# Patient Record
Sex: MALE | Race: WHITE | NOT HISPANIC OR LATINO
[De-identification: names, ages, dates, MRNs, and addresses within clinical notes are randomized per-mention and may not be internally consistent; named-entity substitution may affect disease eponyms.]

---

## 2016-08-18 RX ORDER — AMLODIPINE BESYLATE 2.5 MG/1
2 TABLET ORAL
Qty: 0 | Refills: 0 | DISCHARGE
Start: 2016-08-18 | End: 2016-09-17

## 2017-03-09 ENCOUNTER — NON-APPOINTMENT (OUTPATIENT)
Age: 77
End: 2017-03-09

## 2017-03-09 ENCOUNTER — LABORATORY RESULT (OUTPATIENT)
Age: 77
End: 2017-03-09

## 2017-03-09 ENCOUNTER — APPOINTMENT (OUTPATIENT)
Dept: CARDIOLOGY | Facility: CLINIC | Age: 77
End: 2017-03-09

## 2017-03-09 VITALS
SYSTOLIC BLOOD PRESSURE: 161 MMHG | DIASTOLIC BLOOD PRESSURE: 79 MMHG | WEIGHT: 150 LBS | BODY MASS INDEX: 25.61 KG/M2 | OXYGEN SATURATION: 96 % | HEART RATE: 70 BPM | HEIGHT: 64 IN

## 2017-03-14 LAB
ALBUMIN SERPL ELPH-MCNC: 4.8 G/DL
ALP BLD-CCNC: 68 U/L
ALT SERPL-CCNC: 23 U/L
ANION GAP SERPL CALC-SCNC: 12 MMOL/L
AST SERPL-CCNC: 36 U/L
BASOPHILS # BLD AUTO: 0.01 K/UL
BASOPHILS NFR BLD AUTO: 0.1 %
BILIRUB SERPL-MCNC: 0.4 MG/DL
BUN SERPL-MCNC: 26 MG/DL
CALCIUM SERPL-MCNC: 10 MG/DL
CHLORIDE SERPL-SCNC: 102 MMOL/L
CHOLEST SERPL-MCNC: 148 MG/DL
CHOLEST/HDLC SERPL: 2.1 RATIO
CO2 SERPL-SCNC: 25 MMOL/L
CREAT SERPL-MCNC: 1.05 MG/DL
EOSINOPHIL # BLD AUTO: 0.18 K/UL
EOSINOPHIL NFR BLD AUTO: 2.2 %
GLUCOSE SERPL-MCNC: 107 MG/DL
HBA1C MFR BLD HPLC: 5.9 %
HCT VFR BLD CALC: 40.9 %
HDLC SERPL-MCNC: 70 MG/DL
HGB BLD-MCNC: 13.7 G/DL
IMM GRANULOCYTES NFR BLD AUTO: 0.1 %
INR PPP: 2.77 RATIO
LDLC SERPL CALC-MCNC: 66 MG/DL
LYMPHOCYTES # BLD AUTO: 1.49 K/UL
LYMPHOCYTES NFR BLD AUTO: 18.4 %
MAN DIFF?: NORMAL
MCHC RBC-ENTMCNC: 33.5 GM/DL
MCHC RBC-ENTMCNC: 35.4 PG
MCV RBC AUTO: 105.7 FL
MONOCYTES # BLD AUTO: 0.48 K/UL
MONOCYTES NFR BLD AUTO: 5.9 %
NEUTROPHILS # BLD AUTO: 5.91 K/UL
NEUTROPHILS NFR BLD AUTO: 73.3 %
PLATELET # BLD AUTO: 277 K/UL
POTASSIUM SERPL-SCNC: 5.8 MMOL/L
PROT SERPL-MCNC: 8.2 G/DL
PSA FREE FLD-MCNC: 15.4 %
PSA FREE SERPL-MCNC: 0.05 NG/ML
PSA SERPL-MCNC: 0.34 NG/ML
PT BLD: 31.6 SEC
RBC # BLD: 3.87 M/UL
RBC # FLD: 14.2 %
SODIUM SERPL-SCNC: 139 MMOL/L
TRIGL SERPL-MCNC: 58 MG/DL
TSH SERPL-ACNC: 2.24 UIU/ML
WBC # FLD AUTO: 8.08 K/UL

## 2017-06-22 ENCOUNTER — APPOINTMENT (OUTPATIENT)
Dept: CARDIOLOGY | Facility: CLINIC | Age: 77
End: 2017-06-22

## 2017-06-22 ENCOUNTER — NON-APPOINTMENT (OUTPATIENT)
Age: 77
End: 2017-06-22

## 2017-06-22 VITALS — DIASTOLIC BLOOD PRESSURE: 74 MMHG | SYSTOLIC BLOOD PRESSURE: 157 MMHG

## 2017-06-22 VITALS
OXYGEN SATURATION: 96 % | HEART RATE: 64 BPM | BODY MASS INDEX: 26.12 KG/M2 | DIASTOLIC BLOOD PRESSURE: 79 MMHG | SYSTOLIC BLOOD PRESSURE: 166 MMHG | HEIGHT: 64 IN | WEIGHT: 153 LBS

## 2017-06-23 LAB
INR PPP: 1.79 RATIO
PSA FREE FLD-MCNC: 17.1
PSA FREE SERPL-MCNC: 0.07 NG/ML
PSA SERPL-MCNC: 0.41 NG/ML
PT BLD: 20.5 SEC

## 2017-09-25 ENCOUNTER — TRANSCRIPTION ENCOUNTER (OUTPATIENT)
Age: 77
End: 2017-09-25

## 2017-10-10 ENCOUNTER — APPOINTMENT (OUTPATIENT)
Dept: ULTRASOUND IMAGING | Facility: HOSPITAL | Age: 77
End: 2017-10-10
Payer: MEDICARE

## 2017-10-10 ENCOUNTER — OUTPATIENT (OUTPATIENT)
Dept: OUTPATIENT SERVICES | Facility: HOSPITAL | Age: 77
LOS: 1 days | End: 2017-10-10
Payer: MEDICARE

## 2017-10-10 ENCOUNTER — APPOINTMENT (OUTPATIENT)
Dept: CV DIAGNOSITCS | Facility: HOSPITAL | Age: 77
End: 2017-10-10
Payer: MEDICARE

## 2017-10-10 DIAGNOSIS — Z98.89 OTHER SPECIFIED POSTPROCEDURAL STATES: Chronic | ICD-10-CM

## 2017-10-10 DIAGNOSIS — Z00.00 ENCOUNTER FOR GENERAL ADULT MEDICAL EXAMINATION WITHOUT ABNORMAL FINDINGS: ICD-10-CM

## 2017-10-10 DIAGNOSIS — Z90.79 ACQUIRED ABSENCE OF OTHER GENITAL ORGAN(S): Chronic | ICD-10-CM

## 2017-10-10 DIAGNOSIS — I35.0 NONRHEUMATIC AORTIC (VALVE) STENOSIS: ICD-10-CM

## 2017-10-10 DIAGNOSIS — Z95.1 PRESENCE OF AORTOCORONARY BYPASS GRAFT: Chronic | ICD-10-CM

## 2017-10-10 DIAGNOSIS — Z90.89 ACQUIRED ABSENCE OF OTHER ORGANS: Chronic | ICD-10-CM

## 2017-10-10 PROCEDURE — 93306 TTE W/DOPPLER COMPLETE: CPT | Mod: 26

## 2017-10-10 PROCEDURE — 93923 UPR/LXTR ART STDY 3+ LVLS: CPT | Mod: 26

## 2017-10-10 PROCEDURE — 93306 TTE W/DOPPLER COMPLETE: CPT

## 2017-10-10 PROCEDURE — 93923 UPR/LXTR ART STDY 3+ LVLS: CPT

## 2017-10-19 ENCOUNTER — APPOINTMENT (OUTPATIENT)
Dept: CARDIOLOGY | Facility: CLINIC | Age: 77
End: 2017-10-19
Payer: MEDICARE

## 2017-10-19 ENCOUNTER — NON-APPOINTMENT (OUTPATIENT)
Age: 77
End: 2017-10-19

## 2017-10-19 ENCOUNTER — LABORATORY RESULT (OUTPATIENT)
Age: 77
End: 2017-10-19

## 2017-10-19 VITALS
OXYGEN SATURATION: 97 % | HEIGHT: 64 IN | HEART RATE: 63 BPM | DIASTOLIC BLOOD PRESSURE: 73 MMHG | SYSTOLIC BLOOD PRESSURE: 163 MMHG

## 2017-10-19 PROCEDURE — 93000 ELECTROCARDIOGRAM COMPLETE: CPT

## 2017-10-19 PROCEDURE — 99215 OFFICE O/P EST HI 40 MIN: CPT

## 2017-10-20 LAB
25(OH)D3 SERPL-MCNC: 55.3 NG/ML
ALBUMIN SERPL ELPH-MCNC: 4.7 G/DL
ALP BLD-CCNC: 67 U/L
ALT SERPL-CCNC: 28 U/L
ANION GAP SERPL CALC-SCNC: 14 MMOL/L
AST SERPL-CCNC: 31 U/L
BASOPHILS # BLD AUTO: 0 K/UL
BASOPHILS NFR BLD AUTO: 0 %
BILIRUB SERPL-MCNC: 0.4 MG/DL
BUN SERPL-MCNC: 23 MG/DL
CALCIUM SERPL-MCNC: 10.2 MG/DL
CHLORIDE SERPL-SCNC: 103 MMOL/L
CHOLEST SERPL-MCNC: 148 MG/DL
CHOLEST/HDLC SERPL: 2.1 RATIO
CO2 SERPL-SCNC: 25 MMOL/L
CREAT SERPL-MCNC: 1.08 MG/DL
EOSINOPHIL # BLD AUTO: 0.28 K/UL
EOSINOPHIL NFR BLD AUTO: 4.4 %
GLUCOSE SERPL-MCNC: 107 MG/DL
HBA1C MFR BLD HPLC: 5.7 %
HCT VFR BLD CALC: 42 %
HDLC SERPL-MCNC: 69 MG/DL
HGB BLD-MCNC: 14.4 G/DL
INR PPP: 2.98 RATIO
LDLC SERPL CALC-MCNC: 69 MG/DL
LYMPHOCYTES # BLD AUTO: 0.72 K/UL
LYMPHOCYTES NFR BLD AUTO: 11.4 %
MAN DIFF?: NORMAL
MCHC RBC-ENTMCNC: 34.3 GM/DL
MCHC RBC-ENTMCNC: 36.5 PG
MCV RBC AUTO: 106.3 FL
MONOCYTES # BLD AUTO: 0.56 K/UL
MONOCYTES NFR BLD AUTO: 8.8 %
NEUTROPHILS # BLD AUTO: 4.77 K/UL
NEUTROPHILS NFR BLD AUTO: 75.4 %
PLATELET # BLD AUTO: 314 K/UL
POTASSIUM SERPL-SCNC: 5.3 MMOL/L
PROT SERPL-MCNC: 8.1 G/DL
PT BLD: 34.4 SEC
RBC # BLD: 3.95 M/UL
RBC # FLD: 14 %
SODIUM SERPL-SCNC: 142 MMOL/L
TRIGL SERPL-MCNC: 51 MG/DL
TSH SERPL-ACNC: 2.02 UIU/ML
WBC # FLD AUTO: 6.33 K/UL

## 2017-10-24 ENCOUNTER — APPOINTMENT (OUTPATIENT)
Dept: ULTRASOUND IMAGING | Facility: HOSPITAL | Age: 77
End: 2017-10-24

## 2017-10-24 ENCOUNTER — OUTPATIENT (OUTPATIENT)
Dept: OUTPATIENT SERVICES | Facility: HOSPITAL | Age: 77
LOS: 1 days | End: 2017-10-24
Payer: MEDICARE

## 2017-10-24 DIAGNOSIS — Z90.89 ACQUIRED ABSENCE OF OTHER ORGANS: Chronic | ICD-10-CM

## 2017-10-24 DIAGNOSIS — Z95.1 PRESENCE OF AORTOCORONARY BYPASS GRAFT: Chronic | ICD-10-CM

## 2017-10-24 DIAGNOSIS — I25.10 ATHEROSCLEROTIC HEART DISEASE OF NATIVE CORONARY ARTERY WITHOUT ANGINA PECTORIS: ICD-10-CM

## 2017-10-24 DIAGNOSIS — Z90.79 ACQUIRED ABSENCE OF OTHER GENITAL ORGAN(S): Chronic | ICD-10-CM

## 2017-10-24 DIAGNOSIS — Z98.89 OTHER SPECIFIED POSTPROCEDURAL STATES: Chronic | ICD-10-CM

## 2017-10-24 PROCEDURE — 93880 EXTRACRANIAL BILAT STUDY: CPT | Mod: 26

## 2017-10-24 PROCEDURE — 93880 EXTRACRANIAL BILAT STUDY: CPT

## 2018-02-08 ENCOUNTER — APPOINTMENT (OUTPATIENT)
Dept: PULMONOLOGY | Facility: CLINIC | Age: 78
End: 2018-02-08
Payer: MEDICARE

## 2018-02-08 VITALS
TEMPERATURE: 207.86 F | HEIGHT: 64 IN | OXYGEN SATURATION: 92 % | BODY MASS INDEX: 25.61 KG/M2 | DIASTOLIC BLOOD PRESSURE: 70 MMHG | SYSTOLIC BLOOD PRESSURE: 138 MMHG | WEIGHT: 150 LBS | RESPIRATION RATE: 15 BRPM | HEART RATE: 61 BPM

## 2018-02-08 PROCEDURE — 99214 OFFICE O/P EST MOD 30 MIN: CPT

## 2018-02-08 RX ORDER — PNEUMOCOCCAL 23-VAL P-SAC VAC 25MCG/0.5
25 VIAL (ML) INJECTION
Qty: 1 | Refills: 0 | Status: COMPLETED | COMMUNITY
Start: 2017-12-11

## 2018-02-08 RX ORDER — FLUTICASONE PROPIONATE AND SALMETEROL 50; 250 UG/1; UG/1
250-50 POWDER RESPIRATORY (INHALATION)
Qty: 60 | Refills: 0 | Status: COMPLETED | COMMUNITY
Start: 2016-09-21

## 2018-03-01 ENCOUNTER — NON-APPOINTMENT (OUTPATIENT)
Age: 78
End: 2018-03-01

## 2018-03-01 ENCOUNTER — APPOINTMENT (OUTPATIENT)
Dept: CARDIOLOGY | Facility: CLINIC | Age: 78
End: 2018-03-01
Payer: MEDICARE

## 2018-03-01 ENCOUNTER — LABORATORY RESULT (OUTPATIENT)
Age: 78
End: 2018-03-01

## 2018-03-01 VITALS — SYSTOLIC BLOOD PRESSURE: 150 MMHG | DIASTOLIC BLOOD PRESSURE: 68 MMHG

## 2018-03-01 VITALS
SYSTOLIC BLOOD PRESSURE: 160 MMHG | HEART RATE: 89 BPM | DIASTOLIC BLOOD PRESSURE: 67 MMHG | BODY MASS INDEX: 25.61 KG/M2 | HEIGHT: 64 IN | OXYGEN SATURATION: 95 % | WEIGHT: 150 LBS

## 2018-03-01 PROCEDURE — 93000 ELECTROCARDIOGRAM COMPLETE: CPT

## 2018-03-01 PROCEDURE — 99214 OFFICE O/P EST MOD 30 MIN: CPT

## 2018-03-01 RX ORDER — WARFARIN 4 MG/1
4 TABLET ORAL
Qty: 90 | Refills: 0 | Status: DISCONTINUED | COMMUNITY
Start: 2016-11-17 | End: 2018-03-01

## 2018-03-06 LAB
25(OH)D3 SERPL-MCNC: 47.8 NG/ML
ALBUMIN SERPL ELPH-MCNC: 4.8 G/DL
ALP BLD-CCNC: 64 U/L
ALT SERPL-CCNC: 29 U/L
ANION GAP SERPL CALC-SCNC: 20 MMOL/L
AST SERPL-CCNC: 45 U/L
BASOPHILS # BLD AUTO: 0 K/UL
BASOPHILS NFR BLD AUTO: 0 %
BILIRUB SERPL-MCNC: 0.2 MG/DL
BUN SERPL-MCNC: 24 MG/DL
CALCIUM SERPL-MCNC: 10.2 MG/DL
CHLORIDE SERPL-SCNC: 103 MMOL/L
CHOLEST SERPL-MCNC: 150 MG/DL
CHOLEST/HDLC SERPL: 2 RATIO
CO2 SERPL-SCNC: 18 MMOL/L
CREAT SERPL-MCNC: 1.25 MG/DL
EOSINOPHIL # BLD AUTO: 0.18 K/UL
EOSINOPHIL NFR BLD AUTO: 2.5
GLUCOSE SERPL-MCNC: 94 MG/DL
HBA1C MFR BLD HPLC: 5.5 %
HCT VFR BLD CALC: 41.1 %
HDLC SERPL-MCNC: 74 MG/DL
HGB BLD-MCNC: 13.2 G/DL
INR PPP: 3.58 RATIO
LDLC SERPL CALC-MCNC: 66 MG/DL
LYMPHOCYTES # BLD AUTO: 0.67 K/UL
LYMPHOCYTES NFR BLD AUTO: 9.3 %
MAN DIFF?: NORMAL
MCHC RBC-ENTMCNC: 32.1 GM/DL
MCHC RBC-ENTMCNC: 34.7 PG
MCV RBC AUTO: 108.2 FL
MONOCYTES # BLD AUTO: 0.85 K/UL
MONOCYTES NFR BLD AUTO: 11.9 %
NEUTROPHILS # BLD AUTO: 5.46 K/UL
NEUTROPHILS NFR BLD AUTO: 76.3 %
PLATELET # BLD AUTO: 299 K/UL
POTASSIUM SERPL-SCNC: 6.3 MMOL/L
PROT SERPL-MCNC: 7.9 G/DL
PSA FREE FLD-MCNC: 20.3
PSA FREE SERPL-MCNC: 0.13 NG/ML
PSA SERPL-MCNC: 0.64 NG/ML
PT BLD: 41.5 SEC
RBC # BLD: 3.8 M/UL
RBC # FLD: 14 %
SODIUM SERPL-SCNC: 141 MMOL/L
TRIGL SERPL-MCNC: 50 MG/DL
TSH SERPL-ACNC: 2.5 UIU/ML
WBC # FLD AUTO: 7.16 K/UL

## 2018-03-08 LAB
ALBUMIN SERPL ELPH-MCNC: 4.4 G/DL
ALP BLD-CCNC: 59 U/L
ALT SERPL-CCNC: 27 U/L
ANION GAP SERPL CALC-SCNC: 14 MMOL/L
AST SERPL-CCNC: 34 U/L
BILIRUB SERPL-MCNC: 0.4 MG/DL
BUN SERPL-MCNC: 23 MG/DL
CALCIUM SERPL-MCNC: 9.9 MG/DL
CHLORIDE SERPL-SCNC: 106 MMOL/L
CHOLEST SERPL-MCNC: 151 MG/DL
CHOLEST/HDLC SERPL: 2.2 RATIO
CO2 SERPL-SCNC: 20 MMOL/L
CREAT SERPL-MCNC: 1.1 MG/DL
GLUCOSE SERPL-MCNC: 102 MG/DL
HDLC SERPL-MCNC: 69 MG/DL
LDLC SERPL CALC-MCNC: 74 MG/DL
POTASSIUM SERPL-SCNC: 5.2 MMOL/L
PROT SERPL-MCNC: 8 G/DL
SODIUM SERPL-SCNC: 140 MMOL/L
TRIGL SERPL-MCNC: 40 MG/DL

## 2018-03-12 ENCOUNTER — APPOINTMENT (OUTPATIENT)
Dept: PULMONOLOGY | Facility: CLINIC | Age: 78
End: 2018-03-12
Payer: MEDICARE

## 2018-03-12 VITALS
DIASTOLIC BLOOD PRESSURE: 90 MMHG | WEIGHT: 158 LBS | OXYGEN SATURATION: 96 % | TEMPERATURE: 96.3 F | BODY MASS INDEX: 26.98 KG/M2 | HEART RATE: 64 BPM | HEIGHT: 64 IN | SYSTOLIC BLOOD PRESSURE: 140 MMHG | RESPIRATION RATE: 16 BRPM

## 2018-03-12 PROCEDURE — 94726 PLETHYSMOGRAPHY LUNG VOLUMES: CPT

## 2018-03-12 PROCEDURE — 99214 OFFICE O/P EST MOD 30 MIN: CPT | Mod: 25

## 2018-03-12 PROCEDURE — 94060 EVALUATION OF WHEEZING: CPT

## 2018-03-12 PROCEDURE — ZZZZZ: CPT

## 2018-03-12 PROCEDURE — 94729 DIFFUSING CAPACITY: CPT

## 2018-03-13 ENCOUNTER — APPOINTMENT (OUTPATIENT)
Dept: ULTRASOUND IMAGING | Facility: HOSPITAL | Age: 78
End: 2018-03-13
Payer: MEDICARE

## 2018-03-15 ENCOUNTER — APPOINTMENT (OUTPATIENT)
Dept: ULTRASOUND IMAGING | Facility: HOSPITAL | Age: 78
End: 2018-03-15
Payer: MEDICARE

## 2018-03-15 ENCOUNTER — OUTPATIENT (OUTPATIENT)
Dept: OUTPATIENT SERVICES | Facility: HOSPITAL | Age: 78
LOS: 1 days | End: 2018-03-15
Payer: MEDICARE

## 2018-03-15 DIAGNOSIS — Z98.89 OTHER SPECIFIED POSTPROCEDURAL STATES: Chronic | ICD-10-CM

## 2018-03-15 DIAGNOSIS — I10 ESSENTIAL (PRIMARY) HYPERTENSION: ICD-10-CM

## 2018-03-15 DIAGNOSIS — R06.09 OTHER FORMS OF DYSPNEA: ICD-10-CM

## 2018-03-15 DIAGNOSIS — Z00.00 ENCOUNTER FOR GENERAL ADULT MEDICAL EXAMINATION WITHOUT ABNORMAL FINDINGS: ICD-10-CM

## 2018-03-15 DIAGNOSIS — I25.10 ATHEROSCLEROTIC HEART DISEASE OF NATIVE CORONARY ARTERY WITHOUT ANGINA PECTORIS: ICD-10-CM

## 2018-03-15 DIAGNOSIS — Z90.89 ACQUIRED ABSENCE OF OTHER ORGANS: Chronic | ICD-10-CM

## 2018-03-15 DIAGNOSIS — Z95.1 PRESENCE OF AORTOCORONARY BYPASS GRAFT: Chronic | ICD-10-CM

## 2018-03-15 DIAGNOSIS — Z90.79 ACQUIRED ABSENCE OF OTHER GENITAL ORGAN(S): Chronic | ICD-10-CM

## 2018-03-15 PROCEDURE — 76770 US EXAM ABDO BACK WALL COMP: CPT | Mod: 26

## 2018-03-15 PROCEDURE — 76775 US EXAM ABDO BACK WALL LIM: CPT

## 2018-06-07 ENCOUNTER — APPOINTMENT (OUTPATIENT)
Dept: CARDIOLOGY | Facility: CLINIC | Age: 78
End: 2018-06-07
Payer: MEDICARE

## 2018-06-07 ENCOUNTER — NON-APPOINTMENT (OUTPATIENT)
Age: 78
End: 2018-06-07

## 2018-06-07 VITALS
BODY MASS INDEX: 25.61 KG/M2 | HEART RATE: 62 BPM | SYSTOLIC BLOOD PRESSURE: 161 MMHG | HEIGHT: 64 IN | WEIGHT: 150 LBS | OXYGEN SATURATION: 95 % | DIASTOLIC BLOOD PRESSURE: 74 MMHG

## 2018-06-07 PROCEDURE — 99214 OFFICE O/P EST MOD 30 MIN: CPT

## 2018-06-07 PROCEDURE — 93000 ELECTROCARDIOGRAM COMPLETE: CPT

## 2018-06-08 LAB
INR PPP: 3.7 RATIO
PSA FREE FLD-MCNC: 20.3
PSA FREE SERPL-MCNC: 0.16 NG/ML
PSA SERPL-MCNC: 0.79 NG/ML
PT BLD: 42.9 SEC

## 2018-09-27 ENCOUNTER — LABORATORY RESULT (OUTPATIENT)
Age: 78
End: 2018-09-27

## 2018-09-27 ENCOUNTER — APPOINTMENT (OUTPATIENT)
Dept: CARDIOLOGY | Facility: CLINIC | Age: 78
End: 2018-09-27
Payer: MEDICARE

## 2018-09-27 ENCOUNTER — NON-APPOINTMENT (OUTPATIENT)
Age: 78
End: 2018-09-27

## 2018-09-27 VITALS — OXYGEN SATURATION: 96 % | HEART RATE: 78 BPM | SYSTOLIC BLOOD PRESSURE: 146 MMHG | DIASTOLIC BLOOD PRESSURE: 79 MMHG

## 2018-09-27 PROCEDURE — 99214 OFFICE O/P EST MOD 30 MIN: CPT

## 2018-09-27 PROCEDURE — 93000 ELECTROCARDIOGRAM COMPLETE: CPT

## 2018-10-02 LAB
ALBUMIN SERPL ELPH-MCNC: 4.5 G/DL
ALP BLD-CCNC: 75 U/L
ALT SERPL-CCNC: 16 U/L
ANION GAP SERPL CALC-SCNC: 15 MMOL/L
APO LP(A) SERPL-MCNC: 17 NMOL/L
AST SERPL-CCNC: 31 U/L
BASOPHILS # BLD AUTO: 0 K/UL
BASOPHILS NFR BLD AUTO: 0 %
BILIRUB SERPL-MCNC: 0.3 MG/DL
BUN SERPL-MCNC: 22 MG/DL
CALCIUM SERPL-MCNC: 9.7 MG/DL
CHLORIDE SERPL-SCNC: 105 MMOL/L
CHOLEST SERPL-MCNC: 133 MG/DL
CHOLEST/HDLC SERPL: 2.2 RATIO
CO2 SERPL-SCNC: 22 MMOL/L
CREAT SERPL-MCNC: 1.06 MG/DL
CRP SERPL HS-MCNC: 6.3 MG/L
EOSINOPHIL # BLD AUTO: 0.17 K/UL
EOSINOPHIL NFR BLD AUTO: 3 %
GLUCOSE SERPL-MCNC: 105 MG/DL
HBA1C MFR BLD HPLC: 5.4 %
HCT VFR BLD CALC: 34 %
HDLC SERPL-MCNC: 60 MG/DL
HGB BLD-MCNC: 11.1 G/DL
INR PPP: 3.6 RATIO
LDLC SERPL CALC-MCNC: 63 MG/DL
LYMPHOCYTES # BLD AUTO: 0.62 K/UL
LYMPHOCYTES NFR BLD AUTO: 11 %
MAN DIFF?: NORMAL
MCHC RBC-ENTMCNC: 32.6 GM/DL
MCHC RBC-ENTMCNC: 35.7 PG
MCV RBC AUTO: 109.3 FL
MONOCYTES # BLD AUTO: 0.28 K/UL
MONOCYTES NFR BLD AUTO: 5 %
NEUTROPHILS # BLD AUTO: 4.59 K/UL
NEUTROPHILS NFR BLD AUTO: 81 %
PLATELET # BLD AUTO: 354 K/UL
POTASSIUM SERPL-SCNC: 5.4 MMOL/L
PROT SERPL-MCNC: 8 G/DL
PSA SERPL-MCNC: 1.11 NG/ML
PT BLD: 41.8 SEC
RBC # BLD: 3.11 M/UL
RBC # FLD: 16.7 %
SODIUM SERPL-SCNC: 142 MMOL/L
TRIGL SERPL-MCNC: 51 MG/DL
WBC # FLD AUTO: 5.67 K/UL

## 2018-10-15 ENCOUNTER — APPOINTMENT (OUTPATIENT)
Dept: ULTRASOUND IMAGING | Facility: HOSPITAL | Age: 78
End: 2018-10-15

## 2018-10-15 ENCOUNTER — APPOINTMENT (OUTPATIENT)
Dept: CV DIAGNOSITCS | Facility: HOSPITAL | Age: 78
End: 2018-10-15

## 2018-10-15 ENCOUNTER — OUTPATIENT (OUTPATIENT)
Dept: OUTPATIENT SERVICES | Facility: HOSPITAL | Age: 78
LOS: 1 days | End: 2018-10-15
Payer: MEDICARE

## 2018-10-15 DIAGNOSIS — Z98.89 OTHER SPECIFIED POSTPROCEDURAL STATES: Chronic | ICD-10-CM

## 2018-10-15 DIAGNOSIS — Z95.1 PRESENCE OF AORTOCORONARY BYPASS GRAFT: Chronic | ICD-10-CM

## 2018-10-15 DIAGNOSIS — J44.9 CHRONIC OBSTRUCTIVE PULMONARY DISEASE, UNSPECIFIED: ICD-10-CM

## 2018-10-15 DIAGNOSIS — R06.09 OTHER FORMS OF DYSPNEA: ICD-10-CM

## 2018-10-15 DIAGNOSIS — Z00.00 ENCOUNTER FOR GENERAL ADULT MEDICAL EXAMINATION WITHOUT ABNORMAL FINDINGS: ICD-10-CM

## 2018-10-15 DIAGNOSIS — Z90.89 ACQUIRED ABSENCE OF OTHER ORGANS: Chronic | ICD-10-CM

## 2018-10-15 DIAGNOSIS — Z90.79 ACQUIRED ABSENCE OF OTHER GENITAL ORGAN(S): Chronic | ICD-10-CM

## 2018-10-15 PROCEDURE — 93923 UPR/LXTR ART STDY 3+ LVLS: CPT

## 2018-10-15 PROCEDURE — 93306 TTE W/DOPPLER COMPLETE: CPT

## 2018-10-15 PROCEDURE — 93923 UPR/LXTR ART STDY 3+ LVLS: CPT | Mod: 26

## 2018-10-15 PROCEDURE — 93306 TTE W/DOPPLER COMPLETE: CPT | Mod: 26

## 2018-11-06 ENCOUNTER — APPOINTMENT (OUTPATIENT)
Dept: UROLOGY | Facility: CLINIC | Age: 78
End: 2018-11-06
Payer: MEDICARE

## 2018-11-06 PROCEDURE — 99214 OFFICE O/P EST MOD 30 MIN: CPT

## 2018-12-20 ENCOUNTER — APPOINTMENT (OUTPATIENT)
Dept: CARDIOLOGY | Facility: CLINIC | Age: 78
End: 2018-12-20
Payer: MEDICARE

## 2018-12-20 ENCOUNTER — NON-APPOINTMENT (OUTPATIENT)
Age: 78
End: 2018-12-20

## 2018-12-20 VITALS
HEART RATE: 64 BPM | DIASTOLIC BLOOD PRESSURE: 81 MMHG | BODY MASS INDEX: 25.61 KG/M2 | OXYGEN SATURATION: 97 % | SYSTOLIC BLOOD PRESSURE: 181 MMHG | HEIGHT: 64 IN | WEIGHT: 150 LBS

## 2018-12-20 LAB
25(OH)D3 SERPL-MCNC: 34.6 NG/ML
ALBUMIN SERPL ELPH-MCNC: 4.6 G/DL
ALP BLD-CCNC: 88 U/L
ALT SERPL-CCNC: 58 U/L
ANION GAP SERPL CALC-SCNC: 11 MMOL/L
AST SERPL-CCNC: 62 U/L
BILIRUB SERPL-MCNC: 0.3 MG/DL
BUN SERPL-MCNC: 24 MG/DL
CALCIUM SERPL-MCNC: 9.5 MG/DL
CHLORIDE SERPL-SCNC: 105 MMOL/L
CHOLEST SERPL-MCNC: 142 MG/DL
CHOLEST/HDLC SERPL: 2.1 RATIO
CO2 SERPL-SCNC: 24 MMOL/L
CREAT SERPL-MCNC: 1.12 MG/DL
GLUCOSE SERPL-MCNC: 114 MG/DL
HBA1C MFR BLD HPLC: 5.8 %
HDLC SERPL-MCNC: 67 MG/DL
LDLC SERPL CALC-MCNC: 67 MG/DL
POTASSIUM SERPL-SCNC: 6.1 MMOL/L
PROT SERPL-MCNC: 8 G/DL
PSA SERPL-MCNC: 1.39 NG/ML
SODIUM SERPL-SCNC: 140 MMOL/L
TRIGL SERPL-MCNC: 38 MG/DL

## 2018-12-20 PROCEDURE — 93000 ELECTROCARDIOGRAM COMPLETE: CPT

## 2018-12-20 PROCEDURE — 99214 OFFICE O/P EST MOD 30 MIN: CPT

## 2018-12-20 NOTE — REASON FOR VISIT
[Follow-Up - Clinic] : a clinic follow-up of [Aortic Stenosis] : aortic stenosis [Atrial Fibrillation] : atrial fibrillation [Coronary Artery Disease] : coronary artery disease [Hypertension] : hypertension [Medication Management] : Medication management

## 2018-12-21 ENCOUNTER — MEDICATION RENEWAL (OUTPATIENT)
Age: 78
End: 2018-12-21

## 2018-12-23 NOTE — REVIEW OF SYSTEMS
[Feeling Fatigued] : feeling fatigued [Dyspnea on exertion] : dyspnea during exertion [Anxiety] : anxiety [Negative] : Heme/Lymph [Shortness Of Breath] : no shortness of breath

## 2018-12-23 NOTE — DISCUSSION/SUMMARY
[FreeTextEntry1] : Encouraged more exercise and walking\par Compliance to meds reviewed\par Smoking cessation discussed\par \par Labs ordered\par Recheck carotid US\par \par

## 2018-12-23 NOTE — ASSESSMENT
[FreeTextEntry1] : 74 y/o with HTN, Hchol, PAF on coumadin, Prostate Ca, CAD s/p CABG with GAN\par \par REcommended a r/l cath to further elucidate cause (CAD vs AS)

## 2018-12-23 NOTE — PHYSICAL EXAM
[General Appearance - Well Developed] : well developed [Normal Appearance] : normal appearance [Well Groomed] : well groomed [General Appearance - Well Nourished] : well nourished [No Deformities] : no deformities [General Appearance - In No Acute Distress] : no acute distress [Normal Conjunctiva] : the conjunctiva exhibited no abnormalities [Eyelids - No Xanthelasma] : the eyelids demonstrated no xanthelasmas [Normal Oral Mucosa] : normal oral mucosa [No Oral Pallor] : no oral pallor [No Oral Cyanosis] : no oral cyanosis [Normal Jugular Venous A Waves Present] : normal jugular venous A waves present [Normal Jugular Venous V Waves Present] : normal jugular venous V waves present [No Jugular Venous Snow A Waves] : no jugular venous snow A waves [Respiration, Rhythm And Depth] : normal respiratory rhythm and effort [Exaggerated Use Of Accessory Muscles For Inspiration] : no accessory muscle use [Diffuse Wheezing] : diffuse wheezing [Heart Rate And Rhythm] : heart rate and rhythm were normal [Heart Sounds] : normal S1 and S2 [Systolic grade ___/6] : A grade [unfilled]/6 systolic murmur was heard. [Abdomen Soft] : soft [Abdomen Tenderness] : non-tender [Abdomen Mass (___ Cm)] : no abdominal mass palpated [Abnormal Walk] : normal gait [Gait - Sufficient For Exercise Testing] : the gait was sufficient for exercise testing [Nail Clubbing] : no clubbing of the fingernails [Cyanosis, Localized] : no localized cyanosis [Petechial Hemorrhages (___cm)] : no petechial hemorrhages [Skin Color & Pigmentation] : normal skin color and pigmentation [] : no rash [No Venous Stasis] : no venous stasis [Skin Lesions] : no skin lesions [No Skin Ulcers] : no skin ulcer [No Xanthoma] : no  xanthoma was observed [Oriented To Time, Place, And Person] : oriented to person, place, and time [Affect] : the affect was normal [Mood] : the mood was normal [No Anxiety] : not feeling anxious [Edema] : no peripheral edema present

## 2018-12-23 NOTE — HISTORY OF PRESENT ILLNESS
[FreeTextEntry1] : Returning for follow-up\par Mild GAN, unchanged - still smoking \par \par No syncope\par No palpitations\par Compliant with meds\par No LE edema\par No chest pains \par

## 2019-01-01 ENCOUNTER — OUTPATIENT (OUTPATIENT)
Dept: OUTPATIENT SERVICES | Facility: HOSPITAL | Age: 79
LOS: 1 days | End: 2019-01-01
Payer: MEDICARE

## 2019-01-01 ENCOUNTER — RESULT REVIEW (OUTPATIENT)
Age: 79
End: 2019-01-01

## 2019-01-01 ENCOUNTER — OTHER (OUTPATIENT)
Age: 79
End: 2019-01-01

## 2019-01-01 ENCOUNTER — APPOINTMENT (OUTPATIENT)
Dept: CARDIOLOGY | Facility: CLINIC | Age: 79
End: 2019-01-01
Payer: MEDICARE

## 2019-01-01 ENCOUNTER — NON-APPOINTMENT (OUTPATIENT)
Age: 79
End: 2019-01-01

## 2019-01-01 ENCOUNTER — FORM ENCOUNTER (OUTPATIENT)
Age: 79
End: 2019-01-01

## 2019-01-01 ENCOUNTER — TRANSCRIPTION ENCOUNTER (OUTPATIENT)
Age: 79
End: 2019-01-01

## 2019-01-01 ENCOUNTER — INBOUND DOCUMENT (OUTPATIENT)
Age: 79
End: 2019-01-01

## 2019-01-01 ENCOUNTER — APPOINTMENT (OUTPATIENT)
Dept: PULMONOLOGY | Facility: CLINIC | Age: 79
End: 2019-01-01
Payer: MEDICARE

## 2019-01-01 ENCOUNTER — LABORATORY RESULT (OUTPATIENT)
Age: 79
End: 2019-01-01

## 2019-01-01 ENCOUNTER — APPOINTMENT (OUTPATIENT)
Dept: HEMATOLOGY ONCOLOGY | Facility: CLINIC | Age: 79
End: 2019-01-01
Payer: MEDICARE

## 2019-01-01 ENCOUNTER — OUTPATIENT (OUTPATIENT)
Dept: OUTPATIENT SERVICES | Facility: HOSPITAL | Age: 79
LOS: 1 days | Discharge: HOME | End: 2019-01-01

## 2019-01-01 ENCOUNTER — APPOINTMENT (OUTPATIENT)
Dept: NEUROLOGY | Facility: CLINIC | Age: 79
End: 2019-01-01
Payer: MEDICARE

## 2019-01-01 ENCOUNTER — INPATIENT (INPATIENT)
Facility: HOSPITAL | Age: 79
LOS: 12 days | Discharge: HOME | DRG: 981 | End: 2020-01-01
Attending: INTERNAL MEDICINE | Admitting: HOSPITALIST
Payer: MEDICARE

## 2019-01-01 ENCOUNTER — INPATIENT (INPATIENT)
Facility: HOSPITAL | Age: 79
LOS: 1 days | Discharge: HOME | DRG: 286 | End: 2019-12-05
Attending: STUDENT IN AN ORGANIZED HEALTH CARE EDUCATION/TRAINING PROGRAM | Admitting: INTERNAL MEDICINE
Payer: MEDICARE

## 2019-01-01 ENCOUNTER — APPOINTMENT (OUTPATIENT)
Dept: CARDIOLOGY | Facility: CLINIC | Age: 79
End: 2019-01-01

## 2019-01-01 VITALS — HEART RATE: 82 BPM | OXYGEN SATURATION: 95 % | DIASTOLIC BLOOD PRESSURE: 74 MMHG | SYSTOLIC BLOOD PRESSURE: 153 MMHG

## 2019-01-01 VITALS
OXYGEN SATURATION: 95 % | SYSTOLIC BLOOD PRESSURE: 139 MMHG | DIASTOLIC BLOOD PRESSURE: 89 MMHG | HEIGHT: 64 IN | HEART RATE: 90 BPM

## 2019-01-01 VITALS
HEART RATE: 75 BPM | WEIGHT: 150 LBS | TEMPERATURE: 97.9 F | OXYGEN SATURATION: 95 % | DIASTOLIC BLOOD PRESSURE: 79 MMHG | SYSTOLIC BLOOD PRESSURE: 119 MMHG | RESPIRATION RATE: 18 BRPM | HEIGHT: 64 IN | BODY MASS INDEX: 25.61 KG/M2

## 2019-01-01 VITALS
DIASTOLIC BLOOD PRESSURE: 65 MMHG | WEIGHT: 154.98 LBS | HEIGHT: 64 IN | HEART RATE: 65 BPM | SYSTOLIC BLOOD PRESSURE: 99 MMHG | TEMPERATURE: 97 F | RESPIRATION RATE: 20 BRPM | OXYGEN SATURATION: 99 %

## 2019-01-01 VITALS
DIASTOLIC BLOOD PRESSURE: 73 MMHG | HEIGHT: 63 IN | OXYGEN SATURATION: 97 % | RESPIRATION RATE: 20 BRPM | HEART RATE: 87 BPM | WEIGHT: 150.13 LBS | SYSTOLIC BLOOD PRESSURE: 154 MMHG | TEMPERATURE: 98 F

## 2019-01-01 VITALS
TEMPERATURE: 97.8 F | OXYGEN SATURATION: 99 % | WEIGHT: 138.89 LBS | SYSTOLIC BLOOD PRESSURE: 140 MMHG | HEART RATE: 76 BPM | DIASTOLIC BLOOD PRESSURE: 80 MMHG | BODY MASS INDEX: 24.01 KG/M2 | HEIGHT: 63.78 IN | RESPIRATION RATE: 17 BRPM

## 2019-01-01 VITALS
HEART RATE: 76 BPM | RESPIRATION RATE: 17 BRPM | WEIGHT: 148 LBS | BODY MASS INDEX: 25.27 KG/M2 | OXYGEN SATURATION: 95 % | SYSTOLIC BLOOD PRESSURE: 129 MMHG | TEMPERATURE: 97.2 F | DIASTOLIC BLOOD PRESSURE: 78 MMHG | HEIGHT: 64 IN

## 2019-01-01 VITALS
DIASTOLIC BLOOD PRESSURE: 87 MMHG | TEMPERATURE: 98 F | RESPIRATION RATE: 16 BRPM | HEART RATE: 92 BPM | SYSTOLIC BLOOD PRESSURE: 139 MMHG | OXYGEN SATURATION: 96 % | WEIGHT: 139.99 LBS | HEIGHT: 64 IN

## 2019-01-01 VITALS
SYSTOLIC BLOOD PRESSURE: 119 MMHG | OXYGEN SATURATION: 95 % | TEMPERATURE: 98 F | DIASTOLIC BLOOD PRESSURE: 74 MMHG | HEART RATE: 83 BPM | RESPIRATION RATE: 20 BRPM

## 2019-01-01 VITALS — SYSTOLIC BLOOD PRESSURE: 140 MMHG | HEART RATE: 82 BPM | OXYGEN SATURATION: 94 % | DIASTOLIC BLOOD PRESSURE: 80 MMHG

## 2019-01-01 VITALS — SYSTOLIC BLOOD PRESSURE: 129 MMHG | DIASTOLIC BLOOD PRESSURE: 80 MMHG | OXYGEN SATURATION: 93 %

## 2019-01-01 VITALS — DIASTOLIC BLOOD PRESSURE: 76 MMHG | SYSTOLIC BLOOD PRESSURE: 144 MMHG | HEART RATE: 90 BPM

## 2019-01-01 DIAGNOSIS — R06.02 SHORTNESS OF BREATH: ICD-10-CM

## 2019-01-01 DIAGNOSIS — Z95.2 PRESENCE OF PROSTHETIC HEART VALVE: Chronic | ICD-10-CM

## 2019-01-01 DIAGNOSIS — E78.00 PURE HYPERCHOLESTEROLEMIA, UNSPECIFIED: ICD-10-CM

## 2019-01-01 DIAGNOSIS — D64.9 ANEMIA, UNSPECIFIED: ICD-10-CM

## 2019-01-01 DIAGNOSIS — Z95.1 PRESENCE OF AORTOCORONARY BYPASS GRAFT: Chronic | ICD-10-CM

## 2019-01-01 DIAGNOSIS — I25.10 ATHEROSCLEROTIC HEART DISEASE OF NATIVE CORONARY ARTERY WITHOUT ANGINA PECTORIS: ICD-10-CM

## 2019-01-01 DIAGNOSIS — R06.09 OTHER FORMS OF DYSPNEA: ICD-10-CM

## 2019-01-01 DIAGNOSIS — Z90.79 ACQUIRED ABSENCE OF OTHER GENITAL ORGAN(S): Chronic | ICD-10-CM

## 2019-01-01 DIAGNOSIS — J44.9 CHRONIC OBSTRUCTIVE PULMONARY DISEASE, UNSPECIFIED: ICD-10-CM

## 2019-01-01 DIAGNOSIS — Z98.89 OTHER SPECIFIED POSTPROCEDURAL STATES: Chronic | ICD-10-CM

## 2019-01-01 DIAGNOSIS — D46.9 MYELODYSPLASTIC SYNDROME, UNSPECIFIED: ICD-10-CM

## 2019-01-01 DIAGNOSIS — D61.818 OTHER PANCYTOPENIA: ICD-10-CM

## 2019-01-01 DIAGNOSIS — I48.91 UNSPECIFIED ATRIAL FIBRILLATION: ICD-10-CM

## 2019-01-01 DIAGNOSIS — I49.3 VENTRICULAR PREMATURE DEPOLARIZATION: ICD-10-CM

## 2019-01-01 DIAGNOSIS — N17.9 ACUTE KIDNEY FAILURE, UNSPECIFIED: ICD-10-CM

## 2019-01-01 DIAGNOSIS — Z02.9 ENCOUNTER FOR ADMINISTRATIVE EXAMINATIONS, UNSPECIFIED: ICD-10-CM

## 2019-01-01 DIAGNOSIS — D75.89 OTHER SPECIFIED DISEASES OF BLOOD AND BLOOD-FORMING ORGANS: ICD-10-CM

## 2019-01-01 DIAGNOSIS — I65.23 OCCLUSION AND STENOSIS OF BILATERAL CAROTID ARTERIES: ICD-10-CM

## 2019-01-01 DIAGNOSIS — I48.20 CHRONIC ATRIAL FIBRILLATION, UNSP: ICD-10-CM

## 2019-01-01 DIAGNOSIS — Z90.89 ACQUIRED ABSENCE OF OTHER ORGANS: Chronic | ICD-10-CM

## 2019-01-01 DIAGNOSIS — I44.7 LEFT BUNDLE-BRANCH BLOCK, UNSPECIFIED: ICD-10-CM

## 2019-01-01 DIAGNOSIS — I10 ESSENTIAL (PRIMARY) HYPERTENSION: ICD-10-CM

## 2019-01-01 DIAGNOSIS — E87.5 HYPERKALEMIA: ICD-10-CM

## 2019-01-01 DIAGNOSIS — I50.22 CHRONIC SYSTOLIC (CONGESTIVE) HEART FAILURE: ICD-10-CM

## 2019-01-01 DIAGNOSIS — I65.29 OCCLUSION AND STENOSIS OF UNSPECIFIED CAROTID ARTERY: ICD-10-CM

## 2019-01-01 DIAGNOSIS — Z29.9 ENCOUNTER FOR PROPHYLACTIC MEASURES, UNSPECIFIED: ICD-10-CM

## 2019-01-01 DIAGNOSIS — I50.21 ACUTE SYSTOLIC (CONGESTIVE) HEART FAILURE: ICD-10-CM

## 2019-01-01 DIAGNOSIS — Z01.818 ENCOUNTER FOR OTHER PREPROCEDURAL EXAMINATION: ICD-10-CM

## 2019-01-01 DIAGNOSIS — Z87.891 PERSONAL HISTORY OF NICOTINE DEPENDENCE: ICD-10-CM

## 2019-01-01 DIAGNOSIS — R21 RASH AND OTHER NONSPECIFIC SKIN ERUPTION: ICD-10-CM

## 2019-01-01 DIAGNOSIS — R79.1 ABNORMAL COAGULATION PROFILE: ICD-10-CM

## 2019-01-01 DIAGNOSIS — I50.23 ACUTE ON CHRONIC SYSTOLIC (CONGESTIVE) HEART FAILURE: ICD-10-CM

## 2019-01-01 LAB
% ALBUMIN: 55.7 % — SIGNIFICANT CHANGE UP
% ALPHA 1: 5.4 % — SIGNIFICANT CHANGE UP
% ALPHA 2: 10.4 % — SIGNIFICANT CHANGE UP
% BETA: 8.3 % — SIGNIFICANT CHANGE UP
% GAMMA: 20.2 % — SIGNIFICANT CHANGE UP
% M SPIKE: 13.3 % — SIGNIFICANT CHANGE UP
ALBUMIN SERPL ELPH-MCNC: 3.3 G/DL — SIGNIFICANT CHANGE UP (ref 3.3–5)
ALBUMIN SERPL ELPH-MCNC: 3.5 G/DL — SIGNIFICANT CHANGE UP (ref 3.3–5)
ALBUMIN SERPL ELPH-MCNC: 3.5 G/DL — SIGNIFICANT CHANGE UP (ref 3.3–5)
ALBUMIN SERPL ELPH-MCNC: 3.6 G/DL — SIGNIFICANT CHANGE UP (ref 3.3–5)
ALBUMIN SERPL ELPH-MCNC: 3.6 G/DL — SIGNIFICANT CHANGE UP (ref 3.6–5.5)
ALBUMIN SERPL ELPH-MCNC: 3.8 G/DL
ALBUMIN SERPL ELPH-MCNC: 4.2 G/DL
ALBUMIN SERPL ELPH-MCNC: 4.2 G/DL — SIGNIFICANT CHANGE UP (ref 3.3–5)
ALBUMIN SERPL ELPH-MCNC: 4.2 G/DL — SIGNIFICANT CHANGE UP (ref 3.3–5)
ALBUMIN SERPL ELPH-MCNC: 4.4 G/DL
ALBUMIN SERPL ELPH-MCNC: 4.4 G/DL
ALBUMIN/GLOB SERPL ELPH: 1.3 RATIO — SIGNIFICANT CHANGE UP
ALP BLD-CCNC: 69 U/L
ALP BLD-CCNC: 75 U/L
ALP BLD-CCNC: 80 U/L
ALP BLD-CCNC: 86 U/L
ALP SERPL-CCNC: 100 U/L — SIGNIFICANT CHANGE UP (ref 40–120)
ALP SERPL-CCNC: 101 U/L — SIGNIFICANT CHANGE UP (ref 40–120)
ALP SERPL-CCNC: 124 U/L — HIGH (ref 40–120)
ALP SERPL-CCNC: 130 U/L — HIGH (ref 40–120)
ALP SERPL-CCNC: 73 U/L — SIGNIFICANT CHANGE UP (ref 40–120)
ALP SERPL-CCNC: 73 U/L — SIGNIFICANT CHANGE UP (ref 40–120)
ALPHA1 GLOB SERPL ELPH-MCNC: 0.3 G/DL — SIGNIFICANT CHANGE UP (ref 0.1–0.4)
ALPHA2 GLOB SERPL ELPH-MCNC: 0.7 G/DL — SIGNIFICANT CHANGE UP (ref 0.5–1)
ALT FLD-CCNC: 24 U/L — SIGNIFICANT CHANGE UP (ref 10–45)
ALT FLD-CCNC: 28 U/L — SIGNIFICANT CHANGE UP (ref 10–45)
ALT FLD-CCNC: 32 U/L — SIGNIFICANT CHANGE UP (ref 10–45)
ALT FLD-CCNC: 36 U/L — SIGNIFICANT CHANGE UP (ref 10–45)
ALT SERPL-CCNC: 20 U/L
ALT SERPL-CCNC: 21 U/L
ALT SERPL-CCNC: 21 U/L
ALT SERPL-CCNC: 22 U/L
AMMONIA BLD-MCNC: 43 UMOL/L — SIGNIFICANT CHANGE UP (ref 11–55)
ANA TITR SER: NEGATIVE — SIGNIFICANT CHANGE UP
ANION GAP SERPL CALC-SCNC: 11 MMOL/L
ANION GAP SERPL CALC-SCNC: 11 MMOL/L
ANION GAP SERPL CALC-SCNC: 11 MMOL/L — SIGNIFICANT CHANGE UP (ref 5–17)
ANION GAP SERPL CALC-SCNC: 12 MMOL/L — SIGNIFICANT CHANGE UP (ref 5–17)
ANION GAP SERPL CALC-SCNC: 13 MMOL/L
ANION GAP SERPL CALC-SCNC: 13 MMOL/L — SIGNIFICANT CHANGE UP (ref 5–17)
ANION GAP SERPL CALC-SCNC: 14 MMOL/L
ANION GAP SERPL CALC-SCNC: 14 MMOL/L — SIGNIFICANT CHANGE UP (ref 5–17)
ANION GAP SERPL CALC-SCNC: 15 MMOL/L — SIGNIFICANT CHANGE UP (ref 5–17)
ANION GAP SERPL CALC-SCNC: 16 MMOL/L — SIGNIFICANT CHANGE UP (ref 5–17)
ANION GAP SERPL CALC-SCNC: 17 MMOL/L — SIGNIFICANT CHANGE UP (ref 5–17)
ANION GAP SERPL CALC-SCNC: 17 MMOL/L — SIGNIFICANT CHANGE UP (ref 5–17)
ANION GAP SERPL CALC-SCNC: 18 MMOL/L — HIGH (ref 5–17)
ANION GAP SERPL CALC-SCNC: 19 MMOL/L — HIGH (ref 5–17)
ANISOCYTOSIS BLD QL: SLIGHT — SIGNIFICANT CHANGE UP
ANISOCYTOSIS BLD QL: SLIGHT — SIGNIFICANT CHANGE UP
APTT BLD: 32.7 SEC — SIGNIFICANT CHANGE UP (ref 27.5–36.3)
APTT BLD: 34 SEC — SIGNIFICANT CHANGE UP (ref 27.5–36.3)
APTT BLD: 49.2 SEC — HIGH (ref 27.5–36.3)
APTT BLD: 60.8 SEC — HIGH (ref 27.5–36.3)
AST SERPL-CCNC: 27 U/L — SIGNIFICANT CHANGE UP (ref 10–40)
AST SERPL-CCNC: 28 U/L
AST SERPL-CCNC: 28 U/L
AST SERPL-CCNC: 29 U/L
AST SERPL-CCNC: 29 U/L — SIGNIFICANT CHANGE UP (ref 10–40)
AST SERPL-CCNC: 32 U/L
AST SERPL-CCNC: 34 U/L — SIGNIFICANT CHANGE UP (ref 10–40)
AST SERPL-CCNC: 34 U/L — SIGNIFICANT CHANGE UP (ref 10–40)
AST SERPL-CCNC: 40 U/L — SIGNIFICANT CHANGE UP (ref 10–40)
AST SERPL-CCNC: 42 U/L — HIGH (ref 10–40)
B-GLOBULIN SERPL ELPH-MCNC: 0.5 G/DL — SIGNIFICANT CHANGE UP (ref 0.5–1)
BASOPHILS # BLD AUTO: 0 K/UL — SIGNIFICANT CHANGE UP (ref 0–0.2)
BASOPHILS # BLD AUTO: 0.01 K/UL
BASOPHILS # BLD AUTO: 0.01 K/UL
BASOPHILS # BLD AUTO: 0.01 K/UL — SIGNIFICANT CHANGE UP (ref 0–0.2)
BASOPHILS # BLD AUTO: 0.01 K/UL — SIGNIFICANT CHANGE UP (ref 0–0.2)
BASOPHILS NFR BLD AUTO: 0 % — SIGNIFICANT CHANGE UP (ref 0–2)
BASOPHILS NFR BLD AUTO: 0.2 % — SIGNIFICANT CHANGE UP (ref 0–2)
BASOPHILS NFR BLD AUTO: 0.2 % — SIGNIFICANT CHANGE UP (ref 0–2)
BASOPHILS NFR BLD AUTO: 0.3 %
BASOPHILS NFR BLD AUTO: 0.3 % — SIGNIFICANT CHANGE UP (ref 0–2)
BASOPHILS NFR BLD AUTO: 0.4 %
BASOPHILS NFR BLD AUTO: 0.7 % — SIGNIFICANT CHANGE UP (ref 0–2)
BILIRUB DIRECT SERPL-MCNC: 0.3 MG/DL — HIGH (ref 0–0.2)
BILIRUB INDIRECT FLD-MCNC: 0.5 MG/DL — SIGNIFICANT CHANGE UP (ref 0.2–1)
BILIRUB SERPL-MCNC: 0.4 MG/DL
BILIRUB SERPL-MCNC: 0.4 MG/DL — SIGNIFICANT CHANGE UP (ref 0.2–1.2)
BILIRUB SERPL-MCNC: 0.4 MG/DL — SIGNIFICANT CHANGE UP (ref 0.2–1.2)
BILIRUB SERPL-MCNC: 0.5 MG/DL
BILIRUB SERPL-MCNC: 0.5 MG/DL
BILIRUB SERPL-MCNC: 0.5 MG/DL — SIGNIFICANT CHANGE UP (ref 0.2–1.2)
BILIRUB SERPL-MCNC: 0.6 MG/DL
BILIRUB SERPL-MCNC: 0.6 MG/DL — SIGNIFICANT CHANGE UP (ref 0.2–1.2)
BILIRUB SERPL-MCNC: 0.8 MG/DL — SIGNIFICANT CHANGE UP (ref 0.2–1.2)
BILIRUB SERPL-MCNC: 0.8 MG/DL — SIGNIFICANT CHANGE UP (ref 0.2–1.2)
BLD GP AB SCN SERPL QL: NEGATIVE — SIGNIFICANT CHANGE UP
BLD GP AB SCN SERPL QL: NEGATIVE — SIGNIFICANT CHANGE UP
BUN SERPL-MCNC: 100 MG/DL — HIGH (ref 7–23)
BUN SERPL-MCNC: 100 MG/DL — HIGH (ref 7–23)
BUN SERPL-MCNC: 108 MG/DL — HIGH (ref 7–23)
BUN SERPL-MCNC: 109 MG/DL — HIGH (ref 7–23)
BUN SERPL-MCNC: 110 MG/DL — HIGH (ref 7–23)
BUN SERPL-MCNC: 113 MG/DL
BUN SERPL-MCNC: 26 MG/DL — HIGH (ref 7–23)
BUN SERPL-MCNC: 28 MG/DL — HIGH (ref 7–23)
BUN SERPL-MCNC: 33 MG/DL
BUN SERPL-MCNC: 34 MG/DL — HIGH (ref 7–23)
BUN SERPL-MCNC: 36 MG/DL — HIGH (ref 7–23)
BUN SERPL-MCNC: 37 MG/DL
BUN SERPL-MCNC: 39 MG/DL
BUN SERPL-MCNC: 39 MG/DL — HIGH (ref 7–23)
BUN SERPL-MCNC: 69 MG/DL — HIGH (ref 7–23)
BUN SERPL-MCNC: 75 MG/DL — HIGH (ref 7–23)
BUN SERPL-MCNC: 75 MG/DL — HIGH (ref 7–23)
BUN SERPL-MCNC: 76 MG/DL — HIGH (ref 7–23)
BUN SERPL-MCNC: 78 MG/DL — HIGH (ref 7–23)
BUN SERPL-MCNC: 79 MG/DL — HIGH (ref 7–23)
BUN SERPL-MCNC: 80 MG/DL — HIGH (ref 7–23)
BUN SERPL-MCNC: 89 MG/DL — HIGH (ref 7–23)
BUN SERPL-MCNC: 90 MG/DL — HIGH (ref 7–23)
BUN SERPL-MCNC: 94 MG/DL — HIGH (ref 7–23)
CALCIUM SERPL-MCNC: 8.7 MG/DL — SIGNIFICANT CHANGE UP (ref 8.4–10.5)
CALCIUM SERPL-MCNC: 8.8 MG/DL
CALCIUM SERPL-MCNC: 8.8 MG/DL — SIGNIFICANT CHANGE UP (ref 8.4–10.5)
CALCIUM SERPL-MCNC: 8.9 MG/DL — SIGNIFICANT CHANGE UP (ref 8.4–10.5)
CALCIUM SERPL-MCNC: 9 MG/DL
CALCIUM SERPL-MCNC: 9 MG/DL
CALCIUM SERPL-MCNC: 9 MG/DL — SIGNIFICANT CHANGE UP (ref 8.4–10.5)
CALCIUM SERPL-MCNC: 9 MG/DL — SIGNIFICANT CHANGE UP (ref 8.4–10.5)
CALCIUM SERPL-MCNC: 9.1 MG/DL — SIGNIFICANT CHANGE UP (ref 8.4–10.5)
CALCIUM SERPL-MCNC: 9.2 MG/DL — SIGNIFICANT CHANGE UP (ref 8.4–10.5)
CALCIUM SERPL-MCNC: 9.3 MG/DL
CALCIUM SERPL-MCNC: 9.3 MG/DL — SIGNIFICANT CHANGE UP (ref 8.4–10.5)
CALCIUM SERPL-MCNC: 9.4 MG/DL — SIGNIFICANT CHANGE UP (ref 8.4–10.5)
CALCIUM SERPL-MCNC: 9.4 MG/DL — SIGNIFICANT CHANGE UP (ref 8.4–10.5)
CALCIUM SERPL-MCNC: 9.6 MG/DL — SIGNIFICANT CHANGE UP (ref 8.4–10.5)
CHLORIDE SERPL-SCNC: 100 MMOL/L — SIGNIFICANT CHANGE UP (ref 96–108)
CHLORIDE SERPL-SCNC: 101 MMOL/L — SIGNIFICANT CHANGE UP (ref 96–108)
CHLORIDE SERPL-SCNC: 101 MMOL/L — SIGNIFICANT CHANGE UP (ref 96–108)
CHLORIDE SERPL-SCNC: 102 MMOL/L
CHLORIDE SERPL-SCNC: 102 MMOL/L — SIGNIFICANT CHANGE UP (ref 96–108)
CHLORIDE SERPL-SCNC: 102 MMOL/L — SIGNIFICANT CHANGE UP (ref 96–108)
CHLORIDE SERPL-SCNC: 103 MMOL/L — SIGNIFICANT CHANGE UP (ref 96–108)
CHLORIDE SERPL-SCNC: 104 MMOL/L — SIGNIFICANT CHANGE UP (ref 96–108)
CHLORIDE SERPL-SCNC: 105 MMOL/L — SIGNIFICANT CHANGE UP (ref 96–108)
CHLORIDE SERPL-SCNC: 105 MMOL/L — SIGNIFICANT CHANGE UP (ref 96–108)
CHLORIDE SERPL-SCNC: 106 MMOL/L — SIGNIFICANT CHANGE UP (ref 96–108)
CHLORIDE SERPL-SCNC: 107 MMOL/L
CHLORIDE SERPL-SCNC: 107 MMOL/L
CHLORIDE SERPL-SCNC: 109 MMOL/L
CHLORIDE SERPL-SCNC: 92 MMOL/L — LOW (ref 96–108)
CHLORIDE SERPL-SCNC: 93 MMOL/L — LOW (ref 96–108)
CHLORIDE SERPL-SCNC: 94 MMOL/L — LOW (ref 96–108)
CHLORIDE SERPL-SCNC: 97 MMOL/L — SIGNIFICANT CHANGE UP (ref 96–108)
CHLORIDE SERPL-SCNC: 97 MMOL/L — SIGNIFICANT CHANGE UP (ref 96–108)
CHLORIDE SERPL-SCNC: 99 MMOL/L — SIGNIFICANT CHANGE UP (ref 96–108)
CHOLEST SERPL-MCNC: 101 MG/DL
CHOLEST/HDLC SERPL: 1.9 RATIO
CHROM ANALY INTERPHASE BLD FISH-IMP: SIGNIFICANT CHANGE UP
CHROM ANALY OVERALL INTERP SPEC-IMP: SIGNIFICANT CHANGE UP
CO2 SERPL-SCNC: 18 MMOL/L
CO2 SERPL-SCNC: 20 MMOL/L — LOW (ref 22–31)
CO2 SERPL-SCNC: 21 MMOL/L — LOW (ref 22–31)
CO2 SERPL-SCNC: 22 MMOL/L
CO2 SERPL-SCNC: 22 MMOL/L — SIGNIFICANT CHANGE UP (ref 22–31)
CO2 SERPL-SCNC: 23 MMOL/L — SIGNIFICANT CHANGE UP (ref 22–31)
CO2 SERPL-SCNC: 24 MMOL/L
CO2 SERPL-SCNC: 24 MMOL/L — SIGNIFICANT CHANGE UP (ref 22–31)
CO2 SERPL-SCNC: 25 MMOL/L
CO2 SERPL-SCNC: 25 MMOL/L — SIGNIFICANT CHANGE UP (ref 22–31)
CO2 SERPL-SCNC: 26 MMOL/L — SIGNIFICANT CHANGE UP (ref 22–31)
CREAT ?TM UR-MCNC: 30 MG/DL — SIGNIFICANT CHANGE UP
CREAT ?TM UR-MCNC: 56 MG/DL — SIGNIFICANT CHANGE UP
CREAT SERPL-MCNC: 1.21 MG/DL — SIGNIFICANT CHANGE UP (ref 0.5–1.3)
CREAT SERPL-MCNC: 1.24 MG/DL — SIGNIFICANT CHANGE UP (ref 0.5–1.3)
CREAT SERPL-MCNC: 1.5 MG/DL
CREAT SERPL-MCNC: 1.52 MG/DL
CREAT SERPL-MCNC: 1.57 MG/DL
CREAT SERPL-MCNC: 1.63 MG/DL — HIGH (ref 0.5–1.3)
CREAT SERPL-MCNC: 1.68 MG/DL — HIGH (ref 0.5–1.3)
CREAT SERPL-MCNC: 1.73 MG/DL — HIGH (ref 0.5–1.3)
CREAT SERPL-MCNC: 2.08 MG/DL — HIGH (ref 0.5–1.3)
CREAT SERPL-MCNC: 2.14 MG/DL — HIGH (ref 0.5–1.3)
CREAT SERPL-MCNC: 2.21 MG/DL — HIGH (ref 0.5–1.3)
CREAT SERPL-MCNC: 2.22 MG/DL — HIGH (ref 0.5–1.3)
CREAT SERPL-MCNC: 2.33 MG/DL — HIGH (ref 0.5–1.3)
CREAT SERPL-MCNC: 2.37 MG/DL — HIGH (ref 0.5–1.3)
CREAT SERPL-MCNC: 2.39 MG/DL — HIGH (ref 0.5–1.3)
CREAT SERPL-MCNC: 2.41 MG/DL — HIGH (ref 0.5–1.3)
CREAT SERPL-MCNC: 2.87 MG/DL — HIGH (ref 0.5–1.3)
CREAT SERPL-MCNC: 3.01 MG/DL — HIGH (ref 0.5–1.3)
CREAT SERPL-MCNC: 3.09 MG/DL — HIGH (ref 0.5–1.3)
CREAT SERPL-MCNC: 3.12 MG/DL — HIGH (ref 0.5–1.3)
CREAT SERPL-MCNC: 3.25 MG/DL — HIGH (ref 0.5–1.3)
CREAT SERPL-MCNC: 3.4 MG/DL
CREAT SERPL-MCNC: 3.4 MG/DL — HIGH (ref 0.5–1.3)
CREAT SERPL-MCNC: 3.42 MG/DL — HIGH (ref 0.5–1.3)
EOSINOPHIL # BLD AUTO: 0 K/UL — SIGNIFICANT CHANGE UP (ref 0–0.5)
EOSINOPHIL # BLD AUTO: 0 K/UL — SIGNIFICANT CHANGE UP (ref 0–0.5)
EOSINOPHIL # BLD AUTO: 0.01 K/UL — SIGNIFICANT CHANGE UP (ref 0–0.5)
EOSINOPHIL # BLD AUTO: 0.02 K/UL
EOSINOPHIL # BLD AUTO: 0.02 K/UL — SIGNIFICANT CHANGE UP (ref 0–0.5)
EOSINOPHIL # BLD AUTO: 0.03 K/UL
EOSINOPHIL # BLD AUTO: 0.04 K/UL — SIGNIFICANT CHANGE UP (ref 0–0.5)
EOSINOPHIL # BLD AUTO: 0.1 K/UL — SIGNIFICANT CHANGE UP (ref 0–0.5)
EOSINOPHIL # BLD AUTO: 0.1 K/UL — SIGNIFICANT CHANGE UP (ref 0–0.5)
EOSINOPHIL NFR BLD AUTO: 0 % — SIGNIFICANT CHANGE UP (ref 0–6)
EOSINOPHIL NFR BLD AUTO: 0 % — SIGNIFICANT CHANGE UP (ref 0–6)
EOSINOPHIL NFR BLD AUTO: 0.2 % — SIGNIFICANT CHANGE UP (ref 0–6)
EOSINOPHIL NFR BLD AUTO: 0.4 % — SIGNIFICANT CHANGE UP (ref 0–6)
EOSINOPHIL NFR BLD AUTO: 0.7 %
EOSINOPHIL NFR BLD AUTO: 0.8 % — SIGNIFICANT CHANGE UP (ref 0–6)
EOSINOPHIL NFR BLD AUTO: 1 %
EOSINOPHIL NFR BLD AUTO: 1.3 % — SIGNIFICANT CHANGE UP (ref 0–6)
EOSINOPHIL NFR BLD AUTO: 1.8 % — SIGNIFICANT CHANGE UP (ref 0–6)
ESTIMATED AVERAGE GLUCOSE: 120 MG/DL
FERRITIN SERPL-MCNC: 69 NG/ML — SIGNIFICANT CHANGE UP (ref 30–400)
FOLATE SERPL-MCNC: >20 NG/ML — SIGNIFICANT CHANGE UP
GAMMA GLOBULIN: 1.3 G/DL — SIGNIFICANT CHANGE UP (ref 0.6–1.6)
GAS PNL BLDV: SIGNIFICANT CHANGE UP
GAS PNL BLDV: SIGNIFICANT CHANGE UP
GLUCOSE SERPL-MCNC: 100 MG/DL
GLUCOSE SERPL-MCNC: 101 MG/DL — HIGH (ref 70–99)
GLUCOSE SERPL-MCNC: 102 MG/DL — HIGH (ref 70–99)
GLUCOSE SERPL-MCNC: 103 MG/DL — HIGH (ref 70–99)
GLUCOSE SERPL-MCNC: 104 MG/DL — HIGH (ref 70–99)
GLUCOSE SERPL-MCNC: 104 MG/DL — HIGH (ref 70–99)
GLUCOSE SERPL-MCNC: 105 MG/DL — HIGH (ref 70–99)
GLUCOSE SERPL-MCNC: 107 MG/DL — HIGH (ref 70–99)
GLUCOSE SERPL-MCNC: 108 MG/DL — HIGH (ref 70–99)
GLUCOSE SERPL-MCNC: 108 MG/DL — HIGH (ref 70–99)
GLUCOSE SERPL-MCNC: 111 MG/DL
GLUCOSE SERPL-MCNC: 112 MG/DL — HIGH (ref 70–99)
GLUCOSE SERPL-MCNC: 119 MG/DL — HIGH (ref 70–99)
GLUCOSE SERPL-MCNC: 121 MG/DL
GLUCOSE SERPL-MCNC: 125 MG/DL
GLUCOSE SERPL-MCNC: 129 MG/DL — HIGH (ref 70–99)
GLUCOSE SERPL-MCNC: 132 MG/DL — HIGH (ref 70–99)
GLUCOSE SERPL-MCNC: 136 MG/DL — HIGH (ref 70–99)
GLUCOSE SERPL-MCNC: 150 MG/DL — HIGH (ref 70–99)
GLUCOSE SERPL-MCNC: 91 MG/DL — SIGNIFICANT CHANGE UP (ref 70–99)
GLUCOSE SERPL-MCNC: 95 MG/DL — SIGNIFICANT CHANGE UP (ref 70–99)
GLUCOSE SERPL-MCNC: 96 MG/DL — SIGNIFICANT CHANGE UP (ref 70–99)
GLUCOSE SERPL-MCNC: 99 MG/DL — SIGNIFICANT CHANGE UP (ref 70–99)
GLUCOSE SERPL-MCNC: 99 MG/DL — SIGNIFICANT CHANGE UP (ref 70–99)
HAPTOGLOB SERPL-MCNC: 23 MG/DL — LOW (ref 34–200)
HAPTOGLOB SERPL-MCNC: 25 MG/DL — LOW (ref 34–200)
HBA1C MFR BLD HPLC: 5.8 %
HCT VFR BLD CALC: 29.4 % — LOW (ref 39–50)
HCT VFR BLD CALC: 30.9 % — LOW (ref 39–50)
HCT VFR BLD CALC: 31.9 % — LOW (ref 39–50)
HCT VFR BLD CALC: 32.8 % — LOW (ref 39–50)
HCT VFR BLD CALC: 32.8 % — LOW (ref 39–50)
HCT VFR BLD CALC: 32.9 % — LOW (ref 39–50)
HCT VFR BLD CALC: 33.1 % — LOW (ref 39–50)
HCT VFR BLD CALC: 33.1 % — LOW (ref 39–50)
HCT VFR BLD CALC: 33.3 % — LOW (ref 39–50)
HCT VFR BLD CALC: 35.3 % — LOW (ref 39–50)
HCT VFR BLD CALC: 35.7 %
HCT VFR BLD CALC: 35.7 %
HCT VFR BLD CALC: 35.8 % — LOW (ref 39–50)
HCT VFR BLD CALC: 35.9 % — LOW (ref 39–50)
HCT VFR BLD CALC: 36.5 % — LOW (ref 39–50)
HCT VFR BLD CALC: 36.5 % — LOW (ref 39–50)
HCT VFR BLD CALC: 36.6 % — LOW (ref 39–50)
HCT VFR BLD CALC: 36.7 % — LOW (ref 39–50)
HDLC SERPL-MCNC: 54 MG/DL
HEMATOPATHOLOGY REPORT: SIGNIFICANT CHANGE UP
HGB BLD-MCNC: 10.2 G/DL — LOW (ref 13–17)
HGB BLD-MCNC: 10.5 G/DL — LOW (ref 13–17)
HGB BLD-MCNC: 10.6 G/DL — LOW (ref 13–17)
HGB BLD-MCNC: 10.7 G/DL — LOW (ref 13–17)
HGB BLD-MCNC: 10.9 G/DL — LOW (ref 13–17)
HGB BLD-MCNC: 11.1 G/DL — LOW (ref 13–17)
HGB BLD-MCNC: 11.3 G/DL
HGB BLD-MCNC: 11.5 G/DL
HGB BLD-MCNC: 11.5 G/DL — LOW (ref 13–17)
HGB BLD-MCNC: 11.6 G/DL — LOW (ref 13–17)
HGB BLD-MCNC: 11.7 G/DL — LOW (ref 13–17)
HGB BLD-MCNC: 11.8 G/DL — LOW (ref 13–17)
HGB BLD-MCNC: 11.8 G/DL — LOW (ref 13–17)
HGB BLD-MCNC: 11.9 G/DL — LOW (ref 13–17)
HGB BLD-MCNC: 12.5 G/DL — LOW (ref 13–17)
HGB BLD-MCNC: 9.6 G/DL — LOW (ref 13–17)
HIV 1+2 AB+HIV1 P24 AG SERPL QL IA: SIGNIFICANT CHANGE UP
IMM GRANULOCYTES NFR BLD AUTO: 0.2 % — SIGNIFICANT CHANGE UP (ref 0–1.5)
IMM GRANULOCYTES NFR BLD AUTO: 0.2 % — SIGNIFICANT CHANGE UP (ref 0–1.5)
IMM GRANULOCYTES NFR BLD AUTO: 0.3 %
IMM GRANULOCYTES NFR BLD AUTO: 0.4 %
IMM GRANULOCYTES NFR BLD AUTO: 0.5 % — SIGNIFICANT CHANGE UP (ref 0–1.5)
INR BLD: 1.36 RATIO — HIGH (ref 0.88–1.16)
INR BLD: 1.41 RATIO — HIGH (ref 0.88–1.16)
INR BLD: 1.83 RATIO — HIGH (ref 0.88–1.16)
INR BLD: 1.87 RATIO — HIGH (ref 0.88–1.16)
INR BLD: 1.98 RATIO — HIGH (ref 0.88–1.16)
INR BLD: 2.24 RATIO — HIGH (ref 0.88–1.16)
INR BLD: 2.42 RATIO — HIGH (ref 0.88–1.16)
INR BLD: 2.51 RATIO — HIGH (ref 0.88–1.16)
INR BLD: 2.52 RATIO — HIGH (ref 0.88–1.16)
INR BLD: 2.62 RATIO — HIGH (ref 0.88–1.16)
INR BLD: 2.62 RATIO — HIGH (ref 0.88–1.16)
INR BLD: 2.96 RATIO — HIGH (ref 0.88–1.16)
INR BLD: 3.48 RATIO — HIGH (ref 0.88–1.16)
INR BLD: 3.54 RATIO — HIGH (ref 0.88–1.16)
INR BLD: 5.29 RATIO — CRITICAL HIGH (ref 0.88–1.16)
INR BLD: 6.22 RATIO — CRITICAL HIGH (ref 0.88–1.16)
INR PPP: 3.45 RATIO
INR PPP: 5.91 RATIO
INTERPRETATION SERPL IFE-IMP: SIGNIFICANT CHANGE UP
IRON SATN MFR SERPL: 19 % — SIGNIFICANT CHANGE UP (ref 16–55)
IRON SATN MFR SERPL: 51 UG/DL — SIGNIFICANT CHANGE UP (ref 45–165)
LACTATE SERPL-SCNC: 1.3 MMOL/L — SIGNIFICANT CHANGE UP (ref 0.7–2)
LDH SERPL L TO P-CCNC: 153 U/L — SIGNIFICANT CHANGE UP (ref 50–242)
LDH SERPL L TO P-CCNC: 298 U/L — HIGH (ref 50–242)
LDLC SERPL CALC-MCNC: 40 MG/DL
LYMPHOCYTES # BLD AUTO: 0.15 K/UL — LOW (ref 1–3.3)
LYMPHOCYTES # BLD AUTO: 0.2 K/UL — LOW (ref 1–3.3)
LYMPHOCYTES # BLD AUTO: 0.2 K/UL — LOW (ref 1–3.3)
LYMPHOCYTES # BLD AUTO: 0.21 K/UL — LOW (ref 1–3.3)
LYMPHOCYTES # BLD AUTO: 0.24 K/UL — LOW (ref 1–3.3)
LYMPHOCYTES # BLD AUTO: 0.27 K/UL — LOW (ref 1–3.3)
LYMPHOCYTES # BLD AUTO: 0.39 K/UL
LYMPHOCYTES # BLD AUTO: 0.39 K/UL
LYMPHOCYTES # BLD AUTO: 0.6 K/UL — LOW (ref 1–3.3)
LYMPHOCYTES # BLD AUTO: 16.4 % — SIGNIFICANT CHANGE UP (ref 13–44)
LYMPHOCYTES # BLD AUTO: 2.5 % — LOW (ref 13–44)
LYMPHOCYTES # BLD AUTO: 3.5 % — LOW (ref 13–44)
LYMPHOCYTES # BLD AUTO: 3.9 % — LOW (ref 13–44)
LYMPHOCYTES # BLD AUTO: 4 % — LOW (ref 13–44)
LYMPHOCYTES # BLD AUTO: 5 % — LOW (ref 13–44)
LYMPHOCYTES # BLD AUTO: 5.6 % — LOW (ref 13–44)
LYMPHOCYTES NFR BLD AUTO: 13.3 %
LYMPHOCYTES NFR BLD AUTO: 14.2 %
M-SPIKE: 0.9 G/DL — HIGH (ref 0–0)
MACROCYTES BLD QL: SLIGHT — SIGNIFICANT CHANGE UP
MACROCYTES BLD QL: SLIGHT — SIGNIFICANT CHANGE UP
MAGNESIUM SERPL-MCNC: 2 MG/DL — SIGNIFICANT CHANGE UP (ref 1.6–2.6)
MAGNESIUM SERPL-MCNC: 2.1 MG/DL — SIGNIFICANT CHANGE UP (ref 1.6–2.6)
MAGNESIUM SERPL-MCNC: 2.1 MG/DL — SIGNIFICANT CHANGE UP (ref 1.6–2.6)
MAGNESIUM SERPL-MCNC: 2.2 MG/DL — SIGNIFICANT CHANGE UP (ref 1.6–2.6)
MAGNESIUM SERPL-MCNC: 2.2 MG/DL — SIGNIFICANT CHANGE UP (ref 1.6–2.6)
MAGNESIUM SERPL-MCNC: 2.3 MG/DL — SIGNIFICANT CHANGE UP (ref 1.6–2.6)
MAGNESIUM SERPL-MCNC: 2.3 MG/DL — SIGNIFICANT CHANGE UP (ref 1.6–2.6)
MAGNESIUM SERPL-MCNC: 2.7 MG/DL — HIGH (ref 1.6–2.6)
MAN DIFF?: NORMAL
MAN DIFF?: NORMAL
MANUAL SMEAR VERIFICATION: SIGNIFICANT CHANGE UP
MANUAL SMEAR VERIFICATION: SIGNIFICANT CHANGE UP
MCHC RBC-ENTMCNC: 31.7 GM/DL
MCHC RBC-ENTMCNC: 31.8 GM/DL — LOW (ref 32–36)
MCHC RBC-ENTMCNC: 32.2 GM/DL
MCHC RBC-ENTMCNC: 32.3 GM/DL — SIGNIFICANT CHANGE UP (ref 32–36)
MCHC RBC-ENTMCNC: 32.4 GM/DL — SIGNIFICANT CHANGE UP (ref 32–36)
MCHC RBC-ENTMCNC: 32.6 GM/DL — SIGNIFICANT CHANGE UP (ref 32–36)
MCHC RBC-ENTMCNC: 32.7 GM/DL — SIGNIFICANT CHANGE UP (ref 32–36)
MCHC RBC-ENTMCNC: 32.9 GM/DL — SIGNIFICANT CHANGE UP (ref 32–36)
MCHC RBC-ENTMCNC: 33 GM/DL — SIGNIFICANT CHANGE UP (ref 32–36)
MCHC RBC-ENTMCNC: 33 GM/DL — SIGNIFICANT CHANGE UP (ref 32–36)
MCHC RBC-ENTMCNC: 33.1 GM/DL — SIGNIFICANT CHANGE UP (ref 32–36)
MCHC RBC-ENTMCNC: 33.2 GM/DL — SIGNIFICANT CHANGE UP (ref 32–36)
MCHC RBC-ENTMCNC: 33.5 GM/DL — SIGNIFICANT CHANGE UP (ref 32–36)
MCHC RBC-ENTMCNC: 34.2 G/DL — SIGNIFICANT CHANGE UP (ref 32–36)
MCHC RBC-ENTMCNC: 36.2 PG — HIGH (ref 27–34)
MCHC RBC-ENTMCNC: 36.5 PG
MCHC RBC-ENTMCNC: 36.5 PG — HIGH (ref 27–34)
MCHC RBC-ENTMCNC: 36.6 PG — HIGH (ref 27–34)
MCHC RBC-ENTMCNC: 37 PG — HIGH (ref 27–34)
MCHC RBC-ENTMCNC: 37.1 PG — HIGH (ref 27–34)
MCHC RBC-ENTMCNC: 37.2 PG — HIGH (ref 27–34)
MCHC RBC-ENTMCNC: 37.4 PG — HIGH (ref 27–34)
MCHC RBC-ENTMCNC: 37.4 PG — HIGH (ref 27–34)
MCHC RBC-ENTMCNC: 37.5 PG — HIGH (ref 27–34)
MCHC RBC-ENTMCNC: 37.5 PG — HIGH (ref 27–34)
MCHC RBC-ENTMCNC: 37.6 PG — HIGH (ref 27–34)
MCHC RBC-ENTMCNC: 37.6 PG — HIGH (ref 27–34)
MCHC RBC-ENTMCNC: 37.8 PG
MCHC RBC-ENTMCNC: 37.9 PG — HIGH (ref 27–34)
MCHC RBC-ENTMCNC: 38 PG — HIGH (ref 27–34)
MCHC RBC-ENTMCNC: 38.4 PG — HIGH (ref 27–34)
MCHC RBC-ENTMCNC: 39.5 PG — HIGH (ref 27–34)
MCV RBC AUTO: 111 FL — HIGH (ref 80–100)
MCV RBC AUTO: 111.6 FL — HIGH (ref 80–100)
MCV RBC AUTO: 112.2 FL — HIGH (ref 80–100)
MCV RBC AUTO: 113.1 FL — HIGH (ref 80–100)
MCV RBC AUTO: 113.1 FL — HIGH (ref 80–100)
MCV RBC AUTO: 113.2 FL — HIGH (ref 80–100)
MCV RBC AUTO: 114.3 FL — HIGH (ref 80–100)
MCV RBC AUTO: 114.4 FL — HIGH (ref 80–100)
MCV RBC AUTO: 114.4 FL — HIGH (ref 80–100)
MCV RBC AUTO: 114.5 FL — HIGH (ref 80–100)
MCV RBC AUTO: 115.2 FL
MCV RBC AUTO: 115.4 FL — HIGH (ref 80–100)
MCV RBC AUTO: 115.8 FL — HIGH (ref 80–100)
MCV RBC AUTO: 115.9 FL — HIGH (ref 80–100)
MCV RBC AUTO: 115.9 FL — HIGH (ref 80–100)
MCV RBC AUTO: 116 FL — HIGH (ref 80–100)
MCV RBC AUTO: 116.6 FL — HIGH (ref 80–100)
MCV RBC AUTO: 117.4 FL
MONOCYTES # BLD AUTO: 0 K/UL — SIGNIFICANT CHANGE UP (ref 0–0.9)
MONOCYTES # BLD AUTO: 0 K/UL — SIGNIFICANT CHANGE UP (ref 0–0.9)
MONOCYTES # BLD AUTO: 0.04 K/UL — SIGNIFICANT CHANGE UP (ref 0–0.9)
MONOCYTES # BLD AUTO: 0.05 K/UL
MONOCYTES # BLD AUTO: 0.05 K/UL — SIGNIFICANT CHANGE UP (ref 0–0.9)
MONOCYTES # BLD AUTO: 0.05 K/UL — SIGNIFICANT CHANGE UP (ref 0–0.9)
MONOCYTES # BLD AUTO: 0.07 K/UL
MONOCYTES # BLD AUTO: 0.1 K/UL — SIGNIFICANT CHANGE UP (ref 0–0.9)
MONOCYTES # BLD AUTO: 0.1 K/UL — SIGNIFICANT CHANGE UP (ref 0–0.9)
MONOCYTES NFR BLD AUTO: 0 % — LOW (ref 2–14)
MONOCYTES NFR BLD AUTO: 0 % — LOW (ref 2–14)
MONOCYTES NFR BLD AUTO: 0.7 % — LOW (ref 2–14)
MONOCYTES NFR BLD AUTO: 0.9 % — LOW (ref 2–14)
MONOCYTES NFR BLD AUTO: 1 % — LOW (ref 2–14)
MONOCYTES NFR BLD AUTO: 1 % — LOW (ref 2–14)
MONOCYTES NFR BLD AUTO: 1.7 %
MONOCYTES NFR BLD AUTO: 2.2 % — SIGNIFICANT CHANGE UP (ref 2–14)
MONOCYTES NFR BLD AUTO: 2.5 %
NEUTROPHILS # BLD AUTO: 2.25 K/UL
NEUTROPHILS # BLD AUTO: 2.45 K/UL
NEUTROPHILS # BLD AUTO: 2.7 K/UL — SIGNIFICANT CHANGE UP (ref 1.8–7.4)
NEUTROPHILS # BLD AUTO: 4.44 K/UL — SIGNIFICANT CHANGE UP (ref 1.8–7.4)
NEUTROPHILS # BLD AUTO: 4.47 K/UL — SIGNIFICANT CHANGE UP (ref 1.8–7.4)
NEUTROPHILS # BLD AUTO: 5.18 K/UL — SIGNIFICANT CHANGE UP (ref 1.8–7.4)
NEUTROPHILS # BLD AUTO: 5.45 K/UL — SIGNIFICANT CHANGE UP (ref 1.8–7.4)
NEUTROPHILS # BLD AUTO: 5.6 K/UL — SIGNIFICANT CHANGE UP (ref 1.8–7.4)
NEUTROPHILS # BLD AUTO: 5.72 K/UL — SIGNIFICANT CHANGE UP (ref 1.8–7.4)
NEUTROPHILS NFR BLD AUTO: 78.9 % — HIGH (ref 43–77)
NEUTROPHILS NFR BLD AUTO: 81.8 %
NEUTROPHILS NFR BLD AUTO: 83.4 %
NEUTROPHILS NFR BLD AUTO: 92.2 % — HIGH (ref 43–77)
NEUTROPHILS NFR BLD AUTO: 93.2 % — HIGH (ref 43–77)
NEUTROPHILS NFR BLD AUTO: 93.5 % — HIGH (ref 43–77)
NEUTROPHILS NFR BLD AUTO: 94.7 % — HIGH (ref 43–77)
NEUTROPHILS NFR BLD AUTO: 95.1 % — HIGH (ref 43–77)
NEUTROPHILS NFR BLD AUTO: 96.1 % — HIGH (ref 43–77)
NRBC # BLD: 0 /100 WBCS — SIGNIFICANT CHANGE UP (ref 0–0)
NRBC # BLD: 1 /100 WBCS — HIGH (ref 0–0)
NT-PROBNP SERPL-SCNC: HIGH PG/ML (ref 0–300)
OVALOCYTES BLD QL SMEAR: SIGNIFICANT CHANGE UP
PHOSPHATE SERPL-MCNC: 4.6 MG/DL — HIGH (ref 2.5–4.5)
PHOSPHATE SERPL-MCNC: 4.9 MG/DL — HIGH (ref 2.5–4.5)
PHOSPHATE SERPL-MCNC: 5.6 MG/DL — HIGH (ref 2.5–4.5)
PLAT MORPH BLD: NORMAL — SIGNIFICANT CHANGE UP
PLAT MORPH BLD: NORMAL — SIGNIFICANT CHANGE UP
PLATELET # BLD AUTO: 108 K/UL — LOW (ref 150–400)
PLATELET # BLD AUTO: 120 K/UL — LOW (ref 150–400)
PLATELET # BLD AUTO: 122 K/UL — LOW (ref 150–400)
PLATELET # BLD AUTO: 124 K/UL
PLATELET # BLD AUTO: 126 K/UL — LOW (ref 150–400)
PLATELET # BLD AUTO: 130 K/UL — LOW (ref 150–400)
PLATELET # BLD AUTO: 154 K/UL — SIGNIFICANT CHANGE UP (ref 150–400)
PLATELET # BLD AUTO: 162 K/UL — SIGNIFICANT CHANGE UP (ref 150–400)
PLATELET # BLD AUTO: 174 K/UL — SIGNIFICANT CHANGE UP (ref 150–400)
PLATELET # BLD AUTO: 176 K/UL — SIGNIFICANT CHANGE UP (ref 150–400)
PLATELET # BLD AUTO: 184 K/UL — SIGNIFICANT CHANGE UP (ref 150–400)
PLATELET # BLD AUTO: 215 K/UL — SIGNIFICANT CHANGE UP (ref 150–400)
PLATELET # BLD AUTO: 246 K/UL — SIGNIFICANT CHANGE UP (ref 150–400)
PLATELET # BLD AUTO: 247 K/UL — SIGNIFICANT CHANGE UP (ref 150–400)
PLATELET # BLD AUTO: 75 K/UL — LOW (ref 150–400)
PLATELET # BLD AUTO: 76 K/UL — LOW (ref 150–400)
PLATELET # BLD AUTO: 81 K/UL — LOW (ref 150–400)
PLATELET # BLD AUTO: 95 K/UL
POIKILOCYTOSIS BLD QL AUTO: SIGNIFICANT CHANGE UP
POLYCHROMASIA BLD QL SMEAR: SLIGHT — SIGNIFICANT CHANGE UP
POLYCHROMASIA BLD QL SMEAR: SLIGHT — SIGNIFICANT CHANGE UP
POTASSIUM SERPL-MCNC: 3.8 MMOL/L — SIGNIFICANT CHANGE UP (ref 3.5–5.3)
POTASSIUM SERPL-MCNC: 3.8 MMOL/L — SIGNIFICANT CHANGE UP (ref 3.5–5.3)
POTASSIUM SERPL-MCNC: 3.9 MMOL/L — SIGNIFICANT CHANGE UP (ref 3.5–5.3)
POTASSIUM SERPL-MCNC: 4.1 MMOL/L — SIGNIFICANT CHANGE UP (ref 3.5–5.3)
POTASSIUM SERPL-MCNC: 4.1 MMOL/L — SIGNIFICANT CHANGE UP (ref 3.5–5.3)
POTASSIUM SERPL-MCNC: 4.2 MMOL/L — SIGNIFICANT CHANGE UP (ref 3.5–5.3)
POTASSIUM SERPL-MCNC: 4.3 MMOL/L — SIGNIFICANT CHANGE UP (ref 3.5–5.3)
POTASSIUM SERPL-MCNC: 4.4 MMOL/L — SIGNIFICANT CHANGE UP (ref 3.5–5.3)
POTASSIUM SERPL-MCNC: 4.4 MMOL/L — SIGNIFICANT CHANGE UP (ref 3.5–5.3)
POTASSIUM SERPL-MCNC: 4.5 MMOL/L — SIGNIFICANT CHANGE UP (ref 3.5–5.3)
POTASSIUM SERPL-MCNC: 4.6 MMOL/L — SIGNIFICANT CHANGE UP (ref 3.5–5.3)
POTASSIUM SERPL-MCNC: 4.7 MMOL/L — SIGNIFICANT CHANGE UP (ref 3.5–5.3)
POTASSIUM SERPL-MCNC: 4.8 MMOL/L — SIGNIFICANT CHANGE UP (ref 3.5–5.3)
POTASSIUM SERPL-MCNC: 5 MMOL/L — SIGNIFICANT CHANGE UP (ref 3.5–5.3)
POTASSIUM SERPL-MCNC: 5.1 MMOL/L — SIGNIFICANT CHANGE UP (ref 3.5–5.3)
POTASSIUM SERPL-MCNC: 5.1 MMOL/L — SIGNIFICANT CHANGE UP (ref 3.5–5.3)
POTASSIUM SERPL-MCNC: 5.4 MMOL/L — HIGH (ref 3.5–5.3)
POTASSIUM SERPL-MCNC: 6.7 MMOL/L — CRITICAL HIGH (ref 3.5–5.3)
POTASSIUM SERPL-SCNC: 3.8 MMOL/L — SIGNIFICANT CHANGE UP (ref 3.5–5.3)
POTASSIUM SERPL-SCNC: 3.8 MMOL/L — SIGNIFICANT CHANGE UP (ref 3.5–5.3)
POTASSIUM SERPL-SCNC: 3.9 MMOL/L — SIGNIFICANT CHANGE UP (ref 3.5–5.3)
POTASSIUM SERPL-SCNC: 4.1 MMOL/L — SIGNIFICANT CHANGE UP (ref 3.5–5.3)
POTASSIUM SERPL-SCNC: 4.1 MMOL/L — SIGNIFICANT CHANGE UP (ref 3.5–5.3)
POTASSIUM SERPL-SCNC: 4.2 MMOL/L — SIGNIFICANT CHANGE UP (ref 3.5–5.3)
POTASSIUM SERPL-SCNC: 4.3 MMOL/L — SIGNIFICANT CHANGE UP (ref 3.5–5.3)
POTASSIUM SERPL-SCNC: 4.4 MMOL/L — SIGNIFICANT CHANGE UP (ref 3.5–5.3)
POTASSIUM SERPL-SCNC: 4.4 MMOL/L — SIGNIFICANT CHANGE UP (ref 3.5–5.3)
POTASSIUM SERPL-SCNC: 4.5 MMOL/L — SIGNIFICANT CHANGE UP (ref 3.5–5.3)
POTASSIUM SERPL-SCNC: 4.6 MMOL/L — SIGNIFICANT CHANGE UP (ref 3.5–5.3)
POTASSIUM SERPL-SCNC: 4.7 MMOL/L
POTASSIUM SERPL-SCNC: 4.7 MMOL/L — SIGNIFICANT CHANGE UP (ref 3.5–5.3)
POTASSIUM SERPL-SCNC: 4.8 MMOL/L — SIGNIFICANT CHANGE UP (ref 3.5–5.3)
POTASSIUM SERPL-SCNC: 4.9 MMOL/L
POTASSIUM SERPL-SCNC: 5 MMOL/L — SIGNIFICANT CHANGE UP (ref 3.5–5.3)
POTASSIUM SERPL-SCNC: 5.1 MMOL/L — SIGNIFICANT CHANGE UP (ref 3.5–5.3)
POTASSIUM SERPL-SCNC: 5.1 MMOL/L — SIGNIFICANT CHANGE UP (ref 3.5–5.3)
POTASSIUM SERPL-SCNC: 5.4 MMOL/L — HIGH (ref 3.5–5.3)
POTASSIUM SERPL-SCNC: 5.8 MMOL/L
POTASSIUM SERPL-SCNC: 6.1 MMOL/L
POTASSIUM SERPL-SCNC: 6.7 MMOL/L — CRITICAL HIGH (ref 3.5–5.3)
PROT ?TM UR-MCNC: 21 MG/DL — HIGH (ref 0–12)
PROT ?TM UR-MCNC: 5 MG/DL — SIGNIFICANT CHANGE UP (ref 0–12)
PROT PATTERN SERPL ELPH-IMP: SIGNIFICANT CHANGE UP
PROT SERPL-MCNC: 6.4 G/DL — SIGNIFICANT CHANGE UP (ref 6–8.3)
PROT SERPL-MCNC: 6.4 G/DL — SIGNIFICANT CHANGE UP (ref 6–8.3)
PROT SERPL-MCNC: 6.9 G/DL
PROT SERPL-MCNC: 7.1 G/DL
PROT SERPL-MCNC: 7.1 G/DL
PROT SERPL-MCNC: 7.2 G/DL
PROT SERPL-MCNC: 7.2 G/DL — SIGNIFICANT CHANGE UP (ref 6–8.3)
PROT SERPL-MCNC: 7.2 G/DL — SIGNIFICANT CHANGE UP (ref 6–8.3)
PROT SERPL-MCNC: 7.5 G/DL — SIGNIFICANT CHANGE UP (ref 6–8.3)
PROT SERPL-MCNC: 7.5 G/DL — SIGNIFICANT CHANGE UP (ref 6–8.3)
PROT SERPL-MCNC: 7.6 G/DL — SIGNIFICANT CHANGE UP (ref 6–8.3)
PROT SERPL-MCNC: 7.9 G/DL — SIGNIFICANT CHANGE UP (ref 6–8.3)
PROT/CREAT UR-RTO: 0.2 RATIO — SIGNIFICANT CHANGE UP (ref 0–0.2)
PROT/CREAT UR-RTO: 0.4 RATIO — HIGH (ref 0–0.2)
PROTHROM AB SERPL-ACNC: 15.6 SEC — HIGH (ref 10–13.1)
PROTHROM AB SERPL-ACNC: 16.2 SEC — HIGH (ref 10–12.9)
PROTHROM AB SERPL-ACNC: 21.1 SEC — HIGH (ref 10–13.1)
PROTHROM AB SERPL-ACNC: 21.6 SEC — HIGH (ref 10–13.1)
PROTHROM AB SERPL-ACNC: 22.8 SEC — HIGH (ref 10–13.1)
PROTHROM AB SERPL-ACNC: 26.4 SEC — HIGH (ref 10–13.1)
PROTHROM AB SERPL-ACNC: 28.3 SEC — HIGH (ref 10–13.1)
PROTHROM AB SERPL-ACNC: 29.5 SEC — HIGH (ref 10–12.9)
PROTHROM AB SERPL-ACNC: 29.6 SEC — HIGH (ref 10–13.1)
PROTHROM AB SERPL-ACNC: 30.5 SEC — HIGH (ref 10–13.1)
PROTHROM AB SERPL-ACNC: 31 SEC — HIGH (ref 10–13.1)
PROTHROM AB SERPL-ACNC: 35.2 SEC — HIGH (ref 10–13.1)
PROTHROM AB SERPL-ACNC: 41.6 SEC — HIGH (ref 10–12.9)
PROTHROM AB SERPL-ACNC: 42 SEC — HIGH (ref 10–13.1)
PROTHROM AB SERPL-ACNC: 64.1 SEC — HIGH (ref 10–12.9)
PROTHROM AB SERPL-ACNC: 75 SEC — HIGH (ref 10–12.9)
PSA FLD-MCNC: 2.86 NG/ML — SIGNIFICANT CHANGE UP (ref 0–4)
PSA FREE FLD-MCNC: 23 %
PSA FREE SERPL-MCNC: 0.57 NG/ML
PSA SERPL-MCNC: 2.51 NG/ML
PSA SERPL-MCNC: 2.51 NG/ML
PT BLD: 40.9 SEC
PT BLD: 70.6 SEC
RBC # BLD: 2.57 M/UL — LOW (ref 4.2–5.8)
RBC # BLD: 2.73 M/UL — LOW (ref 4.2–5.8)
RBC # BLD: 2.82 M/UL — LOW (ref 4.2–5.8)
RBC # BLD: 2.84 M/UL — LOW (ref 4.2–5.8)
RBC # BLD: 2.87 M/UL — LOW (ref 4.2–5.8)
RBC # BLD: 2.89 M/UL — LOW (ref 4.2–5.8)
RBC # BLD: 2.9 M/UL — LOW (ref 4.2–5.8)
RBC # BLD: 2.91 M/UL — LOW (ref 4.2–5.8)
RBC # BLD: 2.95 M/UL — LOW (ref 4.2–5.8)
RBC # BLD: 2.95 M/UL — LOW (ref 4.2–5.8)
RBC # BLD: 3.04 M/UL
RBC # BLD: 3.06 M/UL — LOW (ref 4.2–5.8)
RBC # BLD: 3.09 M/UL — LOW (ref 4.2–5.8)
RBC # BLD: 3.1 M/UL
RBC # BLD: 3.13 M/UL — LOW (ref 4.2–5.8)
RBC # BLD: 3.15 M/UL — LOW (ref 4.2–5.8)
RBC # BLD: 3.17 M/UL — LOW (ref 4.2–5.8)
RBC # BLD: 3.24 M/UL — LOW (ref 4.2–5.8)
RBC # BLD: 3.29 M/UL — LOW (ref 4.2–5.8)
RBC # FLD: 14.3 % — SIGNIFICANT CHANGE UP (ref 10.3–14.5)
RBC # FLD: 15.3 % — HIGH (ref 10.3–14.5)
RBC # FLD: 15.4 % — HIGH (ref 10.3–14.5)
RBC # FLD: 15.5 % — HIGH (ref 10.3–14.5)
RBC # FLD: 15.5 % — HIGH (ref 10.3–14.5)
RBC # FLD: 15.8 % — HIGH (ref 10.3–14.5)
RBC # FLD: 15.8 % — HIGH (ref 10.3–14.5)
RBC # FLD: 15.9 %
RBC # FLD: 16.3 % — HIGH (ref 10.3–14.5)
RBC # FLD: 16.5 % — HIGH (ref 10.3–14.5)
RBC # FLD: 16.7 %
RBC # FLD: 16.8 % — HIGH (ref 10.3–14.5)
RBC # FLD: 16.9 % — HIGH (ref 10.3–14.5)
RBC # FLD: 17 % — HIGH (ref 10.3–14.5)
RBC # FLD: 17 % — HIGH (ref 10.3–14.5)
RBC # FLD: 17.2 % — HIGH (ref 10.3–14.5)
RBC BLD AUTO: ABNORMAL
RBC BLD AUTO: ABNORMAL
RETICS #: 21.8 K/UL — LOW (ref 25–125)
RETICS #: 66.4 K/UL — SIGNIFICANT CHANGE UP (ref 25–125)
RETICS/RBC NFR: 0.7 % — SIGNIFICANT CHANGE UP (ref 0.5–2.5)
RETICS/RBC NFR: 2.1 % — SIGNIFICANT CHANGE UP (ref 0.5–2.5)
RH IG SCN BLD-IMP: POSITIVE — SIGNIFICANT CHANGE UP
RH IG SCN BLD-IMP: POSITIVE — SIGNIFICANT CHANGE UP
SODIUM SERPL-SCNC: 133 MMOL/L — LOW (ref 135–145)
SODIUM SERPL-SCNC: 134 MMOL/L — LOW (ref 135–145)
SODIUM SERPL-SCNC: 137 MMOL/L — SIGNIFICANT CHANGE UP (ref 135–145)
SODIUM SERPL-SCNC: 139 MMOL/L — SIGNIFICANT CHANGE UP (ref 135–145)
SODIUM SERPL-SCNC: 140 MMOL/L
SODIUM SERPL-SCNC: 140 MMOL/L — SIGNIFICANT CHANGE UP (ref 135–145)
SODIUM SERPL-SCNC: 141 MMOL/L — SIGNIFICANT CHANGE UP (ref 135–145)
SODIUM SERPL-SCNC: 141 MMOL/L — SIGNIFICANT CHANGE UP (ref 135–145)
SODIUM SERPL-SCNC: 143 MMOL/L
SODIUM SERPL-SCNC: 143 MMOL/L — SIGNIFICANT CHANGE UP (ref 135–145)
SODIUM SERPL-SCNC: 143 MMOL/L — SIGNIFICANT CHANGE UP (ref 135–145)
SODIUM UR-SCNC: 40 MMOL/L — SIGNIFICANT CHANGE UP
TIBC SERPL-MCNC: 272 UG/DL — SIGNIFICANT CHANGE UP (ref 220–430)
TM INTERPRETATION: SIGNIFICANT CHANGE UP
TRIGL SERPL-MCNC: 36 MG/DL
TSH SERPL-MCNC: 1.22 UIU/ML — SIGNIFICANT CHANGE UP (ref 0.27–4.2)
UIBC SERPL-MCNC: 221 UG/DL — SIGNIFICANT CHANGE UP (ref 110–370)
UUN UR-MCNC: 668 MG/DL — SIGNIFICANT CHANGE UP
VIT B12 SERPL-MCNC: >2000 PG/ML — HIGH (ref 232–1245)
WBC # BLD: 2.2 K/UL — LOW (ref 3.8–10.5)
WBC # BLD: 2.29 K/UL — LOW (ref 3.8–10.5)
WBC # BLD: 2.73 K/UL — LOW (ref 3.8–10.5)
WBC # BLD: 2.74 K/UL — LOW (ref 3.8–10.5)
WBC # BLD: 3.06 K/UL — LOW (ref 3.8–10.5)
WBC # BLD: 3.25 K/UL — LOW (ref 3.8–10.5)
WBC # BLD: 3.3 K/UL — LOW (ref 3.8–10.5)
WBC # BLD: 3.4 K/UL — LOW (ref 3.8–10.5)
WBC # BLD: 4.07 K/UL — SIGNIFICANT CHANGE UP (ref 3.8–10.5)
WBC # BLD: 4.77 K/UL — SIGNIFICANT CHANGE UP (ref 3.8–10.5)
WBC # BLD: 4.85 K/UL — SIGNIFICANT CHANGE UP (ref 3.8–10.5)
WBC # BLD: 5.22 K/UL — SIGNIFICANT CHANGE UP (ref 3.8–10.5)
WBC # BLD: 5.45 K/UL — SIGNIFICANT CHANGE UP (ref 3.8–10.5)
WBC # BLD: 5.65 K/UL — SIGNIFICANT CHANGE UP (ref 3.8–10.5)
WBC # BLD: 5.95 K/UL — SIGNIFICANT CHANGE UP (ref 3.8–10.5)
WBC # BLD: 6 K/UL — SIGNIFICANT CHANGE UP (ref 3.8–10.5)
WBC # FLD AUTO: 2.2 K/UL — LOW (ref 3.8–10.5)
WBC # FLD AUTO: 2.29 K/UL — LOW (ref 3.8–10.5)
WBC # FLD AUTO: 2.73 K/UL — LOW (ref 3.8–10.5)
WBC # FLD AUTO: 2.74 K/UL — LOW (ref 3.8–10.5)
WBC # FLD AUTO: 2.75 K/UL
WBC # FLD AUTO: 2.94 K/UL
WBC # FLD AUTO: 3.06 K/UL — LOW (ref 3.8–10.5)
WBC # FLD AUTO: 3.25 K/UL — LOW (ref 3.8–10.5)
WBC # FLD AUTO: 3.3 K/UL — LOW (ref 3.8–10.5)
WBC # FLD AUTO: 3.4 K/UL — LOW (ref 3.8–10.5)
WBC # FLD AUTO: 4.07 K/UL — SIGNIFICANT CHANGE UP (ref 3.8–10.5)
WBC # FLD AUTO: 4.77 K/UL — SIGNIFICANT CHANGE UP (ref 3.8–10.5)
WBC # FLD AUTO: 4.85 K/UL — SIGNIFICANT CHANGE UP (ref 3.8–10.5)
WBC # FLD AUTO: 5.22 K/UL — SIGNIFICANT CHANGE UP (ref 3.8–10.5)
WBC # FLD AUTO: 5.45 K/UL — SIGNIFICANT CHANGE UP (ref 3.8–10.5)
WBC # FLD AUTO: 5.65 K/UL — SIGNIFICANT CHANGE UP (ref 3.8–10.5)
WBC # FLD AUTO: 5.95 K/UL — SIGNIFICANT CHANGE UP (ref 3.8–10.5)
WBC # FLD AUTO: 6 K/UL — SIGNIFICANT CHANGE UP (ref 3.8–10.5)

## 2019-01-01 PROCEDURE — 82728 ASSAY OF FERRITIN: CPT

## 2019-01-01 PROCEDURE — 71045 X-RAY EXAM CHEST 1 VIEW: CPT | Mod: 26

## 2019-01-01 PROCEDURE — 93461 R&L HRT ART/VENTRICLE ANGIO: CPT

## 2019-01-01 PROCEDURE — 85097 BONE MARROW INTERPRETATION: CPT

## 2019-01-01 PROCEDURE — 80048 BASIC METABOLIC PNL TOTAL CA: CPT

## 2019-01-01 PROCEDURE — 88285 CHROMOSOME COUNT ADDITIONAL: CPT

## 2019-01-01 PROCEDURE — 99233 SBSQ HOSP IP/OBS HIGH 50: CPT

## 2019-01-01 PROCEDURE — 87389 HIV-1 AG W/HIV-1&-2 AB AG IA: CPT

## 2019-01-01 PROCEDURE — 81219 CALR GENE COM VARIANTS: CPT

## 2019-01-01 PROCEDURE — 94640 AIRWAY INHALATION TREATMENT: CPT

## 2019-01-01 PROCEDURE — 94726 PLETHYSMOGRAPHY LUNG VOLUMES: CPT

## 2019-01-01 PROCEDURE — 81121 IDH2 COMMON VARIANTS: CPT

## 2019-01-01 PROCEDURE — 99223 1ST HOSP IP/OBS HIGH 75: CPT

## 2019-01-01 PROCEDURE — 81310 NPM1 GENE: CPT

## 2019-01-01 PROCEDURE — 85730 THROMBOPLASTIN TIME PARTIAL: CPT

## 2019-01-01 PROCEDURE — 85027 COMPLETE CBC AUTOMATED: CPT

## 2019-01-01 PROCEDURE — 80053 COMPREHEN METABOLIC PANEL: CPT

## 2019-01-01 PROCEDURE — 93000 ELECTROCARDIOGRAM COMPLETE: CPT

## 2019-01-01 PROCEDURE — 88341 IMHCHEM/IMCYTCHM EA ADD ANTB: CPT | Mod: 26,59

## 2019-01-01 PROCEDURE — 99232 SBSQ HOSP IP/OBS MODERATE 35: CPT | Mod: GC

## 2019-01-01 PROCEDURE — 81270 JAK2 GENE: CPT

## 2019-01-01 PROCEDURE — 99222 1ST HOSP IP/OBS MODERATE 55: CPT

## 2019-01-01 PROCEDURE — 81120 IDH1 COMMON VARIANTS: CPT

## 2019-01-01 PROCEDURE — ZZZZZ: CPT

## 2019-01-01 PROCEDURE — 81403 MOPATH PROCEDURE LEVEL 4: CPT

## 2019-01-01 PROCEDURE — 99152 MOD SED SAME PHYS/QHP 5/>YRS: CPT

## 2019-01-01 PROCEDURE — 86334 IMMUNOFIX E-PHORESIS SERUM: CPT

## 2019-01-01 PROCEDURE — 33225 L VENTRIC PACING LEAD ADD-ON: CPT

## 2019-01-01 PROCEDURE — 81272 KIT GENE TARGETED SEQ ANALYS: CPT

## 2019-01-01 PROCEDURE — 99285 EMERGENCY DEPT VISIT HI MDM: CPT | Mod: GC

## 2019-01-01 PROCEDURE — 88264 CHROMOSOME ANALYSIS 20-25: CPT

## 2019-01-01 PROCEDURE — 99214 OFFICE O/P EST MOD 30 MIN: CPT

## 2019-01-01 PROCEDURE — 76770 US EXAM ABDO BACK WALL COMP: CPT | Mod: 26

## 2019-01-01 PROCEDURE — 86038 ANTINUCLEAR ANTIBODIES: CPT

## 2019-01-01 PROCEDURE — 94729 DIFFUSING CAPACITY: CPT

## 2019-01-01 PROCEDURE — 81246 FLT3 GENE ANALYSIS: CPT

## 2019-01-01 PROCEDURE — 83735 ASSAY OF MAGNESIUM: CPT

## 2019-01-01 PROCEDURE — 99214 OFFICE O/P EST MOD 30 MIN: CPT | Mod: 25

## 2019-01-01 PROCEDURE — 99233 SBSQ HOSP IP/OBS HIGH 50: CPT | Mod: GC

## 2019-01-01 PROCEDURE — 86900 BLOOD TYPING SEROLOGIC ABO: CPT

## 2019-01-01 PROCEDURE — 82746 ASSAY OF FOLIC ACID SERUM: CPT

## 2019-01-01 PROCEDURE — 88360 TUMOR IMMUNOHISTOCHEM/MANUAL: CPT | Mod: 26

## 2019-01-01 PROCEDURE — 81311 NRAS GENE VARIANTS EXON 2&3: CPT

## 2019-01-01 PROCEDURE — 99406 BEHAV CHNG SMOKING 3-10 MIN: CPT

## 2019-01-01 PROCEDURE — 88189 FLOWCYTOMETRY/READ 16 & >: CPT

## 2019-01-01 PROCEDURE — 88305 TISSUE EXAM BY PATHOLOGIST: CPT

## 2019-01-01 PROCEDURE — 81402 MOPATH PROCEDURE LEVEL 3: CPT

## 2019-01-01 PROCEDURE — 36223 PLACE CATH CAROTID/INOM ART: CPT

## 2019-01-01 PROCEDURE — 88341 IMHCHEM/IMCYTCHM EA ADD ANTB: CPT

## 2019-01-01 PROCEDURE — 33249 INSJ/RPLCMT DEFIB W/LEAD(S): CPT

## 2019-01-01 PROCEDURE — 93005 ELECTROCARDIOGRAM TRACING: CPT

## 2019-01-01 PROCEDURE — G0103: CPT

## 2019-01-01 PROCEDURE — 36223 PLACE CATH CAROTID/INOM ART: CPT | Mod: 50

## 2019-01-01 PROCEDURE — 88313 SPECIAL STAINS GROUP 2: CPT

## 2019-01-01 PROCEDURE — 88184 FLOWCYTOMETRY/ TC 1 MARKER: CPT

## 2019-01-01 PROCEDURE — 84155 ASSAY OF PROTEIN SERUM: CPT

## 2019-01-01 PROCEDURE — 81405 MOPATH PROCEDURE LEVEL 6: CPT

## 2019-01-01 PROCEDURE — 81210 BRAF GENE: CPT

## 2019-01-01 PROCEDURE — 83010 ASSAY OF HAPTOGLOBIN QUANT: CPT

## 2019-01-01 PROCEDURE — 81275 KRAS GENE VARIANTS EXON 2: CPT

## 2019-01-01 PROCEDURE — 99223 1ST HOSP IP/OBS HIGH 75: CPT | Mod: GC

## 2019-01-01 PROCEDURE — C1887: CPT

## 2019-01-01 PROCEDURE — 88305 TISSUE EXAM BY PATHOLOGIST: CPT | Mod: 26

## 2019-01-01 PROCEDURE — 85610 PROTHROMBIN TIME: CPT

## 2019-01-01 PROCEDURE — 93306 TTE W/DOPPLER COMPLETE: CPT

## 2019-01-01 PROCEDURE — 99239 HOSP IP/OBS DSCHRG MGMT >30: CPT

## 2019-01-01 PROCEDURE — 81245 FLT3 GENE: CPT

## 2019-01-01 PROCEDURE — 88271 CYTOGENETICS DNA PROBE: CPT

## 2019-01-01 PROCEDURE — 93010 ELECTROCARDIOGRAM REPORT: CPT

## 2019-01-01 PROCEDURE — 88360 TUMOR IMMUNOHISTOCHEM/MANUAL: CPT

## 2019-01-01 PROCEDURE — C1894: CPT

## 2019-01-01 PROCEDURE — 81170 ABL1 GENE: CPT

## 2019-01-01 PROCEDURE — 93461 R&L HRT ART/VENTRICLE ANGIO: CPT | Mod: 26

## 2019-01-01 PROCEDURE — 83540 ASSAY OF IRON: CPT

## 2019-01-01 PROCEDURE — 99205 OFFICE O/P NEW HI 60 MIN: CPT

## 2019-01-01 PROCEDURE — C1769: CPT

## 2019-01-01 PROCEDURE — 84443 ASSAY THYROID STIM HORMONE: CPT

## 2019-01-01 PROCEDURE — 88342 IMHCHEM/IMCYTCHM 1ST ANTB: CPT

## 2019-01-01 PROCEDURE — 88275 CYTOGENETICS 100-300: CPT

## 2019-01-01 PROCEDURE — 93306 TTE W/DOPPLER COMPLETE: CPT | Mod: 26

## 2019-01-01 PROCEDURE — 99232 SBSQ HOSP IP/OBS MODERATE 35: CPT

## 2019-01-01 PROCEDURE — 94060 EVALUATION OF WHEEZING: CPT

## 2019-01-01 PROCEDURE — 83615 LACTATE (LD) (LDH) ENZYME: CPT

## 2019-01-01 PROCEDURE — G0463: CPT

## 2019-01-01 PROCEDURE — 86901 BLOOD TYPING SEROLOGIC RH(D): CPT

## 2019-01-01 PROCEDURE — 85045 AUTOMATED RETICULOCYTE COUNT: CPT

## 2019-01-01 PROCEDURE — 84165 PROTEIN E-PHORESIS SERUM: CPT

## 2019-01-01 PROCEDURE — 88342 IMHCHEM/IMCYTCHM 1ST ANTB: CPT | Mod: 26,59

## 2019-01-01 PROCEDURE — 86850 RBC ANTIBODY SCREEN: CPT

## 2019-01-01 PROCEDURE — 87205 SMEAR GRAM STAIN: CPT

## 2019-01-01 PROCEDURE — 83550 IRON BINDING TEST: CPT

## 2019-01-01 PROCEDURE — 99153 MOD SED SAME PHYS/QHP EA: CPT

## 2019-01-01 PROCEDURE — 81276 KRAS GENE ADDL VARIANTS: CPT

## 2019-01-01 PROCEDURE — 88313 SPECIAL STAINS GROUP 2: CPT | Mod: 26

## 2019-01-01 PROCEDURE — 88237 TISSUE CULTURE BONE MARROW: CPT

## 2019-01-01 PROCEDURE — 12345: CPT | Mod: NC

## 2019-01-01 PROCEDURE — 80076 HEPATIC FUNCTION PANEL: CPT

## 2019-01-01 PROCEDURE — 99213 OFFICE O/P EST LOW 20 MIN: CPT

## 2019-01-01 PROCEDURE — 88185 FLOWCYTOMETRY/TC ADD-ON: CPT

## 2019-01-01 PROCEDURE — 82607 VITAMIN B-12: CPT

## 2019-01-01 PROCEDURE — 88280 CHROMOSOME KARYOTYPE STUDY: CPT

## 2019-01-01 RX ORDER — WARFARIN SODIUM 2.5 MG/1
2.5 TABLET ORAL ONCE
Refills: 0 | Status: COMPLETED | OUTPATIENT
Start: 2019-01-01 | End: 2019-01-01

## 2019-01-01 RX ORDER — HYDRALAZINE HCL 50 MG
10 TABLET ORAL EVERY 8 HOURS
Refills: 0 | Status: DISCONTINUED | OUTPATIENT
Start: 2019-01-01 | End: 2020-01-01

## 2019-01-01 RX ORDER — VARENICLINE TARTRATE 1 MG/1
1 TABLET, FILM COATED ORAL
Qty: 1 | Refills: 5 | Status: DISCONTINUED | COMMUNITY
Start: 2018-02-08 | End: 2019-01-01

## 2019-01-01 RX ORDER — FOLIC ACID 0.8 MG
1 TABLET ORAL DAILY
Refills: 0 | Status: DISCONTINUED | OUTPATIENT
Start: 2019-01-01 | End: 2020-01-01

## 2019-01-01 RX ORDER — LANOLIN ALCOHOL/MO/W.PET/CERES
3 CREAM (GRAM) TOPICAL AT BEDTIME
Refills: 0 | Status: DISCONTINUED | OUTPATIENT
Start: 2019-01-01 | End: 2020-01-01

## 2019-01-01 RX ORDER — FUROSEMIDE 40 MG
40 TABLET ORAL
Refills: 0 | Status: DISCONTINUED | OUTPATIENT
Start: 2019-01-01 | End: 2019-01-01

## 2019-01-01 RX ORDER — FUROSEMIDE 40 MG
80 TABLET ORAL
Refills: 0 | Status: DISCONTINUED | OUTPATIENT
Start: 2019-01-01 | End: 2019-01-01

## 2019-01-01 RX ORDER — IPRATROPIUM/ALBUTEROL SULFATE 18-103MCG
3 AEROSOL WITH ADAPTER (GRAM) INHALATION EVERY 6 HOURS
Refills: 0 | Status: DISCONTINUED | OUTPATIENT
Start: 2019-01-01 | End: 2020-01-01

## 2019-01-01 RX ORDER — ACETAMINOPHEN 500 MG
1000 TABLET ORAL ONCE
Refills: 0 | Status: COMPLETED | OUTPATIENT
Start: 2019-01-01 | End: 2019-01-01

## 2019-01-01 RX ORDER — WARFARIN SODIUM 2.5 MG/1
2.5 TABLET ORAL ONCE
Refills: 0 | Status: DISCONTINUED | OUTPATIENT
Start: 2019-01-01 | End: 2019-01-01

## 2019-01-01 RX ORDER — SOD,AMMONIUM,POTASSIUM LACTATE
1 CREAM (GRAM) TOPICAL
Refills: 0 | Status: DISCONTINUED | OUTPATIENT
Start: 2019-01-01 | End: 2020-01-01

## 2019-01-01 RX ORDER — CHOLECALCIFEROL (VITAMIN D3) 125 MCG
2000 CAPSULE ORAL DAILY
Refills: 0 | Status: DISCONTINUED | OUTPATIENT
Start: 2019-01-01 | End: 2019-01-01

## 2019-01-01 RX ORDER — FUROSEMIDE 40 MG
20 TABLET ORAL
Refills: 0 | Status: DISCONTINUED | OUTPATIENT
Start: 2019-01-01 | End: 2019-01-01

## 2019-01-01 RX ORDER — POTASSIUM CHLORIDE 20 MEQ
10 PACKET (EA) ORAL ONCE
Refills: 0 | Status: COMPLETED | OUTPATIENT
Start: 2019-01-01 | End: 2019-01-01

## 2019-01-01 RX ORDER — VARENICLINE TARTRATE 0.5 (11)-1
0.5 MG X 11 & KIT ORAL
Qty: 1 | Refills: 0 | Status: DISCONTINUED | COMMUNITY
Start: 2018-02-08 | End: 2019-01-01

## 2019-01-01 RX ORDER — FUROSEMIDE 40 MG
40 TABLET ORAL DAILY
Refills: 0 | Status: DISCONTINUED | OUTPATIENT
Start: 2019-01-01 | End: 2019-01-01

## 2019-01-01 RX ORDER — BUDESONIDE AND FORMOTEROL FUMARATE DIHYDRATE 160; 4.5 UG/1; UG/1
2 AEROSOL RESPIRATORY (INHALATION)
Refills: 0 | Status: DISCONTINUED | OUTPATIENT
Start: 2019-01-01 | End: 2019-01-01

## 2019-01-01 RX ORDER — ATORVASTATIN CALCIUM 80 MG/1
20 TABLET, FILM COATED ORAL AT BEDTIME
Refills: 0 | Status: DISCONTINUED | OUTPATIENT
Start: 2019-01-01 | End: 2020-01-01

## 2019-01-01 RX ORDER — ALBUTEROL 90 UG/1
2 AEROSOL, METERED ORAL
Qty: 1 | Refills: 0
Start: 2019-01-01 | End: 2020-01-01

## 2019-01-01 RX ORDER — SACUBITRIL AND VALSARTAN 24; 26 MG/1; MG/1
1 TABLET, FILM COATED ORAL
Qty: 0 | Refills: 0 | DISCHARGE
Start: 2019-01-01

## 2019-01-01 RX ORDER — SACUBITRIL AND VALSARTAN 24; 26 MG/1; MG/1
1 TABLET, FILM COATED ORAL
Qty: 60 | Refills: 0
Start: 2019-01-01 | End: 2020-01-01

## 2019-01-01 RX ORDER — METOPROLOL TARTRATE 50 MG
100 TABLET ORAL DAILY
Refills: 0 | Status: DISCONTINUED | OUTPATIENT
Start: 2019-01-01 | End: 2019-01-01

## 2019-01-01 RX ORDER — ATORVASTATIN CALCIUM 80 MG/1
20 TABLET, FILM COATED ORAL AT BEDTIME
Refills: 0 | Status: DISCONTINUED | OUTPATIENT
Start: 2019-01-01 | End: 2019-01-01

## 2019-01-01 RX ORDER — BUMETANIDE 0.25 MG/ML
3 INJECTION INTRAMUSCULAR; INTRAVENOUS
Refills: 0 | Status: DISCONTINUED | OUTPATIENT
Start: 2019-01-01 | End: 2020-01-01

## 2019-01-01 RX ORDER — ASPIRIN/CALCIUM CARB/MAGNESIUM 324 MG
81 TABLET ORAL DAILY
Refills: 0 | Status: DISCONTINUED | OUTPATIENT
Start: 2019-01-01 | End: 2019-01-01

## 2019-01-01 RX ORDER — POTASSIUM CHLORIDE 20 MEQ
40 PACKET (EA) ORAL ONCE
Refills: 0 | Status: COMPLETED | OUTPATIENT
Start: 2019-01-01 | End: 2019-01-01

## 2019-01-01 RX ORDER — ACETAMINOPHEN 500 MG
325 TABLET ORAL ONCE
Refills: 0 | Status: COMPLETED | OUTPATIENT
Start: 2019-01-01 | End: 2019-01-01

## 2019-01-01 RX ORDER — ALPRAZOLAM 0.25 MG
0.5 TABLET ORAL ONCE
Refills: 0 | Status: DISCONTINUED | OUTPATIENT
Start: 2019-01-01 | End: 2019-01-01

## 2019-01-01 RX ORDER — FUROSEMIDE 40 MG
1 TABLET ORAL
Qty: 30 | Refills: 0
Start: 2019-01-01 | End: 2020-01-01

## 2019-01-01 RX ORDER — ALBUTEROL 90 UG/1
2 AEROSOL, METERED ORAL EVERY 6 HOURS
Refills: 0 | Status: DISCONTINUED | OUTPATIENT
Start: 2019-01-01 | End: 2019-01-01

## 2019-01-01 RX ORDER — QUETIAPINE FUMARATE 200 MG/1
25 TABLET, FILM COATED ORAL AT BEDTIME
Refills: 0 | Status: DISCONTINUED | OUTPATIENT
Start: 2019-01-01 | End: 2019-01-01

## 2019-01-01 RX ORDER — FLUTICASONE FUROATE, UMECLIDINIUM BROMIDE AND VILANTEROL TRIFENATATE 100; 62.5; 25 UG/1; UG/1; UG/1
100-62.5-25 POWDER RESPIRATORY (INHALATION) DAILY
Qty: 3 | Refills: 0 | Status: ACTIVE | COMMUNITY
Start: 2019-01-01 | End: 1900-01-01

## 2019-01-01 RX ORDER — ALBUTEROL 90 UG/1
1.25 AEROSOL, METERED ORAL EVERY 6 HOURS
Refills: 0 | Status: DISCONTINUED | OUTPATIENT
Start: 2019-01-01 | End: 2019-01-01

## 2019-01-01 RX ORDER — FOLIC ACID 0.8 MG
1 TABLET ORAL DAILY
Refills: 0 | Status: DISCONTINUED | OUTPATIENT
Start: 2019-01-01 | End: 2019-01-01

## 2019-01-01 RX ORDER — METOPROLOL TARTRATE 50 MG
1 TABLET ORAL
Qty: 0 | Refills: 0 | DISCHARGE

## 2019-01-01 RX ORDER — VANCOMYCIN HCL 1 G
1000 VIAL (EA) INTRAVENOUS ONCE
Refills: 0 | Status: COMPLETED | OUTPATIENT
Start: 2020-01-01 | End: 2020-01-01

## 2019-01-01 RX ORDER — SODIUM ZIRCONIUM CYCLOSILICATE 10 G/10G
15 POWDER, FOR SUSPENSION ORAL ONCE
Refills: 0 | Status: COMPLETED | OUTPATIENT
Start: 2019-01-01 | End: 2019-01-01

## 2019-01-01 RX ORDER — SOD,AMMONIUM,POTASSIUM LACTATE
1 CREAM (GRAM) TOPICAL EVERY 12 HOURS
Refills: 0 | Status: DISCONTINUED | OUTPATIENT
Start: 2019-01-01 | End: 2019-01-01

## 2019-01-01 RX ORDER — ALBUTEROL 90 UG/1
2 AEROSOL, METERED ORAL
Qty: 0 | Refills: 0 | DISCHARGE
Start: 2019-01-01

## 2019-01-01 RX ORDER — NICOTINE POLACRILEX 2 MG
2 GUM BUCCAL
Refills: 0 | Status: DISCONTINUED | OUTPATIENT
Start: 2019-01-01 | End: 2019-01-01

## 2019-01-01 RX ORDER — QUETIAPINE FUMARATE 200 MG/1
12.5 TABLET, FILM COATED ORAL AT BEDTIME
Refills: 0 | Status: DISCONTINUED | OUTPATIENT
Start: 2019-01-01 | End: 2020-01-01

## 2019-01-01 RX ORDER — NICOTINE POLACRILEX 2 MG
1 GUM BUCCAL
Qty: 112 | Refills: 0
Start: 2019-01-01 | End: 2019-01-01

## 2019-01-01 RX ORDER — SACUBITRIL AND VALSARTAN 24; 26 MG/1; MG/1
1 TABLET, FILM COATED ORAL
Refills: 0 | Status: DISCONTINUED | OUTPATIENT
Start: 2019-01-01 | End: 2019-01-01

## 2019-01-01 RX ORDER — DIGOXIN 250 MCG
0.12 TABLET ORAL DAILY
Refills: 0 | Status: DISCONTINUED | OUTPATIENT
Start: 2019-01-01 | End: 2020-01-01

## 2019-01-01 RX ORDER — NICOTINE POLACRILEX 2 MG
1 GUM BUCCAL DAILY
Refills: 0 | Status: DISCONTINUED | OUTPATIENT
Start: 2019-01-01 | End: 2020-01-01

## 2019-01-01 RX ORDER — ALPRAZOLAM 0.25 MG
0.25 TABLET ORAL ONCE
Refills: 0 | Status: DISCONTINUED | OUTPATIENT
Start: 2019-01-01 | End: 2019-01-01

## 2019-01-01 RX ORDER — BUDESONIDE AND FORMOTEROL FUMARATE DIHYDRATE 160; 4.5 UG/1; UG/1
2 AEROSOL RESPIRATORY (INHALATION)
Refills: 0 | Status: DISCONTINUED | OUTPATIENT
Start: 2019-01-01 | End: 2020-01-01

## 2019-01-01 RX ORDER — WARFARIN 2.5 MG/1
2.5 TABLET ORAL DAILY
Qty: 90 | Refills: 3 | Status: ACTIVE | COMMUNITY
Start: 2018-03-01 | End: 1900-01-01

## 2019-01-01 RX ORDER — METOPROLOL TARTRATE 50 MG
200 TABLET ORAL AT BEDTIME
Refills: 0 | Status: DISCONTINUED | OUTPATIENT
Start: 2019-01-01 | End: 2020-01-01

## 2019-01-01 RX ORDER — OXYCODONE HYDROCHLORIDE 5 MG/1
5 TABLET ORAL ONCE
Refills: 0 | Status: DISCONTINUED | OUTPATIENT
Start: 2019-01-01 | End: 2019-01-01

## 2019-01-01 RX ORDER — QUETIAPINE FUMARATE 200 MG/1
25 TABLET, FILM COATED ORAL DAILY
Refills: 0 | Status: DISCONTINUED | OUTPATIENT
Start: 2019-01-01 | End: 2019-01-01

## 2019-01-01 RX ADMIN — BUDESONIDE AND FORMOTEROL FUMARATE DIHYDRATE 2 PUFF(S): 160; 4.5 AEROSOL RESPIRATORY (INHALATION) at 05:05

## 2019-01-01 RX ADMIN — Medication 81 MILLIGRAM(S): at 13:30

## 2019-01-01 RX ADMIN — BUDESONIDE AND FORMOTEROL FUMARATE DIHYDRATE 2 PUFF(S): 160; 4.5 AEROSOL RESPIRATORY (INHALATION) at 05:17

## 2019-01-01 RX ADMIN — SACUBITRIL AND VALSARTAN 1 TABLET(S): 24; 26 TABLET, FILM COATED ORAL at 06:58

## 2019-01-01 RX ADMIN — WARFARIN SODIUM 2.5 MILLIGRAM(S): 2.5 TABLET ORAL at 22:09

## 2019-01-01 RX ADMIN — QUETIAPINE FUMARATE 25 MILLIGRAM(S): 200 TABLET, FILM COATED ORAL at 21:24

## 2019-01-01 RX ADMIN — Medication 3 MILLILITER(S): at 05:43

## 2019-01-01 RX ADMIN — Medication 1 APPLICATION(S): at 17:06

## 2019-01-01 RX ADMIN — BUDESONIDE AND FORMOTEROL FUMARATE DIHYDRATE 2 PUFF(S): 160; 4.5 AEROSOL RESPIRATORY (INHALATION) at 06:07

## 2019-01-01 RX ADMIN — Medication 100 MILLIGRAM(S): at 05:05

## 2019-01-01 RX ADMIN — Medication 3 MILLILITER(S): at 11:49

## 2019-01-01 RX ADMIN — QUETIAPINE FUMARATE 25 MILLIGRAM(S): 200 TABLET, FILM COATED ORAL at 22:11

## 2019-01-01 RX ADMIN — Medication 1 MILLIGRAM(S): at 11:49

## 2019-01-01 RX ADMIN — BUDESONIDE AND FORMOTEROL FUMARATE DIHYDRATE 2 PUFF(S): 160; 4.5 AEROSOL RESPIRATORY (INHALATION) at 17:06

## 2019-01-01 RX ADMIN — Medication 80 MILLIGRAM(S): at 05:43

## 2019-01-01 RX ADMIN — Medication 1 MILLIGRAM(S): at 11:20

## 2019-01-01 RX ADMIN — Medication 0.25 MILLIGRAM(S): at 06:58

## 2019-01-01 RX ADMIN — Medication 3 MILLILITER(S): at 23:34

## 2019-01-01 RX ADMIN — Medication 1 APPLICATION(S): at 05:43

## 2019-01-01 RX ADMIN — BUDESONIDE AND FORMOTEROL FUMARATE DIHYDRATE 2 PUFF(S): 160; 4.5 AEROSOL RESPIRATORY (INHALATION) at 21:18

## 2019-01-01 RX ADMIN — Medication 3 MILLILITER(S): at 05:01

## 2019-01-01 RX ADMIN — WARFARIN SODIUM 2.5 MILLIGRAM(S): 2.5 TABLET ORAL at 21:05

## 2019-01-01 RX ADMIN — Medication 1 MILLIGRAM(S): at 13:08

## 2019-01-01 RX ADMIN — BUDESONIDE AND FORMOTEROL FUMARATE DIHYDRATE 2 PUFF(S): 160; 4.5 AEROSOL RESPIRATORY (INHALATION) at 05:38

## 2019-01-01 RX ADMIN — BUDESONIDE AND FORMOTEROL FUMARATE DIHYDRATE 2 PUFF(S): 160; 4.5 AEROSOL RESPIRATORY (INHALATION) at 05:24

## 2019-01-01 RX ADMIN — Medication 1 APPLICATION(S): at 05:04

## 2019-01-01 RX ADMIN — Medication 3 MILLIGRAM(S): at 22:09

## 2019-01-01 RX ADMIN — Medication 3 MILLILITER(S): at 11:53

## 2019-01-01 RX ADMIN — Medication 80 MILLIGRAM(S): at 05:05

## 2019-01-01 RX ADMIN — BUMETANIDE 3 MILLIGRAM(S): 0.25 INJECTION INTRAMUSCULAR; INTRAVENOUS at 21:51

## 2019-01-01 RX ADMIN — Medication 1 APPLICATION(S): at 18:23

## 2019-01-01 RX ADMIN — BUMETANIDE 3 MILLIGRAM(S): 0.25 INJECTION INTRAMUSCULAR; INTRAVENOUS at 05:38

## 2019-01-01 RX ADMIN — Medication 1 APPLICATION(S): at 18:21

## 2019-01-01 RX ADMIN — BUDESONIDE AND FORMOTEROL FUMARATE DIHYDRATE 2 PUFF(S): 160; 4.5 AEROSOL RESPIRATORY (INHALATION) at 17:19

## 2019-01-01 RX ADMIN — Medication 3 MILLILITER(S): at 06:06

## 2019-01-01 RX ADMIN — Medication 3 MILLILITER(S): at 11:20

## 2019-01-01 RX ADMIN — Medication 100 MILLIGRAM(S): at 06:03

## 2019-01-01 RX ADMIN — BUDESONIDE AND FORMOTEROL FUMARATE DIHYDRATE 2 PUFF(S): 160; 4.5 AEROSOL RESPIRATORY (INHALATION) at 05:42

## 2019-01-01 RX ADMIN — BUDESONIDE AND FORMOTEROL FUMARATE DIHYDRATE 2 PUFF(S): 160; 4.5 AEROSOL RESPIRATORY (INHALATION) at 05:22

## 2019-01-01 RX ADMIN — Medication 3 MILLILITER(S): at 23:15

## 2019-01-01 RX ADMIN — Medication 1 APPLICATION(S): at 05:05

## 2019-01-01 RX ADMIN — Medication 100 MILLIGRAM(S): at 05:22

## 2019-01-01 RX ADMIN — Medication 1 APPLICATION(S): at 17:19

## 2019-01-01 RX ADMIN — Medication 1 APPLICATION(S): at 20:58

## 2019-01-01 RX ADMIN — ATORVASTATIN CALCIUM 20 MILLIGRAM(S): 80 TABLET, FILM COATED ORAL at 21:05

## 2019-01-01 RX ADMIN — Medication 2 MILLIGRAM(S): at 13:30

## 2019-01-01 RX ADMIN — Medication 3 MILLILITER(S): at 05:05

## 2019-01-01 RX ADMIN — ATORVASTATIN CALCIUM 20 MILLIGRAM(S): 80 TABLET, FILM COATED ORAL at 21:51

## 2019-01-01 RX ADMIN — Medication 3 MILLILITER(S): at 17:06

## 2019-01-01 RX ADMIN — Medication 0.5 MILLIGRAM(S): at 15:48

## 2019-01-01 RX ADMIN — Medication 3 MILLIGRAM(S): at 22:11

## 2019-01-01 RX ADMIN — WARFARIN SODIUM 2.5 MILLIGRAM(S): 2.5 TABLET ORAL at 00:44

## 2019-01-01 RX ADMIN — Medication 100 MILLIGRAM(S): at 05:11

## 2019-01-01 RX ADMIN — QUETIAPINE FUMARATE 25 MILLIGRAM(S): 200 TABLET, FILM COATED ORAL at 22:09

## 2019-01-01 RX ADMIN — Medication 3 MILLILITER(S): at 23:21

## 2019-01-01 RX ADMIN — SACUBITRIL AND VALSARTAN 1 TABLET(S): 24; 26 TABLET, FILM COATED ORAL at 17:54

## 2019-01-01 RX ADMIN — Medication 1 APPLICATION(S): at 18:20

## 2019-01-01 RX ADMIN — Medication 200 MILLIGRAM(S): at 21:54

## 2019-01-01 RX ADMIN — BUDESONIDE AND FORMOTEROL FUMARATE DIHYDRATE 2 PUFF(S): 160; 4.5 AEROSOL RESPIRATORY (INHALATION) at 05:02

## 2019-01-01 RX ADMIN — QUETIAPINE FUMARATE 25 MILLIGRAM(S): 200 TABLET, FILM COATED ORAL at 21:52

## 2019-01-01 RX ADMIN — OXYCODONE HYDROCHLORIDE 5 MILLIGRAM(S): 5 TABLET ORAL at 23:07

## 2019-01-01 RX ADMIN — QUETIAPINE FUMARATE 25 MILLIGRAM(S): 200 TABLET, FILM COATED ORAL at 21:03

## 2019-01-01 RX ADMIN — Medication 3 MILLILITER(S): at 05:26

## 2019-01-01 RX ADMIN — Medication 80 MILLIGRAM(S): at 05:12

## 2019-01-01 RX ADMIN — Medication 3 MILLIGRAM(S): at 21:41

## 2019-01-01 RX ADMIN — ATORVASTATIN CALCIUM 20 MILLIGRAM(S): 80 TABLET, FILM COATED ORAL at 22:10

## 2019-01-01 RX ADMIN — Medication 3 MILLILITER(S): at 12:16

## 2019-01-01 RX ADMIN — Medication 1 APPLICATION(S): at 06:04

## 2019-01-01 RX ADMIN — Medication 80 MILLIGRAM(S): at 18:33

## 2019-01-01 RX ADMIN — BUDESONIDE AND FORMOTEROL FUMARATE DIHYDRATE 2 PUFF(S): 160; 4.5 AEROSOL RESPIRATORY (INHALATION) at 11:54

## 2019-01-01 RX ADMIN — ATORVASTATIN CALCIUM 20 MILLIGRAM(S): 80 TABLET, FILM COATED ORAL at 21:18

## 2019-01-01 RX ADMIN — Medication 1 APPLICATION(S): at 05:01

## 2019-01-01 RX ADMIN — Medication 40 MILLIEQUIVALENT(S): at 15:44

## 2019-01-01 RX ADMIN — BUDESONIDE AND FORMOTEROL FUMARATE DIHYDRATE 2 PUFF(S): 160; 4.5 AEROSOL RESPIRATORY (INHALATION) at 18:22

## 2019-01-01 RX ADMIN — ATORVASTATIN CALCIUM 20 MILLIGRAM(S): 80 TABLET, FILM COATED ORAL at 21:27

## 2019-01-01 RX ADMIN — Medication 1 APPLICATION(S): at 06:06

## 2019-01-01 RX ADMIN — Medication 1 APPLICATION(S): at 05:44

## 2019-01-01 RX ADMIN — Medication 3 MILLILITER(S): at 17:19

## 2019-01-01 RX ADMIN — Medication 1 APPLICATION(S): at 05:06

## 2019-01-01 RX ADMIN — Medication 80 MILLIGRAM(S): at 05:25

## 2019-01-01 RX ADMIN — Medication 20 MILLIGRAM(S): at 18:22

## 2019-01-01 RX ADMIN — Medication 80 MILLIGRAM(S): at 17:19

## 2019-01-01 RX ADMIN — Medication 3 MILLILITER(S): at 11:26

## 2019-01-01 RX ADMIN — Medication 3 MILLILITER(S): at 17:10

## 2019-01-01 RX ADMIN — Medication 81 MILLIGRAM(S): at 11:54

## 2019-01-01 RX ADMIN — Medication 1 APPLICATION(S): at 17:35

## 2019-01-01 RX ADMIN — WARFARIN SODIUM 2.5 MILLIGRAM(S): 2.5 TABLET ORAL at 21:03

## 2019-01-01 RX ADMIN — Medication 1 APPLICATION(S): at 05:24

## 2019-01-01 RX ADMIN — Medication 325 MILLIGRAM(S): at 22:45

## 2019-01-01 RX ADMIN — Medication 100 MILLIGRAM(S): at 05:38

## 2019-01-01 RX ADMIN — Medication 1 APPLICATION(S): at 17:05

## 2019-01-01 RX ADMIN — Medication 325 MILLIGRAM(S): at 21:31

## 2019-01-01 RX ADMIN — WARFARIN SODIUM 2.5 MILLIGRAM(S): 2.5 TABLET ORAL at 21:41

## 2019-01-01 RX ADMIN — Medication 80 MILLIGRAM(S): at 17:35

## 2019-01-01 RX ADMIN — Medication 1 MILLIGRAM(S): at 11:26

## 2019-01-01 RX ADMIN — Medication 2000 UNIT(S): at 11:54

## 2019-01-01 RX ADMIN — Medication 1 APPLICATION(S): at 05:15

## 2019-01-01 RX ADMIN — Medication 80 MILLIGRAM(S): at 19:12

## 2019-01-01 RX ADMIN — Medication 40 MILLIGRAM(S): at 18:29

## 2019-01-01 RX ADMIN — Medication 1 APPLICATION(S): at 05:25

## 2019-01-01 RX ADMIN — Medication 1 APPLICATION(S): at 18:34

## 2019-01-01 RX ADMIN — Medication 1 MILLIGRAM(S): at 11:37

## 2019-01-01 RX ADMIN — Medication 0.12 MILLIGRAM(S): at 21:54

## 2019-01-01 RX ADMIN — Medication 80 MILLIGRAM(S): at 05:04

## 2019-01-01 RX ADMIN — Medication 1 APPLICATION(S): at 05:16

## 2019-01-01 RX ADMIN — BUDESONIDE AND FORMOTEROL FUMARATE DIHYDRATE 2 PUFF(S): 160; 4.5 AEROSOL RESPIRATORY (INHALATION) at 18:33

## 2019-01-01 RX ADMIN — BUDESONIDE AND FORMOTEROL FUMARATE DIHYDRATE 2 PUFF(S): 160; 4.5 AEROSOL RESPIRATORY (INHALATION) at 18:21

## 2019-01-01 RX ADMIN — Medication 100 MILLIGRAM(S): at 05:00

## 2019-01-01 RX ADMIN — Medication 40 MILLIGRAM(S): at 05:02

## 2019-01-01 RX ADMIN — Medication 1 MILLIGRAM(S): at 11:52

## 2019-01-01 RX ADMIN — WARFARIN SODIUM 2.5 MILLIGRAM(S): 2.5 TABLET ORAL at 21:18

## 2019-01-01 RX ADMIN — Medication 3 MILLILITER(S): at 17:50

## 2019-01-01 RX ADMIN — Medication 3 MILLILITER(S): at 22:09

## 2019-01-01 RX ADMIN — Medication 3 MILLILITER(S): at 00:44

## 2019-01-01 RX ADMIN — Medication 3 MILLILITER(S): at 19:13

## 2019-01-01 RX ADMIN — QUETIAPINE FUMARATE 25 MILLIGRAM(S): 200 TABLET, FILM COATED ORAL at 21:26

## 2019-01-01 RX ADMIN — Medication 80 MILLIGRAM(S): at 05:22

## 2019-01-01 RX ADMIN — Medication 3 MILLIGRAM(S): at 21:26

## 2019-01-01 RX ADMIN — Medication 1 APPLICATION(S): at 17:36

## 2019-01-01 RX ADMIN — Medication 3 MILLILITER(S): at 05:23

## 2019-01-01 RX ADMIN — Medication 80 MILLIGRAM(S): at 17:06

## 2019-01-01 RX ADMIN — Medication 3 MILLILITER(S): at 14:19

## 2019-01-01 RX ADMIN — Medication 1 APPLICATION(S): at 17:10

## 2019-01-01 RX ADMIN — Medication 10 MILLIGRAM(S): at 21:51

## 2019-01-01 RX ADMIN — BUMETANIDE 3 MILLIGRAM(S): 0.25 INJECTION INTRAMUSCULAR; INTRAVENOUS at 18:19

## 2019-01-01 RX ADMIN — Medication 100 MILLIGRAM(S): at 06:06

## 2019-01-01 RX ADMIN — Medication 0.5 MILLIGRAM(S): at 23:34

## 2019-01-01 RX ADMIN — Medication 3 MILLILITER(S): at 23:05

## 2019-01-01 RX ADMIN — Medication 1 MILLIGRAM(S): at 11:33

## 2019-01-01 RX ADMIN — Medication 1 APPLICATION(S): at 17:51

## 2019-01-01 RX ADMIN — Medication 3 MILLILITER(S): at 12:13

## 2019-01-01 RX ADMIN — Medication 1 MILLIGRAM(S): at 11:54

## 2019-01-01 RX ADMIN — ATORVASTATIN CALCIUM 20 MILLIGRAM(S): 80 TABLET, FILM COATED ORAL at 22:11

## 2019-01-01 RX ADMIN — Medication 40 MILLIGRAM(S): at 17:55

## 2019-01-01 RX ADMIN — Medication 10 MILLIGRAM(S): at 05:38

## 2019-01-01 RX ADMIN — Medication 1 APPLICATION(S): at 06:03

## 2019-01-01 RX ADMIN — Medication 3 MILLILITER(S): at 11:47

## 2019-01-01 RX ADMIN — Medication 3 MILLIGRAM(S): at 21:24

## 2019-01-01 RX ADMIN — Medication 1 APPLICATION(S): at 18:04

## 2019-01-01 RX ADMIN — SACUBITRIL AND VALSARTAN 1 TABLET(S): 24; 26 TABLET, FILM COATED ORAL at 22:10

## 2019-01-01 RX ADMIN — BUDESONIDE AND FORMOTEROL FUMARATE DIHYDRATE 2 PUFF(S): 160; 4.5 AEROSOL RESPIRATORY (INHALATION) at 17:35

## 2019-01-01 RX ADMIN — ATORVASTATIN CALCIUM 20 MILLIGRAM(S): 80 TABLET, FILM COATED ORAL at 21:24

## 2019-01-01 RX ADMIN — Medication 3 MILLILITER(S): at 18:20

## 2019-01-01 RX ADMIN — Medication 100 MILLIGRAM(S): at 05:03

## 2019-01-01 RX ADMIN — Medication 20 MILLIGRAM(S): at 06:02

## 2019-01-01 RX ADMIN — Medication 3 MILLILITER(S): at 11:37

## 2019-01-01 RX ADMIN — Medication 10 MILLIEQUIVALENT(S): at 03:23

## 2019-01-01 RX ADMIN — Medication 1000 MILLIGRAM(S): at 20:15

## 2019-01-01 RX ADMIN — Medication 1 APPLICATION(S): at 05:26

## 2019-01-01 RX ADMIN — Medication 2000 UNIT(S): at 13:30

## 2019-01-01 RX ADMIN — Medication 40 MILLIGRAM(S): at 05:22

## 2019-01-01 RX ADMIN — BUDESONIDE AND FORMOTEROL FUMARATE DIHYDRATE 2 PUFF(S): 160; 4.5 AEROSOL RESPIRATORY (INHALATION) at 06:02

## 2019-01-01 RX ADMIN — BUDESONIDE AND FORMOTEROL FUMARATE DIHYDRATE 2 PUFF(S): 160; 4.5 AEROSOL RESPIRATORY (INHALATION) at 17:50

## 2019-01-01 RX ADMIN — QUETIAPINE FUMARATE 12.5 MILLIGRAM(S): 200 TABLET, FILM COATED ORAL at 21:51

## 2019-01-01 RX ADMIN — Medication 3 MILLILITER(S): at 23:52

## 2019-01-01 RX ADMIN — Medication 80 MILLIGRAM(S): at 17:00

## 2019-01-01 RX ADMIN — WARFARIN SODIUM 2.5 MILLIGRAM(S): 2.5 TABLET ORAL at 21:26

## 2019-01-01 RX ADMIN — Medication 100 MILLIGRAM(S): at 18:22

## 2019-01-01 RX ADMIN — Medication 3 MILLILITER(S): at 18:19

## 2019-01-01 RX ADMIN — Medication 80 MILLIGRAM(S): at 17:36

## 2019-01-01 RX ADMIN — Medication 3 MILLILITER(S): at 22:13

## 2019-01-01 RX ADMIN — Medication 400 MILLIGRAM(S): at 19:29

## 2019-01-01 RX ADMIN — Medication 10 MILLIGRAM(S): at 21:52

## 2019-01-01 RX ADMIN — Medication 3 MILLILITER(S): at 05:38

## 2019-01-01 RX ADMIN — Medication 3 MILLILITER(S): at 18:23

## 2019-01-01 RX ADMIN — Medication 3 MILLILITER(S): at 11:33

## 2019-01-01 RX ADMIN — ATORVASTATIN CALCIUM 20 MILLIGRAM(S): 80 TABLET, FILM COATED ORAL at 21:31

## 2019-01-01 RX ADMIN — Medication 1 MILLIGRAM(S): at 12:16

## 2019-01-01 RX ADMIN — Medication 1 APPLICATION(S): at 18:19

## 2019-01-01 RX ADMIN — Medication 1 APPLICATION(S): at 05:23

## 2019-01-01 RX ADMIN — Medication 1 MILLIGRAM(S): at 11:47

## 2019-01-01 RX ADMIN — ATORVASTATIN CALCIUM 20 MILLIGRAM(S): 80 TABLET, FILM COATED ORAL at 21:41

## 2019-01-01 RX ADMIN — BUDESONIDE AND FORMOTEROL FUMARATE DIHYDRATE 2 PUFF(S): 160; 4.5 AEROSOL RESPIRATORY (INHALATION) at 19:13

## 2019-01-01 RX ADMIN — ATORVASTATIN CALCIUM 20 MILLIGRAM(S): 80 TABLET, FILM COATED ORAL at 20:57

## 2019-01-01 RX ADMIN — BUDESONIDE AND FORMOTEROL FUMARATE DIHYDRATE 2 PUFF(S): 160; 4.5 AEROSOL RESPIRATORY (INHALATION) at 05:06

## 2019-01-01 RX ADMIN — Medication 80 MILLIGRAM(S): at 06:07

## 2019-01-01 RX ADMIN — Medication 80 MILLIGRAM(S): at 17:50

## 2019-01-01 RX ADMIN — SACUBITRIL AND VALSARTAN 1 TABLET(S): 24; 26 TABLET, FILM COATED ORAL at 05:02

## 2019-01-01 RX ADMIN — Medication 3 MILLILITER(S): at 18:33

## 2019-01-01 RX ADMIN — Medication 3 MILLILITER(S): at 23:08

## 2019-01-01 RX ADMIN — BUDESONIDE AND FORMOTEROL FUMARATE DIHYDRATE 2 PUFF(S): 160; 4.5 AEROSOL RESPIRATORY (INHALATION) at 17:10

## 2019-01-01 RX ADMIN — Medication 80 MILLIGRAM(S): at 18:19

## 2019-01-01 RX ADMIN — Medication 1 MILLIGRAM(S): at 12:14

## 2019-01-01 RX ADMIN — Medication 3 MILLIGRAM(S): at 21:52

## 2019-01-01 RX ADMIN — Medication 100 MILLIGRAM(S): at 05:25

## 2019-01-01 RX ADMIN — Medication 3 MILLIGRAM(S): at 21:03

## 2019-01-01 RX ADMIN — ATORVASTATIN CALCIUM 20 MILLIGRAM(S): 80 TABLET, FILM COATED ORAL at 22:09

## 2019-01-01 RX ADMIN — Medication 3 MILLILITER(S): at 21:28

## 2019-01-01 RX ADMIN — SODIUM ZIRCONIUM CYCLOSILICATE 15 GRAM(S): 10 POWDER, FOR SUSPENSION ORAL at 00:09

## 2019-01-01 RX ADMIN — Medication 1 APPLICATION(S): at 05:39

## 2019-01-01 RX ADMIN — Medication 80 MILLIGRAM(S): at 05:00

## 2019-01-01 RX ADMIN — Medication 1 MILLIGRAM(S): at 14:19

## 2019-01-01 RX ADMIN — Medication 3 MILLILITER(S): at 17:36

## 2019-01-01 RX ADMIN — Medication 3 MILLILITER(S): at 06:02

## 2019-01-01 RX ADMIN — Medication 1 MILLIGRAM(S): at 13:30

## 2019-01-01 RX ADMIN — Medication 3 MILLILITER(S): at 17:35

## 2019-01-01 RX ADMIN — Medication 3 MILLIGRAM(S): at 22:10

## 2019-01-01 RX ADMIN — Medication 100 MILLIGRAM(S): at 05:43

## 2019-01-01 RX ADMIN — BUDESONIDE AND FORMOTEROL FUMARATE DIHYDRATE 2 PUFF(S): 160; 4.5 AEROSOL RESPIRATORY (INHALATION) at 18:19

## 2019-01-01 RX ADMIN — Medication 1 APPLICATION(S): at 17:38

## 2019-01-01 RX ADMIN — ATORVASTATIN CALCIUM 20 MILLIGRAM(S): 80 TABLET, FILM COATED ORAL at 21:03

## 2019-01-01 RX ADMIN — Medication 3 MILLILITER(S): at 23:12

## 2019-01-07 ENCOUNTER — FORM ENCOUNTER (OUTPATIENT)
Age: 79
End: 2019-01-07

## 2019-01-08 ENCOUNTER — APPOINTMENT (OUTPATIENT)
Dept: CT IMAGING | Facility: IMAGING CENTER | Age: 79
End: 2019-01-08
Payer: MEDICARE

## 2019-01-08 ENCOUNTER — APPOINTMENT (OUTPATIENT)
Dept: NUCLEAR MEDICINE | Facility: IMAGING CENTER | Age: 79
End: 2019-01-08
Payer: MEDICARE

## 2019-01-08 ENCOUNTER — OUTPATIENT (OUTPATIENT)
Dept: OUTPATIENT SERVICES | Facility: HOSPITAL | Age: 79
LOS: 1 days | End: 2019-01-08
Payer: MEDICARE

## 2019-01-08 DIAGNOSIS — Z95.1 PRESENCE OF AORTOCORONARY BYPASS GRAFT: Chronic | ICD-10-CM

## 2019-01-08 DIAGNOSIS — Z90.89 ACQUIRED ABSENCE OF OTHER ORGANS: Chronic | ICD-10-CM

## 2019-01-08 DIAGNOSIS — Z90.79 ACQUIRED ABSENCE OF OTHER GENITAL ORGAN(S): Chronic | ICD-10-CM

## 2019-01-08 DIAGNOSIS — Z98.89 OTHER SPECIFIED POSTPROCEDURAL STATES: Chronic | ICD-10-CM

## 2019-01-08 DIAGNOSIS — C61 MALIGNANT NEOPLASM OF PROSTATE: ICD-10-CM

## 2019-01-08 PROCEDURE — 74177 CT ABD & PELVIS W/CONTRAST: CPT | Mod: 26

## 2019-01-08 PROCEDURE — 74177 CT ABD & PELVIS W/CONTRAST: CPT

## 2019-01-08 PROCEDURE — 78306 BONE IMAGING WHOLE BODY: CPT | Mod: 26

## 2019-01-08 PROCEDURE — 78306 BONE IMAGING WHOLE BODY: CPT

## 2019-01-08 PROCEDURE — 78320: CPT | Mod: 26

## 2019-01-08 PROCEDURE — A9561: CPT

## 2019-01-08 PROCEDURE — 78999 UNLISTED MISC PX DX NUC MED: CPT

## 2019-02-14 ENCOUNTER — APPOINTMENT (OUTPATIENT)
Dept: CARDIOLOGY | Facility: CLINIC | Age: 79
End: 2019-02-14
Payer: MEDICARE

## 2019-02-14 ENCOUNTER — NON-APPOINTMENT (OUTPATIENT)
Age: 79
End: 2019-02-14

## 2019-02-14 VITALS
HEIGHT: 64 IN | SYSTOLIC BLOOD PRESSURE: 160 MMHG | WEIGHT: 150 LBS | DIASTOLIC BLOOD PRESSURE: 79 MMHG | BODY MASS INDEX: 25.61 KG/M2 | OXYGEN SATURATION: 94 % | HEART RATE: 54 BPM

## 2019-02-14 PROCEDURE — 93000 ELECTROCARDIOGRAM COMPLETE: CPT

## 2019-02-14 PROCEDURE — 99214 OFFICE O/P EST MOD 30 MIN: CPT

## 2019-02-15 NOTE — REVIEW OF SYSTEMS
[Feeling Fatigued] : feeling fatigued [Dyspnea on exertion] : dyspnea during exertion [Anxiety] : anxiety [Negative] : Heme/Lymph [Shortness Of Breath] : shortness of breath

## 2019-02-15 NOTE — PHYSICAL EXAM
[General Appearance - Well Developed] : well developed [Normal Appearance] : normal appearance [Well Groomed] : well groomed [General Appearance - Well Nourished] : well nourished [No Deformities] : no deformities [General Appearance - In No Acute Distress] : no acute distress [Normal Conjunctiva] : the conjunctiva exhibited no abnormalities [Eyelids - No Xanthelasma] : the eyelids demonstrated no xanthelasmas [Normal Oral Mucosa] : normal oral mucosa [No Oral Pallor] : no oral pallor [No Oral Cyanosis] : no oral cyanosis [Normal Jugular Venous A Waves Present] : normal jugular venous A waves present [Normal Jugular Venous V Waves Present] : normal jugular venous V waves present [No Jugular Venous Snow A Waves] : no jugular venous snow A waves [Respiration, Rhythm And Depth] : normal respiratory rhythm and effort [Exaggerated Use Of Accessory Muscles For Inspiration] : no accessory muscle use [Diffuse Wheezing] : diffuse wheezing [Heart Rate And Rhythm] : heart rate and rhythm were normal [Heart Sounds] : normal S1 and S2 [Edema] : no peripheral edema present [Systolic grade ___/6] : A grade [unfilled]/6 systolic murmur was heard. [Abdomen Soft] : soft [Abdomen Tenderness] : non-tender [Abdomen Mass (___ Cm)] : no abdominal mass palpated [Abnormal Walk] : normal gait [Gait - Sufficient For Exercise Testing] : the gait was sufficient for exercise testing [Nail Clubbing] : no clubbing of the fingernails [Cyanosis, Localized] : no localized cyanosis [Petechial Hemorrhages (___cm)] : no petechial hemorrhages [Skin Color & Pigmentation] : normal skin color and pigmentation [] : no rash [No Venous Stasis] : no venous stasis [Skin Lesions] : no skin lesions [No Skin Ulcers] : no skin ulcer [No Xanthoma] : no  xanthoma was observed [Oriented To Time, Place, And Person] : oriented to person, place, and time [Affect] : the affect was normal [Mood] : the mood was normal [No Anxiety] : not feeling anxious [Prolonged Exp Time] : with prolonged expiratory time [Decreased Breath Sounds] : decreased breath sounds

## 2019-02-15 NOTE — REASON FOR VISIT
[Follow-Up - Clinic] : a clinic follow-up of [Aortic Stenosis] : aortic stenosis [Atrial Fibrillation] : atrial fibrillation [Coronary Artery Disease] : coronary artery disease [Hypertension] : hypertension [Medication Management] : Medication management [Dyspnea] : dyspnea

## 2019-02-15 NOTE — ASSESSMENT
[FreeTextEntry1] : 77 y/o with HTN, Hchol, PAF on coumadin, Prostate Ca, CAD s/p CABG, AS s/p TAVR - with an increase in GAN with scattered wheezing on exam. \par  \par With recent cardiac imaging, valve replacement and blood work being  WNL, I suspect his dyspnea is mostyl pulmonary in etiology. I had him ambulate around the office for 2 minutes and his RA sat dropped to 91-92%. \par \par I asked him to see pulmonary for med/inhaler titration. \par No changes to his cardiac meds

## 2019-02-15 NOTE — HISTORY OF PRESENT ILLNESS
[FreeTextEntry1] : Returning for follow-up\par increase in GAN - now unable to climb stairs or walk substantial distances\par \par No syncope\par No palpitations\par Compliant with meds\par No LE edema\par No chest pains \par

## 2019-03-01 ENCOUNTER — TRANSCRIPTION ENCOUNTER (OUTPATIENT)
Age: 79
End: 2019-03-01

## 2019-03-04 ENCOUNTER — APPOINTMENT (OUTPATIENT)
Dept: PULMONOLOGY | Facility: CLINIC | Age: 79
End: 2019-03-04
Payer: MEDICARE

## 2019-03-04 VITALS
SYSTOLIC BLOOD PRESSURE: 144 MMHG | HEART RATE: 86 BPM | HEIGHT: 64 IN | WEIGHT: 155 LBS | BODY MASS INDEX: 26.46 KG/M2 | DIASTOLIC BLOOD PRESSURE: 88 MMHG | TEMPERATURE: 97.8 F | RESPIRATION RATE: 16 BRPM

## 2019-03-04 PROCEDURE — 99214 OFFICE O/P EST MOD 30 MIN: CPT

## 2019-03-04 NOTE — PHYSICAL EXAM

## 2019-03-04 NOTE — HISTORY OF PRESENT ILLNESS
[FreeTextEntry1] : Patient with moderate COPD. Since last visit, quit smoking in January 2019 with nicotine gum. Notes more dyspnea on exertion since last visit, sometimes can't take the trash out. Admits to decreased activity. Somewhat noncompliant with Anoro, but has been taking for the past month. Stopped at one point during exacerbation because he attributed it to the medication.

## 2019-03-04 NOTE — END OF VISIT
[FreeTextEntry3] : I directly supervised nurse practitioner Frantz Valdivia and was present during key points of his history and physical. I agree with his history, physical and assessment.\par

## 2019-03-04 NOTE — ASSESSMENT
[FreeTextEntry1] : Patient with significant smoking history and moderate COPD, continue with Anoro daily and proair prn. Recommended pulmonary rehab, but patient declines at this time, stating he will attempt to increase his exercise on his own. RTC in 3 months for full PFT.

## 2019-03-13 ENCOUNTER — RX RENEWAL (OUTPATIENT)
Age: 79
End: 2019-03-13

## 2019-03-19 ENCOUNTER — APPOINTMENT (OUTPATIENT)
Dept: ULTRASOUND IMAGING | Facility: HOSPITAL | Age: 79
End: 2019-03-19
Payer: MEDICARE

## 2019-03-19 ENCOUNTER — OUTPATIENT (OUTPATIENT)
Dept: OUTPATIENT SERVICES | Facility: HOSPITAL | Age: 79
LOS: 1 days | End: 2019-03-19
Payer: MEDICARE

## 2019-03-19 DIAGNOSIS — Z00.00 ENCOUNTER FOR GENERAL ADULT MEDICAL EXAMINATION WITHOUT ABNORMAL FINDINGS: ICD-10-CM

## 2019-03-19 DIAGNOSIS — Z90.79 ACQUIRED ABSENCE OF OTHER GENITAL ORGAN(S): Chronic | ICD-10-CM

## 2019-03-19 DIAGNOSIS — Z90.89 ACQUIRED ABSENCE OF OTHER ORGANS: Chronic | ICD-10-CM

## 2019-03-19 DIAGNOSIS — R06.09 OTHER FORMS OF DYSPNEA: ICD-10-CM

## 2019-03-19 DIAGNOSIS — Z95.1 PRESENCE OF AORTOCORONARY BYPASS GRAFT: Chronic | ICD-10-CM

## 2019-03-19 DIAGNOSIS — I10 ESSENTIAL (PRIMARY) HYPERTENSION: ICD-10-CM

## 2019-03-19 DIAGNOSIS — Z98.89 OTHER SPECIFIED POSTPROCEDURAL STATES: Chronic | ICD-10-CM

## 2019-03-19 DIAGNOSIS — E78.00 PURE HYPERCHOLESTEROLEMIA, UNSPECIFIED: ICD-10-CM

## 2019-03-19 PROCEDURE — 93880 EXTRACRANIAL BILAT STUDY: CPT

## 2019-03-19 PROCEDURE — 93880 EXTRACRANIAL BILAT STUDY: CPT | Mod: 26

## 2019-03-19 PROCEDURE — 76770 US EXAM ABDO BACK WALL COMP: CPT | Mod: 26

## 2019-03-19 PROCEDURE — 76775 US EXAM ABDO BACK WALL LIM: CPT

## 2019-04-11 ENCOUNTER — LABORATORY RESULT (OUTPATIENT)
Age: 79
End: 2019-04-11

## 2019-04-11 ENCOUNTER — APPOINTMENT (OUTPATIENT)
Dept: CARDIOLOGY | Facility: CLINIC | Age: 79
End: 2019-04-11
Payer: MEDICARE

## 2019-04-11 ENCOUNTER — NON-APPOINTMENT (OUTPATIENT)
Age: 79
End: 2019-04-11

## 2019-04-11 VITALS — HEART RATE: 73 BPM | SYSTOLIC BLOOD PRESSURE: 172 MMHG | DIASTOLIC BLOOD PRESSURE: 74 MMHG | OXYGEN SATURATION: 93 %

## 2019-04-11 DIAGNOSIS — I65.29 OCCLUSION AND STENOSIS OF UNSPECIFIED CAROTID ARTERY: ICD-10-CM

## 2019-04-11 PROCEDURE — 99214 OFFICE O/P EST MOD 30 MIN: CPT

## 2019-04-11 PROCEDURE — 93000 ELECTROCARDIOGRAM COMPLETE: CPT

## 2019-04-12 ENCOUNTER — OTHER (OUTPATIENT)
Age: 79
End: 2019-04-12

## 2019-04-12 LAB
ALBUMIN SERPL ELPH-MCNC: 4.2 G/DL
ALP BLD-CCNC: 66 U/L
ALT SERPL-CCNC: 16 U/L
ANION GAP SERPL CALC-SCNC: 13 MMOL/L
AST SERPL-CCNC: 23 U/L
BASOPHILS # BLD AUTO: 0.02 K/UL
BASOPHILS NFR BLD AUTO: 0.3 %
BILIRUB SERPL-MCNC: 0.4 MG/DL
BUN SERPL-MCNC: 31 MG/DL
CALCIUM SERPL-MCNC: 9.1 MG/DL
CHLORIDE SERPL-SCNC: 106 MMOL/L
CHOLEST SERPL-MCNC: 119 MG/DL
CHOLEST/HDLC SERPL: 2.2 RATIO
CO2 SERPL-SCNC: 21 MMOL/L
CREAT SERPL-MCNC: 1.39 MG/DL
EOSINOPHIL # BLD AUTO: 0.1 K/UL
EOSINOPHIL NFR BLD AUTO: 1.7 %
ESTIMATED AVERAGE GLUCOSE: 126 MG/DL
GLUCOSE SERPL-MCNC: 110 MG/DL
HBA1C MFR BLD HPLC: 6 %
HCT VFR BLD CALC: 33.7 %
HDLC SERPL-MCNC: 55 MG/DL
HGB BLD-MCNC: 10.7 G/DL
IMM GRANULOCYTES NFR BLD AUTO: 0.5 %
INR PPP: 2.21 RATIO
LDLC SERPL CALC-MCNC: 59 MG/DL
LYMPHOCYTES # BLD AUTO: 0.49 K/UL
LYMPHOCYTES NFR BLD AUTO: 8.2 %
MAN DIFF?: NORMAL
MCHC RBC-ENTMCNC: 31.8 GM/DL
MCHC RBC-ENTMCNC: 34.5 PG
MCV RBC AUTO: 108.7 FL
MONOCYTES # BLD AUTO: 0.14 K/UL
MONOCYTES NFR BLD AUTO: 2.3 %
NEUTROPHILS # BLD AUTO: 5.2 K/UL
NEUTROPHILS NFR BLD AUTO: 87 %
PLATELET # BLD AUTO: 323 K/UL
POTASSIUM SERPL-SCNC: 6.1 MMOL/L
PROT SERPL-MCNC: 7.6 G/DL
PSA SERPL-MCNC: 2.56 NG/ML
PT BLD: 25.8 SEC
RBC # BLD: 3.1 M/UL
RBC # FLD: 14.6 %
SODIUM SERPL-SCNC: 140 MMOL/L
TRIGL SERPL-MCNC: 26 MG/DL
TSH SERPL-ACNC: 4.38 UIU/ML
WBC # FLD AUTO: 5.98 K/UL

## 2019-04-21 ENCOUNTER — MOBILE ON CALL (OUTPATIENT)
Age: 79
End: 2019-04-21

## 2019-04-21 RX ORDER — AMLODIPINE BESYLATE 5 MG/1
5 TABLET ORAL
Qty: 90 | Refills: 3 | Status: DISCONTINUED | COMMUNITY
Start: 2019-04-12 | End: 2019-04-21

## 2019-04-22 ENCOUNTER — OUTPATIENT (OUTPATIENT)
Dept: OUTPATIENT SERVICES | Facility: HOSPITAL | Age: 79
LOS: 1 days | End: 2019-04-22
Payer: MEDICARE

## 2019-04-22 ENCOUNTER — CLINICAL ADVICE (OUTPATIENT)
Age: 79
End: 2019-04-22

## 2019-04-22 ENCOUNTER — APPOINTMENT (OUTPATIENT)
Dept: CT IMAGING | Facility: HOSPITAL | Age: 79
End: 2019-04-22

## 2019-04-22 DIAGNOSIS — Z98.89 OTHER SPECIFIED POSTPROCEDURAL STATES: Chronic | ICD-10-CM

## 2019-04-22 DIAGNOSIS — I65.29 OCCLUSION AND STENOSIS OF UNSPECIFIED CAROTID ARTERY: ICD-10-CM

## 2019-04-22 DIAGNOSIS — Z95.1 PRESENCE OF AORTOCORONARY BYPASS GRAFT: Chronic | ICD-10-CM

## 2019-04-22 DIAGNOSIS — Z90.79 ACQUIRED ABSENCE OF OTHER GENITAL ORGAN(S): Chronic | ICD-10-CM

## 2019-04-22 DIAGNOSIS — Z90.89 ACQUIRED ABSENCE OF OTHER ORGANS: Chronic | ICD-10-CM

## 2019-04-22 DIAGNOSIS — G93.9 DISORDER OF BRAIN, UNSPECIFIED: ICD-10-CM

## 2019-04-22 PROCEDURE — 70498 CT ANGIOGRAPHY NECK: CPT | Mod: 26

## 2019-04-22 PROCEDURE — 70496 CT ANGIOGRAPHY HEAD: CPT | Mod: 26

## 2019-04-22 PROCEDURE — 70498 CT ANGIOGRAPHY NECK: CPT

## 2019-04-22 PROCEDURE — 70496 CT ANGIOGRAPHY HEAD: CPT

## 2019-05-21 ENCOUNTER — APPOINTMENT (OUTPATIENT)
Dept: VASCULAR SURGERY | Facility: CLINIC | Age: 79
End: 2019-05-21
Payer: MEDICARE

## 2019-05-21 VITALS
BODY MASS INDEX: 26.46 KG/M2 | HEIGHT: 64 IN | HEART RATE: 77 BPM | SYSTOLIC BLOOD PRESSURE: 155 MMHG | DIASTOLIC BLOOD PRESSURE: 87 MMHG | TEMPERATURE: 98 F | WEIGHT: 155 LBS

## 2019-05-21 DIAGNOSIS — N40.0 BENIGN PROSTATIC HYPERPLASIA WITHOUT LOWER URINARY TRACT SYMPMS: ICD-10-CM

## 2019-05-21 DIAGNOSIS — C61 MALIGNANT NEOPLASM OF PROSTATE: ICD-10-CM

## 2019-05-21 DIAGNOSIS — I35.0 NONRHEUMATIC AORTIC (VALVE) STENOSIS: ICD-10-CM

## 2019-05-21 PROCEDURE — 99202 OFFICE O/P NEW SF 15 MIN: CPT

## 2019-05-21 NOTE — REVIEW OF SYSTEMS
[Shortness Of Breath] : shortness of breath [SOB on Exertion] : shortness of breath during exertion [Negative] : Neurological

## 2019-05-21 NOTE — ASSESSMENT
[Carotid Endarterectomy] : carotid endarterectomy [FreeTextEntry1] : 77yo M w/ asymptomatic high grade bilateral carotid artery stenosis. CTA and office dopplers reviewed. Large calcification burden in bilateral carotid bifurcations and there is an ICA velocity and CTA stenosis disparity. procedure of   CEA  recovery complications  all discussed  with him  and his  son \par \par 1. Will review CTA findings with Neuroradiology\par 2. If there is still concern for high grade stenosis, may consider obtaining diagnostic cerebral angiogram prior to further decision making regarding endarterectomy or observation\par 3. Seen and discussed with Dr. Mcfadden

## 2019-05-21 NOTE — REASON FOR VISIT
[Consultation] : a consultation visit [Family Member] : family member [FreeTextEntry1] : b/l carotid stenosis

## 2019-05-21 NOTE — HISTORY OF PRESENT ILLNESS
[FreeTextEntry1] : 79yo M w/ asymptomatic carotid stenosis found on routine surveillance, referred for finding of bilateral 85% stenosis on CTA in April 2019. Pt has a h/o AS s/p TAVR in 2016, chronic dyspnea on exertion from smoking (quit recently). He has followed with Vascular Surgery and Cardiology for routine abdominal and carotid surveillance and his last duplex in March showed an increase in velocities which prompted the CTA. he has no evidence of acute stroke and clinically he denies TIA/CVA. He takes coumadin for Afib.

## 2019-05-21 NOTE — PHYSICAL EXAM
[Carotid Bruits] : carotid bruit  [2+] : left 2+ [Alert] : alert [Oriented to Person] : oriented to person [Oriented to Place] : oriented to place [Oriented to Time] : oriented to time [Ankle Swelling (On Exam)] : not present [Varicose Veins Of Lower Extremities] : not present [] : not present [de-identified] : appears well [de-identified] : no scars, good ROM

## 2019-05-31 ENCOUNTER — MEDICATION RENEWAL (OUTPATIENT)
Age: 79
End: 2019-05-31

## 2019-06-10 NOTE — ASSESSMENT
[FreeTextEntry1] : Patient with significant smoking history and moderate COPD, continue with Anoro daily and proair prn. PFT today shows no change from 2018. Will refer to pulmonary rehab, but patient will attempt to increase his exercise on his own. RTC in 3 months.

## 2019-06-10 NOTE — HISTORY OF PRESENT ILLNESS
[FreeTextEntry1] : Patient with moderate COPD. Quit smoking in January 2019 with nicotine gum. Notes continued dyspnea on exertion since last visit, sometimes can't take the trash out. Admits to decreased activity. Somewhat noncompliant with Anoro, has not been taking for past 2 months. He agrees to try pulmonary rehab since he didn't start an exercise routine since last visit.

## 2019-06-10 NOTE — PHYSICAL EXAM
[General Appearance - Well Developed] : well developed [Normal Appearance] : normal appearance [Well Groomed] : well groomed [General Appearance - Well Nourished] : well nourished [No Deformities] : no deformities [General Appearance - In No Acute Distress] : no acute distress [Eyelids - No Xanthelasma] : the eyelids demonstrated no xanthelasmas [Normal Conjunctiva] : the conjunctiva exhibited no abnormalities [Neck Appearance] : the appearance of the neck was normal [Normal Oropharynx] : normal oropharynx [Neck Cervical Mass (___cm)] : no neck mass was observed [Thyroid Nodule] : there were no palpable thyroid nodules [Thyroid Diffuse Enlargement] : the thyroid was not enlarged [Jugular Venous Distention Increased] : there was no jugular-venous distention [Heart Rate And Rhythm] : heart rate and rhythm were normal [Heart Sounds] : normal S1 and S2 [Murmurs] : no murmurs present [Respiration, Rhythm And Depth] : normal respiratory rhythm and effort [Exaggerated Use Of Accessory Muscles For Inspiration] : no accessory muscle use [Abdomen Tenderness] : non-tender [Abdomen Soft] : soft [Abdomen Mass (___ Cm)] : no abdominal mass palpated [Abnormal Walk] : normal gait [Gait - Sufficient For Exercise Testing] : the gait was sufficient for exercise testing [Cyanosis, Localized] : no localized cyanosis [Nail Clubbing] : no clubbing of the fingernails [Petechial Hemorrhages (___cm)] : no petechial hemorrhages [Skin Color & Pigmentation] : normal skin color and pigmentation [] : no rash [No Venous Stasis] : no venous stasis [Skin Lesions] : no skin lesions [No Skin Ulcers] : no skin ulcer [No Xanthoma] : no  xanthoma was observed [Deep Tendon Reflexes (DTR)] : deep tendon reflexes were 2+ and symmetric [Sensation] : the sensory exam was normal to light touch and pinprick [No Focal Deficits] : no focal deficits [Oriented To Time, Place, And Person] : oriented to person, place, and time [Impaired Insight] : insight and judgment were intact [Affect] : the affect was normal [FreeTextEntry1] : diminished, wheezing throughout

## 2019-06-11 PROBLEM — I65.23 CAROTID STENOSIS, ASYMPTOMATIC, BILATERAL: Status: ACTIVE | Noted: 2019-01-01

## 2019-06-11 NOTE — REVIEW OF SYSTEMS
[Chills] : no chills [Fever] : no fever [Feeling Poorly] : not feeling poorly [Feeling Tired] : not feeling tired [Suicidal] : not suicidal [Anxiety] : no anxiety [Depression] : no depression [Confused or Disoriented] : no confusion [Memory Lapses or Loss] : no memory loss [Difficulty with Language] : no ~M difficulty with language [Decr. Concentrating Ability] : no decrease in concentrating ability [Repeating Questions] : no repeated questioning about recent events [Changed Thought Patterns] : no change in thought patterns [Hand Weakness] : no hand weakness [Arm Weakness] : no arm weakness [Facial Weakness] : no facial weakness [Poor Coordination] : good coordination [Leg Weakness] : no leg weakness [Numbness] : no numbness [Difficulty Writing] : no difficulty writing [Difficulties in Speech] : no speech difficulties [Seizures] : no convulsions [Tingling] : no tingling [Lightheadedness] : no lightheadedness [Fainting] : no fainting [Dizziness] : no dizziness [Tension Headache] : no tension-type headache [Vertigo] : no vertigo [Difficulty Walking] : no difficulty walking [Inability to Walk] : able to walk [Frequent Falls] : not falling [Ataxia] : no ataxia [Eye Pain] : no eye pain [Earache] : no earache [Red Eyes] : eyes not red [Loss Of Hearing] : no hearing loss [Chest Pain] : no chest pain [Palpitations] : no palpitations [Shortness Of Breath] : no shortness of breath [Wheezing] : no wheezing [Abdominal Pain] : no abdominal pain [Cough] : no cough [Vomiting] : no vomiting [Constipation] : no constipation [Joint Pain] : no joint pain [Joint Swelling] : no joint swelling [Skin Wound] : no skin wound [Easy Bleeding] : no tendency for easy bleeding [Skin Lesions] : no skin lesions [Easy Bruising] : no tendency for easy bruising

## 2019-06-11 NOTE — DISCUSSION/SUMMARY
[FreeTextEntry1] : Mr. Lopez is a 78 year old man who comes in today for evaluation of known bilateral carotid stenosis with possible progression by recent Doppler. He was referred by Dr. Mcfadden for a diagnostic angiogram to evaluate the degree of carotid stenosis. Procedure, risks and benefits discussed with patient and son. Risk of embolic stroke during the procedure is higher than normal because he has significant calcified atherosclerotic plaque in his aorta and proximal great vessels. Patient is on Coumadin for a.fib, raising the risk of groin hematoma, and we will plan on using a closure device. Based on the angiogram, he may need a CEA by Dr. Mcfadden. He is not a good stenting candidate because of the severely calcified stenoses.  All of their questions and concerns were addressed.

## 2019-06-11 NOTE — PHYSICAL EXAM
[General Appearance - Alert] : alert [General Appearance - Well Nourished] : well nourished [General Appearance - Well Developed] : well developed [Oriented To Time, Place, And Person] : oriented to person, place, and time [Affect] : the affect was normal [Mood] : the mood was normal [Person] : oriented to person [Place] : oriented to place [Time] : oriented to time [Short Term Intact] : short term memory intact [Naming Objects] : no difficulty naming common objects [Concentration Intact] : normal concentrating ability [Visual Intact] : visual attention was ~T not ~L decreased [Writing A Sentence] : no difficulty writing a sentence [Fluency] : fluency intact [Repeating Phrases] : no difficulty repeating a phrase [Reading] : reading intact [Past History] : adequate knowledge of personal past history [Comprehension] : comprehension intact [Cranial Nerves Trigeminal (V)] : facial sensation intact symmetrically [Cranial Nerves Oculomotor (III)] : extraocular motion intact [Cranial Nerves Optic (II)] : visual acuity intact bilaterally,  visual fields full to confrontation, pupils equal round and reactive to light [Cranial Nerves Facial (VII)] : face symmetrical [Cranial Nerves Glossopharyngeal (IX)] : tongue and palate midline [Cranial Nerves Vestibulocochlear (VIII)] : hearing was intact bilaterally [Motor Tone] : muscle tone was normal in all four extremities [Cranial Nerves Hypoglossal (XII)] : there was no tongue deviation with protrusion [Cranial Nerves Accessory (XI - Cranial And Spinal)] : head turning and shoulder shrug symmetric [No Muscle Atrophy] : normal bulk in all four extremities [Motor Handedness Right-Handed] : the patient is right hand dominant [Motor Strength] : muscle strength was normal in all four extremities [Sensation Tactile Decrease] : light touch was intact [Balance] : balance was intact [Extraocular Movements] : extraocular movements were intact [Full Visual Field] : full visual field [Optic Disc Abnormality] : the optic disc were normal in size and color [Hearing Threshold Finger Rub Not [place]] : hearing was normal [Heart Rate And Rhythm] : heart rate was normal and rhythm regular [] : no respiratory distress [Abnormal Walk] : normal gait [Edema] : there was no peripheral edema [Abdomen Soft] : soft [Musculoskeletal - Swelling] : no joint swelling seen [Involuntary Movements] : no involuntary movements were seen [Skin Color & Pigmentation] : normal skin color and pigmentation [Skin Turgor] : normal skin turgor [Paresis Pronator Drift Right-Sided] : no pronator drift on the right [Paresis Pronator Drift Left-Sided] : no pronator drift on the left [Motor Strength Lower Extremities Bilaterally] : strength was normal in both lower extremities [Motor Strength Upper Extremities Bilaterally] : strength was normal in both upper extremities [Dysdiadochokinesia Bilaterally] : not present [Coordination - Dysmetria Impaired Finger-to-Nose Bilateral] : not present

## 2019-06-11 NOTE — CONSULT LETTER
[Dear  ___] : Dear  [unfilled], [Consult Letter:] : I had the pleasure of evaluating your patient, [unfilled]. [Please see my note below.] : Please see my note below. [Consult Closing:] : Thank you very much for allowing me to participate in the care of this patient.  If you have any questions, please do not hesitate to contact me. [Sincerely,] : Sincerely, [DrFrances  ___] : Dr. STEINER [FreeTextEntry3] : Jah Roger MD\par Chief, Vascular Neurology\par  of Neurology and Radiology\par [facility]\par Director, Comprehensive Stroke Center and Stroke Unit\par [facility]\par Attending, Interventional Neuroradiology\par \par

## 2019-06-11 NOTE — HISTORY OF PRESENT ILLNESS
[FreeTextEntry1] : Mr. Lopez is a 78 year old man who comes in today for evaluation of known bilateral carotid stenosis with recent progression of the left carotid stenosis by Doppler. He has had known carotid stenosis for 10 years and has been followed with yearly carotid Doppler. He is a former smoker, and quit recently in January.  He is on Coumadin for A. fib. CTA on 04/22 showed 85% bilateral carotid stenosis and carotid Doppler showed 60-79% stenosis. Patient was referred by Dr. Mcfadden for cerebral angiogram to confirm the degree of stenosis. He has had no stroke/TIA symptoms.

## 2019-06-17 NOTE — REASON FOR VISIT
[Follow-Up - Clinic] : a clinic follow-up of [Aortic Stenosis] : aortic stenosis [Atrial Fibrillation] : atrial fibrillation [Coronary Artery Disease] : coronary artery disease [Hypertension] : hypertension [Dyspnea] : dyspnea [Medication Management] : Medication management

## 2019-06-17 NOTE — PHYSICAL EXAM
[General Appearance - Well Developed] : well developed [Normal Appearance] : normal appearance [Well Groomed] : well groomed [General Appearance - Well Nourished] : well nourished [No Deformities] : no deformities [General Appearance - In No Acute Distress] : no acute distress [Normal Conjunctiva] : the conjunctiva exhibited no abnormalities [Eyelids - No Xanthelasma] : the eyelids demonstrated no xanthelasmas [Normal Oral Mucosa] : normal oral mucosa [No Oral Pallor] : no oral pallor [No Oral Cyanosis] : no oral cyanosis [Normal Jugular Venous A Waves Present] : normal jugular venous A waves present [Normal Jugular Venous V Waves Present] : normal jugular venous V waves present [No Jugular Venous Snow A Waves] : no jugular venous snow A waves [Respiration, Rhythm And Depth] : normal respiratory rhythm and effort [Exaggerated Use Of Accessory Muscles For Inspiration] : no accessory muscle use [Diffuse Wheezing] : diffuse wheezing [Prolonged Exp Time] : with prolonged expiratory time [Decreased Breath Sounds] : decreased breath sounds [Heart Rate And Rhythm] : heart rate and rhythm were normal [Heart Sounds] : normal S1 and S2 [Edema] : no peripheral edema present [Systolic grade ___/6] : A grade [unfilled]/6 systolic murmur was heard. [Abdomen Soft] : soft [Abdomen Tenderness] : non-tender [Abdomen Mass (___ Cm)] : no abdominal mass palpated [Gait - Sufficient For Exercise Testing] : the gait was sufficient for exercise testing [Abnormal Walk] : normal gait [Nail Clubbing] : no clubbing of the fingernails [Cyanosis, Localized] : no localized cyanosis [Petechial Hemorrhages (___cm)] : no petechial hemorrhages [Skin Color & Pigmentation] : normal skin color and pigmentation [No Venous Stasis] : no venous stasis [] : no rash [No Skin Ulcers] : no skin ulcer [Skin Lesions] : no skin lesions [No Xanthoma] : no  xanthoma was observed [Oriented To Time, Place, And Person] : oriented to person, place, and time [Mood] : the mood was normal [Affect] : the affect was normal [No Anxiety] : not feeling anxious

## 2019-06-17 NOTE — DISCUSSION/SUMMARY
[FreeTextEntry1] : Encouraged Pulm F/u\par \par Compliance to meds reviewed\par Smoking cessation discussed\par Labs ordered \par Referred to Vasc for possible CEA

## 2019-06-17 NOTE — ASSESSMENT
[FreeTextEntry1] : 79 y/o with HTN, Hchol, PAF on coumadin, Prostate Ca, CAD s/p CABG, AS s/p TAVR - with an increase in GAN with scattered wheezing on exam. \par  \par With recent cardiac imaging, valve replacement and blood work being  WNL, I suspect his dyspnea is mostyl pulmonary in etiology. I had him ambulate around the office for 2 minutes and his RA sat dropped to 91-92%. \par \par I asked him to see pulmonary for med/inhaler titration. \par No changes to his cardiac meds

## 2019-06-17 NOTE — HISTORY OF PRESENT ILLNESS
[FreeTextEntry1] : Returning for follow-up\par continues to feel GAN\par No syncope\par No palpitations\par Compliant with meds/inhalers\par \par No LE edema\par No chest pains \par

## 2019-06-17 NOTE — REVIEW OF SYSTEMS
[Feeling Fatigued] : feeling fatigued [Shortness Of Breath] : shortness of breath [Dyspnea on exertion] : dyspnea during exertion [Anxiety] : anxiety [Negative] : Heme/Lymph

## 2019-06-18 NOTE — H&P PST ADULT - NS MD HP INPLANTS MED DEV
None/mesh for inguinal hernia, sternal wiring/Heart valve mesh for inguinal hernia, sternal wiring/Heart valve

## 2019-06-18 NOTE — H&P PST ADULT - NSANTHOSAYNRD_GEN_A_CORE
No. LU screening performed.  STOP BANG Legend: 0-2 = LOW Risk; 3-4 = INTERMEDIATE Risk; 5-8 = HIGH Risk

## 2019-06-18 NOTE — H&P PST ADULT - RS GEN PE MLT RESP DETAILS PC
respirations non-labored/normal/clear to auscultation bilaterally/good air movement/airway patent/breath sounds equal

## 2019-06-18 NOTE — H&P PST ADULT - NSICDXPASTMEDICALHX_GEN_ALL_CORE_FT
PAST MEDICAL HISTORY:  Aortic valve stenosis, unspecified etiology     Atrial fibrillation, unspecified type on coumadin    Bilateral carotid artery disease     COPD (chronic obstructive pulmonary disease) cigar smoker for 29 years, quit 1/2019    Coronary artery disease of bypass graft of native heart with stable angina pectoris     Erectile dysfunction, unspecified erectile dysfunction type     Hypertension     Osteoarthritis     Polio     Prostate cancer Prostatectomy 2008, radiation 2011    Severe aortic stenosis by prior echocardiogram PAST MEDICAL HISTORY:  Atrial fibrillation, unspecified type on coumadin    Bilateral carotid artery disease     COPD (chronic obstructive pulmonary disease) cigar smoker for 29 years, quit 1/2019    Coronary artery 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

## 2019-06-18 NOTE — H&P PST ADULT - NSICDXPASTSURGICALHX_GEN_ALL_CORE_FT
PAST SURGICAL HISTORY:  S/P CABG x 3 9/17/2007 at [facility]    S/P hernia repair inguinal, 1998    S/P prostatectomy 11/18/2008    S/P TAVR (transcatheter aortic valve replacement) 2016 in [facility]    S/P tonsillectomy as a child PAST SURGICAL HISTORY:  S/P CABG x 3 9/17/2007 at [facility]    S/P hernia repair inguinal, 1998    S/P prostatectomy 11/18/2008    S/P TAVR (transcatheter aortic valve replacement) 2016 in [facility]    S/P tonsillectomy as a child

## 2019-06-18 NOTE — H&P PST ADULT - NSICDXFAMILYHX_GEN_ALL_CORE_FT
FAMILY HISTORY:  Father  Still living? No  Family history of heart disease, Age at diagnosis: Age Unknown    Child  Still living? Yes, Estimated age: Age Unknown  Family history of heart disease, Age at diagnosis: Age Unknown

## 2019-06-18 NOTE — H&P PST ADULT - NSICDXPROBLEM_GEN_ALL_CORE_FT
PROBLEM DIAGNOSES  Problem: Bilateral carotid artery stenosis  Assessment and Plan: Cerebral angiogram on 6/28/19.     Problem: Atrial fibrillation  Assessment and Plan: On coumadin, pt was advised to continue as per Dr. Roger, Pt to call Dr. Quiroz to have discussion with Dr. Roger about coumadin   [facility] RECORD ID : [id number]  Interrupt warfarin with LMWH bridging suggested based on clinician judgment and most current  evidence*  * Atrial fibrillation: Bridging NOT recommended based on Level 1 evidence, but evidence in few  high risk CHADS2 patients (score 5 and 6); MHV and VTE: Retrospective studies suggest  bridging increases bleeding risk without reducing thrombosis    Problem: Hypertension  Assessment and Plan: Continue on antihypertensive medication

## 2019-06-18 NOTE — H&P PST ADULT - HISTORY OF PRESENT ILLNESS
74 yo white male, PMH CAD s/p CABG X 3 (2007), a-fib on coumadin, COPD, HTN, HLD, Prostate CA s/p prostatectomy 2008. Pt. with GAN since  October 2015, Echocardiogram revealed severe Aortic stenosis and presents to PST today fro TAVR on 8/15/16. Pt. continues with AGN after r walking one block, chronic wheeze,  denies cought, sputum production, chest pain, fever, chills, changes in bowel/urinary habits. 74 yo white male, PMH CAD s/p CABG X 3 (2007), a-fib on coumadin, COPD, HTN, HLD, Prostate CA s/p prostatectomy 2008, with bilateral carotid artery stenosis for 10 yrs , had recent carotid artery doppler with increasing velocities , CT angio of neck - severe bilateral carotid artery stenosis , Now coming in for Cerebral angiogram on 6/28/19.

## 2019-06-28 NOTE — CHART NOTE - NSCHARTNOTEFT_GEN_A_CORE
Interventional Neuro Radiology  Pre-Procedure Note    This is a 79yo right hand dominant male, with bilateral carotid artery stenosis for 10 years, had recent carotid artery dopplers with increasing velocities, CT angio of neck demonstrates severe bilateral carotid artery stenosis. Patient presents today to neuro IR for selective cerebral angiography for further evaluation.     Neuro Exam: Awake and alert, oriented x3, fluent, normal naming and repetition, follows 3 step commands. Extraocular movements intact, no nystagmus, visual fields full, face symmetric, tongue midline. No drift, 5/5 power x 4 extremities. Normal sensation to LT. Normal finger-to-nose and rapid alternating movements.    PAST MEDICAL & SURGICAL HISTORY:  Moderate tricuspid regurgitation  Osteoarthritis  Bilateral carotid artery disease  Hypertension  COPD (chronic obstructive pulmonary disease): cigar smoker for 29 years, quit 1/2019  Prostate cancer: Prostatectomy 2008, radiation 2011  Atrial fibrillation, unspecified type: on coumadin  Erectile dysfunction, unspecified erectile dysfunction type  Coronary artery disease of bypass graft of native heart with stable angina pectoris  Polio  S/P TAVR (transcatheter aortic valve replacement): 2016 in [facility]  S/P tonsillectomy: as a child  S/P prostatectomy: 11/18/2008  S/P hernia repair: inguinal, 1998  S/P CABG x 3: 9/17/2007 at [facility]    Social History:   Former smoker    FAMILY HISTORY:  Family history of heart disease (Father, Child)    Allergies:   No Known Allergies    Current Medications:   ramipril 5 mg oral capsule: Last Dose Taken:  , 1 cap(s) orally once a day  · 	folic acid 1 mg oral tablet: Last Dose Taken:  , 1 tab(s) orally once a day  · 	Advair Diskus 250 mcg-50 mcg inhalation powder: Last Dose Taken:  , 1 puff(s) inhaled 2 times a day  · 	Ecotrin Adult Low Strength 81 mg oral delayed release tablet: Last Dose Taken:  , 1 tab(s) orally once a day  · 	atorvastatin 20 mg oral tablet: Last Dose Taken:  , 1 tab(s) orally once a day (at bedtime)  · 	niacin: Last Dose Taken:  , 1500 milligram(s) orally once a day  · 	amLODIPine 5 mg oral tablet: Last Dose Taken:  , 2 tab(s) orally once a day  · 	Coumadin 2.5 mg oral tablet: Last Dose Taken:  , 1 tab(s) orally once a day  · 	Metoprolol Tartrate 100 mg oral tablet: Last Dose Taken:  , 1 tab(s) orally once a day (at bedtime)  · 	Proventil HFA 90 mcg/inh inhalation aerosol: Last Dose Taken:  , 2 puff(s) inhaled 4 times a day, As Needed  · 	Vitamin D3 2000 intl units oral capsule: Last Dose Taken:  , 1 cap(s) orally once a day      Labs:   Seen and reviewed in [facility].     Blood Bank: blood available     Assessment/Plan:   This is a 79yo right hand dominant Male presents with severe bilateral carotid artery stenosis. Patient presents to neuro-IR for selective cerebral angiography. Procedure/ risks/ benefits/ goals/ alternatives were explained. Risks include but are not limited to stroke/ vessel injury/ hemorrhage/ groin hematoma. All questions answered. Informed content obtained from patient. Consent placed in chart.    Ashli Davis PA-C  [phone]

## 2019-06-28 NOTE — CHART NOTE - NSCHARTNOTEFT_GEN_A_CORE
Interventional Neuro- Radiology   Procedure Note    Procedure: Selective Cerebral Angiography   Pre- Procedure Diagnosis: Carotid artery stenosis   Post- Procedure Diagnosis:    : Dr. Acacia MD    Physician Assistant: Ashli Davis PA-C    RN: Flores     Anesthesia: Dr. Anne MD  (MAC)       I/Os:  Fluids:  Contrast:  Estimated Blood Loss: <10cc    Preliminary Report:  Under MAC, using a ___Fr short/long sheath to the right groin examination of left vertebral artery/ left common carotid artery/ left ICA/ left external carotid artery/ right vertebral artery/ right common carotid artery/ right ICA/ right external carotid artery via selective cerebral angiography demonstrates ________. ( Official note to follow).    Patient tolerated procedure well, vital signs stable, hemodynamically stable, no change in neurological status compared to baseline. Results discussed with neurosurgery/ patient and their family. Groin sheath d/c'ed, manual compression held to hemostasis, no active bleeding, no hematoma, quick clot and safeguard balloon dressing applied at _____h. STAT labs, CBC/BMP at ____h. Patient transferred to PACU for further care/ monitoring.     Ashli Davis PA-C  [phone] Interventional Neuro- Radiology   Procedure Note    Procedure: Selective Cerebral Angiography   Pre- Procedure Diagnosis: Carotid artery stenosis   Post- Procedure Diagnosis: No significant carotid artery stenosis     : Dr. Acacia MD    Physician Assistant: Ashli Davis PA-C    RN: Flores     Anesthesia: Dr. Anne MD  (MAC)       I/Os:  Fluids: 150cc DTV  Contrast: 134cc     Estimated Blood Loss: <10cc    Preliminary Report:  Under MAC, using a 5Fr long sheath to the right groin examination of left common carotid artery/ right common carotid artery via selective cerebral angiography demonstrates no carotid stenosis. ( Official note to follow).    Patient tolerated procedure well, vital signs stable, hemodynamically stable, no change in neurological status compared to baseline. Results discussed with neurosurgery/ patient and their family. Groin sheath d/c'ed, manual compression held to hemostasis, no active bleeding, no hematoma, quick clot and safeguard balloon dressing applied at 9:15h. STAT labs, CBC/BMP at 13:00h. Patient transferred to PACU for further care/ monitoring.     Ashli Davis PA-C  [phone] Interventional Neuro- Radiology   Procedure Note    Procedure: Selective Cerebral Angiography   Pre- Procedure Diagnosis: Carotid artery stenosis   Post- Procedure Diagnosis: No significant carotid artery stenosis     : Dr. Acacia MD    Physician Assistant: Ashli Davis PA-C    RN: Flores     Anesthesia: Dr. Anne MD  (MAC)       I/Os:  Fluids: 150cc DTV  Contrast: 134cc     Estimated Blood Loss: <10cc    Preliminary Report:  Under MAC, using a 5Fr long sheath to the right groin examination of left common carotid artery/ right common carotid artery via selective cerebral angiography demonstrates 56% right carotid stenosis and 12% left carotid stenosis, no intra cranial stenosis. Vertebral arteries collateralized via muscular branches suggesting severe vertebral artery stenosis. ( Official note to follow).    Patient tolerated procedure well, vital signs stable, hemodynamically stable, no change in neurological status compared to baseline. Results discussed with neurosurgery/ patient and their family. Groin sheath d/c'ed, manual compression held to hemostasis, no active bleeding, no hematoma, quick clot and safeguard balloon dressing applied at 9:15h. STAT labs, CBC/BMP at 13:00h. Patient transferred to PACU for further care/ monitoring.     Ashli Davis PA-C  [phone] Interventional Neuro- Radiology   Procedure Note    Procedure: Selective Cerebral Angiography   Pre- Procedure Diagnosis: Carotid artery stenosis   Post- Procedure Diagnosis: No significant carotid artery stenosis     : Dr. Acacia MD    Physician Assistant: Ashli Davis PA-C    RN: Flores     Anesthesia: Dr. Anne MD  (MAC)       I/Os:  Fluids: 150cc DTV  Contrast: 134cc     Estimated Blood Loss: <10cc    Preliminary Report:  Under MAC, using a 5Fr long sheath to the right groin examination of left common carotid artery/ right common carotid artery via selective cerebral angiography demonstrates 56% right carotid stenosis and 12% left carotid stenosis, no intra cranial stenosis. Vertebral arteries collateralized via occipitals via muscular branches suggesting severe vertebral artery stenosis. ( Official note to follow).    Patient tolerated procedure well, vital signs stable, hemodynamically stable, no change in neurological status compared to baseline. Results discussed with neurosurgery/ patient and their family. Groin sheath d/c'ed, manual compression held to hemostasis, no active bleeding, no hematoma, quick clot and safeguard balloon dressing applied at 9:15h. STAT labs, CBC/BMP at 13:00h. Patient transferred to PACU for further care/ monitoring.     Ashli Davis PA-C  [phone]

## 2019-07-02 PROBLEM — I77.9 DISORDER OF ARTERIES AND ARTERIOLES, UNSPECIFIED: Chronic | Status: ACTIVE | Noted: 2019-01-01

## 2019-07-02 PROBLEM — I07.1 RHEUMATIC TRICUSPID INSUFFICIENCY: Chronic | Status: ACTIVE | Noted: 2019-01-01

## 2019-07-02 PROBLEM — M19.90 UNSPECIFIED OSTEOARTHRITIS, UNSPECIFIED SITE: Chronic | Status: ACTIVE | Noted: 2019-01-01

## 2019-07-18 NOTE — PHYSICAL EXAM
[General Appearance - Well Developed] : well developed [Normal Appearance] : normal appearance [Well Groomed] : well groomed [General Appearance - Well Nourished] : well nourished [No Deformities] : no deformities [General Appearance - In No Acute Distress] : no acute distress [Normal Conjunctiva] : the conjunctiva exhibited no abnormalities [Eyelids - No Xanthelasma] : the eyelids demonstrated no xanthelasmas [Normal Oral Mucosa] : normal oral mucosa [No Oral Pallor] : no oral pallor [No Oral Cyanosis] : no oral cyanosis [Normal Jugular Venous A Waves Present] : normal jugular venous A waves present [Normal Jugular Venous V Waves Present] : normal jugular venous V waves present [No Jugular Venous Snow A Waves] : no jugular venous snow A waves [Respiration, Rhythm And Depth] : normal respiratory rhythm and effort [Exaggerated Use Of Accessory Muscles For Inspiration] : no accessory muscle use [Diffuse Wheezing] : diffuse wheezing [Prolonged Exp Time] : with prolonged expiratory time [Decreased Breath Sounds] : decreased breath sounds [Heart Rate And Rhythm] : heart rate and rhythm were normal [Heart Sounds] : normal S1 and S2 [Edema] : no peripheral edema present [Systolic grade ___/6] : A grade [unfilled]/6 systolic murmur was heard. [Abdomen Soft] : soft [Abdomen Tenderness] : non-tender [Abdomen Mass (___ Cm)] : no abdominal mass palpated [Abnormal Walk] : normal gait [Gait - Sufficient For Exercise Testing] : the gait was sufficient for exercise testing [Nail Clubbing] : no clubbing of the fingernails [Cyanosis, Localized] : no localized cyanosis [Petechial Hemorrhages (___cm)] : no petechial hemorrhages [Skin Color & Pigmentation] : normal skin color and pigmentation [] : no rash [No Venous Stasis] : no venous stasis [Skin Lesions] : no skin lesions [No Skin Ulcers] : no skin ulcer [No Xanthoma] : no  xanthoma was observed [Oriented To Time, Place, And Person] : oriented to person, place, and time [Affect] : the affect was normal [Mood] : the mood was normal [No Anxiety] : not feeling anxious

## 2019-07-21 NOTE — HISTORY OF PRESENT ILLNESS
[FreeTextEntry1] : Returning for follow-up\par s/p carotid Angio. Returned to smoking\par No syncope\par No palpitations\par Compliant with meds/inhalers\par No LE edema\par No chest pains \par BP remains high

## 2019-07-21 NOTE — ASSESSMENT
[FreeTextEntry1] : 77 y/o with HTN, Hchol, PAF on coumadin, Prostate Ca, CAD s/p CABG, AS s/p TAVR - w/ GAN - likely pulm in etiology. \par \par BP remains high - will increase ACE to BID to address HTN\par  \par

## 2019-11-02 NOTE — HISTORY OF PRESENT ILLNESS
[FreeTextEntry1] : Returning for follow-up\par s/p carotid Angio. \par Still smoking\par No syncope\par No palpitations\par Compliant with meds/inhalers\par No LE edema\par No chest pains \par BP  normal

## 2019-11-02 NOTE — DISCUSSION/SUMMARY
[FreeTextEntry1] : 78 y/o with HTN, Hchol, PAF on coumadin, Prostate Ca, CAD s/p CABG, AS s/p TAVR - \par  \par Bp better\par No change in meds\par Check labs\par Compliance to meds reviewed\par Smoking cessation discussed

## 2019-11-30 NOTE — PHYSICAL EXAM
[General Appearance - Well Developed] : well developed [Normal Appearance] : normal appearance [Well Groomed] : well groomed [General Appearance - Well Nourished] : well nourished [No Deformities] : no deformities [General Appearance - In No Acute Distress] : no acute distress [Normal Conjunctiva] : the conjunctiva exhibited no abnormalities [Eyelids - No Xanthelasma] : the eyelids demonstrated no xanthelasmas [Normal Oral Mucosa] : normal oral mucosa [No Oral Cyanosis] : no oral cyanosis [No Oral Pallor] : no oral pallor [Normal Jugular Venous A Waves Present] : normal jugular venous A waves present [Normal Jugular Venous V Waves Present] : normal jugular venous V waves present [Respiration, Rhythm And Depth] : normal respiratory rhythm and effort [No Jugular Venous Snow A Waves] : no jugular venous snow A waves [Diffuse Wheezing] : diffuse wheezing [Exaggerated Use Of Accessory Muscles For Inspiration] : no accessory muscle use [Heart Rate And Rhythm] : heart rate and rhythm were normal [Prolonged Exp Time] : with prolonged expiratory time [Decreased Breath Sounds] : decreased breath sounds [Heart Sounds] : normal S1 and S2 [Systolic grade ___/6] : A grade [unfilled]/6 systolic murmur was heard. [Edema] : no peripheral edema present [Abdomen Tenderness] : non-tender [Abdomen Soft] : soft [Abnormal Walk] : normal gait [Abdomen Mass (___ Cm)] : no abdominal mass palpated [Gait - Sufficient For Exercise Testing] : the gait was sufficient for exercise testing [Nail Clubbing] : no clubbing of the fingernails [Petechial Hemorrhages (___cm)] : no petechial hemorrhages [Cyanosis, Localized] : no localized cyanosis [Skin Color & Pigmentation] : normal skin color and pigmentation [No Venous Stasis] : no venous stasis [Skin Lesions] : no skin lesions [] : no rash [No Skin Ulcers] : no skin ulcer [Oriented To Time, Place, And Person] : oriented to person, place, and time [No Xanthoma] : no  xanthoma was observed [Mood] : the mood was normal [Affect] : the affect was normal [No Anxiety] : not feeling anxious [FreeTextEntry1] : Trace b/l LE edema

## 2019-11-30 NOTE — HISTORY OF PRESENT ILLNESS
[FreeTextEntry1] : Returning for follow-up\par Still smoking\par \par Echo, Labs reviewed.\par LE edema improved however still present\par Still will GAN and fatigue\par

## 2019-11-30 NOTE — REVIEW OF SYSTEMS
[Feeling Fatigued] : feeling fatigued [Shortness Of Breath] : shortness of breath [Dyspnea on exertion] : dyspnea during exertion [Anxiety] : anxiety [Negative] : Endocrine

## 2019-11-30 NOTE — DISCUSSION/SUMMARY
[FreeTextEntry1] : 80 y/o with HTN, Hchol, PAF on coumadin, Prostate Ca, CAD s/p CABG, AS s/p TAVR - \par  \par Echo with new reduction in EF (global)\par Recent LE edema may have been combo of amlodipine and HF (improved slightly with lasix)\par I asked pt to schedule R/L cardiac cath to assess reason for EF reduction\par \par He should stop K supplements\par Reduced Lasix to QD\par

## 2019-11-30 NOTE — REASON FOR VISIT
[Follow-Up - Clinic] : a clinic follow-up of [Aortic Stenosis] : aortic stenosis [Coronary Artery Disease] : coronary artery disease [Hypertension] : hypertension [Atrial Fibrillation] : atrial fibrillation [Medication Management] : Medication management

## 2019-12-01 NOTE — DISCUSSION/SUMMARY
[FreeTextEntry1] : 80 y/o with HTN, Hchol, PAF on coumadin, Prostate Ca, CAD s/p CABG, AS s/p TAVR - \par  \par Edema concerning for new onset HF\par \par 1- check labs\par 2- stop amlodipine\par 3- increase diuretic, lasix 40 BID\par 4- Echo cardiogram\par \par F/u 1 week\par

## 2019-12-01 NOTE — REVIEW OF SYSTEMS
[Feeling Fatigued] : feeling fatigued [Anxiety] : anxiety [Dyspnea on exertion] : dyspnea during exertion [Shortness Of Breath] : shortness of breath [Negative] : Heme/Lymph [Lower Ext Edema] : lower extremity edema

## 2019-12-01 NOTE — HISTORY OF PRESENT ILLNESS
[FreeTextEntry1] : Returning for follow-up\par s/p carotid Angio. \par Still smoking\par \par Notes an increase in GAN and LE edema recently\par Has tried taking extra doses of Lasix on his own - with no response\par No chest pain\par No palpitations\par

## 2019-12-01 NOTE — REASON FOR VISIT
[Aortic Stenosis] : aortic stenosis [Follow-Up - Clinic] : a clinic follow-up of [Atrial Fibrillation] : atrial fibrillation [Coronary Artery Disease] : coronary artery disease [Hypertension] : hypertension [Medication Management] : Medication management

## 2019-12-01 NOTE — PHYSICAL EXAM
[General Appearance - Well Developed] : well developed [Normal Appearance] : normal appearance [Well Groomed] : well groomed [General Appearance - Well Nourished] : well nourished [General Appearance - In No Acute Distress] : no acute distress [Normal Conjunctiva] : the conjunctiva exhibited no abnormalities [No Deformities] : no deformities [Eyelids - No Xanthelasma] : the eyelids demonstrated no xanthelasmas [No Oral Pallor] : no oral pallor [Normal Oral Mucosa] : normal oral mucosa [Normal Jugular Venous A Waves Present] : normal jugular venous A waves present [No Oral Cyanosis] : no oral cyanosis [Normal Jugular Venous V Waves Present] : normal jugular venous V waves present [No Jugular Venous Snow A Waves] : no jugular venous snow A waves [Respiration, Rhythm And Depth] : normal respiratory rhythm and effort [Diffuse Wheezing] : diffuse wheezing [Exaggerated Use Of Accessory Muscles For Inspiration] : no accessory muscle use [Prolonged Exp Time] : with prolonged expiratory time [Heart Rate And Rhythm] : heart rate and rhythm were normal [Decreased Breath Sounds] : decreased breath sounds [Systolic grade ___/6] : A grade [unfilled]/6 systolic murmur was heard. [Heart Sounds] : normal S1 and S2 [Abdomen Soft] : soft [Abdomen Tenderness] : non-tender [Abdomen Mass (___ Cm)] : no abdominal mass palpated [Gait - Sufficient For Exercise Testing] : the gait was sufficient for exercise testing [Abnormal Walk] : normal gait [Nail Clubbing] : no clubbing of the fingernails [Cyanosis, Localized] : no localized cyanosis [Petechial Hemorrhages (___cm)] : no petechial hemorrhages [Skin Color & Pigmentation] : normal skin color and pigmentation [] : no rash [No Venous Stasis] : no venous stasis [Skin Lesions] : no skin lesions [Oriented To Time, Place, And Person] : oriented to person, place, and time [No Skin Ulcers] : no skin ulcer [No Xanthoma] : no  xanthoma was observed [Affect] : the affect was normal [Mood] : the mood was normal [No Anxiety] : not feeling anxious [FreeTextEntry1] : +2 LE edema

## 2019-12-03 NOTE — H&P CARDIOLOGY - FAMILY HISTORY
Father  Still living? No  Family history of heart disease, Age at diagnosis: Age Unknown     Child  Still living? Yes, Estimated age: Age Unknown  Family history of heart disease, Age at diagnosis: Age Unknown

## 2019-12-03 NOTE — H&P CARDIOLOGY - PMH
Aortic valve stenosis, unspecified etiology    Atrial fibrillation, unspecified type    Coronary artery disease of bypass graft of native heart with stable angina pectoris    Erectile dysfunction, unspecified erectile dysfunction type    Polio    Prostate cancer    Severe aortic stenosis by prior echocardiogram

## 2019-12-03 NOTE — CONSULT NOTE ADULT - SUBJECTIVE AND OBJECTIVE BOX
Hematology/Oncology Consult Note    HPI:  79 year old male with HTN, HLD, CAD s/p CABG x 3V (2008), AFIB on coumadin, prostate cancer s/p prostatectomy and radiation for recurrence followed by urology, COPD on Trilogy at home followed by cardiology Dr. Quiroz for LE swelling for several months and dyspnea on minimal exertion was admitted for elective R&LHC for ischemic evaluation as a cause of HFrEF. Hematology was consulted for pancytopenia    PAST MEDICAL & SURGICAL HISTORY:  Moderate tricuspid regurgitation  Osteoarthritis  Bilateral carotid artery disease  Hypertension  COPD (chronic obstructive pulmonary disease): cigar smoker for 29 years, quit 1/2019  Prostate cancer: Prostatectomy 2008, radiation 2011  Atrial fibrillation, unspecified type: on coumadin  Erectile dysfunction, unspecified erectile dysfunction type  Coronary artery disease of bypass graft of native heart with stable angina pectoris  [place]  S/P TAVR (transcatheter aortic valve replacement): 2016 in [facility]  S/P tonsillectomy: as a child  S/P prostatectomy: 11/18/2008  S/P hernia repair: inguinal, 1998  S/P CABG x 3: 9/17/2007 at [facility]      FAMILY HISTORY:  Family history of heart disease (Child)      MEDICATIONS  (STANDING):  aspirin enteric coated 81 milliGRAM(s) Oral daily  atorvastatin 20 milliGRAM(s) Oral at bedtime  cholecalciferol 2000 Unit(s) Oral daily  folic acid 1 milliGRAM(s) Oral daily  furosemide   Injectable 40 milliGRAM(s) IV Push two times a day  metoprolol succinate  milliGRAM(s) Oral daily  sacubitril 49 mG/valsartan 51 mG 1 Tablet(s) Oral two times a day    Allergies    No Known Allergies    VITAL SIGNS:  Height (cm): 162.56 (12-03 @ 13:05)  Weight (kg): 63.5 (12-03 @ 13:05)  BMI (kg/m2): 24 (12-03 @ 13:05)  BSA (m2): 1.68 (12-03 @ 13:05)  T(F): 97.7 (12-03-19 @ 13:05), Max: 97.7 (12-03-19 @ 13:05)  HR: 92 (12-03-19 @ 13:05)  BP: 139/87 (12-03-19 @ 13:05)  RR: 16 (12-03-19 @ 13:05)  SpO2: 96% (12-03-19 @ 13:05)  Wt(kg): --    PHYSICAL EXAMINATION:  Constitutional: NAD   Eyes: sclera non-icteric, conjunctiva non-anemia  Neck: supple, no palpable LNs  Mouth: No mucositis, no exudate, no ulcer.   Respiratory: CTA b/l  Cardiovascular: Normal rate regular rhythm. no murmur  Gastrointestinal: soft and flat, no tenderness to palpation, no masses palpable, normoactive bowel sound  Extremities:  No c/c/e  MSK: No joint swelling, erythema, or tenderness   Neurological: AOx3, Cranial nerves II-XII grossly intact  Skin: No rash  Psych: Normal affect    LABS:                        11.8   4.07  )-----------( 75       ( 03 Dec 2019 13:34 )             36.5     12-03    139  |  103  |  39<H>  ----------------------------<  108<H>  4.8   |  22  |  1.73<H>    Ca    9.4      03 Dec 2019 13:34  Mg     2.2     12-03    TPro  7.5  /  Alb  4.2  /  TBili  0.8  /  DBili  0.3<H>  /  AST  34  /  ALT  28  /  AlkPhos  73  12-03    PT/INR - ( 03 Dec 2019 13:34 )   PT: 16.2 sec;   INR: 1.41 ratio         PTT - ( 03 Dec 2019 13:34 )  PTT:32.7 sec Magnesium, Serum: 2.2 mg/dL (12-03 @ 13:34)  Lactate Dehydrogenase, Serum: 298 U/L (12-03 @ 13:34)

## 2019-12-03 NOTE — H&P CARDIOLOGY - HISTORY OF PRESENT ILLNESS
80 y/o male current cigar smoker 5 cigars (past tobacco 25 pyh), with pmh of HTN, HLD, CAD s/p CABG x 3V (), AFIB (on coumadin-last dose 19), prostate cancer s/p prostatectomy, chronic cough seen and evaluated by Dr. Quiroz for LE swelling for several months and dyspnea on minimal exertion, LE swelling improved with Lasix, currently on 40mg daily, denies any chest discomfort with exertion. Echo with newly reduced EF 20-25% from 60% in 2018. Patient presents for R&LHC for ischemic evaluation.  Denies monitoring device implants.  Denied dizziness, diaphoresis, palpitations, nausea, vomiting or syncope.       Symptoms:        Angina (Class): IV       Ischemic Symptoms: dyspnea on minimal exertion    Heart Failure: new onset       Systolic/Diastolic/Combined: systolic HF, EF 20-25%       NYHA Class (within 2 weeks): III, on Lasix    Assessment of LVEF: EF 20-25%       EF: 20-25%        Assessed by:        Date: 19    Prior Cardiac Interventions:       PCI's:        CAB    Noninvasive Testing:   Stress Test: Date: 2015       Protocol: Nuclear ST       Duration of Exercise:        Symptoms:        EKG Changes:        DTS:        Myocardial Imaging:        Risk Assessment:     < from: Nuclear Stress Test-Pharmacologic (11.06.15 @ 11:09) >  IMPRESSIONS:Abnormal Study  * Chest Pain: No chest pain with administration of  Regadenoson.  * Symptom: Shortness of breath.  * HR Response: Appropriate.  * BP Response: Appropriate.  * Heart Rhythm: Sinus Bradycardia - 56 BPM.  * Baseline ECG: ST wave abnormalities in I  , II , III,  aVL, V6.  * ECG Changes: No significant ischemic ST segment changes  beyond baseline abnormalities during pharmacologic stress.   ECG interpretation during the initial attempt at exercise  stress significantly limited by excessive artifact.  * Arrhythmia: Occasional VPDs occurred during stress and  recovery during pharmacologic stress. Frequent VPDs  occurred during exercise stress and in recovery.  Ventricular triplets occurred during exercise stress and  in recovery.  * The left ventricle was enlarged. There is a small, mild  defect in the basal to mid anterior wall that is  reversible suggestive of ischemia.  There is also a  medium-sized, moderate defect in the basal to mid inferior  and basal to mid inferoseptal walls that is mostly fixed  suggestive of infarct with mild paola-infarct ischemia.  There is a small, mild to moderate defect in the apex that  is partially reversible suggestive of infarct with mild to  moderate paola-infarct ischemia.  * Post-stress gated wall motion analysis was performed  (LVEF = 55 %;LVEDV = 133 ml.) revealing hypokinesis of the  basal to mid inferoseptal and basal to mid inferior walls.  *** No previous Nuclear/Stress exam.  ------------------------------------------------------------------------  Confirmed on  2015 - 12:34:41 by Remi Marks M.D.    < end of copied text >    Echo:   < from: Transthoracic Echocardiogram (19 @ 11:44) >  Conclusions:  1. Moderate mitral regurgitation.  2. Transcatheter aortic valve replacement. Peak transaortic  valve gradient equals 3 mm Hg, which is probably normal in  the presence of a transcatheter aortic valve replacement.  Mild aortic regurgitation.  3. Moderate to severely dilated left atrial enlargement.  4. Mild left ventricular enlargement.  5. Endocardium not well visualized. Grossly severe global  left ventricular systolic dysfunction with regional  variation. Consider use of echo contrast for better  estimation of left ventricular function if clinically  indicated.  Abnormal septal motion.  6. Increased E/e'  is consistent with elevated left  ventricular filling pressure.  7. Moderate right atrial enlargement.  8. Normal right ventricular size with decreased right  ventricular systolic function.  9. Moderate-severe tricuspid regurgitation.  10. Estimated pulmonary artery systolic pressure equals 49  mm Hg, assuming right atrial pressure equals 8 mm Hg,  consistent with mild pulmonary pressures.  *** Compared with echocardiogram of 10/15/2018, the left  ventricular function has significantly worsened.  ------------------------------------------------------------------------  Confirmed on  2019 - 15:11:00 by Shivam Bates M.D.    < end of copied text >    Antianginal Therapies:        Beta Blockers:         Calcium Channel Blockers:        Long Acting Nitrates:        Ranexa:     Associated Risk Factors:        Cerebrovascular Disease: N/A       Chronic Lung Disease: N/A       Peripheral Arterial Disease: N/A       Chronic Kidney Disease (if yes, what is GFR): N/A       Uncontrolled Diabetes (if yes, what is HgbA1C or FBS): N/A       Poorly Controlled Hypertension (if yes, what is SBP): N/A       Morbid Obesity (if yes, what is BMI): N/A       History of Recent Ventricular Arrhythmia: N/A       Inability to Ambulate Safely: N/A       Need for Therapeutic Anticoagulation: N/A       Antiplatelet or Contrast Allergy: N/A 78 y/o male current cigar smoker 5 cigars (past tobacco 25 pyh), with pmh of HTN, HLD, CAD s/p CABG x 3V (2008), AFIB (on coumadin-last dose 11/28/19), prostate cancer s/p prostatectomy, chronic cough seen and evaluated by Dr. Quiroz for LE swelling for several months and dyspnea on minimal exertion, LE swelling improved with Lasix, currently on 40mg daily, denies any chest discomfort with exertion. Echo with newly reduced EF 20-25% from 60% in 2018. Patient presents for R&LHC for ischemic evaluation.  Denies monitoring device implants.  Denied dizziness, diaphoresis, palpitations, nausea, vomiting or syncope.       Symptoms:        Angina (Class): II       Ischemic Symptoms: dyspnea on minimal exertion    Heart Failure: new onset       Systolic/Diastolic/Combined: systolic HF, EF 20-25% from EF 60% in 2018       NYHA Class (within 2 weeks): IV, started on Lasix    Assessment of LVEF: EF 20-25%       EF: 20-25%        Assessed by:        Date: 11/27/19    Prior Cardiac Interventions:       PCI's:        CABG: in 2008    Noninvasive Testing:   Stress Test: Date: 11/06/2015       Protocol: Nuclear ST       Duration of Exercise:        Symptoms:        EKG Changes:        DTS:        Myocardial Imaging:        Risk Assessment:     < from: Nuclear Stress Test-Pharmacologic (11.06.15 @ 11:09) >  IMPRESSIONS:Abnormal Study  * Chest Pain: No chest pain with administration of  Regadenoson.  * Symptom: Shortness of breath.  * HR Response: Appropriate.  * BP Response: Appropriate.  * Heart Rhythm: Sinus Bradycardia - 56 BPM.  * Baseline ECG: ST wave abnormalities in I  , II , III,  aVL, V6.  * ECG Changes: No significant ischemic ST segment changes  beyond baseline abnormalities during pharmacologic stress.   ECG interpretation during the initial attempt at exercise  stress significantly limited by excessive artifact.  * Arrhythmia: Occasional VPDs occurred during stress and  recovery during pharmacologic stress. Frequent VPDs  occurred during exercise stress and in recovery.  Ventricular triplets occurred during exercise stress and  in recovery.  * The left ventricle was enlarged. There is a small, mild  defect in the basal to mid anterior wall that is  reversible suggestive of ischemia.  There is also a  medium-sized, moderate defect in the basal to mid inferior  and basal to mid inferoseptal walls that is mostly fixed  suggestive of infarct with mild paola-infarct ischemia.  There is a small, mild to moderate defect in the apex that  is partially reversible suggestive of infarct with mild to  moderate paola-infarct ischemia.  * Post-stress gated wall motion analysis was performed  (LVEF = 55 %;LVEDV = 133 ml.) revealing hypokinesis of the  basal to mid inferoseptal and basal to mid inferior walls.  *** No previous Nuclear/Stress exam.  ------------------------------------------------------------------------  Confirmed on  11/18/2015 - 12:34:41 by Remi Marks M.D.    < end of copied text >    Echo:   < from: Transthoracic Echocardiogram (11.27.19 @ 11:44) >  Conclusions:  1. Moderate mitral regurgitation.  2. Transcatheter aortic valve replacement. Peak transaortic  valve gradient equals 3 mm Hg, which is probably normal in  the presence of a transcatheter aortic valve replacement.  Mild aortic regurgitation.  3. Moderate to severely dilated left atrial enlargement.  4. Mild left ventricular enlargement.  5. Endocardium not well visualized. Grossly severe global  left ventricular systolic dysfunction with regional  variation. Consider use of echo contrast for better  estimation of left ventricular function if clinically  indicated.  Abnormal septal motion.  6. Increased E/e'  is consistent with elevated left  ventricular filling pressure.  7. Moderate right atrial enlargement.  8. Normal right ventricular size with decreased right  ventricular systolic function.  9. Moderate-severe tricuspid regurgitation.  10. Estimated pulmonary artery systolic pressure equals 49  mm Hg, assuming right atrial pressure equals 8 mm Hg,  consistent with mild pulmonary pressures.  *** Compared with echocardiogram of 10/15/2018, the left  ventricular function has significantly worsened.  ------------------------------------------------------------------------  Confirmed on  11/27/2019 - 15:11:00 by Shivam Bates M.D.    < end of copied text >    Antianginal Therapies:        Beta Blockers:         Calcium Channel Blockers:        Long Acting Nitrates:        Ranexa:     Associated Risk Factors:        Cerebrovascular Disease: N/A       Chronic Lung Disease: N/A       Peripheral Arterial Disease: N/A       Chronic Kidney Disease (if yes, what is GFR): N/A       Uncontrolled Diabetes (if yes, what is HgbA1C or FBS): N/A       Poorly Controlled Hypertension (if yes, what is SBP): N/A       Morbid Obesity (if yes, what is BMI): N/A       History of Recent Ventricular Arrhythmia: N/A       Inability to Ambulate Safely: N/A       Need for Therapeutic Anticoagulation: N/A       Antiplatelet or Contrast Allergy: N/A

## 2019-12-03 NOTE — CONSULT NOTE ADULT - ASSESSMENT
79M PMH CAD s/p CABG (3V 2008), afib on coumadin, HTN, HLD, daily cigar smoker, prostate ca s/p prostatectomy/RT, chronic cough p/w GAN and LE edema, admitted with new acute systolic heart failures and pancytopenia.

## 2019-12-03 NOTE — CONSULT NOTE ADULT - SUBJECTIVE AND OBJECTIVE BOX
CC: SOB    HPI:  79M PMH CAD s/p CABG (3V 2008), afib on coumadin, HTN, HLD, daily cigar smoker, prostate ca s/p prostatectomy/RT, chronic cough p/w GAN and LE edema. Symptoms stated about two months ago, associated with a dry intermittent cough. LE edema worsened and was not responding to his home lasix 20mg, so was increased to 40mg with some improvement. TTE performed showed newly reduced EF 20-25% (from 60% in 2018). Admitted following R&LHC today which showed patent grafts, but high wedge pressures, therefore requiring diuresis. He was noted also to have low grade pancytopenia on labs.      Following the cath, patient feels well. Feels his breathing is ok, but it usually is when he is at rest. Denies dizziness, syncope, chest pain, SOB at rest, NVD, abdominal pain, dysuria.     PAST MEDICAL & SURGICAL HISTORY:  Moderate tricuspid regurgitation  Osteoarthritis  Bilateral carotid artery disease  Hypertension  COPD (chronic obstructive pulmonary disease): cigar smoker for 29 years, quit 1/2019  Prostate cancer: Prostatectomy 2008, radiation 2011  Atrial fibrillation, unspecified type: on coumadin  Erectile dysfunction, unspecified erectile dysfunction type  Coronary artery disease of bypass graft of native heart with stable angina pectoris  [place]  S/P TAVR (transcatheter aortic valve replacement): 2016 in [facility]  S/P tonsillectomy: as a child  S/P prostatectomy: 11/18/2008  S/P hernia repair: inguinal, 1998  S/P CABG x 3: 9/17/2007 at [facility]    Review of Systems:   CONSTITUTIONAL: No fever, weight loss, or fatigue  EYES: No eye pain, visual disturbances, or discharge  ENMT:  No difficulty hearing, tinnitus, vertigo; No sinus or throat pain  NECK: No pain or stiffness  RESPIRATORY: No cough, wheezing, chills or hemoptysis  CARDIOVASCULAR: +GAN, LE swelling; No chest pain, palpitations, dizziness  GASTROINTESTINAL: No abdominal or epigastric pain. No nausea, vomiting, or hematemesis; No diarrhea or constipation. No melena or hematochezia.  GENITOURINARY: No dysuria, frequency, hematuria, or incontinence  NEUROLOGICAL: No headaches, memory loss, loss of strength, numbness, or tremors  SKIN: No itching, burning, rashes, or lesions   MUSCULOSKELETAL: No joint pain or swelling; No muscle, back, or extremity pain  PSYCHIATRIC: No depression, anxiety, mood swings, or difficulty sleeping  HEME/LYMPH: No easy bruising, or bleeding gums  ALLERGY AND IMMUNOLOGIC: No hives or eczema  [x] all other systems negative or as per HPI    Allergies  No Known Allergies    Social History: lives alone, smokes 5 cigars daily, 25 pack year history    FAMILY HISTORY:  Family history of heart disease (Child)    MEDICATIONS  (STANDING):  aspirin enteric coated 81 milliGRAM(s) Oral daily  atorvastatin 20 milliGRAM(s) Oral at bedtime  cholecalciferol 2000 Unit(s) Oral daily  folic acid 1 milliGRAM(s) Oral daily  furosemide   Injectable 40 milliGRAM(s) IV Push two times a day  metoprolol succinate  milliGRAM(s) Oral daily  sacubitril 49 mG/valsartan 51 mG 1 Tablet(s) Oral two times a day    Vital Signs Last 24 Hrs  T(C): 36.6 (12-03-19 @ 18:05)  T(F): 97.8 (12-03-19 @ 18:05), Max: 97.8 (12-03-19 @ 18:05)  HR: 95 (12-03-19 @ 18:40) (83 - 95)  BP: 135/65 (12-03-19 @ 18:40)  BP(mean): 104 (12-03-19 @ 13:05) (104 - 104)  RR: 18 (12-03-19 @ 18:05) (16 - 18)  SpO2: 93% (12-03-19 @ 18:05) (93% - 96%)  Wt(kg): --    PHYSICAL EXAM:  GENERAL: NAD, well-developed, thin  HEAD:  Atraumatic, Normocephalic  EYES: EOMI, PERRLA, conjunctiva and sclera clear  NECK: Supple, No JVD  CHEST/LUNG: Clear to auscultation bilaterally; few crackles at bases  HEART: irregular; No murmurs, rubs, or gallops  ABDOMEN: Soft, Nontender, Nondistended; Bowel sounds present  EXTREMITIES:  2+ Peripheral Pulses, No clubbing, cyanosis; 1+ edema  PSYCH: AAOx3  NEUROLOGY: non-focal  SKIN: No rashes or lesions, multiple tattoos    LABS:             11.8   4.07  )-----------( 75       ( 03 Dec 2019 13:34 )             36.5     12-03    139  |  103  |  39<H>  ----------------------------<  108<H>  4.8   |  22  |  1.73<H>    Ca    9.4      03 Dec 2019 13:34  Mg     2.2     12-03    TPro  7.5  /  Alb  4.2  /  TBili  0.8  /  DBili  0.3<H>  /  AST  34  /  ALT  28  /  AlkPhos  73  12-03    PT/INR - ( 03 Dec 2019 13:34 )   PT: 16.2 sec;   INR: 1.41 ratio    PTT - ( 03 Dec 2019 13:34 )  PTT:32.7 sec    RADIOLOGY & ADDITIONAL TESTS:  Imaging Personally Reviewed:    Echo:   < from: Transthoracic Echocardiogram (11.27.19 @ 11:44) >  Conclusions:  1. Moderate mitral regurgitation.  2. Transcatheter aortic valve replacement. Peak transaortic  valve gradient equals 3 mm Hg, which is probably normal in  the presence of a transcatheter aortic valve replacement.  Mild aortic regurgitation.  3. Moderate to severely dilated left atrial enlargement.  4. Mild left ventricular enlargement.  5. Endocardium not well visualized. Grossly severe global  left ventricular systolic dysfunction with regional  variation. Consider use of echo contrast for better  estimation of left ventricular function if clinically  indicated.  Abnormal septal motion.  6. Increased E/e'  is consistent with elevated left  ventricular filling pressure.  7. Moderate right atrial enlargement.  8. Normal right ventricular size with decreased right  ventricular systolic function.  9. Moderate-severe tricuspid regurgitation.  10. Estimated pulmonary artery systolic pressure equals 49  mm Hg, assuming right atrial pressure equals 8 mm Hg,  consistent with mild pulmonary pressures.  *** Compared with echocardiogram of 10/15/2018, the left  ventricular function has significantly worsened.    left and right heart cath:  patent grafts, PCWP 21    Consultant(s) Notes Reviewed:  Yes  Care Discussed with Consultants/Other Providers: Yes  Outpatient and prior hospitalization records reviewed: Yes CC: SOB    HPI:  79M PMH CAD s/p CABG (3V 2008), afib on coumadin, HTN, HLD, daily cigar smoker, prostate ca s/p prostatectomy/RT, chronic cough p/w GAN and LE edema. Symptoms stated about two months ago, associated with a dry intermittent cough. LE edema worsened and was not responding to his home lasix 20mg, so was increased to 40mg with some improvement. TTE performed showed newly reduced EF 20-25% (from 60% in 2018). Admitted following R&LHC today which showed patent grafts, but high wedge pressures, therefore requiring diuresis. He was noted also to have low grade pancytopenia on labs.      Following the cath, patient feels well. Feels his breathing is ok, but it usually is when he is at rest. Denies dizziness, syncope, chest pain, SOB at rest, NVD, abdominal pain, dysuria.     PAST MEDICAL & SURGICAL HISTORY:  Moderate tricuspid regurgitation  Osteoarthritis  Bilateral carotid artery disease  Hypertension  COPD (chronic obstructive pulmonary disease): cigar smoker for 29 years, quit 1/2019  Prostate cancer: Prostatectomy 2008, radiation 2011  Atrial fibrillation, unspecified type: on coumadin  Erectile dysfunction, unspecified erectile dysfunction type  Coronary artery disease of bypass graft of native heart with stable angina pectoris  [place]  S/P TAVR (transcatheter aortic valve replacement): 2016 in [facility]  S/P tonsillectomy: as a child  S/P prostatectomy: 11/18/2008  S/P hernia repair: inguinal, 1998  S/P CABG x 3: 9/17/2007 at [facility]    Review of Systems:   CONSTITUTIONAL: No fever, weight loss, or fatigue  EYES: No eye pain, visual disturbances, or discharge  ENMT:  No difficulty hearing, tinnitus, vertigo; No sinus or throat pain  NECK: No pain or stiffness  RESPIRATORY: No cough, wheezing, chills or hemoptysis  CARDIOVASCULAR: +GAN, LE swelling; No chest pain, palpitations, dizziness  GASTROINTESTINAL: No abdominal or epigastric pain. No nausea, vomiting, or hematemesis; No diarrhea or constipation. No melena or hematochezia.  GENITOURINARY: No dysuria, frequency, hematuria, or incontinence  NEUROLOGICAL: No headaches, memory loss, loss of strength, numbness, or tremors  SKIN: No itching, burning, rashes, or lesions   MUSCULOSKELETAL: No joint pain or swelling; No muscle, back, or extremity pain  PSYCHIATRIC: No depression, anxiety, mood swings, or difficulty sleeping  HEME/LYMPH: No easy bruising, or bleeding gums  ALLERGY AND IMMUNOLOGIC: No hives or eczema  [x] all other systems negative or as per HPI    Allergies  No Known Allergies    Social History: lives alone, smokes 5 cigars daily, 25 pack year history    FAMILY HISTORY:  Family history of heart disease (Child)    MEDICATIONS  (STANDING):  aspirin enteric coated 81 milliGRAM(s) Oral daily  atorvastatin 20 milliGRAM(s) Oral at bedtime  cholecalciferol 2000 Unit(s) Oral daily  folic acid 1 milliGRAM(s) Oral daily  furosemide   Injectable 40 milliGRAM(s) IV Push two times a day  metoprolol succinate  milliGRAM(s) Oral daily  sacubitril 49 mG/valsartan 51 mG 1 Tablet(s) Oral two times a day    Vital Signs Last 24 Hrs  T(C): 36.6 (12-03-19 @ 18:05)  T(F): 97.8 (12-03-19 @ 18:05), Max: 97.8 (12-03-19 @ 18:05)  HR: 95 (12-03-19 @ 18:40) (83 - 95)  BP: 135/65 (12-03-19 @ 18:40)  BP(mean): 104 (12-03-19 @ 13:05) (104 - 104)  RR: 18 (12-03-19 @ 18:05) (16 - 18)  SpO2: 93% (12-03-19 @ 18:05) (93% - 96%)  Wt(kg): --    PHYSICAL EXAM:  GENERAL: NAD, well-developed, thin  HEAD:  Atraumatic, Normocephalic  EYES: EOMI, PERRLA, conjunctiva and sclera clear  NECK: Supple, No JVD  CHEST/LUNG: Clear to auscultation bilaterally; few crackles at bases  HEART: irregular; No murmurs, rubs, or gallops  ABDOMEN: Soft, Nontender, Nondistended; Bowel sounds present  EXTREMITIES:  2+ Peripheral Pulses, No clubbing, cyanosis; 1+ edema  PSYCH: AAOx3  NEUROLOGY: non-focal  SKIN: No rashes or lesions, multiple tattoos; R groin access site soft, min tenderness    LABS:             11.8   4.07  )-----------( 75       ( 03 Dec 2019 13:34 )             36.5     12-03    139  |  103  |  39<H>  ----------------------------<  108<H>  4.8   |  22  |  1.73<H>    Ca    9.4      03 Dec 2019 13:34  Mg     2.2     12-03    TPro  7.5  /  Alb  4.2  /  TBili  0.8  /  DBili  0.3<H>  /  AST  34  /  ALT  28  /  AlkPhos  73  12-03    PT/INR - ( 03 Dec 2019 13:34 )   PT: 16.2 sec;   INR: 1.41 ratio    PTT - ( 03 Dec 2019 13:34 )  PTT:32.7 sec    RADIOLOGY & ADDITIONAL TESTS:  Imaging Personally Reviewed:    Echo:   < from: Transthoracic Echocardiogram (11.27.19 @ 11:44) >  Conclusions:  1. Moderate mitral regurgitation.  2. Transcatheter aortic valve replacement. Peak transaortic  valve gradient equals 3 mm Hg, which is probably normal in  the presence of a transcatheter aortic valve replacement.  Mild aortic regurgitation.  3. Moderate to severely dilated left atrial enlargement.  4. Mild left ventricular enlargement.  5. Endocardium not well visualized. Grossly severe global  left ventricular systolic dysfunction with regional  variation. Consider use of echo contrast for better  estimation of left ventricular function if clinically  indicated.  Abnormal septal motion.  6. Increased E/e'  is consistent with elevated left  ventricular filling pressure.  7. Moderate right atrial enlargement.  8. Normal right ventricular size with decreased right  ventricular systolic function.  9. Moderate-severe tricuspid regurgitation.  10. Estimated pulmonary artery systolic pressure equals 49  mm Hg, assuming right atrial pressure equals 8 mm Hg,  consistent with mild pulmonary pressures.  *** Compared with echocardiogram of 10/15/2018, the left  ventricular function has significantly worsened.    left and right heart cath:  patent grafts, PCWP 21    Consultant(s) Notes Reviewed:  Yes  Care Discussed with Consultants/Other Providers: Yes  Outpatient and prior hospitalization records reviewed: Yes

## 2019-12-03 NOTE — H&P CARDIOLOGY - PSH
S/P CABG x 3  9/17/2007 at [facility]  S/P hernia repair  inguinal, 1998  S/P prostatectomy  11/18/2008  S/P TAVR (transcatheter aortic valve replacement)  2016 in [facility]  S/P tonsillectomy  as a child

## 2019-12-03 NOTE — CONSULT NOTE ADULT - ATTENDING COMMENTS
Pt with history of borderline anemia for years who had been given B12 and folic acid without diagnosed deficiency. He had pronounced leucopenia with lymphopenia that has developed since October without any sick contacts, new herbal supplements or medications. Also with thrombocytopenia. On exam, barrel chested, no palpable cervical or axillary or L inguinal adenopathy; bandage over R groin after cardiac procedure. No appreciable hepatosplenomegaly. He had multiple tattoos over chest and lower extremity but no rash. 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 On differential tumor infiltration by prostate cancer versus MDS versus B12 deficiency. Microangiopathic process low likelihood.  Please add PSA to blood work. He consents to BM biopsy tomorrow for further evaluation.

## 2019-12-03 NOTE — CONSULT NOTE ADULT - PROBLEM SELECTOR RECOMMENDATION 7
Transitions of Care Status:  1.  Name of PCP: Dr. Ramón Quiroz  2.  PCP Contacted on Admission: [x] Y - directed pt to hospital for cath   [ ] N    3.  PCP contacted at Discharge: [ ] Y    [ ] N    [ ] N/A  4.  Post-Discharge Appointment Date and Location:  5.  Summary of Handoff given to PCP:

## 2019-12-03 NOTE — CONSULT NOTE ADULT - PROBLEM SELECTOR RECOMMENDATION 3
--mild MERCED, most likely from venous congestion from hypervolemia  --trend CMP, renal dosing of meds

## 2019-12-03 NOTE — CONSULT NOTE ADULT - ASSESSMENT
79 year old male with HTN, HLD, CAD s/p CABG x 3V (2008), AFIB on coumadin, prostate cancer s/p prostatectomy and radiation for recurrence followed by urology, COPD on Trilogy at home followed by cardiology Dr. Quiroz for LE swelling for several months and dyspnea on minimal exertion was admitted for elective R&LHC for ischemic evaluation as a cause of HFrEF. Hematology was consulted for pancytopenia    # Pancytopenia   Noted by Dr. Quiroz, cardiology, pancytopenia about a month ago. He never be seen by hematologist before. He has anemia reportedly for long time but never been told about leukopenia and thrombocytopenia. MCV >115 today. Peripheral smear shows occasional tear drop, +pseudo-Pelger-Huet, significant amount of large plts, 0-1 schistocytes, +nucleated RBC, +carolee cells. MDS is possible.   -Check B12, folate, iron panel, ferritine, reitculocyte, Hapto, LDH  -We will do BMBx tomorrow     # Prostate cancer  s/p prostatectomy for primary and radiation for recurrence. Now being followed bu urology. No previous systemic therapy.  -F/U with urology after discharge     The case was discussed with Dr. Jude Hawkins MD, MPH  Hematology/Oncology Fellow  Pager: [phone] 79 year old male with HTN, HLD, CAD s/p CABG x 3V (2008), AFIB on coumadin, prostate cancer s/p prostatectomy and radiation for recurrence followed by urology, COPD on Trilogy at home followed by cardiology Dr. Quiroz for LE swelling for several months and dyspnea on minimal exertion was admitted for elective R&LHC for ischemic evaluation as a cause of HFrEF. Hematology was consulted for pancytopenia    # Pancytopenia   Noted by Dr. Quiroz, cardiology, pancytopenia about a month ago. He never be seen by hematologist before. He has anemia reportedly for long time but never been told about leukopenia and thrombocytopenia. MCV >115 today. Peripheral smear shows occasional tear drop, +pseudo-Pelger-Huet, significant amount of large plts, 0-1 schistocytes, +nucleated RBC, +carolee cells. MDS is possible.   -Check B12, folate, iron panel, ferritine, reitculocyte, Hapto, LDH  -We will do BMBx tomorrow   -Check CBC with differentials daily    # Prostate cancer  s/p prostatectomy for primary and radiation for recurrence. Now being followed bu urology. No previous systemic therapy.  -F/U with urology after discharge     The case was discussed with Dr. Jude Hawkins MD, MPH  Hematology/Oncology Fellow  Pager: [phone]

## 2019-12-03 NOTE — CONSULT NOTE ADULT - PROBLEM SELECTOR RECOMMENDATION 2
--thrombocytopenia to 70s with mild anemia and leukopenia  --appreciate hematology recs, f/u B12, folate, Fe studies, etc...  --BM biopsy tomorrow. Continue to hold coumadin.

## 2019-12-03 NOTE — CHART NOTE - NSCHARTNOTEFT_GEN_A_CORE
HPI:  78 y/o male current cigar smoker 5 cigars (past tobacco 25 pyh), with pmh of HTN, HLD, CAD s/p CABG x 3V (2008), AFIB (on coumadin-last dose 11/28/19), prostate cancer s/p prostatectomy, chronic cough seen and evaluated by Dr. Quiroz for LE swelling for several months and dyspnea on minimal exertion, LE swelling improved with Lasix, currently on 40mg daily, denies any chest discomfort with exertion. Echo with newly reduced EF 20-25% from 60% in 2018. Patient presents for R&LHC for ischemic evaluation.     s/p right and left cardiac angiogram today via RFV/A  cath findings: grafts are patent, elevated PCWP= 21    -Case discussed with Dr. Quiroz  Patient admitted for IV diuresis (lasix 40 PO daily at home, now on lasix 40mg IV BID)  started on Entresto for newly reduced EF 20-25% from 60% in 2018   ACE held (Cr slightly elevated)  Heme consult for thrombocytopenia and low WBC  cards consult (Dr. Leos)    Patient admitted to [facility] under hospitalist service

## 2019-12-03 NOTE — PATIENT PROFILE ADULT - DO YOU WANT TO COMPLETE THE HCP AND NAME A HEALTH CARE AGENT
Patient was examined and evaluated by me. Persistent tachycardia is sinus in nature. Cardiac examination is otherwise normal. Tachycardia probably secondary to noncardiac causes like fever, sepsis, dehydration etc. echocardiogram will be done to rule out any evidence of endocarditis, myocardial /pericardial involvement. no

## 2019-12-04 NOTE — PROGRESS NOTE ADULT - PROBLEM SELECTOR PLAN 2
Etiology not clear, however several cell lines are involved.    2nd workup ordered.  -Bone marrow biopsy completed

## 2019-12-04 NOTE — PROGRESS NOTE ADULT - SUBJECTIVE AND OBJECTIVE BOX
[facility] Division of Hospital Medicine  Giuseppe Diaz MD  Pager  [phone]    Patient is a 79y old  Male who presents with a chief complaint of CHF (04 Dec 2019 11:27)      SUBJECTIVE / OVERNIGHT EVENTS:  patient had cardiac cath yesterday   ADDITIONAL REVIEW OF SYSTEMS:  no chest pain or SOB    MEDICATIONS  (STANDING):  aspirin enteric coated 81 milliGRAM(s) Oral daily  atorvastatin 20 milliGRAM(s) Oral at bedtime  budesonide 160 MICROgram(s)/formoterol 4.5 MICROgram(s) Inhaler 2 Puff(s) Inhalation two times a day  cholecalciferol 2000 Unit(s) Oral daily  folic acid 1 milliGRAM(s) Oral daily  furosemide   Injectable 40 milliGRAM(s) IV Push two times a day  metoprolol succinate  milliGRAM(s) Oral daily  sacubitril 49 mG/valsartan 51 mG 1 Tablet(s) Oral two times a day  warfarin 2.5 milliGRAM(s) Oral once    MEDICATIONS  (PRN):  ALBUTerol    90 MICROgram(s) HFA Inhaler 2 Puff(s) Inhalation every 6 hours PRN Wheezing  nicotine  Polacrilex Gum 2 milliGRAM(s) Oral every 3 hours PRN nicotine craving      CAPILLARY BLOOD GLUCOSE        I&O's Summary    03 Dec 2019 07:01  -  04 Dec 2019 07:00  --------------------------------------------------------  IN: 120 mL / OUT: 450 mL / NET: -330 mL    04 Dec 2019 07:01  -  04 Dec 2019 17:14  --------------------------------------------------------  IN: 480 mL / OUT: 603 mL / NET: -123 mL        PHYSICAL EXAM:  Vital Signs Last 24 Hrs  T(C): 36.7 (04 Dec 2019 11:27), Max: 36.7 (03 Dec 2019 21:50)  T(F): 98.1 (04 Dec 2019 11:27), Max: 98.1 (04 Dec 2019 11:27)  HR: 88 (04 Dec 2019 11:27) (83 - 107)  BP: 103/70 (04 Dec 2019 11:27) (103/70 - 135/65)  BP(mean): --  RR: 18 (04 Dec 2019 11:27) (18 - 18)  SpO2: 93% (04 Dec 2019 11:27) (93% - 95%)        CONSTITUTIONAL: elderly ill appearing  EYES: PERRLA; conjunctiva and sclera clear  ENMT: Moist oral mucosa,   NECK: Supple  RESPIRATORY: decreased BS at the bases   CARDIOVASCULAR: irregularly irregular rate controlled   ABDOMEN: Nontender to palpation,  MUSCLOSKELETAL:  Normal gait; no clubbing or cyanosis of digits; no joint swelling or tenderness to palpation  PSYCH: A+O to person, place, and time; affect appropriate  SKIN: No rashes; no palpable lesions    LABS:                        11.7   4.77  )-----------( 76       ( 04 Dec 2019 07:48 )             35.8     12-04    141  |  103  |  34<H>  ----------------------------<  150<H>  4.4   |  25  |  1.63<H>    Ca    9.2      04 Dec 2019 12:24  Mg     2.2     12-03    TPro  7.5  /  Alb  4.2  /  TBili  0.8  /  DBili  0.3<H>  /  AST  34  /  ALT  28  /  AlkPhos  73  12-03    PT/INR - ( 03 Dec 2019 13:34 )   PT: 16.2 sec;   INR: 1.41 ratio         PTT - ( 03 Dec 2019 13:34 )  PTT:32.7 sec            RADIOLOGY & ADDITIONAL TESTS:  Results Reviewed:   Imaging Personally Reviewed:  Electrocardiogram Personally Reviewed:    COORDINATION OF CARE:  Care Discussed with Consultants/Other Providers [Y/N]:  Prior or Outpatient Records Reviewed [Y/N]:

## 2019-12-04 NOTE — PROGRESS NOTE ADULT - PROBLEM SELECTOR PLAN 1
Now reduced EF of 20-25%  Cardiac cath s/p right and left cardiac angiogram today via RFV/A  cath findings: grafts are patent, elevated PCWP= 21  -Continue IV lasix 40 BID   -Toprol   -Entresto BID

## 2019-12-04 NOTE — PROGRESS NOTE ADULT - ASSESSMENT
79 year old male with HTN, HLD, CAD s/p CABG x 3V (2008), AFIB on coumadin, prostate cancer s/p prostatectomy and radiation for recurrence followed by urology, COPD on Trilogy at home followed by cardiology Dr. Quiroz for LE swelling for several months and dyspnea on minimal exertion was admitted for elective R&LHC for ischemic evaluation as a cause of HFrEF. Hematology was consulted for pancytopenia    # Pancytopenia   Noted by Dr. Quiroz, cardiology, pancytopenia about a month ago. He never be seen by hematologist before. He has anemia reportedly for long time but never been told about leukopenia and thrombocytopenia. MCV >115 today. Peripheral smear shows occasional tear drop, +pseudo-Pelger-Huet, significant amount of large plts, 0-1 schistocytes, +nucleated RBC, +carolee cells. MDS is possible. No hemolysis. BMBx done on 12/4.  -Check B12, folate, TSH  -Check CBC with differentials daily while he is in hospital  -F/U BMBx result (Done on 12/4)  -Will refer to [facility] after discharge    # Prostate cancer  s/p prostatectomy for primary and radiation for recurrence. Now being followed bu urology. No previous systemic therapy.  -F/U with urology after discharge 79 year old male with HTN, HLD, CAD s/p CABG x 3V (2008), AFIB on coumadin, prostate cancer s/p prostatectomy and radiation for recurrence followed by urology, COPD on Trilogy at home followed by cardiology Dr. Quiroz for LE swelling for several months and dyspnea on minimal exertion was admitted for elective R&LHC for ischemic evaluation as a cause of HFrEF. Hematology was consulted for pancytopenia    # Pancytopenia   Noted by Dr. Quiroz, cardiology, pancytopenia about a month ago. He never be seen by hematologist before. He has anemia reportedly for long time but never been told about leukopenia and thrombocytopenia. MCV >115 today. Peripheral smear shows occasional tear drop, +pseudo-Pelger-Huet, significant amount of large plts, 0-1 schistocytes, +nucleated RBC, +carolee cells. MDS is possible. No hemolysis. BMBx done on 12/4.  -Check B12, folate, TSH  -Check CBC with differentials daily while he is in hospital  -F/U BMBx result (Done on 12/4)  -Will refer to [facility] after discharge    # Prostate cancer  s/p prostatectomy for primary and radiation for recurrence. Now being followed bu urology. No previous systemic therapy.  -F/U with urology after discharge     The case was discussed with Dr. Jude Hawkins MD, MPH  Hematology/Oncology Fellow  Pager: [phone]

## 2019-12-04 NOTE — PROGRESS NOTE ADULT - SUBJECTIVE AND OBJECTIVE BOX
Overnight Events: No acute events overnight. Pt states that he has had increased urinary output after receiving lasix.          Review Of Systems:   CONSTITUTIONAL: no fevers or chills  EYES/ENT: No visual changes;  No vertigo or throat pain   NECK: No pain or stiffness  RESPIRATORY: No cough, wheezing. +SOB, dyspnea on exertion  CARDIOVASCULAR: No chest pain or palpitations  GASTROINTESTINAL: No abdominal or epigastric pain. No nausea, vomiting. No diarrhea. No melena.  GENITOURINARY: No dysuria, frequency or hematuria. Increased urinary output.  NEUROLOGICAL: No numbness or weakness  SKIN: No itching, burning, rashes, or lesions   All other review of systems is negative unless indicated above.     Current Meds:  aspirin enteric coated 81 milliGRAM(s) Oral daily  atorvastatin 20 milliGRAM(s) Oral at bedtime  cholecalciferol 2000 Unit(s) Oral daily  folic acid 1 milliGRAM(s) Oral daily  furosemide   Injectable 40 milliGRAM(s) IV Push two times a day  metoprolol succinate  milliGRAM(s) Oral daily  nicotine  Polacrilex Gum 2 milliGRAM(s) Oral every 3 hours PRN  sacubitril 49 mG/valsartan 51 mG 1 Tablet(s) Oral two times a day      Vitals:  T(F): 97.8 (12-04), Max: 98 (12-03)  HR: 96 (12-04) (83 - 107)  BP: 113/74 (12-04) (113/74 - 139/87)  RR: 18 (12-04)  SpO2: 94% (12-04)  I&O's Summary    03 Dec 2019 07:01  -  04 Dec 2019 07:00  --------------------------------------------------------  IN: 120 mL / OUT: 450 mL / NET: -330 mL    04 Dec 2019 07:01  -  04 Dec 2019 09:56  --------------------------------------------------------  IN: 0 mL / OUT: 600 mL / NET: -600 mL        Physical Exam:  Appearance: No acute distress; well appearing, lying flat in bed, aox3  Eyes: PERRL, EOMI, pink conjunctiva  HEENT: Normal oral mucosa  Cardiovascular: irregularly irregular; no edema; no JVD  Respiratory: bibasilar crackles  Gastrointestinal: soft, non-tender, non-distended with normal bowel sounds  Musculoskeletal: No clubbing; no joint deformity   Neurologic: Non-focal  Lymphatic: No lymphadenopathy  Psychiatry: AAOx3, mood & affect appropriate  Skin: No rashes, ecchymoses, or cyanosis                          11.7   4.77  )-----------( 76       ( 04 Dec 2019 07:48 )             35.8     12-03    139  |  103  |  39<H>  ----------------------------<  108<H>  4.8   |  22  |  1.73<H>    Ca    9.4      03 Dec 2019 13:34  Mg     2.2     12-03    TPro  7.5  /  Alb  4.2  /  TBili  0.8  /  DBili  0.3<H>  /  AST  34  /  ALT  28  /  AlkPhos  73  12-03    PT/INR - ( 03 Dec 2019 13:34 )   PT: 16.2 sec;   INR: 1.41 ratio         PTT - ( 03 Dec 2019 13:34 )  PTT:32.7 sec              New ECG(s): Personally reviewed    Echo: < from: Transthoracic Echocardiogram (11.27.19 @ 11:44) >  Conclusions:  1. Moderate mitral regurgitation.  2. Transcatheter aortic valve replacement. Peak transaortic  valve gradient equals 3 mm Hg, which is probably normal in  the presence of a transcatheter aortic valve replacement.  Mild aortic regurgitation.  3. Moderate to severely dilated left atrial enlargement.  4. Mild left ventricular enlargement.  5. Endocardium not well visualized. Grossly severe global  left ventricular systolic dysfunction with regional  variation. Consider use of echo contrast for better  estimation of left ventricular function if clinically  indicated.  Abnormal septal motion.  6. Increased E/e'  is consistent with elevated left  ventricular filling pressure.  7. Moderate right atrial enlargement.  8. Normal right ventricular size with decreased right  ventricular systolic function.  9. Moderate-severe tricuspid regurgitation.  10. Estimated pulmonary artery systolic pressure equals 49  mm Hg, assuming right atrial pressure equals 8 mm Hg,  consistent with mild pulmonary pressures.  *** Compared with echocardiogram of 10/15/2018, the left  ventricular function has significantly worsened.        Cath: 12/3/19 LHC and RHC: patent grafts, PCWP 21      Interpretation of Telemetry: afib  w/ PVC, couplets, bigeminy Overnight Events: No acute events overnight. Pt states that he has had increased urinary output after receiving lasix.            Current Meds:  aspirin enteric coated 81 milliGRAM(s) Oral daily  atorvastatin 20 milliGRAM(s) Oral at bedtime  cholecalciferol 2000 Unit(s) Oral daily  folic acid 1 milliGRAM(s) Oral daily  furosemide   Injectable 40 milliGRAM(s) IV Push two times a day  metoprolol succinate  milliGRAM(s) Oral daily  nicotine  Polacrilex Gum 2 milliGRAM(s) Oral every 3 hours PRN  sacubitril 49 mG/valsartan 51 mG 1 Tablet(s) Oral two times a day      PMH:  Unchanged      Review Of Systems:   CONSTITUTIONAL: no fevers or chills  EYES/ENT: No visual changes;  No vertigo or throat pain   NECK: No pain or stiffness  RESPIRATORY: No cough, wheezing. +SOB, dyspnea on exertion  CARDIOVASCULAR: No chest pain or palpitations  GASTROINTESTINAL: No abdominal or epigastric pain. No nausea, vomiting. No diarrhea. No melena.  GENITOURINARY: No dysuria, frequency or hematuria. Increased urinary output.  NEUROLOGICAL: No numbness or weakness  SKIN: No itching, burning, rashes, or lesions   All other review of systems is negative unless indicated above.       Vitals:  T(F): 97.8 (12-04), Max: 98 (12-03)  HR: 96 (12-04) (83 - 107)  BP: 113/74 (12-04) (113/74 - 139/87)  RR: 18 (12-04)  SpO2: 94% (12-04)    Physical Exam:  Appearance: No acute distress; well appearing, lying flat in bed, aox3  Eyes: PERRL, EOMI, pink conjunctiva  HEENT: Normal oral mucosa  Cardiovascular: irregularly irregular; no edema; no JVD  Respiratory: bibasilar crackles  Gastrointestinal: soft, non-tender, non-distended with normal bowel sounds  Musculoskeletal: No clubbing; no joint deformity   Neurologic: Non-focal  Lymphatic: No lymphadenopathy  Psychiatry: AAOx3, mood & affect appropriate  Skin: No rashes, ecchymoses, or cyanosis      Interpretation of Telemetry: afib  w/ PVC, couplets, bigeminy    I&O's Summary  03 Dec 2019 07:01  -  04 Dec 2019 07:00  --------------------------------------------------------  IN: 120 mL / OUT: 450 mL / NET: -330 mL    04 Dec 2019 07:01  -  04 Dec 2019 09:56  --------------------------------------------------------  IN: 0 mL / OUT: 600 mL / NET: -600 mL      LABS:             11.7   4.77  )-----------( 76       ( 04 Dec 2019 07:48 )             35.8     12-03  139  |  103  |  39<H>  ----------------------------<  108<H>  4.8   |  22  |  1.73<H>    Ca    9.4      03 Dec 2019 13:34  Mg     2.2     12-03    TPro  7.5  /  Alb  4.2  /  TBili  0.8  /  DBili  0.3<H>  /  AST  34  /  ALT  28  /  AlkPhos  73  12-03    PT/INR - ( 03 Dec 2019 13:34 )   PT: 16.2 sec;   INR: 1.41 ratio    PTT - ( 03 Dec 2019 13:34 )  PTT:32.7 sec      Transthoracic Echocardiogram (11.27.19 @ 11:44) >  Conclusions:  1. Moderate mitral regurgitation.  2. Transcatheter aortic valve replacement. Peak transaortic valve gradient equals 3 mm Hg, which is probably normal in the presence of a transcatheter aortic valve replacement. Mild aortic regurgitation.  3. Moderate to severely dilated left atrial enlargement.  4. Mild left ventricular enlargement.  5. Endocardium not well visualized. Grossly severe global left ventricular systolic dysfunction with regional variation. Consider use of echo contrast for better estimation of left ventricular function if clinically indicated.  Abnormal septal motion.  6. Increased E/e'  is consistent with elevated left ventricular filling pressure.  7. Moderate right atrial enlargement.  8. Normal right ventricular size with decreased right ventricular systolic function.  9. Moderate-severe tricuspid regurgitation.  10. Estimated pulmonary artery systolic pressure equals 49 mm Hg, assuming right atrial pressure equals 8 mm Hg, consistent with mild pulmonary pressures.   *** Compared  with echocardiogram of 10/15/2018, the left ventricular function has significantly worsened.      Cath: 12/3/19 LHC and RHC: preliminary report -- patent grafts, PCWP 21

## 2019-12-04 NOTE — PROGRESS NOTE ADULT - ASSESSMENT
79M w/ h/o htn, hld, CAD s/p CABG x3 vessels (2008), severe aortic stenosis s/p TAVR 2016, afib on coumadin (last dose 1/28), prostate CA s/p prostatectomy, chronic cough p/w LE swelling for several months and dyspnea on exertion, denies chest discomfort. Echo demonstrates new onset HF w/ EF 20-25%, cath shows patent grafts with PCWP 21. Pt also has pancytopenia, pending bone marrow biopsy. Cause of reduced EF most likely nonischemic.     Recommendations:    #1. HFrEF  -low EF most likely nonischemic given patent grafts on cath  -c/w lasix 40 IV BID, pt has no JVD or LE edema but has bibasilar crackles  -c/w entresto  -c/w metoprolol  -please f/u TSH, HIV, JESSICA, iron panel, SPEP    #2. CAD s/p CABG   -c/w ASA  -c/w statin    #3. pancytopenia  -bone marrow biopsy  -management as per primary and hem onc team    Brodie Greenfield M.D.  Cardiology Resident    Patient seen and examined at bedside. Note is preliminary until signed by attending. 79M w/ h/o htn, hld, CAD s/p CABG x3 vessels (2008), severe aortic stenosis s/p TAVR 2016, afib on coumadin (last dose 1/28), prostate CA s/p prostatectomy, chronic cough p/w LE swelling for several months and dyspnea on exertion, denies chest discomfort. Echo demonstrates new onset HF w/ EF 20-25%, cath shows patent grafts with PCWP 21. Pt also has pancytopenia, pending bone marrow biopsy. Cause of reduced EF most likely nonischemic.     Recommendations:    #1. HFrEF  -low EF most likely nonischemic given patent grafts on cath  -c/w lasix 40 IV BID, pt has no JVD or LE edema but has bibasilar crackles. Check daily Is/Os/weights.  -c/w entresto  -c/w metoprolol  -please send off TSH, HIV, JESSICA, iron studies/ferritin, SPEP    #2. CAD s/p CABG   -c/w ASA  -c/w statin    #3. pancytopenia  -bone marrow biopsy  -management as per primary and hem onc team    Brodie Greenfield M.D.  Cardiology Resident    Patient seen and examined at bedside. Note is preliminary until signed by attending. 79M w/ h/o HTN, HLD, CAD s/p CABG x3 vessels (2008), severe aortic stenosis s/p TAVR 2016, afib on coumadin (last dose 1/28).  Reports chronic cough p/w LE swelling for several months and dyspnea on exertion; seems consistent with new onset CHF.  Echo demonstrates interval decrease of EF 20-25%, cath shows patent grafts with PCWP 21. Cause of reduced EF appears to be non-ischemic as graft patent on cardiac cath.  Pt also has pancytopenia, pending bone marrow biopsy.       Recommendations:  #1. HFrEF  -low EF appears non-ischemic, given patent grafts on cath  -c/w Lasix 40 IV BID, pt has no JVD or LE edema, but has bibasilar crackles.  -Check daily Is/Os/weights.  -c/w Entresto  -c/w metoprolol  -please send off TSH, HIV, JESSICA, iron studies/ferritin, SPEP to assess for non-ischemic causes of CHF.    #2. CAD s/p CABG   -c/w ASA  -c/w statin    #3. pancytopenia  -bone marrow biopsy  -management as per primary and hem onc team      Brodie Greenfield M.D.  Cardiology Resident    Wilmer Leos M.D.  Cardiology Attending, Consult Service  [phone] or beeper     All Cardiology service information can be found 24/7 on [email or web address], password: [email or web address]

## 2019-12-04 NOTE — PROGRESS NOTE ADULT - ATTENDING COMMENTS
Pt seen after BM biopsy. R iliac crest site C/D/I. Tolerated procedure. Understands will have follow up at [facility] set up upon discharge to follow up on results. Currently, platelets low but stable after cardiac procedure. Leucopenia improved without intervention. Await B12/ folate studies.

## 2019-12-04 NOTE — PROGRESS NOTE ADULT - NSHPATTENDINGPLANDISCUSS_GEN_ALL_CORE
cardiology resident and fellow; patient seen and examined.  Hx., exam and labs as above.  I agree with the assessment and recommendations.

## 2019-12-04 NOTE — PROGRESS NOTE ADULT - SUBJECTIVE AND OBJECTIVE BOX
Hematology/Oncology Follow-up    INTERVAL HPI/OVERNIGHT EVENTS:  No acute overnight event.  Feels better. No chest pain. Walking.     VITAL SIGNS:  T(F): 97.8 (12-04-19 @ 05:20)  HR: 96 (12-04-19 @ 05:20)  BP: 113/74 (12-04-19 @ 05:20)  RR: 18 (12-04-19 @ 05:20)  SpO2: 94% (12-04-19 @ 05:20)  Wt(kg): --    12-03-19 @ 07:01  -  12-04-19 @ 07:00  --------------------------------------------------------  IN: 120 mL / OUT: 450 mL / NET: -330 mL    12-04-19 @ 07:01  -  12-04-19 @ 11:28  --------------------------------------------------------  IN: 0 mL / OUT: 600 mL / NET: -600 mL        PHYSICAL EXAM:  Constitutional: NAD   Eyes: sclera non-icteric, conjunctiva non-anemia  Neck: supple, no palpable LNs  Mouth: No mucositis, no exudate, no ulcer.   Respiratory: CTA b/l  Cardiovascular: Normal rate regular rhythm. no murmur  Gastrointestinal: soft and flat, no tenderness to palpation, no masses palpable, normoactive bowel sound  Extremities:  No c/c/e  MSK: No joint swelling, erythema, or tenderness   Neurological: AOx3, Cranial nerves II-XII grossly intact  Skin: No rash  Psych: Normal affect    MEDICATIONS  (STANDING):  aspirin enteric coated 81 milliGRAM(s) Oral daily  atorvastatin 20 milliGRAM(s) Oral at bedtime  budesonide 160 MICROgram(s)/formoterol 4.5 MICROgram(s) Inhaler 2 Puff(s) Inhalation two times a day  cholecalciferol 2000 Unit(s) Oral daily  folic acid 1 milliGRAM(s) Oral daily  furosemide   Injectable 40 milliGRAM(s) IV Push two times a day  metoprolol succinate  milliGRAM(s) Oral daily  sacubitril 49 mG/valsartan 51 mG 1 Tablet(s) Oral two times a day  warfarin 2.5 milliGRAM(s) Oral once    MEDICATIONS  (PRN):  ALBUTerol    90 MICROgram(s) HFA Inhaler 2 Puff(s) Inhalation every 6 hours PRN Wheezing  nicotine  Polacrilex Gum 2 milliGRAM(s) Oral every 3 hours PRN nicotine craving      No Known Allergies      LABS:                        11.7   4.77  )-----------( 76       ( 04 Dec 2019 07:48 )             35.8     12-03    139  |  103  |  39<H>  ----------------------------<  108<H>  4.8   |  22  |  1.73<H>    Ca    9.4      03 Dec 2019 13:34  Mg     2.2     12-03    TPro  7.5  /  Alb  4.2  /  TBili  0.8  /  DBili  0.3<H>  /  AST  34  /  ALT  28  /  AlkPhos  73  12-03    PT/INR - ( 03 Dec 2019 13:34 )   PT: 16.2 sec;   INR: 1.41 ratio         PTT - ( 03 Dec 2019 13:34 )  PTT:32.7 sec Lactate Dehydrogenase, Serum: 153 U/L (12-04 @ 06:13)  Haptoglobin, Serum: 23 mg/dL (12-03 @ 22:20)  Lactate Dehydrogenase, Serum: 298 U/L (12-03 @ 13:34)        RADIOLOGY & ADDITIONAL TESTS:  Studies reviewed.

## 2019-12-04 NOTE — PROCEDURE NOTE - ADDITIONAL PROCEDURE DETAILS
Bone marrow aspiration and biopsy procedure description, risks, and benefits were discussed in detail with the patient.  All questions were answered.  Informed consent was obtained and time-out performed.      The area of the right posterior iliac crest was prepped and draped using sterile technique. Local anesthetic with  2% Lidocaine.    Bone marrow aspiration and biopsy  was performed using sterile technique  by Genny Hawkins MD under the supervision of Gume Chu MD. Specimens were obtained.    The procedure was well tolerated and no local bleeding or other complications were observed.  Pressure was applied to the procedure site and a wound dressing was placed.  The patient and nursing staff were advised that the patient is to lie flat for 30 minutes post procedure. Tylenol may be used if no contraindications for pain at the procedure site.

## 2019-12-05 NOTE — DISCHARGE NOTE PROVIDER - NSDCPNSUBOBJ_GEN_ALL_CORE
[facility] Division of Hospital Medicine    Giuseppe Diaz MD    Pager  [phone]        Patient is a 79y old  Male who presents with a chief complaint of SOB (05 Dec 2019 14:24)            SUBJECTIVE / OVERNIGHT EVENTS:  no events overnight.    ADDITIONAL REVIEW OF SYSTEMS:  no chest pain or SOB        MEDICATIONS  (STANDING):    aspirin enteric coated 81 milliGRAM(s) Oral daily    atorvastatin 20 milliGRAM(s) Oral at bedtime    budesonide 160 MICROgram(s)/formoterol 4.5 MICROgram(s) Inhaler 2 Puff(s) Inhalation two times a day    cholecalciferol 2000 Unit(s) Oral daily    folic acid 1 milliGRAM(s) Oral daily    metoprolol succinate  milliGRAM(s) Oral daily    sacubitril 49 mG/valsartan 51 mG 1 Tablet(s) Oral two times a day        MEDICATIONS  (PRN):    ALBUTerol    90 MICROgram(s) HFA Inhaler 2 Puff(s) Inhalation every 6 hours PRN Wheezing    nicotine  Polacrilex Gum 2 milliGRAM(s) Oral every 3 hours PRN nicotine craving            CAPILLARY BLOOD GLUCOSE                I&O's Summary        04 Dec 2019 07:01  -  05 Dec 2019 07:00    --------------------------------------------------------    IN: 480 mL / OUT: 953 mL / NET: -473 mL                PHYSICAL EXAM:    Vital Signs Last 24 Hrs    T(C): 36.6 (05 Dec 2019 12:30), Max: 36.7 (04 Dec 2019 20:53)    T(F): 97.9 (05 Dec 2019 12:30), Max: 98 (04 Dec 2019 20:53)    HR: 83 (05 Dec 2019 12:30) (83 - 99)    BP: 119/74 (05 Dec 2019 12:30) (104/70 - 119/74)    BP(mean): 0 (05 Dec 2019 12:30) (0 - 0)    RR: 20 (05 Dec 2019 12:30) (18 - 20)    SpO2: 95% (05 Dec 2019 12:30) (93% - 95%)    CONSTITUTIONAL: NAD, well-developed, well-groomed    EYES: PERRLA; conjunctiva and sclera clear    ENMT: Moist oral mucosa, no pharyngeal injection or exudates; normal dentition    NECK: Supple, no palpable masses; no thyromegaly    RESPIRATORY: Normal respiratory effort; lungs are clear to auscultation bilaterally    CARDIOVASCULAR: Regular rate and rhythm, normal S1 and S2, no murmur/rub/gallop; No lower extremity edema; Peripheral pulses are 2+ bilaterally    ABDOMEN: Nontender to palpation, normoactive bowel sounds, no rebound/guarding; No hepatosplenomegaly    MUSCLOSKELETAL:  Normal gait; no clubbing or cyanosis of digits; no joint swelling or tenderness to palpation    PSYCH: A+O to person, place, and time; affect appropriate    NEUROLOGY: CN 2-12 are intact and symmetric; no gross sensory deficits;     SKIN: No rashes; no palpable lesions        LABS:                            11.5     4.85  )-----------( 81       ( 05 Dec 2019 07:40 )               35.3         12-05        140  |  101  |  36<H>    ----------------------------<  112<H>    4.2   |  24  |  1.68<H>        Ca    9.1      05 Dec 2019 05:19    Mg     2.1     12-05        TPro  6.4  /  Alb  x   /  TBili  x   /  DBili  x   /  AST  x   /  ALT  x   /  AlkPhos  x   12-04        PT/INR - ( 05 Dec 2019 07:04 )   PT: 15.6 sec;   INR: 1.36 ratio                                       RADIOLOGY & ADDITIONAL TESTS:    Results Reviewed:     Imaging Personally Reviewed:    Electrocardiogram Personally Reviewed:        COORDINATION OF CARE:    Care Discussed with Consultants/Other Providers [Y/N]:    Prior or Outpatient Records Reviewed [Y/N]: [facility] Division of Hospital Medicine    Giuseppe Diaz MD    Pager  [phone]        Patient is a 79y old  Male who presents with a chief complaint of SOB (05 Dec 2019 14:24)            SUBJECTIVE / OVERNIGHT EVENTS:  no events overnight.    ADDITIONAL REVIEW OF SYSTEMS:  no chest pain or SOB        MEDICATIONS  (STANDING):    aspirin enteric coated 81 milliGRAM(s) Oral daily    atorvastatin 20 milliGRAM(s) Oral at bedtime    budesonide 160 MICROgram(s)/formoterol 4.5 MICROgram(s) Inhaler 2 Puff(s) Inhalation two times a day    cholecalciferol 2000 Unit(s) Oral daily    folic acid 1 milliGRAM(s) Oral daily    metoprolol succinate  milliGRAM(s) Oral daily    sacubitril 49 mG/valsartan 51 mG 1 Tablet(s) Oral two times a day        MEDICATIONS  (PRN):    ALBUTerol    90 MICROgram(s) HFA Inhaler 2 Puff(s) Inhalation every 6 hours PRN Wheezing    nicotine  Polacrilex Gum 2 milliGRAM(s) Oral every 3 hours PRN nicotine craving            CAPILLARY BLOOD GLUCOSE                I&O's Summary        04 Dec 2019 07:01  -  05 Dec 2019 07:00    --------------------------------------------------------    IN: 480 mL / OUT: 953 mL / NET: -473 mL                PHYSICAL EXAM:    Vital Signs Last 24 Hrs    T(C): 36.6 (05 Dec 2019 12:30), Max: 36.7 (04 Dec 2019 20:53)    T(F): 97.9 (05 Dec 2019 12:30), Max: 98 (04 Dec 2019 20:53)    HR: 83 (05 Dec 2019 12:30) (83 - 99)    BP: 119/74 (05 Dec 2019 12:30) (104/70 - 119/74)    BP(mean): 0 (05 Dec 2019 12:30) (0 - 0)    RR: 20 (05 Dec 2019 12:30) (18 - 20)    SpO2: 95% (05 Dec 2019 12:30) (93% - 95%)            CONSTITUTIONAL: NAD, well-developed    EYES conjunctiva and sclera clear    ENMT: Moist oral mucosa, no pharyngeal injection or exudates    NECK: Supple    RESPIRATORY: wheezing bilateral     CARDIOVASCULAR: irregular rhythm and rate controlled normal S1 and S2, no murmur    ABDOMEN: Nontender to palpation, normoactive bowel sounds    MUSCLOSKELETAL:  Normal gait    PSYCH: A+O to person, place, and time; affect appropriate    NEUROLOGY: CN 2-12 are intact and symmetric; no gross sensory deficits;     SKIN: No rashes; no palpable lesions        LABS:                            11.5     4.85  )-----------( 81       ( 05 Dec 2019 07:40 )               35.3         12-05        140  |  101  |  36<H>    ----------------------------<  112<H>    4.2   |  24  |  1.68<H>        Ca    9.1      05 Dec 2019 05:19    Mg     2.1     12-05        TPro  6.4  /  Alb  x   /  TBili  x   /  DBili  x   /  AST  x   /  ALT  x   /  AlkPhos  x   12-04        PT/INR - ( 05 Dec 2019 07:04 )   PT: 15.6 sec;   INR: 1.36 ratio                                       RADIOLOGY & ADDITIONAL TESTS:    Results Reviewed:     Imaging Personally Reviewed:    Electrocardiogram Personally Reviewed:        COORDINATION OF CARE:    Care Discussed with Consultants/Other Providers [Y/N]:    Prior or Outpatient Records Reviewed [Y/N]:            79M PMH CAD s/p CABG (3V 2008), afib on coumadin, HTN, HLD, daily cigar smoker, prostate ca s/p prostatectomy/RT, chronic cough p/w GAN and LE edema, admitted with new acute systolic heart failures and pancytopenia.          Problem/Plan - 1:    ·  Problem: Acute systolic heart failure.  Plan: Now reduced EF of 20-25%    Cardiac cath s/p right and left cardiac angiogram today via RFV/A    cath findings: grafts are patent, elevated PCWP= 21    -diuresed with IV lasix now much improved.  Continue Lasix 40 mg PO QD     -Toprol     -Entresto BID.     -Stop Ramipril     -Lasix left off of discharge summary medications called patient and spoke to him over the phone          Problem/Plan - 2:    ·  Problem: Pancytopenia.  Plan: Etiology not clear, however several cell lines are involved.      2nd workup ordered.    -Bone marrow biopsy completed.     -patient to follow up outpatient with [facility]          Problem/Plan - 3:    ·  Problem: Atrial fibrillation, unspecified type.  Plan: Rate controlled    Coumadin restarted    Continue Toprol .     -Home INR checks with results to dr. Quiroz          Problem/Plan - 4:    ·  Problem: MERCED (acute kidney injury).- Stable for now. Discussed with Richie who will monitor and refer to nephrology if needed.           Problem/Plan - 5:    ·  Problem: COPD (chronic obstructive pulmonary disease).  Plan: continue home regimen         Patient seen and examined.  Agree with discharge diagnoses, as well as with plan of care and goals.    Time spent: 43 minutes for evaluation, plan of care, patient education, medication reconciliation, coordination of care, follow ups and transition to outpatient.          Plan discussed with Dr. Quiroz and son/patient at bedside.

## 2019-12-05 NOTE — CHART NOTE - NSCHARTNOTEFT_GEN_A_CORE
Lasix prescription sent to pharmacy after pt was discharged.   Dr. Diaz called pt to inform to take lasix 40 mg daily    Danielle Coleman NP-C  #[id number]

## 2019-12-05 NOTE — DISCHARGE NOTE PROVIDER - NSDCCPCAREPLAN_GEN_ALL_CORE_FT
PRINCIPAL DISCHARGE DIAGNOSIS  Diagnosis: Acute systolic heart failure  Assessment and Plan of Treatment: Please follow up with Dr. Quiroz in one week  SPEP and JESSICA results still pending   Weigh yourself daily.  If you gain 3lbs in 3 days, or 5lbs in a week call your Health Care Provider.  Do not eat or drink foods containing more than 2000mg of salt (sodium) in your diet every day.  Call your Health Care Provider if you have any swelling or increased swelling in your feet, ankles, and/or stomach.  The Pt was provided with CHF diet instruction (low sodium diet, daily weights, label reading, Heart Healthy Cooking Tips & Heart Healthy shopping Tips).  Take all of your medication as directed.  If you become dizzy call your Health Care Provider.      SECONDARY DISCHARGE DIAGNOSES  Diagnosis: Pancytopenia  Assessment and Plan of Treatment: s/p bone marrow biopsy  Please follow up with hematologist at [facility]    Diagnosis: CAD (coronary artery disease)  Assessment and Plan of Treatment: Continue Aspirin and statin as directed    Diagnosis: Atrial fibrillation, unspecified type  Assessment and Plan of Treatment: Continue Coumadin and Metoprolol as directed    Diagnosis: MERCED (acute kidney injury)  Assessment and Plan of Treatment: Cr 1.68 stable   Close monitoring of renal function    Please follow up with your primray care physician in one week    Diagnosis: COPD (chronic obstructive pulmonary disease)  Assessment and Plan of Treatment: Continue inhaler as directed   Please follow up with your pulmonologist

## 2019-12-05 NOTE — DISCHARGE NOTE PROVIDER - CARE PROVIDERS DIRECT ADDRESSES
[email or web address]

## 2019-12-05 NOTE — DISCHARGE NOTE NURSING/CASE MANAGEMENT/SOCIAL WORK - PATIENT PORTAL LINK FT
You can access the FollowMyHealth Patient Portal offered by [facility] by registering at the following website: [email or web address]. By joining [employer]’s FollowMyHealth portal, you will also be able to view your health information using other applications (apps) compatible with our system.

## 2019-12-05 NOTE — DISCHARGE NOTE PROVIDER - CARE PROVIDER_API CALL
Ramón Quiroz)  Cardiovascular Disease; Interventional Cardiology  [address] [place] [address]  Phone: [phone]  Fax: [phone]  Follow Up Time: 1 week    Evin Roque)  Hematology; Internal Medicine; Medical Oncology  [address] [place] [address]  Phone: [phone]  Fax: [phone]  Follow Up Time:

## 2019-12-05 NOTE — PROGRESS NOTE ADULT - SUBJECTIVE AND OBJECTIVE BOX
Overnight Events: No acute events overnight. Bone marrow biopsy performed yesterday. Pt states that he feels significantly improved today, denies SOB and decreased exercise tolerance. Has had good urine output on diuretics.           Review Of Systems:   CONSTITUTIONAL: no fevers or chills  EYES/ENT: No visual changes;  No vertigo or throat pain   NECK: No pain or stiffness  RESPIRATORY: No cough, wheezing. No shortness of breath  CARDIOVASCULAR: No chest pain or palpitations  GASTROINTESTINAL: No abdominal or epigastric pain. No nausea, vomiting. No diarrhea. No melena.  GENITOURINARY: No dysuria, frequency or hematuria  NEUROLOGICAL: No numbness or weakness  SKIN: No itching, burning, rashes, or lesions   All other review of systems is negative unless indicated above.     Current Meds:  ALBUTerol    90 MICROgram(s) HFA Inhaler 2 Puff(s) Inhalation every 6 hours PRN  aspirin enteric coated 81 milliGRAM(s) Oral daily  atorvastatin 20 milliGRAM(s) Oral at bedtime  budesonide 160 MICROgram(s)/formoterol 4.5 MICROgram(s) Inhaler 2 Puff(s) Inhalation two times a day  cholecalciferol 2000 Unit(s) Oral daily  folic acid 1 milliGRAM(s) Oral daily  furosemide   Injectable 40 milliGRAM(s) IV Push two times a day  metoprolol succinate  milliGRAM(s) Oral daily  nicotine  Polacrilex Gum 2 milliGRAM(s) Oral every 3 hours PRN  sacubitril 49 mG/valsartan 51 mG 1 Tablet(s) Oral two times a day      Vitals:  T(F): 97.6 (12-05), Max: 98.1 (12-04)  HR: 95 (12-05) (88 - 99)  BP: 111/79 (12-05) (103/70 - 118/84)  RR: 18 (12-05)  SpO2: 93% (12-05)  I&O's Summary    04 Dec 2019 07:01  -  05 Dec 2019 07:00  --------------------------------------------------------  IN: 480 mL / OUT: 953 mL / NET: -473 mL        Physical Exam:  Appearance: No acute distress; well appearing, lying in bed  Eyes: PERRL, EOMI, pink conjunctiva  HEENT: Normal oral mucosa  Cardiovascular: irregularly irregular; no JVD  Respiratory: mild bibasilar crackles  Gastrointestinal: soft, non-tender, non-distended with normal bowel sounds  Musculoskeletal: No clubbing; no joint deformity   Extremities: no cyanosis, clubbing, or edema in LE b/l. Warm, well perfused, pulses intact.   Neurologic: Non-focal  Lymphatic: No lymphadenopathy  Psychiatry: AAOx3, mood & affect appropriate  Skin: No rashes, ecchymoses, or cyanosis                          11.5   4.85  )-----------( 81       ( 05 Dec 2019 07:40 )             35.3     12-05    140  |  101  |  36<H>  ----------------------------<  112<H>  4.2   |  24  |  1.68<H>    Ca    9.1      05 Dec 2019 05:19  Mg     2.1     12-05    TPro  7.5  /  Alb  4.2  /  TBili  0.8  /  DBili  0.3<H>  /  AST  34  /  ALT  28  /  AlkPhos  73  12-03    PT/INR - ( 05 Dec 2019 07:04 )   PT: 15.6 sec;   INR: 1.36 ratio         PTT - ( 03 Dec 2019 13:34 )  PTT:32.7 sec              New ECG(s): Personally reviewed      Interpretation of Telemetry: afib , PVCs and couplets No acute events overnight.   Bone marrow biopsy performed yesterday.   Pt states that he feels significantly improved today, denies SOB and decreased exercise tolerance. Has had good urine output on diuretics.             Review Of Systems:   CONSTITUTIONAL: no fevers or chills  EYES/ENT: No visual changes;  No vertigo or throat pain   NECK: No pain or stiffness  RESPIRATORY: No cough, wheezing. No shortness of breath  CARDIOVASCULAR: No chest pain or palpitations  GASTROINTESTINAL: No abdominal or epigastric pain. No nausea, vomiting. No diarrhea. No melena.  GENITOURINARY: No dysuria, frequency or hematuria  NEUROLOGICAL: No numbness or weakness  SKIN: No itching, burning, rashes, or lesions   All other review of systems is negative unless indicated above.       Current Meds:  ALBUTerol    90 MICROgram(s) HFA Inhaler 2 Puff(s) Inhalation every 6 hours PRN  aspirin enteric coated 81 milliGRAM(s) Oral daily  atorvastatin 20 milliGRAM(s) Oral at bedtime  budesonide 160 MICROgram(s)/formoterol 4.5 MICROgram(s) Inhaler 2 Puff(s) Inhalation two times a day  cholecalciferol 2000 Unit(s) Oral daily  folic acid 1 milliGRAM(s) Oral daily  furosemide   Injectable 40 milliGRAM(s) IV Push two times a day  metoprolol succinate  milliGRAM(s) Oral daily  nicotine  Polacrilex Gum 2 milliGRAM(s) Oral every 3 hours PRN  sacubitril 49 mG/valsartan 51 mG 1 Tablet(s) Oral two times a day      Vitals:  T(F): 97.6 (12-05), Max: 98.1 (12-04)  HR: 95 (12-05) (88 - 99)  BP: 111/79 (12-05) (103/70 - 118/84)  RR: 18 (12-05)  SpO2: 93% (12-05)    Physical Exam:  Appearance: No acute distress; well appearing, lying in bed  Eyes: PERRL, EOMI, pink conjunctiva  HEENT: Normal oral mucosa  Cardiovascular: irregularly irregular; no JVD  Respiratory: mild bibasilar crackles  Gastrointestinal: soft, non-tender, non-distended with normal bowel sounds  Musculoskeletal: No clubbing; no joint deformity   Extremities: no cyanosis, clubbing, or edema in LE b/l. Warm, well perfused, pulses intact.   Neurologic: Non-focal  Lymphatic: No lymphadenopathy  Psychiatry: AAOx3, mood & affect appropriate  Skin: No rashes, ecchymoses, or cyanosis      Interpretation of Telemetry:  A. fib , occasional PVCs, rare couplets      LABS:             11.5   4.85  )-----------( 81       ( 05 Dec 2019 07:40 )             35.3     12-05  140  |  101  |  36<H>  ----------------------------<  112<H>  4.2   |  24  |  1.68<H>    Ca    9.1      05 Dec 2019 05:19  Mg     2.1     12-05    TPro  7.5  /  Alb  4.2  /  TBili  0.8  /  DBili  0.3<H>  /  AST  34  /  ALT  28  /  AlkPhos  73  12-03    PT/INR - ( 05 Dec 2019 07:04 )   PT: 15.6 sec;   INR: 1.36 ratio      Thyroid Stimulating Hormone, Serum: 1.22 uIU/mL (12.04.19 @ 17:27)    HIV-1/2 Combo Result: Nonreact: The HIV Ag/Ab Combo test performed screens for HIV-1 p24 antigen,  antibodies to HIV-1 (group M and group O), and antibodies to HIV-2. All  specimens repeatedly reactive will reflex to an HIV 1/2 antibody  confirmation and differentiation test. This assay detects p24 antigen  which may be present prior to the development of HIV antibodies,  therefore a reactive result with a negative HIV 1/2 AB Confirmation  should be followed up with HIV-1 RNA, HIV-2 RNA and repeat testing in 4-8  weeks. A nonreactive result does not preclude previous exposure to or  infection with HIV-1 or HIV-2. [facility] prohibits disclosure of this  result to any unauthorized party. (12.04.19 @ 16:44)      Ferritin, Serum (12.04.19 @ 08:40)    Ferritin, Serum: 69 ng/mL

## 2019-12-05 NOTE — DISCHARGE NOTE PROVIDER - HOSPITAL COURSE
79M w/ h/o HTN, HLD, CAD s/p CABG x3 vessels (2008), severe aortic stenosis s/p TAVR 2016, afib on coumadin (last dose 1/28).  Chronic cough and LE swelling for several months with dyspnea on exertion; c/w new onset CHF.  Echo demonstrates interval decrease of EF 20-25%, cath shows patent grafts with PCWP 21. Non-ischemic workup for reduced EF negative so far; SPEP and JESSICA pending. Pt has pancytopenia w/ suspicion for MDS, s/p bone marrow biopsy on 12/4 by hematology.     As per cardiology, discharge on Lasix 40 mg daily, Entresto current dose and Metoprolol 100mg daily. 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 pt to follow up as outpatient at [facility]     Inez on CKD, renal function stable     COpD stable.     pt to follow up with PCP, cardiology, pulmonologist and hematology as outpatient    Med rec and DCP discussed with Dr. Diaz 79M w/ h/o HTN, HLD, CAD s/p CABG x3 vessels (2008), severe aortic stenosis s/p TAVR 2016, afib on coumadin (last dose 1/28).  Chronic cough and LE swelling for several months with dyspnea on exertion; c/w new onset CHF.  Echo demonstrates interval decrease of EF 20-25%, cath shows patent grafts with PCWP 21. Non-ischemic workup for reduced EF negative so far; SPEP and JESSICA pending. Pt has pancytopenia w/ suspicion for MDS, s/p bone marrow biopsy on 12/4 by hematology.     As per cardiology, discharge on Lasix 40 mg daily, Entresto current dose and Metoprolol 100mg daily. 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 pt to follow up as outpatient at [facility]     Inez on CKD, renal function stable     COpD stable.     pt to follow up with PCP, cardiology, pulmonologist and hematology as outpatient    Med rec and DCP discussed with Dr. Diaz     Lasix prescription sent to pharmacy after pt discharged. Dr. Diaz called pt to inform pt to take lasix 40 mg daily

## 2019-12-05 NOTE — DISCHARGE NOTE PROVIDER - NSDCMRMEDTOKEN_GEN_ALL_CORE_FT
albuterol 90 mcg/inh inhalation aerosol: 2 puff(s) inhaled every 6 hours, As needed, Wheezing  atorvastatin 20 mg oral tablet: 1 tab(s) orally once a day (at bedtime)  Coumadin 2.5 mg oral tablet: 1 tab(s) orally once a day  Ecotrin Adult Low Strength 81 mg oral delayed release tablet: 1 tab(s) orally once a day  folic acid 1 mg oral tablet: 1 tab(s) orally once a day  nicotine 2 mg oral transmucosal gum: 1 gum chewed every 3 hours, As Needed   sacubitril-valsartan 49 mg-51 mg oral tablet: 1 tab(s) orally 2 times a day  Toprol- mg oral tablet, extended release: 1 tab(s) orally once a day  Trelegy Ellipta inhalation powder: 1 puff(s) inhaled once a day, As Needed    Vitamin D3 2000 intl units oral capsule: 1 cap(s) orally once a day albuterol 90 mcg/inh inhalation aerosol: 2 puff(s) inhaled every 6 hours, As needed, Wheezing  atorvastatin 20 mg oral tablet: 1 tab(s) orally once a day (at bedtime)  Coumadin 2.5 mg oral tablet: 1 tab(s) orally once a day  Ecotrin Adult Low Strength 81 mg oral delayed release tablet: 1 tab(s) orally once a day  folic acid 1 mg oral tablet: 1 tab(s) orally once a day  Lasix 40 mg oral tablet: 1 tab(s) orally once a day   nicotine 2 mg oral transmucosal gum: 1 gum chewed every 3 hours, As Needed   sacubitril-valsartan 49 mg-51 mg oral tablet: 1 tab(s) orally 2 times a day  Toprol- mg oral tablet, extended release: 1 tab(s) orally once a day  Trelegy Ellipta inhalation powder: 1 puff(s) inhaled once a day, As Needed    Vitamin D3 2000 intl units oral capsule: 1 cap(s) orally once a day

## 2019-12-05 NOTE — PROGRESS NOTE ADULT - ASSESSMENT
79M w/ h/o HTN, HLD, CAD s/p CABG x3 vessels (2008), severe aortic stenosis s/p TAVR 2016, afib on coumadin (last dose 1/28).  Reports chronic cough p/w LE swelling for several months and dyspnea on exertion; seems consistent with new onset CHF.  Echo demonstrates interval decrease of EF 20-25%, cath shows patent grafts with PCWP 21. Nonischemic workup for reduced EF still pending. Pt has pancytopenia w/ suspicion for MDS, s/p bone marrow biopsy.     Recommendations:  #1. HFrEF  -low EF appears non-ischemic, given patent grafts on cath; however, ferritin and TSH wnl, HIV nonreactive. Pending SPEP and JESSICA.   -c/w metoprolol  -from a cardiology standpoint, pt is cleared for discharge. Can go home with current home meds along with lasix 40 PO qhs and entresto at current dose (do not resume ramipril as an outpt). Can consider adding on aldactone as an outpatient. Discussed medications with Dr. Quiroz.     #2. CAD s/p CABG   -c/w ASA  -c/w statin    #3. Pancytopenia  -MDS is a possibility based on peripheral smear as per hem onc team  -bone marrow biopsy performed 12/5  -management as per primary and hem onc team      Brodie Greenfield M.D.  Cardiology Resident 79M w/ h/o HTN, HLD, CAD s/p CABG x3 vessels (2008), severe aortic stenosis s/p TAVR 2016, afib on coumadin (last dose 1/28).  Reports chronic cough p/w LE swelling for several months and dyspnea on exertion; seems consistent with new onset CHF.  Echo demonstrates interval decrease of EF 20-25%, cath shows patent grafts with PCWP 21. Nonischemic workup for reduced EF still pending. Pt has pancytopenia w/ suspicion for MDS, s/p bone marrow biopsy.     Recommendations:  #1. HFrEF  -low EF appears non-ischemic, given patent grafts on cath; however, ferritin and TSH wnl, HIV nonreactive. Pending SPEP and JESSICA.   -c/w metoprolol  daily  -from a cardiology standpoint, no further inpatient w/u necessary. Can go home with current home meds along with lasix 40 PO daily and entresto at current dose (do not resume ramipril as an outpt). Can consider adding on aldactone as an outpatient. Discussed medications with Dr. Quiroz.     #2. CAD s/p CABG   -c/w ASA  -c/w statin    #3. Pancytopenia  -MDS is a possibility based on peripheral smear as per hem onc team  -bone marrow biopsy performed 12/5  -f/u with hematology as outpatient  -management as per primary and hem onc team      Brodie Greenfield M.D.  Cardiology Resident 79M w/ h/o HTN, HLD, CAD s/p CABG x3 vessels (2008), severe aortic stenosis s/p TAVR 2016, afib on coumadin (last dose 1/28).    Chronic cough and LE swelling for several months with dyspnea on exertion; c/w new onset CHF.  Echo demonstrates interval decrease of EF 20-25%, cath shows patent grafts with PCWP 21.   Non-ischemic workup for reduced EF negative so far; SPEP and JESSICA pending.   Pt has pancytopenia w/ suspicion for MDS, s/p bone marrow biopsy.     Recommendations:  #1. HFrEF  -low EF appears non-ischemic, given patent grafts on cath.  Ferritin and TSH wnl, HIV nonreactive; SPEP and JESSICA pending.   -c/w metoprolol  daily  -from a cardiology standpoint, no further inpatient w/u necessary. Can go home with current home meds along with Lasix 40 PO daily and Entresto at current dose (do not resume ramipril as an outpt). Can consider adding on aldactone as an outpatient. Discussed medications with Dr. Quiroz.   -patient should f/u with Dr. Quiroz in 1-2 weeks.    #2. CAD s/p CABG   -c/w ASA  -c/w statin    #3. Pancytopenia  -MDS is a possibility based on peripheral smear as per hem onc team  -bone marrow biopsy performed 12/5  -f/u with hematology as outpatient  -management as per primary and hem onc team      Brodie Greenfield M.D.  Cardiology Resident    Wilmer Leos M.D.  Cardiology Attending, Consult Service  [phone] or beeper     All Cardiology service information can be found 24/7 on [email or web address], password: [email or web address]

## 2019-12-19 PROBLEM — D46.9 MYELODYSPLASTIC SYNDROME: Status: ACTIVE | Noted: 2019-01-01

## 2019-12-19 NOTE — ED ADULT NURSE NOTE - OBJECTIVE STATEMENT
Pt is a AAOx4 78yo male brought in by son to ED for abnormal lab results.  Per pt he was recently discharged from the hospital and had his follow up appointment with his PCP today; labs were drawn and potassium was elevated. Pt was told to come in to hospital by PCP for further evaluation. Pt presents afebrile and ambulatory. Pt denies dizziness, cp, palpitations, sob, n/v. Cardiac monitoring initiated, Afib on monitor.

## 2019-12-19 NOTE — ED PROVIDER NOTE - PROGRESS NOTE DETAILS
Attn - d/w with Nephrology for consult for worsening renal function.  recommend Bladder scan and straight cath for urine and Lokelma 15 Gm PO and not require dialysis at this time.  D/w cardiology and pt can be admitted to telem floor and they will reassess for possible [place] tomorrow.

## 2019-12-19 NOTE — ED PROVIDER NOTE - OBJECTIVE STATEMENT
79M w/ h/o HTN, HLD, CAD s/p CABG x3 vessels (2008), severe aortic stenosis s/p TAVR 2016, afib on coumadin presents due to abnormal out-patient labs. Patient recently admitted for CHF exacerbation earlier this month. On routine follow up labs, patient found to be very hyperkalemic as well as doubling of his baseline creatinine. (K 5.8, Cr 3.4 up from baseline of 1.6). Patient reports leg heaviness BL. Increased weakness since his discharge a few weeks ago. Denies fevers or chills, nausea or vomiting, headache, dizziness, lightheadedness, blurry vision, chest pain, cough, palpitations, shortness of breath, abdominal pain, diarrhea, dysuria, hematuria, flank pain. Chronic GAN. Endorses few months of BL leg swelling, with some mild improvement since discharge from hospital a few weeks ago. Meds, allergies per charting. PMD/Cards Dr Quiroz. Current smoker.

## 2019-12-19 NOTE — ED ADULT NURSE REASSESSMENT NOTE - NS ED NURSE REASSESS COMMENT FT1
1945- patient & family while in triage demanded that Dr. Quiroz must be called as it was told to them by PMD. Explained ED procedure but was insisting. Tried calling the service but office-no answer. Relayed to [facility]. Seen & labs done. Patient remains alert & oriented x3 & ambulatory.

## 2019-12-19 NOTE — ED PROVIDER NOTE - PMH
Atrial fibrillation, unspecified type  on coumadin  Bilateral carotid artery disease    COPD (chronic obstructive pulmonary disease)  cigar smoker for 29 years, quit 1/2019  Coronary artery disease of bypass graft of native heart with stable angina pectoris    Erectile dysfunction, unspecified erectile dysfunction type    Hypertension    Moderate tricuspid regurgitation    Osteoarthritis    Polio    Prostate cancer  Prostatectomy 2008, radiation 2011

## 2019-12-19 NOTE — ED PROVIDER NOTE - PHYSICAL EXAMINATION
General: awake, alert, oriented, no acute distress. Resting in bed.  HEENT: PERRLA EOMI. No trauma/bruising noted to head or face.   CV: irreg irreg rhythm, reg rate, S1/S2, no murmurs/rubs/gallops noted on exam. No tenderness to palpation to chest wall.  Lungs: Clear to ascultation bilaterally, mild exp wheezing, no crackles/rales noted on exam. Equal chest wall excursion noted.   Abdomen: Soft, non tender, non distended, no guarding or rebound. No CVA tenderness to palpation.   MSK: Intact ROM of upper and lower extremities bilaterally. No tenderness to palpation to extremities. Intact ROM of neck. No gross deformities noted to extremities.   Neuro: Awake, A+O x4, moving all extremities spontaneously. CN 2-12 grossly intact. No nystagmus noted. Strength and sensation grossly intact to all extremities. Speech fluent, no slurred speech.   Extremities: Pitting edema to BL lower ext to mid shin. No calf tenderness to palpation.   Skin: No rash or bruising noted on exam. General: awake, alert, oriented, no acute distress. Resting in bed.  HEENT: PERRLA EOMI. No trauma/bruising noted to head or face.   CV: irreg irreg rhythm, reg rate, S1/S2, no murmurs/rubs/gallops noted on exam. No tenderness to palpation to chest wall.  Lungs: Clear to ascultation bilaterally, mild exp wheezing, no crackles/rales noted on exam. Equal chest wall excursion noted.   Abdomen: Soft, non tender, non distended, no guarding or rebound. No CVA tenderness to palpation.   MSK: Intact ROM of upper and lower extremities bilaterally. No tenderness to palpation to extremities. Intact ROM of neck. No gross deformities noted to extremities.   Neuro: Awake, A+O x4, moving all extremities spontaneously. CN 2-12 grossly intact. No nystagmus noted. Strength and sensation grossly intact to all extremities. Speech fluent, no slurred speech.   Extremities: Pitting edema to BL lower ext to mid shin. No calf tenderness to palpation.   Skin: No rash or bruising noted on exam.      Attn - alert, nad, sl. pallor, no JVD, PERRL 2 mm, dry mm, lungs - expirat wheezing, but no basilar rales.  cor - irreg. abdo - soft, NT, ND, no CVAT, trace sacral edema.  Extrem - bilat 1+ pitting edema lower leg.  no c/t.

## 2019-12-19 NOTE — HISTORY OF PRESENT ILLNESS
[de-identified] : Low risk MDS\par This is a 78 y/o M with multiple medical problems including CAD/CABG, aortic stensis s/p TAVR, afib on coumadin, recently diagnosed with CHF, EF 20-25%, prostate cancer- blue 7 s/p prostatectomy with positive margins, perineural invasion followed by RT in 2008 slowly rising PSA who is here for a follow up for a newly diagnosed MDS.  He was admitted from 12/3-12/5 for CHF, underwent a bone marrow biopsy on 12/4- Normocellular marrow (30%) with erythroid predominant maturing trilineage hematopoiesis and mild dyserythropoiesis and dysgranulopoiesis although the biopsy is suboptimal with a very small core with aspiration artefact. Cytogenetics with normal karyotype, molecular studies are negative.  Biopsy was completed due to a history of macrocytic anemia since 2016- but macrocytosis since 2008 which has progressively increased.  He also has had a couple of bouts of leukopenia that have resolved and a new thrombocytopenia since October.  He does not require any blood/plt transfusions.  He has multiple complaints, mainly he feels a generalized LE weakness since the last few weeks, no back pain, no loss of bowel/bladder.  He is able to ambulate but just feel fatigued.  He states as well as his son he has had a couple of episodes of slurred speech that resolve on its own.  He has had some increased LE swelling, concerned about his diuretic dosage.  He denies any bleeding or bruising.  He has poor taste but is able to eat.  \par

## 2019-12-19 NOTE — ED PROVIDER NOTE - RAPID ASSESSMENT
79 year old M with pmhx HTN, COPD, Afib, CAD and pshsx of CABG x3, TAVR presents to ED for abnormal K level. Pt had routine blood work performed which resulted with abnormal K level. Outpt labs significant for K of 5.8 and creatinine of 3.4 compared to his baseline of 1.6. Per son, pt endorsing fatigue, heavy feeling legs. Denies CP, abd pain, nausea , vomiting , difficulty urinating.     **Pt seen in waiting room by Lydia Medina)., Documentation completed by Dalia Salazar. Pt to be sent to main ED for further evaluation - all orders placed to be followed by MD in the main ED** 79 year old M with pmhx HTN, COPD, Afib, CAD and pshsx of CABG x3, TAVR presents to ED for abnormal K level. Pt had routine blood work performed which resulted with abnormal K level. Outpt labs significant for K of 5.8 and creatinine of 3.4 compared to his baseline of 1.6. Per son, pt endorsing fatigue, heavy feeling legs. Denies CP, abd pain, nausea , vomiting , difficulty urinating.     **Pt seen in waiting room by Lydia Medina (PA)., Documentation completed by Dalia Salazar. Pt to be sent to main ED for further evaluation - all orders placed to be followed by MD in the main ED**        Attn - pt seen in [facility] - agree with above - pt was seen by his PMD/Cardiologist Dr. Ramón Quiroz today and labs drawn and pt had elevated K+ and worsening renal function.  Pt c/o generalized weakness and GAN.  He denies cp, sob at rest, worsening leg edema, fever, cough.  Pt has bilat leg pain and had been taking large doses of ibuprofen daily. pt states he is compliant with his meds.

## 2019-12-19 NOTE — ED ADULT NURSE NOTE - NSIMPLEMENTINTERV_GEN_ALL_ED
Implemented All Fall with Harm Risk Interventions:  [place] to call system. Call bell, personal items and telephone within reach. Instruct patient to call for assistance. Room bathroom lighting operational. Non-slip footwear when patient is off stretcher. Physically safe environment: no spills, clutter or unnecessary equipment. Stretcher in lowest position, wheels locked, appropriate side rails in place. Provide visual cue, wrist band, yellow gown, etc. Monitor gait and stability. Monitor for mental status changes and reorient to person, place, and time. Review medications for side effects contributing to fall risk. Reinforce activity limits and safety measures with patient and family. Provide visual clues: red socks.

## 2019-12-19 NOTE — PHYSICAL EXAM
[Capable of only limited self care, confined to bed or chair more than 50% of waking hours] : Status 3- Capable of only limited self care, confined to bed or chair more than 50% of waking hours [Normal] : affect appropriate [de-identified] : diffuse expiratory wheezing [de-identified] : irregular [de-identified] : +2 LE edema, warm [de-identified] : CN II-XII grossly intact, 5/5 strength in UE/LE b/l

## 2019-12-19 NOTE — REVIEW OF SYSTEMS
[Fatigue] : fatigue [Recent Change In Weight] : ~T recent weight change [Lower Ext Edema] : lower extremity edema [Shortness Of Breath] : shortness of breath [Cough] : cough [Wheezing] : wheezing [SOB on Exertion] : shortness of breath during exertion [Muscle Weakness] : muscle weakness [Negative] : Allergic/Immunologic [Chills] : no chills [Fever] : no fever [Night Sweats] : no night sweats [Palpitations] : no palpitations [Chest Pain] : no chest pain [Confused] : no confusion [Dizziness] : no dizziness [Fainting] : no fainting [FreeTextEntry4] : poor taste [de-identified] : 2

## 2019-12-19 NOTE — ED PROVIDER NOTE - NS ED ROS FT
Please see HPI section of chart for further detailed Review of Systems    chronic leg swelling  BL leg heaviness  weakness   chronic GAN

## 2019-12-19 NOTE — ED PROVIDER NOTE - PSH
S/P CABG x 3  9/17/2007 at [facility]  S/P hernia repair  inguinal, 1998  S/P prostatectomy  11/18/2008  S/P TAVR (transcatheter aortic valve replacement)  2016 in [facility]  S/P tonsillectomy  as a child

## 2019-12-19 NOTE — ASSESSMENT
[FreeTextEntry1] : This is a 78 y/o M with a complicated medical history including CAD/CABG/TAVR, afib on coumadin 2.5 mg a daily (checks himself at home), CKD, prostate cancer s/p prostatectomy followed by RT in 2008.  He has a history of a macrocytic anemia since 2006, macrocytosis since 2008.  He had been treated with B12/folic acid in the past. \par Bone marrow biopsy suggests mild dyserythropoiesis/dysgranulopoiesis though the sample was small, also with normal cytogenetics/molecular studies. 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  Risk low, rIPSS is 1.  \par As his counts are stable, not transfusion requiring, plt>100, ANC >1000, no intervention is required.  His anemia may be confounded by his CKD, stage III.  \par Would not repeat the bone marrow biopsy right now.  \par At his next visit, will check a methylmalonic acid/homocysteine level along with folic acid, TSH.  \par More concerned about his other medical conditions, namely the heart failure along with his neurologic complaints.  \par Recommend a MRI of his brain if ok (has implant/mesh)- son to find out.  Concerned if he is having thromboemboli on top of being on aspirin/coumadin- therapeutic.  He should follow up with neurology.  \par Repeat his PSA as well, he should follow up with urology. \par He will follow up in 3 months, earlier if his counts change or if he has any symptoms.

## 2019-12-20 NOTE — H&P ADULT - PROBLEM SELECTOR PLAN 2
-Hold Warfarin given supratherapeutic INR  -Daily coags  -If patient has minor bleed will give Vitamin K, if major bleed then KCentra  -Restart Warfarin 2.5 mg daily when feasible

## 2019-12-20 NOTE — CONSULT NOTE ADULT - ATTENDING COMMENTS
CKD III  MERCED, cardiorenal and medication toxicity  Hyperkalemia, mild  Mild edema, breathing comfortably on room air    Would hold diuretics, ACEi/ARB (including Entresto), give time for renal recovery. Probably restart diuretics as oral in coming days    No further need for hyperkalemia management    Will follow, remainder per fellow

## 2019-12-20 NOTE — ED ADULT NURSE REASSESSMENT NOTE - NS ED NURSE REASSESS COMMENT FT1
Pt previously scabbed over rash on back is now bleeding. When inquiring about the rash pt stated "I have aids" on three separate occasions then proceeds to laugh. When pt asked about HIV/AIDS status he stated "I probably have it I've been a bad boy when I was younger". Patient offered a HIV /AIDS test but patient refused at this time. RHONDA Rosa ([id number]) notified about pt's comments and provider stated she will look into his history. As of now, safety precautions are in place.

## 2019-12-20 NOTE — PROGRESS NOTE ADULT - SUBJECTIVE AND OBJECTIVE BOX
Patient is a 79y old  Male who presents with a chief complaint of Elevated Potassium (20 Dec 2019 16:31)      SUBJECTIVE / OVERNIGHT EVENTS:    Patient was seen in [place] with son at the bedside with no complaints presented, no SOB, no chest pain .  Pos skin rash that is pruritic.       ADDITIONAL REVIEW OF SYSTEMS: Negative except for above    MEDICATIONS  (STANDING):  albuterol/ipratropium for Nebulization. 3 milliLiter(s) Nebulizer every 6 hours  ammonium lactate 12% Lotion 1 Application(s) Topical two times a day  atorvastatin 20 milliGRAM(s) Oral at bedtime  budesonide 160 MICROgram(s)/formoterol 4.5 MICROgram(s) Inhaler 2 Puff(s) Inhalation two times a day  clotrimazole 1% Cream 1 Application(s) Topical every 12 hours  folic acid 1 milliGRAM(s) Oral daily  furosemide   Injectable 20 milliGRAM(s) IV Push two times a day  metoprolol succinate  milliGRAM(s) Oral daily  triamcinolone 0.1% Ointment 1 Application(s) Topical every 12 hours    MEDICATIONS  (PRN):      CAPILLARY BLOOD GLUCOSE        I&O's Summary      PHYSICAL EXAM:  Vital Signs Last 24 Hrs  T(C): 36.8 (20 Dec 2019 14:00), Max: 36.8 (20 Dec 2019 14:00)  T(F): 98.2 (20 Dec 2019 14:00), Max: 98.2 (20 Dec 2019 14:00)  HR: 77 (20 Dec 2019 14:00) (65 - 101)  BP: 108/70 (20 Dec 2019 14:00) (99/65 - 148/70)  BP(mean): --  RR: 20 (20 Dec 2019 14:00) (17 - 20)  SpO2: 95% (20 Dec 2019 13:40) (94% - 99%)    PHYSICAL EXAM:  GENERAL: NAD, well-developed  HEAD:  Atraumatic, Normocephalic  EYES:  conjunctiva and sclera clear  NECK: Supple, No JVD  CHEST/LUNG: Clear to auscultation bilaterally; No wheeze  HEART: Regular rate and rhythm; No murmurs, rubs, or gallops  ABDOMEN: Soft, Nontender, Nondistended; Bowel sounds present  EXTREMITIES:  2+ Peripheral Pulses, No clubbing, cyanosis,  TRACE edema  PSYCH: AAOx3  NEUROLOGY: non-focal  SKIN: pos scab and excoriation on the upper back and Rt scapular area.        LABS:                        10.9   5.65  )-----------( 120      ( 20 Dec 2019 11:07 )             32.8     12-20    143  |  105  |  109<H>  ----------------------------<  129<H>  4.6   |  23  |  3.12<H>    Ca    8.7      20 Dec 2019 11:07  Phos  5.6     12-19  Mg     2.7     12-19    TPro  7.2  /  Alb  3.5  /  TBili  0.4  /  DBili  x   /  AST  29  /  ALT  24  /  AlkPhos  100  12-20    PT/INR - ( 20 Dec 2019 11:07 )   PT: 64.1 sec;   INR: 5.29 ratio         PTT - ( 19 Dec 2019 19:54 )  PTT:60.8 sec            RADIOLOGY & ADDITIONAL TESTS:    Imaging Personally Reviewed:    Electrocardiogram Personally Reviewed:    COORDINATION OF CARE:  Care Discussed with Consultants/Other Providers [Y/N]:  Prior or Outpatient Records Reviewed [Y/N]:

## 2019-12-20 NOTE — CONSULT NOTE ADULT - PROBLEM SELECTOR RECOMMENDATION 2
- Continue to monitor with diuresis as above  - If worsening despite diuresis will consider addition of inotrope  - Renal ultrasound 12/20 showing no hydronephrosis, mild increase cortical echogenicity b/l, nonobstructing L intrarenal calculi as noted on CT 1/2019 and small volume ascites

## 2019-12-20 NOTE — H&P ADULT - PROBLEM SELECTOR PLAN 4
-Monitor HR, holding beta blocker per cardiology recommendations  -If HR not controlled or has rebound tachycardia will start low dose beta blocker  -No anti-coagulant for now given supra-therapeutic INR

## 2019-12-20 NOTE — H&P ADULT - NSHPPHYSICALEXAM_GEN_ALL_CORE
T(C): 36.6 (12-20-19 @ 05:04), Max: 36.6 (12-20-19 @ 01:04)  HR: 85 (12-20-19 @ 05:04) (65 - 99)  BP: 123/69 (12-20-19 @ 05:04) (99/65 - 139/70)  RR: 17 (12-20-19 @ 05:04) (17 - 20)  SpO2: 96% (12-20-19 @ 05:04) (94% - 99%)    Gen: (fe)male in NAD, appears comfortable, no diaphoresis  HEENT: NCAT, MMM, neck soft and supple  CV: +S1/S2, no m/r/g  Resp: CTAB, no w/r/r  GI: normoactive BS, soft, NTND, no rebounding/guarding  Ext: No LE edema, extremities appear warm and well perfused   Neuro: AOx3, no focal deficits, CNII-XII grossly intact  Psych: No SI/HI/AVH, appropriate affect  Skin: no petechiae, ecchymosis or maculopapular rash noted T(C): 36.6 (12-20-19 @ 05:04), Max: 36.6 (12-20-19 @ 01:04)  HR: 85 (12-20-19 @ 05:04) (65 - 99)  BP: 123/69 (12-20-19 @ 05:04) (99/65 - 139/70)  RR: 17 (12-20-19 @ 05:04) (17 - 20)  SpO2: 96% (12-20-19 @ 05:04) (94% - 99%)    Gen: male in NAD, appears comfortable, no diaphoresis  HEENT: NCAT, MMM, neck soft and supple  CV: +S1/S2, no m/r/g  Resp: diffuse expiratory wheezing heard anteriorly and posteriorly  GI: normoactive BS, soft, NTND, no rebounding/guarding  Ext: extremities appear warm and well perfused, minor bilateral edema of feet  Neuro: AOx3, no focal deficits, CNII-XII grossly intact  Psych: No SI/HI/AVH, appropriate affect  Skin: scabbed dried up lesions noted on the back (appear secondary to scratching)

## 2019-12-20 NOTE — CONSULT NOTE ADULT - SUBJECTIVE AND OBJECTIVE BOX
In-House Cardiology Consult Note  ---------------------------------------------    Patient seen and evaluated at bedside in [facility]    Chief Complaint: LE swelling and SOB    HPI:  80yo man with PMHx CAD s/p CABG 2000 and PCI x3 stents, severe AS s/p TAVR 2016, COPD, HTN who presented from Dr. Quiroz's office with abnormal labs. His sCr was *** and K was elevated to 5.8. Of note, patient has been following with Dr. Quiroz for several months.      PMHx:   Moderate tricuspid regurgitation  Osteoarthritis  Bilateral carotid artery disease  Hypertension  COPD (chronic obstructive pulmonary disease)  Prostate cancer  Severe aortic stenosis by prior echocardiogram  Atrial fibrillation, unspecified type  Aortic valve stenosis, unspecified etiology  Erectile dysfunction, unspecified erectile dysfunction type  Coronary artery disease of bypass graft of native heart with stable angina pectoris  [place]      PSHx:   S/P TAVR (transcatheter aortic valve replacement)  S/P tonsillectomy  S/P prostatectomy  S/P hernia repair  S/P CABG x 3      Allergies:  No Known Allergies      Home Meds:    Current Medications:       FAMILY HISTORY:  Family history of heart disease (Child)    Social History:  Smoking History:  Alcohol Use:  Drug Use:    REVIEW OF SYSTEMS:  CONSTITUTIONAL: No weakness, fevers or chills  EYES/ENT: No visual changes;  No dysphagia  NECK: No pain or stiffness  RESPIRATORY: No cough, wheezing, hemoptysis; No shortness of breath  CARDIOVASCULAR: No chest pain or palpitations; No lower extremity edema  GASTROINTESTINAL: No abdominal or epigastric pain. No nausea, vomiting, or hematemesis; No diarrhea or constipation. No melena or hematochezia.  BACK: No back pain  GENITOURINARY: No dysuria, frequency or hematuria  NEUROLOGICAL: No numbness or weakness  SKIN: No itching, burning, rashes, or lesions   All other review of systems is negative unless indicated above.    Physical Exam:  T(F): 97.4 (12-19), Max: 97.4 (12-19)  HR: 99 (12-19) (65 - 99)  BP: 134/66 (12-19) (99/65 - 134/66)  RR: 20 (12-19)  SpO2: 94% (12-19)  GENERAL: No acute distress, well-developed  HEAD:  Atraumatic, Normocephalic  ENT: EOMI, PERRLA, conjunctiva and sclera clear, Neck supple, No JVD, moist mucosa  CHEST/LUNG: Clear to auscultation bilaterally; No wheeze, equal breath sounds bilaterally   HEART: Regular rate and rhythm; No murmurs, rubs, or gallops  ABDOMEN: Soft, Nontender, Nondistended; Bowel sounds present  EXTREMITIES:  No clubbing, cyanosis, or edema  PSYCH: Nl behavior, nl affect  NEUROLOGY: AAOx3, non-focal, cranial nerves intact  SKIN: Normal color, No rashes or lesions    CXR: Personally reviewed    Labs: Personally reviewed                        11.6   5.45  )-----------( 130      ( 19 Dec 2019 19:54 )             36.5     12-19    141  |  101  |  108<H>  ----------------------------<  132<H>  5.4<H>   |  21<L>  |  3.40<H>    Ca    9.1      19 Dec 2019 21:27  Phos  5.6     12-19  Mg     2.7     12-19    TPro  7.9  /  Alb  3.6  /  TBili  0.4  /  DBili  x   /  AST  40  /  ALT  28  /  AlkPhos  101  12-19    PT/INR - ( 19 Dec 2019 19:54 )   PT: 75.0 sec;   INR: 6.22 ratio         PTT - ( 19 Dec 2019 19:54 )  PTT:60.8 sec In-House Cardiology Consult Note  ---------------------------------------------    Patient seen and evaluated at bedside in [place], ED    Chief Complaint: LE swelling and SOB    HPI:  78yo man with PMHx CAD s/p CABG 2000 and PCI x3 stents, severe AS s/p TAVR 2016 with LBBB (no PPM implanted), COPD, HTN, pAF on Coumadin AC who presented from Dr. Quiroz's office with abnormal labs. His sCr was elevated 3.4 (up from 1.6) and K was elevated to 5.8. Of note, patient has been following with Dr. Quiroz for several years. He had presented to Dr. Quiroz in 11/2019 with an increase in his GAN and LE edema at that time. He tried to take extra doses of Lasix of his own accord, but with no response; because this was concerning for new onset HF, TTE was ordered and Novasc was discontinued in case his LE edema was a drug related adverse effect. EF revealed new globally reduced EF of about 20-25%. RHC and LHC were scheduled outpatient as a result. LHC revealed patent grafts and RHC showed wedge 21 and CI 1.8. Patient was then started on Entresto with anticipation of EF recovery.     Patient was brought to the ED by his son at the encouragement of Dr. Quiroz for further work up. Repeat sCr was 3.4 and repeat K was 5.4. Nephrology was consulted, and the did not believe that patient required emergent HD. Cardiology consulted for further recommendations.      PMHx:   Moderate tricuspid regurgitation  Osteoarthritis  Bilateral carotid artery disease  Hypertension  COPD (chronic obstructive pulmonary disease)  Prostate cancer  Severe aortic stenosis by prior echocardiogram  Atrial fibrillation, unspecified type  Aortic valve stenosis, unspecified etiology  Erectile dysfunction, unspecified erectile dysfunction type  Coronary artery disease of bypass graft of native heart with stable angina pectoris  [place]      PSHx:   S/P TAVR (transcatheter aortic valve replacement)  S/P tonsillectomy  S/P prostatectomy  S/P hernia repair  S/P CABG x 3    Allergies:  No Known Allergies    Home Meds:  Lipitor 40  Coumadin 2.5  ASA 81  Lasix 40mg PO BID  Entresto  Toprol XL 100mg PO daily      FAMILY HISTORY:  Family history of heart disease (Child)    Social History:  Smoking History: Active smoker 1PPD since teenager  Alcohol Use: None  Drug Use: None    REVIEW OF SYSTEMS:  CONSTITUTIONAL: No weakness, fevers or chills  EYES/ENT: No visual changes;  No dysphagia  NECK: No pain or stiffness  RESPIRATORY: No cough, wheezing, hemoptysis; No shortness of breath  CARDIOVASCULAR: No chest pain or palpitations; No lower extremity edema  GASTROINTESTINAL: No abdominal or epigastric pain. No nausea, vomiting, or hematemesis; No diarrhea or constipation. No melena or hematochezia.  BACK: No back pain  GENITOURINARY: No dysuria, frequency or hematuria  NEUROLOGICAL: No numbness or weakness  SKIN: No itching, burning, rashes, or lesions   All other review of systems is negative unless indicated above.    Physical Exam:  T(F): 97.4 (12-19), Max: 97.4 (12-19)  HR: 99 (12-19) (65 - 99)  BP: 134/66 (12-19) (99/65 - 134/66)  RR: 20 (12-19)  SpO2: 94% (12-19)  GENERAL: No acute distress, well-developed  HEAD:  Atraumatic, Normocephalic  ENT: EOMI, PERRLA, conjunctiva and sclera clear, Neck supple, moderate JVD, moist mucosa  CHEST/LUNG: Expiratory wheezes in all lung fields with coarse crackles  HEART: Irregular rhythm and normal rate, no RMG  ABDOMEN: Soft, Nontender, Nondistended; Bowel sounds present  EXTREMITIES: +2 b/l LE edema  PSYCH: Nl behavior, nl affect  NEUROLOGY: AAOx3, non-focal, cranial nerves intact  SKIN: Normal color, No rashes or lesions    CXR: Personally reviewed    Labs: Personally reviewed                        11.6   5.45  )-----------( 130      ( 19 Dec 2019 19:54 )             36.5     12-19    141  |  101  |  108<H>  ----------------------------<  132<H>  5.4<H>   |  21<L>  |  3.40<H>    Ca    9.1      19 Dec 2019 21:27  Phos  5.6     12-19  Mg     2.7     12-19    TPro  7.9  /  Alb  3.6  /  TBili  0.4  /  DBili  x   /  AST  40  /  ALT  28  /  AlkPhos  101  12-19    PT/INR - ( 19 Dec 2019 19:54 )   PT: 75.0 sec;   INR: 6.22 ratio         PTT - ( 19 Dec 2019 19:54 )  PTT:60.8 sec In-House Cardiology Consult Note  ---------------------------------------------    Patient seen and evaluated at bedside in [facility], ED    Chief Complaint: LE swelling and SOB    HPI:  78yo man with PMHx CAD s/p CABG 2000 and PCI x3 stents, severe AS s/p TAVR 2016 with LBBB (no PPM implanted), COPD, HTN, pAF on Coumadin AC who presented from Dr. Quiroz's office with abnormal labs. His sCr was elevated 3.4 (up from 1.6) and K was elevated to 5.8. Of note, patient has been following with Dr. Quiroz for several years. He had presented to Dr. Quiroz in 11/2019 with an increase in his GAN and LE edema at that time. He tried to take extra doses of Lasix of his own accord, but with no response; because this was concerning for new onset HF, TTE was ordered and Novasc was discontinued in case his LE edema was a drug related adverse effect. EF revealed new globally reduced EF of about 20-25%. RHC and LHC were scheduled outpatient as a result. LHC revealed patent grafts and RHC showed wedge 21 and CI 1.8. Patient was then started on Entresto with anticipation of EF recovery.     Patient was brought to the ED by his son at the encouragement of Dr. Quiroz for further work up. Repeat sCr was 3.4 and repeat K was 5.4. Nephrology was consulted, and the did not believe that patient required emergent HD. Cardiology consulted for further recommendations.      PMHx:   Moderate tricuspid regurgitation  Osteoarthritis  Bilateral carotid artery disease  Hypertension  COPD (chronic obstructive pulmonary disease)  Prostate cancer  Severe aortic stenosis by prior echocardiogram  Atrial fibrillation, unspecified type  Aortic valve stenosis, unspecified etiology  Erectile dysfunction, unspecified erectile dysfunction type  Coronary artery disease of bypass graft of native heart with stable angina pectoris  [place]      PSHx:   S/P TAVR (transcatheter aortic valve replacement)  S/P tonsillectomy  S/P prostatectomy  S/P hernia repair  S/P CABG x 3    Allergies:  No Known Allergies    Home Meds:  Lipitor 40  Coumadin 2.5  ASA 81  Lasix 40mg PO BID  Entresto  Toprol XL 100mg PO daily      FAMILY HISTORY:  Family history of heart disease (Child)    Social History:  Smoking History: Active smoker 1PPD since teenager  Alcohol Use: None  Drug Use: None    REVIEW OF SYSTEMS:  CONSTITUTIONAL: No weakness, fevers or chills  EYES/ENT: No visual changes;  No dysphagia  NECK: No pain or stiffness  RESPIRATORY: No cough, wheezing, hemoptysis; No shortness of breath  CARDIOVASCULAR: No chest pain or palpitations; No lower extremity edema  GASTROINTESTINAL: No abdominal or epigastric pain. No nausea, vomiting, or hematemesis; No diarrhea or constipation. No melena or hematochezia.  BACK: No back pain  GENITOURINARY: No dysuria, frequency or hematuria  NEUROLOGICAL: No numbness or weakness  SKIN: No itching, burning, rashes, or lesions   All other review of systems is negative unless indicated above.    Physical Exam:  T(F): 97.4 (12-19), Max: 97.4 (12-19)  HR: 99 (12-19) (65 - 99)  BP: 134/66 (12-19) (99/65 - 134/66)  RR: 20 (12-19)  SpO2: 94% (12-19)  GENERAL: No acute distress, well-developed  HEAD:  Atraumatic, Normocephalic  ENT: EOMI, PERRLA, conjunctiva and sclera clear, Neck supple, moderate JVD, moist mucosa  CHEST/LUNG: Expiratory wheezes in all lung fields with coarse crackles  HEART: Irregular rhythm and normal rate, no RMG  ABDOMEN: Soft, Nontender, Nondistended; Bowel sounds present  EXTREMITIES: +2 b/l LE edema  PSYCH: Nl behavior, nl affect  NEUROLOGY: AAOx3, non-focal, cranial nerves intact  SKIN: Normal color, No rashes or lesions    EKG: atrial fibrillation, LBBB,  ms.    CXR: Personally reviewed    Labs: Personally reviewed                        11.6   5.45  )-----------( 130      ( 19 Dec 2019 19:54 )             36.5     12-19    141  |  101  |  108<H>  ----------------------------<  132<H>  5.4<H>   |  21<L>  |  3.40<H>    Ca    9.1      19 Dec 2019 21:27  Phos  5.6     12-19  Mg     2.7     12-19    TPro  7.9  /  Alb  3.6  /  TBili  0.4  /  DBili  x   /  AST  40  /  ALT  28  /  AlkPhos  101  12-19    PT/INR - ( 19 Dec 2019 19:54 )   PT: 75.0 sec;   INR: 6.22 ratio         PTT - ( 19 Dec 2019 19:54 )  PTT:60.8 sec

## 2019-12-20 NOTE — CONSULT NOTE ADULT - PROBLEM SELECTOR RECOMMENDATION 9
- Please start lasix 60mg IV BID  - Restart home dose of Toprol XL 100mg daily  - Hold on ACE-I/ARB/ARNI/MRA at this time given renal dysfunction  - Daily standing weights, strict I/Os  - Daily CMP, Mg, lactate - Please start lasix 60mg IV BID  - Restart home dose of Toprol XL 100mg daily  - Hold on ACE-I/ARB/ARNI/MRA at this time given renal dysfunction  - Daily standing weights, strict I/Os  - Daily CMP, Mg, lactate  - If lactate/LFT's rise will plan to start milrinone

## 2019-12-20 NOTE — H&P ADULT - NSICDXPASTMEDICALHX_GEN_ALL_CORE_FT
PAST MEDICAL HISTORY:  Atrial fibrillation, unspecified type on coumadin    Bilateral carotid artery disease     COPD (chronic obstructive pulmonary disease) cigar smoker for 29 years, quit 1/2019    Coronary artery disease of bypass graft of native heart with stable angina pectoris     Erectile dysfunction, unspecified erectile dysfunction type     Heart failure     Hypertension     Moderate tricuspid regurgitation     Osteoarthritis     Polio     Prostate cancer Prostatectomy 2008, radiation 2011

## 2019-12-20 NOTE — H&P ADULT - HISTORY OF PRESENT ILLNESS
79M with PMHx of CAD (post-CABG and PCI), severe aortic stenosis (post-TAVR), known LBBB, COPD, current smoker, HTN, atrial fibrillation, and CKD3 referred by Dr. Quiroz's office for elevated creatine and potassium. Patient recently admitted to hospitalist service for possible new onset CHF when he presented with shortness of breath. Patient diuresed and optimized on medications. He was also noted to have an anemia, thrombocytopenia, and elevated MCV concerning for MDS (also based on smear findings) at which time he had a bone marrow biopsy performed and followed up with hematology and told there was no issue. Since he was discharged he started developing bilateral upper thigh pain (did not endorse any aggravating or alleviating factors). He denies any worsening lower extremity edema. Because of this patient increased his Lasix from 40 mg BID to 60 mg BID. In addition, because of the pain he decided to randomly take 1000 mg of an NSAID several times. Patient brought into the ED by his son for further work up. Nephrology called and recommended Lokelma, but no dialysis. Cardiology consulted and their recommendations are appreciated.

## 2019-12-20 NOTE — CONSULT NOTE ADULT - ASSESSMENT
Mr. Lopez is a 78 y/o M with CAD s/p 3v CABG (LIMA-mLAD, SVT-OM1, SVG-RPL1 in 9/2007), severe aortic stenosis (post-TAVR in 2016), known LBBB, COPD, current smoker, HTN, atrial fibrillation (on coumadin), and CKD3 (baseline Cr 1.4-1.5), and newly reduced biventricular systolic function and worsening TR who presents with acute decompensated HF, volume overloaded with MERCED on CKD, hyperkalemia and elevated INR without evidence of low output at this time. He is in afib which is rate controlled with frequent PVCs. He is generally low normotensive. Mr. Lopez is a 78 y/o M with CAD s/p 3v CABG (LIMA-mLAD, SVT-OM1, SVG-RPL1 in 9/2007), severe aortic stenosis (post-TAVR in 2016), known LBBB, COPD, current smoker, HTN, atrial fibrillation (on coumadin), and CKD3 (baseline Cr 1.4-1.5), and newly reduced biventricular systolic function and worsening TR who presents with acute decompensated HF, volume overloaded with MERCED on CKD, hyperkalemia and elevated INR without evidence of low output at this time. He is in afib which is rate controlled with frequent PVCs. He is generally low normotensive.     Recent Cardiac Studies:  12/3/19 Coronary angiogram: LM 90%, mLAD 100%, Cx 100%, RCA not injected, LIMA-mLAD, SVG-OM1, SVG-RPL1 patent  12/3/19 RHC: /78/104, HR 71, RA 14, RV 49/10, PA 53/18/34, PCWP 21, LVedp 20, Ao 95%, PA 50%, CO/CI 3.23/1.86, PRV 4.02 Miles, SVR 2228.5 dsc  11/27/19 TTE: LVEF 20-25% (global), LVIDd 5.3cm, elevated E/e', septum 1.0, PWT 1.2, LA 6.2, nl RV size with RVSD, mod-sev dilated LA, mild MR, mod-sev TR, nl gradient across TAVR, est RVSP 49  10/15/18 TTE: LVEF 60%, stage II DD, LVIDd 5.6cm, nl RV size and function, mod TR Mr. Lopez is a 78 y/o M with CAD s/p 3v CABG (LIMA-mLAD, SVT-OM1, SVG-RPL1 in 9/2007), severe aortic stenosis (post-TAVR in 2016), known LBBB, COPD, current smoker, HTN, atrial fibrillation (on coumadin), and CKD3 (baseline Cr 1.4-1.5), and newly reduced biventricular systolic function and worsening TR who presents with acute decompensated HF, volume overloaded with MERCED on CKD, hyperkalemia and elevated INR without evidence of low output at this time. He is in afib which is rate controlled with frequent PVCs. He is generally low normotensive.     MERCED is most likely cardiorenal from elevated filling pressures. Will diurese and assess if renal function improves. If not, or if there are other supporting signs of hypoperfusion, will start milrinone as recent RHC demonstrated low cardiac output (albeit with elevated SVR).     Recent Cardiac Studies:  12/3/19 Coronary angiogram: LM 90%, mLAD 100%, Cx 100%, RCA not injected, LIMA-mLAD, SVG-OM1, SVG-RPL1 patent  12/3/19 RHC: /78/104, HR 71, RA 14, RV 49/10, PA 53/18/34, PCWP 21, LVedp 20, Ao 95%, PA 50%, CO/CI 3.23/1.86, PRV 4.02 Miles, SVR 2228.5 dsc  11/27/19 TTE: LVEF 20-25% (global), LVIDd 5.3cm, elevated E/e', septum 1.0, PWT 1.2, LA 6.2, nl RV size with RVSD, mod-sev dilated LA, mild MR, mod-sev TR, nl gradient across TAVR, est RVSP 49  10/15/18 TTE: LVEF 60%, stage II DD, LVIDd 5.6cm, nl RV size and function, mod TR

## 2019-12-20 NOTE — CHART NOTE - NSCHARTNOTEFT_GEN_A_CORE
Dermatology Brief Chart Note    HPI:  80 y/o M h/o CAD s/p CABG, severe AS s/p TAVR, CKDIII, and COPD sent in from clinic for MERCED w/ hyperkalemia to 6.7. Derm c/s for lesions on back. Very itchy. Onset 1-2 weeks ago during last admission. Used lac hydrin at home w/ improvement. No other skin complaints.    PHYSICAL EXAM:   Vital Signs Last 24 Hrs  T(C): 36.8 (20 Dec 2019 14:00), Max: 36.8 (20 Dec 2019 14:00)  T(F): 98.2 (20 Dec 2019 14:00), Max: 98.2 (20 Dec 2019 14:00)  HR: 77 (20 Dec 2019 14:00) (65 - 101)  BP: 108/70 (20 Dec 2019 14:00) (99/65 - 148/70)  BP(mean): --  RR: 20 (20 Dec 2019 14:00) (17 - 20)  SpO2: 95% (20 Dec 2019 13:40) (94% - 99%)    Skin exam:  The patient was alert, well nourished, and in no apparent distress. Oropharynx showed no ulcerations. There was no visible lymphadenopathy. Conjunctiva were non-injected. There was no clubbing or edema of extremities.    The scalp, hair, face, eyebrows, lips, oropharynx , neck, chest, back, buttocks, extremities X 4, hands, feet, nails were examined.    Of note on skin exam:   - back - xerosis w/ secondary excoriations (pt uses backscratcher so mid-back also involved)  - b/l feet w/ annular erythematous patches and moccasin-like distribution of scaling  - L hand, L elbow w/ annular erythematous patches     ASSESSMENT/PLAN:  # Tinea corporis  With two foot/one hand involvement  - start clotrimazole cream BID to both feet, L hand, and L elbow x 4-6 weeks  - no evidence of other infections/infestations     # Xerosis with secondary excoriations  - start amlactin lotion BID as needed as pt reported symptomatic improvement   - gentle skin care discussed    Patient seen at bedside and discussed with Dermatology attending Dr. Collado remotely.     Dermatology consult team will officially round and staff the patient tomorrow. Please call [phone] with any questions.    Anna Britt MD  PGY3 Dermatology Dermatology Brief Chart Note    HPI:  78 y/o M h/o CAD s/p CABG, severe AS s/p TAVR, CKDIII, and COPD sent in from clinic for MERCED w/ hyperkalemia to 6.7. Derm c/s for lesions on back. Very itchy. Onset 1-2 weeks ago during last admission. Used lac hydrin at home w/ improvement. No other skin complaints.    PHYSICAL EXAM:   Vital Signs Last 24 Hrs  T(C): 36.8 (20 Dec 2019 14:00), Max: 36.8 (20 Dec 2019 14:00)  T(F): 98.2 (20 Dec 2019 14:00), Max: 98.2 (20 Dec 2019 14:00)  HR: 77 (20 Dec 2019 14:00) (65 - 101)  BP: 108/70 (20 Dec 2019 14:00) (99/65 - 148/70)  BP(mean): --  RR: 20 (20 Dec 2019 14:00) (17 - 20)  SpO2: 95% (20 Dec 2019 13:40) (94% - 99%)    Skin exam:  The patient was alert, well nourished, and in no apparent distress. Oropharynx showed no ulcerations. There was no visible lymphadenopathy. Conjunctiva were non-injected. There was no clubbing or edema of extremities.    The scalp, hair, face, eyebrows, lips, oropharynx , neck, chest, back, buttocks, extremities X 4, hands, feet, nails were examined.    Of note on skin exam:   - back - xerosis w/ secondary excoriations (pt uses backscratcher so mid-back also involved)  - b/l feet w/ annular erythematous patches and moccasin-like distribution of scaling  - L hand, L elbow w/ annular erythematous patches     ASSESSMENT/PLAN:  # Tinea corporis  With two foot/one hand involvement  - start clotrimazole cream BID to both feet, L hand, and L elbow x 4-6 weeks  - no evidence of other infections/infestations     # Xerosis with secondary excoriations  - start triamcinolone 0.1% oint BID to back PRN (avoid face, groin, axillae) - use for only 2 weeks at a time   - start amlactin lotion BID as needed as pt reported symptomatic improvement   - gentle skin care discussed  - orders placed in sunrise    Patient seen at bedside and discussed with Dermatology attending Dr. Collado remotely.     Dermatology consult team will officially round and staff the patient tomorrow. Please call [phone] with any questions.    Anna Britt MD  PGY3 Dermatology

## 2019-12-20 NOTE — CONSULT NOTE ADULT - ATTENDING COMMENTS
79 year old man with coronary artery disease, stents, severe AS, TAVR 2016, onset of LBBB and has had deterioration in LV systolic function. Has COPD and multifactorial dyspnea. Also has been bothered by lower extremity edema. Saw Dr. Quiroz for outpatient cardiology follow up and lab testing revealed acute kidney injury and hyperkalemia occurring few weeks after starting ARNI therapy and also with patient self-increasing furosemide. On exam normotensive, regular cardiac rhythm, diffusely diminished breath sounds, prolonged expiratory phase with expiratory wheeze, normal heart sounds. There is 1+ lower extremity edema. Need to stop diuretics and Entresto and monitor electrolytes and renal function. Subsequently consider merits of cardiac resynchronization therapy. 79 year old man with coronary artery disease, stents, severe AS, TAVR 2016, onset of LBBB and has had deterioration in LV systolic function. Also has atrial fibrillation on anticoagulation. Has COPD and multifactorial dyspnea. Also has been bothered by lower extremity edema. Saw Dr. Quiroz for outpatient cardiology follow up and lab testing revealed acute kidney injury and hyperkalemia occurring few weeks after starting ARNI therapy and also with patient self-increasing furosemide. On exam normotensive, regular cardiac rhythm, diffusely diminished breath sounds, prolonged expiratory phase with expiratory wheeze, normal heart sounds. There is 1+ lower extremity edema. Need to stop diuretics and Entresto and monitor electrolytes and renal function. INR supratherapeutic, hold warfarin until declines toward therapeutic range. Subsequently consider merits of cardiac resynchronization therapy.

## 2019-12-20 NOTE — H&P ADULT - PROBLEM SELECTOR PLAN 7
-Patient with anemia and thrombocytopenia with elevated MCV. Patient is not folic acid or B12 deficient with normal TSH  -Patient had bone marrow biopsy which he states was unremarkable

## 2019-12-20 NOTE — PROGRESS NOTE ADULT - PROBLEM SELECTOR PLAN 1
-No fluids given his HFrEF, patient encouraged to PO intake with fluid restrictions  -Potasium is now stable   -UA with microscopy, urine electrolytes to calculate FeUREA, UPCR ratio  -Renal US with medical renal disease   -HOLD Nephrotoxic medications/ HOLD entresto

## 2019-12-20 NOTE — H&P ADULT - NSICDXPASTSURGICALHX_GEN_ALL_CORE_FT
PAST SURGICAL HISTORY:  S/P CABG x 3 9/17/2007 at [facility]    S/P hernia repair inguinal, 1998    S/P prostatectomy 11/18/2008    S/P TAVR (transcatheter aortic valve replacement) 2016 in [facility]    S/P tonsillectomy as a child

## 2019-12-20 NOTE — CONSULT NOTE ADULT - PROBLEM SELECTOR RECOMMENDATION 9
Patient with Non-oliguric MERCED secondary to diuretic use and NSAIDs. Patient with history of possible MDS? Serum creatinine 3.4 increased from baseline SCr: 1.1- 1.5. Last SCr: 1.6 on 12/5/19.    Recommend   - Hold diuretics. Can give IV NS 250cc IV bolus.   - Avoid NSAIDs, ACE/ARBs and other potential nephrotoxins  - Please get renal ultrasound and check UA with Urine electrolytes with urine urea. Check urine spot TP/CR. Would also get CK, Hep BsAg and Ab with Hep C. Patient with Non-oliguric MERCED secondary to diuretic use and NSAIDs. Patient with history of possible MDS? Serum creatinine 3.4 increased from baseline SCr: 1.1- 1.5. Last SCr: 1.6 on 12/5/19.    Recommend   - Hold diuretics.   - Avoid NSAIDs, ACE/ARBs and other potential nephrotoxins  - Please get renal ultrasound and check UA with Urine electrolytes with urine urea. Check urine spot TP/CR. Would also get CK, Hep BsAg and Ab with Hep C.

## 2019-12-20 NOTE — H&P ADULT - NSHPREVIEWOFSYSTEMS_GEN_ALL_CORE
Gen: no changes in weight, fatigue, night sweats, appetite, or fever  Eyes: no changes in vision, diplopia, or floaters  ENT: no epistaxis, sinus pain, gingival bleeding, odynophagia or dysphagia  CV: no CP, SOB, PND, orthopnea, LOC, or palpitations  Resp: no cough, wheezing, or hemoptysis  GI: no abdominal pain, dyspepsia, nausea, vomiting, diarrhea, constipation, hematemesis, hematochezia, or melena  : no dysuria, polyuria, or hematuria  MSK: no arthralgias or joint swelling   Neuro: no gross sensory changes, numbness, focal deficits  Psych: no depression, changes in sleep, changes in concentration, or lack of energy  Heme/Onc: no purpura, petechiae or night sweats  Skin: no pruritus, hair loss or skin lesions  All: no photosensitivity, no complaints of anaphylaxis (SOB, throat swelling) Gen: no changes in weight, fatigue, night sweats, appetite, or fever  Eyes: no changes in vision, diplopia, or floaters  ENT: no epistaxis, sinus pain, gingival bleeding, odynophagia or dysphagia  CV: no CP, SOB, PND, orthopnea, LOC, or palpitations  Resp: no cough or hemoptysis  GI: no abdominal pain, dyspepsia, nausea, vomiting, diarrhea, constipation, hematemesis, hematochezia, or melena  : no dysuria, polyuria, or hematuria  MSK: no joint swelling; bilateral myalgia in thighs  Neuro: no gross sensory changes, numbness, focal deficits  Psych: no depression, changes in sleep, changes in concentration, or lack of energy  Heme/Onc: no purpura, petechiae or night sweats  Skin: no pruritus, hair loss or skin lesions  All: no photosensitivity, no complaints of anaphylaxis (SOB, throat swelling)

## 2019-12-20 NOTE — CONSULT NOTE ADULT - SUBJECTIVE AND OBJECTIVE BOX
HPI:  79M with PMHx of CAD s/p 3v CABG (LIMA-mLAD, SVT-OM1, SVG-RPL1 in 9/2007), severe aortic stenosis (post-TAVR in 2016), known LBBB, recently diagnosed HFrEF (LVEF 20-25%), COPD, current smoker, HTN, atrial fibrillation (on coumadin), and CKD3 (baseline Cr 1.4-1.5) referred by Dr. Quiroz's office for elevated creatine and potassium.     He has been following with Dr. Ramón Quiroz. On 11/27/19 TTE showed newly reduced LVEF 20-25% with RVSD and mod-severe TR from EF 60% in 10/18. He underwent coronary angiogram and RHC on 12/3 which showed patent grafts and elevating filling pressure with low output. He was admitted 12/3-12/5 for ADHF initially presenting with SOB for which he was diuresed and started on neurohormonal antagonists. He was also found to be anemic and thrombocytopenic with concern for MDS with negative BMBx. He was discharged home on Entresto 49-51mg BID, Toprol GR245co daily and lasix 40mg PO BID but shortly after discharge noted pain and heaviness in his LE thus increased his lasix to 60mg BID. Additionally he was noted to have taken 1gm NSAIDs several times for pain. He reports increase in LE swelling. Reports SOB at baseline, denies orthopnea, lightheadedness, abdominal bloating/pain/decreased appetite.    On admission K 6.7, BUN/Cr 110/3.42, INR 6.22 which were all increased from discharge labs on 12/5 which showed K 4.2, BUN/Cr 36/1.68, and INR 1.36. 12/20 proBNP elevated at [address], lact 1.6, LFTs normal.    PAST MEDICAL & SURGICAL HISTORY:  Heart failure  Moderate tricuspid regurgitation  Osteoarthritis  Bilateral carotid artery disease  Hypertension  COPD (chronic obstructive pulmonary disease): cigar smoker for 29 years, quit 1/2019  Prostate cancer: Prostatectomy 2008, radiation 2011  Atrial fibrillation, unspecified type: on coumadin  Erectile dysfunction, unspecified erectile dysfunction type  Coronary artery disease of bypass graft of native heart with stable angina pectoris  [place]  S/P TAVR (transcatheter aortic valve replacement): 2016 in [facility]  S/P tonsillectomy: as a child  S/P prostatectomy: 11/18/2008  S/P hernia repair: inguinal, 1998  S/P CABG x 3: 9/17/2007 at [facility]      REVIEW OF SYSTEMS  14 point ROS done and found to be negative or noncontributory other than noted in HPI.    MEDICATIONS  (STANDING):  albuterol/ipratropium for Nebulization. 3 milliLiter(s) Nebulizer every 6 hours  ammonium lactate 12% Lotion 1 Application(s) Topical two times a day  atorvastatin 20 milliGRAM(s) Oral at bedtime  budesonide 160 MICROgram(s)/formoterol 4.5 MICROgram(s) Inhaler 2 Puff(s) Inhalation two times a day  clotrimazole 1% Cream 1 Application(s) Topical every 12 hours  folic acid 1 milliGRAM(s) Oral daily  furosemide   Injectable 20 milliGRAM(s) IV Push two times a day  metoprolol succinate  milliGRAM(s) Oral daily  triamcinolone 0.1% Ointment 1 Application(s) Topical every 12 hours    Allergies    No Known Allergies    SOCIAL HISTORY:  Current smoker  Social alcohol use  No illicit drug use  Retired dentist    FAMILY HISTORY:  Family history of heart disease (Child)      Vital Signs Last 24 Hrs  T(C): 36.8 (20 Dec 2019 14:00), Max: 36.8 (20 Dec 2019 14:00)  T(F): 98.2 (20 Dec 2019 14:00), Max: 98.2 (20 Dec 2019 14:00)  HR: 77 (20 Dec 2019 14:00) (65 - 101)  BP: 108/70 (20 Dec 2019 14:00) (99/65 - 148/70)  RR: 20 (20 Dec 2019 14:00) (17 - 20)  SpO2: 95% (20 Dec 2019 13:40) (94% - 99%)    PHYSICAL EXAM:    General: Sitting upright in bed. No acute distress  HEET: EOM intact  Neck: Supple. JVP mod-severely elevated.  Resp: + nonproductive cough, expiratory wheeze throughout  CV: Irregularly irregular. S1S2. II/VI SM LLSB. +2 BLE edema  Abd: soft, nontender, nondistended, normoactive BS  Skin: Warm peripherally  Neuro: AOx2, nonfocal  Psych: Normal affect and mood      LABS:                        10.9   5.65  )-----------( 120      ( 20 Dec 2019 11:07 )             32.8     12-20    143  |  105  |  109<H>  ----------------------------<  129<H>  4.6   |  23  |  3.12<H>    Ca    8.7      20 Dec 2019 11:07  Phos  5.6     12-19  Mg     2.7     12-19    TPro  7.2  /  Alb  3.5  /  TBili  0.4  /  DBili  x   /  AST  29  /  ALT  24  /  AlkPhos  100  12-20    PT/INR - ( 20 Dec 2019 11:07 )   PT: 64.1 sec;   INR: 5.29 ratio         PTT - ( 19 Dec 2019 19:54 )  PTT:60.8 sec

## 2019-12-20 NOTE — H&P ADULT - ASSESSMENT
79M with PMHx of CAD (post-CABG and PCI), severe aortic stenosis (post-TAVR), known LBBB, COPD, current smoker, HTN, atrial fibrillation, and CKD3 referred by Dr. Quiroz's office for elevated creatine and potassium. Patient self increased in Lasix and decided to take a large dose of NSAIDs for pain. He is also endorsing slight nausea and decreased appetite, but no shortness of breath. Labs also significant for supratherapeutic INR (but no complaints of bleeding). Patient admitted for acute renal failure complicated by hyperkalemia and uremia in the setting of NSAID use and possibly hypovolemia given increased Lasix dose.

## 2019-12-20 NOTE — CONSULT NOTE ADULT - ASSESSMENT
79M with PMHx of CAD (post-CABG and PCI), severe aortic stenosis (post-TAVR), known LBBB, COPD, current smoker, HTN, atrial fibrillation, and CKD3 referred by Dr. Quiroz's office for elevated creatine and potassium.

## 2019-12-20 NOTE — H&P ADULT - PROBLEM SELECTOR PLAN 9
Transitions of Care Status:  1.  Name of PCP: Ramón Quiroz  2.  PCP Contacted on Admission: [ ] Y    [ ] N    3.  PCP contacted at Discharge: [ ] Y    [ ] N    [ ] N/A  4.  Post-Discharge Appointment Date and Location:  5.  Summary of Handoff given to PCP:

## 2019-12-20 NOTE — H&P ADULT - PROBLEM SELECTOR PLAN 8
Transitions of Care Status:  1.  Name of PCP: Ramón Quiroz  2.  PCP Contacted on Admission: [ ] Y    [ ] N    3.  PCP contacted at Discharge: [ ] Y    [ ] N    [ ] N/A  4.  Post-Discharge Appointment Date and Location:  5.  Summary of Handoff given to PCP: -Performed smoking cessation counseling with patient. Reviewed modalities such as nicotine replacement therapy and pharmacological agents. Also reviewed deleterious effects of smoking such as increased malignancy and infection. 3 minutes spent with patient.   -Patient is wholeheartedly not interested in quitting, offer nicotine replacement therapy if he has cravings

## 2019-12-20 NOTE — CONSULT NOTE ADULT - PROBLEM SELECTOR RECOMMENDATION 2
Patient with hyperkalemia secondary to MERCED. Serum K: 5.7 non hemolyzed. Patient given Lokelma 15mg PO.  - Can give IV NS 250cc fluid bolus  - Medical management for hyperkalemia. Can give D50+ Insulin and albuterol nebulizations Patient with hyperkalemia secondary to MERCED. Serum K: 5.7 non hemolyzed. Patient given Lokelma 15mg PO.  - Medical management for hyperkalemia. Can give D50+ Insulin and albuterol nebulizations

## 2019-12-20 NOTE — CONSULT NOTE ADULT - ASSESSMENT
78yo man with PMHx CAD s/p CABG 2000 and PCI x3 stents, severe AS s/p TAVR 2016 with LBBB (no PPM implanted), COPD, HTN, pAF on Coumadin AC who presented from Dr. Quiroz's office with MERCED and hyperkalemia.    #Acute Decompensated Systolic HF, HFrEF EF 20-25%, likely NICM  -Decompensation has been chronic over the past 1-2 months; likely 2/2 to LBBB s/p TAVR resulting in atrio-ventricular dyssynchrony   -Hx CAD with CABG and PCI x3  -LHC 12/3 with patent grafts and RHC with elevated filling pressures  -TTE 11/2019 with EF 20-25% globally reduced EF with moderate to severe TR  -Continue BB; hold Entresto and Lasix to allow for body volume equilibration  -F/u AM labs for sCr trend; patient may require another RHC to elucidate hemodynamic changes following course of Entresto since 12/3  -Continue telemetry monitoring    #CAD with CABG and PCI x3  -Continue Lipitor and ASA    #Severe AS s/p TAVR 2016  #pAF with LBBB  -Continue Coumadin for stroke PPx  -Toprol XL 100mg PO daily for rate control  -May need PPM implantation evaluation at some point    #Renal insufficiency  DDx: Prerenal azotemia, renal vein congestion in setting of HFrEF  -Resulting in hyperkalemia; continue to monitor K and medically manage at this time  -Hold Entresto and Lasix and allow for total body volume equilibration     Cardiology will continue to follow.    Case to be discussed with Dr. Thomas in the AM.    Lia Palacio MD PGY-4  Cardiology Fellow  All Cardiology service information can be found 24/7 on [email or web address], password: [email or web address]  Note is preliminary until signed by the attending. 78yo man with PMHx CAD s/p CABG 2000 and PCI x3 stents, severe AS s/p TAVR 2016 with LBBB (no PPM implanted), COPD, HTN, pAF on Coumadin AC who presented from Dr. Quiroz's office with MERCED and hyperkalemia.    #Acute Decompensated Systolic HF, HFrEF EF 20-25%, likely NICM  -Decompensation has been chronic over the past 1-2 months; likely 2/2 to LBBB s/p TAVR resulting in atrio-ventricular dyssynchrony   -Hx CAD with CABG and PCI x3  -LHC 12/3 with patent grafts and RHC with elevated filling pressures  -TTE 11/2019 with EF 20-25% globally reduced EF with moderate to severe TR  -Continue BB; hold Entresto and Lasix to allow for body volume equilibration  -F/u AM labs for sCr trend; patient may require another RHC to elucidate hemodynamic changes following course of Entresto since 12/3  -Continue telemetry monitoring    #CAD with CABG and PCI x3  -Continue Lipitor and ASA    #Severe AS s/p TAVR 2016  #pAF with LBBB  -Continue Coumadin for stroke PPx  -Toprol XL 100mg PO daily for rate control  -May need  CRT with BiV-ICD implantation     #Renal insufficiency  DDx: Prerenal azotemia, renal vein congestion in setting of HFrEF  -Resulting in hyperkalemia; continue to monitor K and medically manage at this time  -Hold Entresto and Lasix and allow for total body volume equilibration     Cardiology will continue to follow.      Lia Palacio MD PGY-4  Cardiology Fellow  All Cardiology service information can be found 24/7 on [email or web address], password: [email or web address]

## 2019-12-20 NOTE — H&P ADULT - PROBLEM SELECTOR PLAN 1
Transitions of Care Status:  1.  Name of PCP: Ramón Quiroz  2.  PCP Contacted on Admission: [ ] Y    [ ] N    3.  PCP contacted at Discharge: [ ] Y    [ ] N    [ ] N/A  4.  Post-Discharge Appointment Date and Location:  5.  Summary of Handoff given to PCP: -No fluids given his HFrEF, patient encouraged to PO intake with fluid restrictions  -Renal called by ED, recommended Lokelma  -UA with microscopy, urine electrolytes to calculate FeUREA, UPCR ratio  -Renal US  -Treat hyperkalemia medically, consider standing Lokelma if still elevated  -Repeat potassium in several hours  -No urgent need for dialysis: slight uremic symptoms, otherwise not overloaded with no major electrolyte abnormalities which cannot be treated medically

## 2019-12-20 NOTE — H&P ADULT - PROBLEM SELECTOR PLAN 3
-Hold Lasix in setting of hypovolemia  -Hold Entresto in setting of MERCED and hyperkalemia  -Daily weights  -Hold beta blocker per cardiology  -Follow up cardiology recommendations

## 2019-12-20 NOTE — H&P ADULT - PROBLEM SELECTOR PLAN 6
-Continue with Lipitor  -Hold ASA in setting of supra-therapeutic INR and likely platelet dysfunction given uremia  -Restart ASA when feasible given high risk patient

## 2019-12-20 NOTE — H&P ADULT - NSICDXFAMILYHX_GEN_ALL_CORE_FT
FAMILY HISTORY:  Child  Still living? Yes, Estimated age: Age Unknown  Family history of heart disease, Age at diagnosis: Age Unknown

## 2019-12-21 NOTE — PROGRESS NOTE ADULT - PROBLEM SELECTOR PLAN 1
pt with CKD 3 ; presented with Cr 3.4   - SCr: 3.01 today ; improving  - K 6.7 at presentation ; s/p Sahilma in the ED ; K is 5.0 today  - No urgent need for dialysis per Nephrology  - monitor bmp  - Renal diet  -  Renal US with mildly increased cortical echogenicity bilaterally, which may be secondary to medical renal disease

## 2019-12-21 NOTE — PROGRESS NOTE ADULT - SUBJECTIVE AND OBJECTIVE BOX
Patient seen and examined at bedside.    Overnight Events:       REVIEW OF SYSTEMS:  Constitutional:     No fevers, chills, weight loss, weight gain  HEENT:                  No dry eyes, nasal congestion, postnasal drip  CV:                         No chest pain, palpitations, orthopnea, PND  Resp:                     No cough, SOB, dyspnea, wheezing, sputum  GI:                          No nausea, vomiting, abdominal pain, diarrhea, constipation  :                        No dysuria, nocturia, hematuria, increased urinary frequency  Musculoskeletal: No back pain, myalgias, arthralgias   Skin:                       No rash, pruritus, ecchymoses  Neurological:        No headache, dizziness, syncope, weakness, numbness  Psychiatric:           No anxiety, depression   Endocrine:            No hot/cold intolerance, polydipsia  Heme/Lymph:      No bleeding, easy bruising  Allergic/Immune: No itchy eyes, rhinorrhea, hives angioedema      Current Meds:  albuterol/ipratropium for Nebulization. 3 milliLiter(s) Nebulizer every 6 hours  ammonium lactate 12% Lotion 1 Application(s) Topical two times a day  atorvastatin 20 milliGRAM(s) Oral at bedtime  budesonide 160 MICROgram(s)/formoterol 4.5 MICROgram(s) Inhaler 2 Puff(s) Inhalation two times a day  clotrimazole 1% Cream 1 Application(s) Topical every 12 hours  folic acid 1 milliGRAM(s) Oral daily  furosemide   Injectable 20 milliGRAM(s) IV Push two times a day  metoprolol succinate  milliGRAM(s) Oral daily  triamcinolone 0.1% Ointment 1 Application(s) Topical every 12 hours      PAST MEDICAL & SURGICAL HISTORY:  Heart failure  Moderate tricuspid regurgitation  Osteoarthritis  Bilateral carotid artery disease  Hypertension  COPD (chronic obstructive pulmonary disease): cigar smoker for 29 years, quit 1/2019  Prostate cancer: Prostatectomy 2008, radiation 2011  Atrial fibrillation, unspecified type: on coumadin  Erectile dysfunction, unspecified erectile dysfunction type  Coronary artery disease of bypass graft of native heart with stable angina pectoris  [place]  S/P TAVR (transcatheter aortic valve replacement): 2016 in [facility]  S/P tonsillectomy: as a child  S/P prostatectomy: 11/18/2008  S/P hernia repair: inguinal, 1998  S/P CABG x 3: 9/17/2007 at [facility]      Vitals:  T(F): 97.4 (12-21), Max: 98.2 (12-20)  HR: 83 (12-21) (77 - 101)  BP: 134/67 (12-21) (108/70 - 148/70)  RR: 19 (12-21)  SpO2: 93% (12-21)  I&O's Summary    20 Dec 2019 07:01  -  21 Dec 2019 07:00  --------------------------------------------------------  IN: 420 mL / OUT: 901 mL / NET: -481 mL    21 Dec 2019 07:01  -  21 Dec 2019 07:47  --------------------------------------------------------  IN: 0 mL / OUT: 200 mL / NET: -200 mL        Physical Exam:  Appearance: No acute distress; well appearing  Eyes: PERRL, EOMI, pink conjunctiva  HENT: Normal oral mucosa  Cardiovascular: RRR, S1, S2, no murmurs, rubs, or gallops; no edema; no JVD  Respiratory: Clear to auscultation bilaterally  Gastrointestinal: soft, non-tender, non-distended with normal bowel sounds  Musculoskeletal: No clubbing; no joint deformity   Neurologic: Non-focal  Lymphatic: No lymphadenopathy  Psychiatry: AAOx3, mood & affect appropriate  Skin: No rashes, ecchymoses, or cyanosis                          10.9   5.65  )-----------( 120      ( 20 Dec 2019 11:07 )             32.8     12-21    139  |  105  |  100<H>  ----------------------------<  107<H>  5.0   |  20<L>  |  3.01<H>    Ca    8.8      21 Dec 2019 06:27  Phos  5.6     12-19  Mg     2.7     12-19    TPro  7.2  /  Alb  3.3  /  TBili  0.5  /  DBili  x   /  AST  42<H>  /  ALT  32  /  AlkPhos  124<H>  12-21    PT/INR - ( 20 Dec 2019 11:07 )   PT: 64.1 sec;   INR: 5.29 ratio         PTT - ( 19 Dec 2019 19:54 )  PTT:60.8 sec      Serum Pro-Brain Natriuretic Peptide: 69579 pg/mL (12-20 @ 11:07)          New ECG(s): Personally reviewed    Echo:    Stress Testing:     Cath:    Imaging:    Interpretation of Telemetry: Patient seen and examined at bedside.    Overnight Events: Feels okay. Denies SOB, PND, orthopnea, CP. His lower extremity edema is improving.       REVIEW OF SYSTEMS:  Constitutional:     No fevers, chills, weight loss, weight gain  HEENT:                  No dry eyes, nasal congestion, postnasal drip  CV:                         see above  Resp:                     No cough, SOB, dyspnea, wheezing, sputum  GI:                          No nausea, vomiting, abdominal pain, diarrhea, constipation  :                        No dysuria, nocturia, hematuria, increased urinary frequency  Musculoskeletal: No back pain, myalgias, arthralgias   Skin:                       No rash, pruritus, ecchymoses  Neurological:        No headache, dizziness, syncope, weakness, numbness  Psychiatric:           No anxiety, depression   Endocrine:            No hot/cold intolerance, polydipsia  Heme/Lymph:      No bleeding, easy bruising  Allergic/Immune: No itchy eyes, rhinorrhea, hives angioedema      Current Meds:  albuterol/ipratropium for Nebulization. 3 milliLiter(s) Nebulizer every 6 hours  ammonium lactate 12% Lotion 1 Application(s) Topical two times a day  atorvastatin 20 milliGRAM(s) Oral at bedtime  budesonide 160 MICROgram(s)/formoterol 4.5 MICROgram(s) Inhaler 2 Puff(s) Inhalation two times a day  clotrimazole 1% Cream 1 Application(s) Topical every 12 hours  folic acid 1 milliGRAM(s) Oral daily  furosemide   Injectable 20 milliGRAM(s) IV Push two times a day  metoprolol succinate  milliGRAM(s) Oral daily  triamcinolone 0.1% Ointment 1 Application(s) Topical every 12 hours      PAST MEDICAL & SURGICAL HISTORY:  Heart failure  Moderate tricuspid regurgitation  Osteoarthritis  Bilateral carotid artery disease  Hypertension  COPD (chronic obstructive pulmonary disease): cigar smoker for 29 years, quit 1/2019  Prostate cancer: Prostatectomy 2008, radiation 2011  Atrial fibrillation, unspecified type: on coumadin  Erectile dysfunction, unspecified erectile dysfunction type  Coronary artery disease of bypass graft of native heart with stable angina pectoris  [place]  S/P TAVR (transcatheter aortic valve replacement): 2016 in [facility]  S/P tonsillectomy: as a child  S/P prostatectomy: 11/18/2008  S/P hernia repair: inguinal, 1998  S/P CABG x 3: 9/17/2007 at [facility]      Vitals:  T(F): 97.4 (12-21), Max: 98.2 (12-20)  HR: 83 (12-21) (77 - 101)  BP: 134/67 (12-21) (108/70 - 148/70)  RR: 19 (12-21)  SpO2: 93% (12-21)  I&O's Summary    20 Dec 2019 07:01  -  21 Dec 2019 07:00  --------------------------------------------------------  IN: 420 mL / OUT: 901 mL / NET: -481 mL    21 Dec 2019 07:01  -  21 Dec 2019 07:47  --------------------------------------------------------  IN: 0 mL / OUT: 200 mL / NET: -200 mL        Physical Exam:  Appearance: No acute distress; speaking in complete sentences  Eyes: PERRL, EOMI, pink conjunctiva  HENT: Normal oral mucosa  Cardiovascular: irregularly irreglary, S1, S2, 1+ LE pitting edema  Respiratory: diminished breath sounds throughout  Gastrointestinal: soft, non-tender, non-distended with normal bowel sounds  Musculoskeletal: No clubbing; no joint deformity   Neurologic: Non-focal  Lymphatic: No lymphadenopathy  Psychiatry: AAOx3, mood & affect appropriate  Skin: No rashes, ecchymoses, or cyanosis                          10.9   5.65  )-----------( 120      ( 20 Dec 2019 11:07 )             32.8     12-21    139  |  105  |  100<H>  ----------------------------<  107<H>  5.0   |  20<L>  |  3.01<H>    Ca    8.8      21 Dec 2019 06:27  Phos  5.6     12-19  Mg     2.7     12-19    TPro  7.2  /  Alb  3.3  /  TBili  0.5  /  DBili  x   /  AST  42<H>  /  ALT  32  /  AlkPhos  124<H>  12-21    PT/INR - ( 20 Dec 2019 11:07 )   PT: 64.1 sec;   INR: 5.29 ratio         PTT - ( 19 Dec 2019 19:54 )  PTT:60.8 sec      Serum Pro-Brain Natriuretic Peptide: 59702 pg/mL (12-20 @ 11:07)      Echo:  < from: Transthoracic Echocardiogram (11.27.19 @ 11:44) >  Dimensions:    Normal Values:  LA:     6.2    2.0 - 4.0 cm  Ao:     3.3    2.0 - 3.8cm  SEPTUM: 1.0    0.6 - 1.2 cm  PWT:    1.2    0.6 - 1.1 cm  LVIDd:  5.3    3.0 - 5.6 cm  LVIDs:  4.5    1.8 - 4.0 cm  Derived variables:  LVMI: 132 g/m2  RWT: 0.45  Fractional short: 15 %  EF (Visual Estimate): 20-25 %  Doppler Peak Velocity (m/sec): AoV=0.8  ------------------------------------------------------------------------  Conclusions:  1. Moderate mitral regurgitation.  2. Transcatheter aortic valve replacement. Peak transaortic  valve gradient equals 3 mm Hg, which is probably normal in  the presence of a transcatheter aortic valve replacement.  Mild aortic regurgitation.  3. Moderate to severely dilated left atrial enlargement.  4. Mild left ventricular enlargement.  5. Endocardium not well visualized. Grossly severe global  left ventricular systolic dysfunction with regional  variation. Consider use of echo contrast for better  estimation of left ventricular function if clinically  indicated.  Abnormal septal motion.  6. Increased E/e'  is consistent with elevated left  ventricular filling pressure.  7. Moderate right atrial enlargement.  8. Normal right ventricular size with decreased right  ventricular systolic function.  9. Moderate-severe tricuspid regurgitation.  10. Estimated pulmonary artery systolic pressure equals 49  mm Hg, assuming right atrial pressure equals 8 mm Hg,  consistent with mild pulmonary pressures.  *** Compared with echocardiogram of 10/15/2018, the left  ventricular function has significantly worsened.    < end of copied text >    Cath:  < from: Cardiac Cath Lab - Adult (12.03.19 @ 15:13) >  CORONARY VESSELS: The coronarycirculation is right dominant.  LM:   --  LM: There was a 90 % stenosis.  LAD:   --  Mid LAD: There was a 100 % stenosis.  CX:   --  Proximal circumflex: There was a 100 % stenosis.  RCA:   --  RCA: This vessel was not injected.  GRAFTS:   --  Graft to the mid LAD: The graft was a LIMA. It was normal.  --  Graft to the 1st obtuse marginal: The graft was a saphenous vein graft  from the aorta. It was normal.  --  Graft to the RPL1: The graft was a saphenous vein graft from the aorta.  It was normal.    Pressures:  -- Aortic Pressure (S/D/M): 147/78/104  Pressures: -- Left Ventricle (s/edp): 145/20/--  Pressures:  -- Pulmonary Artery (S/D/M): 53/18/34  Pressures:  -- Pulmonary Capillary Wedge: 21/20/21  Pressures:  -- Right Atrium (a/v/M): 15/21/14  Pressures:  -- Right Ventricle (s/edp): 49/10/--  O2 Sats:  -- AO: 11.4/94.8/14.7  O2 Sats:  -- Pa: 11.7/50.2/7.99  Outputs:  -- CALCULATIONS: Body Surface Area: 1.73  Outputs:  -- OUTPUTS: CO by Leny: 3.23  Outputs:  -- OUTPUTS: Leny cardiac index: 1.86  Outputs:  -- OUTPUTS: O2 consumption: 216.73    < end of copied text >      Interpretation of Telemetry: AFib 80-100s, PVCs, 7 beats of wide complex tachycardia overnight

## 2019-12-21 NOTE — PROGRESS NOTE ADULT - ASSESSMENT
79M with CAD s/p 3v CABG (LIMA-mLAD, SVT-OM1, SVG-RPL1 in 9/2007), severe aortic stenosis s/p TAVR 2016,, known LBBB, COPD, current smoker, HTN, atrial fibrillation on warfarin, and CKD3 (baseline Cr 1.4-1.5), and newly reduced biventricular systolic function and worsening TR who presents with acute decompensated HF, volume overloaded with MERCED on CKD,    - Appreciate HF recs    ***In progress    MELITON Turner MD  Cardiology Fellow  All cardiology service information may be found 24/7 on [email or web address], password: "[email or web address]  [phone] 79M with CAD s/p 3v CABG (LIMA-mLAD, SVT-OM1, SVG-RPL1 in 9/2007), severe aortic stenosis s/p TAVR 2016, known LBBB, COPD, current smoker, atrial fibrillation on warfarin, and CKD3 (baseline Cr 1.4-1.5), and newly reduced biventricular systolic function and worsening TR who presents with acute decompensated HF, volume overloaded with MERCED on CKD, hyperkalemia and elevated INR. He is in rate controlled AFib with frequent PVCs. He is generally low normotensive. He remains volume up on exam.

## 2019-12-21 NOTE — CONSULT NOTE ADULT - SUBJECTIVE AND OBJECTIVE BOX
HPI:  80 y/o M h/o CAD s/p CABG, severe AS s/p TAVR, CKDIII, and COPD sent in from clinic for MERCED w/ hyperkalemia to 6.7. Derm c/s for lesions on back. Very itchy. Onset 1-2 weeks ago during last admission. Used lac hydrin at home w/ improvement. No other skin complaints.    PAST MEDICAL & SURGICAL HISTORY:  Heart failure  Moderate tricuspid regurgitation  Osteoarthritis  Bilateral carotid artery disease  Hypertension  COPD (chronic obstructive pulmonary disease): cigar smoker for 29 years, quit 1/2019  Prostate cancer: Prostatectomy 2008, radiation 2011  Atrial fibrillation, unspecified type: on coumadin  Erectile dysfunction, unspecified erectile dysfunction type  Coronary artery disease of bypass graft of native heart with stable angina pectoris  [place]  S/P TAVR (transcatheter aortic valve replacement): 2016 in [facility]  S/P tonsillectomy: as a child  S/P prostatectomy: 11/18/2008  S/P hernia repair: inguinal, 1998  S/P CABG x 3: 9/17/2007 at [facility]      REVIEW OF SYSTEMS      General: no fevers/chills, no lethargy	    Skin/Breast: see HPI  	  Ophthalmologic: no eye pain or change in vision  	  ENMT: no dysphagia or change in hearing    Respiratory and Thorax: no SOB or cough  	  Cardiovascular: no palpitations or chest pain    Gastrointestinal: no abdominal pain or blood in stool     Genitourinary: no dysuria or frequency    Musculoskeletal: no joint pains or weakness	    Neurological: no weakness, numbness , or tingling    MEDICATIONS  (STANDING):  albuterol/ipratropium for Nebulization. 3 milliLiter(s) Nebulizer every 6 hours  ammonium lactate 12% Lotion 1 Application(s) Topical two times a day  atorvastatin 20 milliGRAM(s) Oral at bedtime  budesonide 160 MICROgram(s)/formoterol 4.5 MICROgram(s) Inhaler 2 Puff(s) Inhalation two times a day  clotrimazole 1% Cream 1 Application(s) Topical every 12 hours  folic acid 1 milliGRAM(s) Oral daily  furosemide   Injectable 20 milliGRAM(s) IV Push two times a day  metoprolol succinate  milliGRAM(s) Oral daily  triamcinolone 0.1% Ointment 1 Application(s) Topical every 12 hours    MEDICATIONS  (PRN):      Allergies    No Known Allergies    Intolerances        SOCIAL HISTORY:    FAMILY HISTORY:  Family history of heart disease (Child)      Vital Signs Last 24 Hrs  T(C): 36.4 (21 Dec 2019 12:08), Max: 36.7 (20 Dec 2019 21:05)  T(F): 97.6 (21 Dec 2019 12:08), Max: 98 (20 Dec 2019 21:05)  HR: 85 (21 Dec 2019 12:08) (83 - 92)  BP: 118/72 (21 Dec 2019 12:08) (118/72 - 134/67)  BP(mean): --  RR: 18 (21 Dec 2019 12:08) (18 - 19)  SpO2: 93% (21 Dec 2019 04:25) (93% - 95%)    PHYSICAL EXAM:     The patient was alert and oriented X 3, well nourished, and in no  apparent distress.  OP showed no ulcerations  There was no visible lymphadenopathy.  Conjunctiva were non injected  There was no clubbing or edema of extremities.  The scalp, hair, face, eyebrows, lips, OP, neck, chest, back,   extremities X 4, nails were examined.  There was no hyperhidrosis or bromhidrosis.    Of note on skin exam:   - back - xerosis w/ secondary punctate excoriated papules  - b/l feet w/ annular erythematous patches and moccasin-like distribution of scaling  - L hand, L elbow w/ annular erythematous patches and scaling    LABS:                        10.9   5.95  )-----------( 122      ( 21 Dec 2019 10:06 )             32.9     12-21    139  |  105  |  100<H>  ----------------------------<  107<H>  5.0   |  20<L>  |  3.01<H>    Ca    8.8      21 Dec 2019 06:27  Phos  5.6     12-19  Mg     2.7     12-19    TPro  7.2  /  Alb  3.3  /  TBili  0.5  /  DBili  x   /  AST  42<H>  /  ALT  32  /  AlkPhos  124<H>  12-21    PT/INR - ( 21 Dec 2019 10:00 )   PT: 42.0 sec;   INR: 3.54 ratio         PTT - ( 21 Dec 2019 10:00 )  PTT:49.2 sec      RADIOLOGY & ADDITIONAL STUDIES:

## 2019-12-21 NOTE — PROGRESS NOTE ADULT - SUBJECTIVE AND OBJECTIVE BOX
Patient is a 79y old  Male who presents with a chief complaint of Elevated Potassium (21 Dec 2019 14:02)      SUBJECTIVE / OVERNIGHT EVENTS:    MEDICATIONS  (STANDING):  albuterol/ipratropium for Nebulization. 3 milliLiter(s) Nebulizer every 6 hours  ammonium lactate 12% Lotion 1 Application(s) Topical two times a day  atorvastatin 20 milliGRAM(s) Oral at bedtime  budesonide 160 MICROgram(s)/formoterol 4.5 MICROgram(s) Inhaler 2 Puff(s) Inhalation two times a day  clotrimazole 1% Cream 1 Application(s) Topical every 12 hours  folic acid 1 milliGRAM(s) Oral daily  furosemide   Injectable 80 milliGRAM(s) IV Push two times a day  metoprolol succinate  milliGRAM(s) Oral daily  triamcinolone 0.1% Ointment 1 Application(s) Topical every 12 hours    MEDICATIONS  (PRN):      Vital Signs Last 24 Hrs  T(C): 36.4 (21 Dec 2019 12:08), Max: 36.7 (20 Dec 2019 21:05)  T(F): 97.6 (21 Dec 2019 12:08), Max: 98 (20 Dec 2019 21:05)  HR: 85 (21 Dec 2019 12:08) (83 - 92)  BP: 118/72 (21 Dec 2019 12:08) (118/72 - 134/67)  BP(mean): --  RR: 18 (21 Dec 2019 12:08) (18 - 19)  SpO2: 93% (21 Dec 2019 04:25) (93% - 95%)  CAPILLARY BLOOD GLUCOSE        I&O's Summary    20 Dec 2019 07:01  -  21 Dec 2019 07:00  --------------------------------------------------------  IN: 420 mL / OUT: 901 mL / NET: -481 mL    21 Dec 2019 07:01  -  21 Dec 2019 15:45  --------------------------------------------------------  IN: 840 mL / OUT: 550 mL / NET: 290 mL        PHYSICAL EXAM:  GENERAL: NAD, well-developed  HEAD:  Atraumatic, Normocephalic  EYES: EOMI, PERRLA, conjunctiva and sclera clear  NECK: Supple, No JVD  CHEST/LUNG: Clear to auscultation bilaterally; No wheeze  HEART: Regular rate and rhythm; No murmurs, rubs, or gallops  ABDOMEN: Soft, Nontender, Nondistended; Bowel sounds present  EXTREMITIES:  2+ Peripheral Pulses, No clubbing, cyanosis, or edema  PSYCH: AAOx3  NEUROLOGY: non-focal  SKIN: No rashes or lesions    LABS:                        10.9   5.95  )-----------( 122      ( 21 Dec 2019 10:06 )             32.9     12-21    139  |  105  |  100<H>  ----------------------------<  107<H>  5.0   |  20<L>  |  3.01<H>    Ca    8.8      21 Dec 2019 06:27  Phos  5.6     12-19  Mg     2.7     12-19    TPro  7.2  /  Alb  3.3  /  TBili  0.5  /  DBili  x   /  AST  42<H>  /  ALT  32  /  AlkPhos  124<H>  12-21    PT/INR - ( 21 Dec 2019 10:00 )   PT: 42.0 sec;   INR: 3.54 ratio         PTT - ( 21 Dec 2019 10:00 )  PTT:49.2 sec          RADIOLOGY & ADDITIONAL TESTS:    Imaging Personally Reviewed:    Consultant(s) Notes Reviewed:      Care Discussed with Consultants/Other Providers: Patient is a 79y old  Male who presents with a chief complaint of Elevated Potassium (21 Dec 2019 14:02)      SUBJECTIVE / OVERNIGHT EVENTS:  Pt seen and examined. No acute events overnight. c/o headache. Denies sob , cp. Reports b/l LE swelling. Seen by Heart failure ; reccs appreciated.   Seen by Derm for diffuse pruritic rash.    MEDICATIONS  (STANDING):  albuterol/ipratropium for Nebulization. 3 milliLiter(s) Nebulizer every 6 hours  ammonium lactate 12% Lotion 1 Application(s) Topical two times a day  atorvastatin 20 milliGRAM(s) Oral at bedtime  budesonide 160 MICROgram(s)/formoterol 4.5 MICROgram(s) Inhaler 2 Puff(s) Inhalation two times a day  clotrimazole 1% Cream 1 Application(s) Topical every 12 hours  folic acid 1 milliGRAM(s) Oral daily  furosemide   Injectable 80 milliGRAM(s) IV Push two times a day  metoprolol succinate  milliGRAM(s) Oral daily  triamcinolone 0.1% Ointment 1 Application(s) Topical every 12 hours    MEDICATIONS  (PRN):      Vital Signs Last 24 Hrs  T(C): 36.4 (21 Dec 2019 12:08), Max: 36.7 (20 Dec 2019 21:05)  T(F): 97.6 (21 Dec 2019 12:08), Max: 98 (20 Dec 2019 21:05)  HR: 85 (21 Dec 2019 12:08) (83 - 92)  BP: 118/72 (21 Dec 2019 12:08) (118/72 - 134/67)  BP(mean): --  RR: 18 (21 Dec 2019 12:08) (18 - 19)  SpO2: 93% (21 Dec 2019 04:25) (93% - 95%)  CAPILLARY BLOOD GLUCOSE        I&O's Summary    20 Dec 2019 07:01  -  21 Dec 2019 07:00  --------------------------------------------------------  IN: 420 mL / OUT: 901 mL / NET: -481 mL    21 Dec 2019 07:01  -  21 Dec 2019 15:45  --------------------------------------------------------  IN: 840 mL / OUT: 550 mL / NET: 290 mL        PHYSICAL EXAM:  GENERAL: NAD, anicteric, afebrile  HEAD:  Atraumatic, Normocephalic  EYES: EOMI, PERRLA, conjunctiva and sclera clear  NECK: Supple, No JVD  CHEST/LUNG: Clear to auscultation bilaterally; No wheeze  HEART: Regular rate and rhythm; No murmurs, rubs, or gallops  ABDOMEN: Soft, Nontender, Nondistended; Bowel sounds present  EXTREMITIES:  2+ LE  edema  PSYCH: AAOx3  NEUROLOGY: non-focal  SKIN: diffuse xerosis and erythematous scaly patches involving the  back , LUE and b/l feet    LABS:                        10.9   5.95  )-----------( 122      ( 21 Dec 2019 10:06 )             32.9     12-21    139  |  105  |  100<H>  ----------------------------<  107<H>  5.0   |  20<L>  |  3.01<H>    Ca    8.8      21 Dec 2019 06:27  Phos  5.6     12-19  Mg     2.7     12-19    TPro  7.2  /  Alb  3.3  /  TBili  0.5  /  DBili  x   /  AST  42<H>  /  ALT  32  /  AlkPhos  124<H>  12-21    PT/INR - ( 21 Dec 2019 10:00 )   PT: 42.0 sec;   INR: 3.54 ratio         PTT - ( 21 Dec 2019 10:00 )  PTT:49.2 sec            Consultant(s) Notes Reviewed:  HF, Derm

## 2019-12-21 NOTE — CONSULT NOTE ADULT - ASSESSMENT
# Tinea corporis  With two foot/one hand involvement  - Continue clotrimazole cream BID to both feet, L hand, and L elbow x 4-6 weeks. Please provide Rx for patient with multiple refills upon discharge so that patient can complete full course  - No evidence of other infections/infestations     # Dermatitis  Non-specific; exam showing xerosis with secondary excoriations  - continue triamcinolone 0.1% oint BID to back PRN (avoid face, groin, axillae) - use for only 2 weeks at a time- please provide Rx for patient upon discharge  - continue amlactin lotion BID as needed as pt reported symptomatic improvement   - gentle skin care discussed  - orders placed in sunrise    Can have patient follow with our office upon discharge in 4-6 weeks to assess improvement    [facility]  [address]  [address]  [place] [address]  [phone]    Please provide office information in the discharge instructions and inform patient to schedule follow up appointment.    Richie Campbell MD  PGY 2 Resident  Department of Dermatology  [phone]

## 2019-12-21 NOTE — PROGRESS NOTE ADULT - PROBLEM SELECTOR PLAN 1
- Continue home dose of Toprol XL 100mg daily  - Increase Lasix to 60mg IV BID  - Hold on ACE-I/ARB/ARNI/MRA at this time given renal dysfunction  - Daily standing weights, strict I/Os  - Check daily CMP, Mg, lactate

## 2019-12-21 NOTE — PROGRESS NOTE ADULT - SUBJECTIVE AND OBJECTIVE BOX
HF to Follow.  Thanks Progress Note:   · Provider Specialty	Heart Failure	  		    Reason for Admission:   Reason for Admission:  · Reason for Admission	Elevated Potassium	      · Subjective and Objective: 	  Patient seen and examined at bedside.    Overnight Events:   - No reported worsening CP/Palps/SOB        REVIEW OF SYSTEMS:  Constitutional:     No fevers, chills, weight loss, weight gain  HEENT:                  No dry eyes, nasal congestion, postnasal drip  CV:                         see above  Resp:                     No cough, SOB, dyspnea, wheezing, sputum  GI:                          No nausea, vomiting, abdominal pain, diarrhea, constipation  :                        No dysuria, nocturia, hematuria, increased urinary frequency  Musculoskeletal: No back pain, myalgias, arthralgias   Skin:                       No rash, pruritus, ecchymoses  Neurological:        No headache, dizziness, syncope, weakness, numbness  Psychiatric:           No anxiety, depression   Endocrine:            No hot/cold intolerance, polydipsia  Heme/Lymph:      No bleeding, easy bruising  Allergic/Immune: No itchy eyes, rhinorrhea, hives angioedema      Current Meds:  albuterol/ipratropium for Nebulization. 3 milliLiter(s) Nebulizer every 6 hours  ammonium lactate 12% Lotion 1 Application(s) Topical two times a day  atorvastatin 20 milliGRAM(s) Oral at bedtime  budesonide 160 MICROgram(s)/formoterol 4.5 MICROgram(s) Inhaler 2 Puff(s) Inhalation two times a day  clotrimazole 1% Cream 1 Application(s) Topical every 12 hours  folic acid 1 milliGRAM(s) Oral daily  furosemide   Injectable 20 milliGRAM(s) IV Push two times a day  metoprolol succinate  milliGRAM(s) Oral daily  triamcinolone 0.1% Ointment 1 Application(s) Topical every 12 hours      PAST MEDICAL & SURGICAL HISTORY:  Heart failure  Moderate tricuspid regurgitation  Osteoarthritis  Bilateral carotid artery disease  Hypertension  COPD (chronic obstructive pulmonary disease): cigar smoker for 29 years, quit 1/2019  Prostate cancer: Prostatectomy 2008, radiation 2011  Atrial fibrillation, unspecified type: on coumadin  Erectile dysfunction, unspecified erectile dysfunction type  Coronary artery disease of bypass graft of native heart with stable angina pectoris  [place]  S/P TAVR (transcatheter aortic valve replacement): 2016 in [facility]  S/P tonsillectomy: as a child  S/P prostatectomy: 11/18/2008  S/P hernia repair: inguinal, 1998  S/P CABG x 3: 9/17/2007 at [facility]      Vitals:  T(F): 97.4 (12-21), Max: 98.2 (12-20)  HR: 83 (12-21) (77 - 101)  BP: 134/67 (12-21) (108/70 - 148/70)  RR: 19 (12-21)  SpO2: 93% (12-21)  I&O's Summary    20 Dec 2019 07:01  -  21 Dec 2019 07:00  --------------------------------------------------------  IN: 420 mL / OUT: 901 mL / NET: -481 mL    21 Dec 2019 07:01  -  21 Dec 2019 07:47  --------------------------------------------------------  IN: 0 mL / OUT: 200 mL / NET: -200 mL        Physical Exam:  Appearance: No acute distress; speaking in complete sentences  Eyes: PERRL, EOMI, pink conjunctiva  HENT: Normal oral mucosa  Cardiovascular: irregularly irreglary, S1, S2, 1+ LE pitting edema  Respiratory: diminished breath sounds throughout  Gastrointestinal: soft, non-tender, non-distended with normal bowel sounds  Musculoskeletal: No clubbing; no joint deformity   Neurologic: Non-focal  Lymphatic: No lymphadenopathy  Psychiatry: AAOx3, mood & affect appropriate  Skin: No rashes, ecchymoses, or cyanosis    Labs Personally Reviewed 12/21/2019                          10.9   5.65  )-----------( 120      ( 20 Dec 2019 11:07 )             32.8     12-21    139  |  105  |  100<H>  ----------------------------<  107<H>  5.0   |  20<L>  |  3.01<H>    Ca    8.8      21 Dec 2019 06:27  Phos  5.6     12-19  Mg     2.7     12-19    TPro  7.2  /  Alb  3.3  /  TBili  0.5  /  DBili  x   /  AST  42<H>  /  ALT  32  /  AlkPhos  124<H>  12-21    PT/INR - ( 20 Dec 2019 11:07 )   PT: 64.1 sec;   INR: 5.29 ratio         PTT - ( 19 Dec 2019 19:54 )  PTT:60.8 sec      Serum Pro-Brain Natriuretic Peptide: 02522 pg/mL (12-20 @ 11:07)      Echo:  < from: Transthoracic Echocardiogram (11.27.19 @ 11:44) >  Dimensions:    Normal Values:  LA:     6.2    2.0 - 4.0 cm  Ao:     3.3    2.0 - 3.8cm  SEPTUM: 1.0    0.6 - 1.2 cm  PWT:    1.2    0.6 - 1.1 cm  LVIDd:  5.3    3.0 - 5.6 cm  LVIDs:  4.5    1.8 - 4.0 cm  Derived variables:  LVMI: 132 g/m2  RWT: 0.45  Fractional short: 15 %  EF (Visual Estimate): 20-25 %  Doppler Peak Velocity (m/sec): AoV=0.8  ------------------------------------------------------------------------  Conclusions:  1. Moderate mitral regurgitation.  2. Transcatheter aortic valve replacement. Peak transaortic  valve gradient equals 3 mm Hg, which is probably normal in  the presence of a transcatheter aortic valve replacement.  Mild aortic regurgitation.  3. Moderate to severely dilated left atrial enlargement.  4. Mild left ventricular enlargement.  5. Endocardium not well visualized. Grossly severe global  left ventricular systolic dysfunction with regional  variation. Consider use of echo contrast for better  estimation of left ventricular function if clinically  indicated.  Abnormal septal motion.  6. Increased E/e'  is consistent with elevated left  ventricular filling pressure.  7. Moderate right atrial enlargement.  8. Normal right ventricular size with decreased right  ventricular systolic function.  9. Moderate-severe tricuspid regurgitation.  10. Estimated pulmonary artery systolic pressure equals 49  mm Hg, assuming right atrial pressure equals 8 mm Hg,  consistent with mild pulmonary pressures.  *** Compared with echocardiogram of 10/15/2018, the left  ventricular function has significantly worsened.    < end of copied text >    Cath:  < from: Cardiac Cath Lab - Adult (12.03.19 @ 15:13) >  CORONARY VESSELS: The coronarycirculation is right dominant.  LM:   --  LM: There was a 90 % stenosis.  LAD:   --  Mid LAD: There was a 100 % stenosis.  CX:   --  Proximal circumflex: There was a 100 % stenosis.  RCA:   --  RCA: This vessel was not injected.  GRAFTS:   --  Graft to the mid LAD: The graft was a LIMA. It was normal.  --  Graft to the 1st obtuse marginal: The graft was a saphenous vein graft  from the aorta. It was normal.  --  Graft to the RPL1: The graft was a saphenous vein graft from the aorta.  It was normal.    Pressures:  -- Aortic Pressure (S/D/M): 147/78/104  Pressures: -- Left Ventricle (s/edp): 145/20/--  Pressures:  -- Pulmonary Artery (S/D/M): 53/18/34  Pressures:  -- Pulmonary Capillary Wedge: 21/20/21  Pressures:  -- Right Atrium (a/v/M): 15/21/14  Pressures:  -- Right Ventricle (s/edp): 49/10/--  O2 Sats:  -- AO: 11.4/94.8/14.7  O2 Sats:  -- Pa: 11.7/50.2/7.99  Outputs:  -- CALCULATIONS: Body Surface Area: 1.73  Outputs:  -- OUTPUTS: CO by Leny: 3.23  Outputs:  -- OUTPUTS: Leny cardiac index: 1.86  Outputs:  -- OUTPUTS: O2 consumption: 216.73    < end of copied text >      Interpretation of Telemetry: AFib 80-100s, PVCs, 7 beats of wide complex tachycardia overnight      Assessment and Plan:   · Assessment		  79M with CAD s/p 3v CABG (LIMA-mLAD, SVT-OM1, SVG-RPL1 in 9/2007), severe aortic stenosis s/p TAVR 2016, known LBBB, COPD, current smoker, atrial fibrillation on warfarin, and CKD3 (baseline Cr 1.4-1.5), and newly reduced biventricular systolic function and worsening TR who presents with acute decompensated HF, volume overloaded with MERCED on CKD, hyperkalemia and elevated INR. He is in rate controlled AFib with frequent PVCs. He is generally low normotensive. He remains volume up on exam.     Recs  - Lasix 80 IV  - Toprol  - Holding  ACE-I/ARB/ARNI/MRA at this time given renal dysfunction  - Monitor INR    ANALILIA Bates

## 2019-12-22 NOTE — PROGRESS NOTE ADULT - SUBJECTIVE AND OBJECTIVE BOX
Patient is a 79y old  Male who presents with a chief complaint of Elevated Potassium (22 Dec 2019 07:07)      SUBJECTIVE / OVERNIGHT EVENTS:    MEDICATIONS  (STANDING):  albuterol/ipratropium for Nebulization. 3 milliLiter(s) Nebulizer every 6 hours  ammonium lactate 12% Lotion 1 Application(s) Topical two times a day  atorvastatin 20 milliGRAM(s) Oral at bedtime  budesonide 160 MICROgram(s)/formoterol 4.5 MICROgram(s) Inhaler 2 Puff(s) Inhalation two times a day  clotrimazole 1% Cream 1 Application(s) Topical every 12 hours  folic acid 1 milliGRAM(s) Oral daily  furosemide   Injectable 80 milliGRAM(s) IV Push two times a day  metoprolol succinate  milliGRAM(s) Oral daily  triamcinolone 0.1% Ointment 1 Application(s) Topical every 12 hours    MEDICATIONS  (PRN):      Vital Signs Last 24 Hrs  T(C): 36.5 (22 Dec 2019 12:38), Max: 36.9 (21 Dec 2019 20:40)  T(F): 97.7 (22 Dec 2019 12:38), Max: 98.4 (21 Dec 2019 20:40)  HR: 94 (22 Dec 2019 12:38) (79 - 101)  BP: 123/76 (22 Dec 2019 12:38) (107/69 - 134/75)  BP(mean): --  RR: 19 (22 Dec 2019 12:38) (18 - 20)  SpO2: 98% (22 Dec 2019 12:38) (96% - 99%)  CAPILLARY BLOOD GLUCOSE        I&O's Summary    21 Dec 2019 07:01  -  22 Dec 2019 07:00  --------------------------------------------------------  IN: 1260 mL / OUT: 1350 mL / NET: -90 mL    22 Dec 2019 07:01  -  22 Dec 2019 13:13  --------------------------------------------------------  IN: 300 mL / OUT: 900 mL / NET: -600 mL        PHYSICAL EXAM:  GENERAL: NAD, well-developed  HEAD:  Atraumatic, Normocephalic  EYES: EOMI, PERRLA, conjunctiva and sclera clear  NECK: Supple, No JVD  CHEST/LUNG: Clear to auscultation bilaterally; No wheeze  HEART: Regular rate and rhythm; No murmurs, rubs, or gallops  ABDOMEN: Soft, Nontender, Nondistended; Bowel sounds present  EXTREMITIES:  2+ Peripheral Pulses, No clubbing, cyanosis, or edema  PSYCH: AAOx3  NEUROLOGY: non-focal  SKIN: No rashes or lesions    LABS:                        10.9   5.22  )-----------( 126      ( 22 Dec 2019 09:00 )             33.3     12-22    143  |  104  |  100<H>  ----------------------------<  105<H>  5.1   |  23  |  3.25<H>    Ca    8.9      22 Dec 2019 06:57    TPro  7.2  /  Alb  3.3  /  TBili  0.5  /  DBili  x   /  AST  42<H>  /  ALT  32  /  AlkPhos  124<H>  12-21    PT/INR - ( 22 Dec 2019 08:57 )   PT: 26.4 sec;   INR: 2.24 ratio         PTT - ( 21 Dec 2019 10:00 )  PTT:49.2 sec          RADIOLOGY & ADDITIONAL TESTS:    Imaging Personally Reviewed:    Consultant(s) Notes Reviewed:      Care Discussed with Consultants/Other Providers: Patient is a 79y old  Male who presents with a chief complaint of Elevated Potassium (22 Dec 2019 07:07)      SUBJECTIVE / OVERNIGHT EVENTS: Pt seen and examined. No acute events overnight. c/o anxiety, insomnia. SOB is unchanged.     Tele : Afib, 80s -90s, occasional PVCs, couplets    MEDICATIONS  (STANDING):  albuterol/ipratropium for Nebulization. 3 milliLiter(s) Nebulizer every 6 hours  ammonium lactate 12% Lotion 1 Application(s) Topical two times a day  atorvastatin 20 milliGRAM(s) Oral at bedtime  budesonide 160 MICROgram(s)/formoterol 4.5 MICROgram(s) Inhaler 2 Puff(s) Inhalation two times a day  clotrimazole 1% Cream 1 Application(s) Topical every 12 hours  folic acid 1 milliGRAM(s) Oral daily  furosemide   Injectable 80 milliGRAM(s) IV Push two times a day  metoprolol succinate  milliGRAM(s) Oral daily  triamcinolone 0.1% Ointment 1 Application(s) Topical every 12 hours    MEDICATIONS  (PRN):      Vital Signs Last 24 Hrs  T(C): 36.5 (22 Dec 2019 12:38), Max: 36.9 (21 Dec 2019 20:40)  T(F): 97.7 (22 Dec 2019 12:38), Max: 98.4 (21 Dec 2019 20:40)  HR: 94 (22 Dec 2019 12:38) (79 - 101)  BP: 123/76 (22 Dec 2019 12:38) (107/69 - 134/75)  BP(mean): --  RR: 19 (22 Dec 2019 12:38) (18 - 20)  SpO2: 98% (22 Dec 2019 12:38) (96% - 99%)  CAPILLARY BLOOD GLUCOSE        I&O's Summary    21 Dec 2019 07:01  -  22 Dec 2019 07:00  --------------------------------------------------------  IN: 1260 mL / OUT: 1350 mL / NET: -90 mL    22 Dec 2019 07:01  -  22 Dec 2019 13:13  --------------------------------------------------------  IN: 300 mL / OUT: 900 mL / NET: -600 mL        PHYSICAL EXAM:  GENERAL: NAD, anicteric, afebrile  HEAD:  Atraumatic, Normocephalic  EYES: EOMI, PERRLA, conjunctiva and sclera clear  NECK: Supple, No JVD  CHEST/LUNG: Clear to auscultation bilaterally; No wheeze  HEART: Regular rate and rhythm; No murmurs, rubs, or gallops  ABDOMEN: Soft, Nontender, Nondistended; Bowel sounds present  EXTREMITIES:  2+ LE  edema  PSYCH: AAOx3  NEUROLOGY: non-focal  SKIN: diffuse xerosis and erythematous scaly patches involving the  back , LUE and b/l feet    LABS:                        10.9   5.22  )-----------( 126      ( 22 Dec 2019 09:00 )             33.3     12-22    143  |  104  |  100<H>  ----------------------------<  105<H>  5.1   |  23  |  3.25<H>    Ca    8.9      22 Dec 2019 06:57    TPro  7.2  /  Alb  3.3  /  TBili  0.5  /  DBili  x   /  AST  42<H>  /  ALT  32  /  AlkPhos  124<H>  12-21    PT/INR - ( 22 Dec 2019 08:57 )   PT: 26.4 sec;   INR: 2.24 ratio         PTT - ( 21 Dec 2019 10:00 )  PTT:49.2 sec

## 2019-12-22 NOTE — PROGRESS NOTE ADULT - PROBLEM SELECTOR PLAN 1
- Continue home dose of Toprol XL 100mg daily  - continue Lasix 80mgIV BID  - Hold on ACE-I/ARB/ARNI/MRA at this time given renal dysfunction  - Daily standing weights, strict I/Os  - Check daily CMP, Mg, lactate

## 2019-12-22 NOTE — PROGRESS NOTE ADULT - PROBLEM SELECTOR PLAN 1
pt with CKD 3 ; presented with Cr 3.4   - SCr improving  - K 6.7 at presentation ; s/p Carolann in the ED ; K is 5.1 today  - No urgent need for dialysis per Nephrology  - monitor bmp  - Renal diet  -  Renal US with mildly increased cortical echogenicity bilaterally, which may be secondary to medical renal disease

## 2019-12-22 NOTE — PROGRESS NOTE ADULT - SUBJECTIVE AND OBJECTIVE BOX
Progress Note:   · Provider Specialty	Heart Failure	  		    Reason for Admission:   Reason for Admission:  · Reason for Admission	Elevated Potassium	      · Subjective and Objective: 	  Patient seen and examined at bedside.    Overnight Events:   - No reported worsening CP/Palps/SOB        REVIEW OF SYSTEMS:  Constitutional:     No fevers, chills, weight loss, weight gain  HEENT:                  No dry eyes, nasal congestion, postnasal drip  CV:                         see above  Resp:                     No cough, SOB, dyspnea, wheezing, sputum  GI:                          No nausea, vomiting, abdominal pain, diarrhea, constipation  :                        No dysuria, nocturia, hematuria, increased urinary frequency  Musculoskeletal: No back pain, myalgias, arthralgias   Skin:                       No rash, pruritus, ecchymoses  Neurological:        No headache, dizziness, syncope, weakness, numbness  Psychiatric:           No anxiety, depression   Endocrine:            No hot/cold intolerance, polydipsia  Heme/Lymph:      No bleeding, easy bruising  Allergic/Immune: No itchy eyes, rhinorrhea, hives angioedema      Current Meds:  albuterol/ipratropium for Nebulization. 3 milliLiter(s) Nebulizer every 6 hours  ammonium lactate 12% Lotion 1 Application(s) Topical two times a day  atorvastatin 20 milliGRAM(s) Oral at bedtime  budesonide 160 MICROgram(s)/formoterol 4.5 MICROgram(s) Inhaler 2 Puff(s) Inhalation two times a day  clotrimazole 1% Cream 1 Application(s) Topical every 12 hours  folic acid 1 milliGRAM(s) Oral daily  furosemide   Injectable 80 milliGRAM(s) IV Push two times a day  metoprolol succinate  milliGRAM(s) Oral daily  triamcinolone 0.1% Ointment 1 Application(s) Topical every 12 hours      PAST MEDICAL & SURGICAL HISTORY:  Heart failure  Moderate tricuspid regurgitation  Osteoarthritis  Bilateral carotid artery disease  Hypertension  COPD (chronic obstructive pulmonary disease): cigar smoker for 29 years, quit 1/2019  Prostate cancer: Prostatectomy 2008, radiation 2011  Atrial fibrillation, unspecified type: on coumadin  Erectile dysfunction, unspecified erectile dysfunction type  Coronary artery disease of bypass graft of native heart with stable angina pectoris  [place]  S/P TAVR (transcatheter aortic valve replacement): 2016 in [facility]  S/P tonsillectomy: as a child  S/P prostatectomy: 11/18/2008  S/P hernia repair: inguinal, 1998  S/P CABG x 3: 9/17/2007 at [facility]      Vitals:  T(F): 97.4 (12-21), Max: 98.2 (12-20)  HR: 83 (12-21) (77 - 101)  BP: 134/67 (12-21) (108/70 - 148/70)  RR: 19 (12-21)  SpO2: 93% (12-21)  I&O's Summary    20 Dec 2019 07:01  -  21 Dec 2019 07:00  --------------------------------------------------------  IN: 420 mL / OUT: 901 mL / NET: -481 mL    21 Dec 2019 07:01  -  21 Dec 2019 07:47  --------------------------------------------------------  IN: 0 mL / OUT: 200 mL / NET: -200 mL        Physical Exam:  Appearance: No acute distress; speaking in complete sentences  Eyes: PERRL, EOMI, pink conjunctiva  HENT: Normal oral mucosa  Cardiovascular: irregularly irreglary, S1, S2, 1+ LE pitting edema  Respiratory: diminished breath sounds throughout  Gastrointestinal: soft, non-tender, non-distended with normal bowel sounds  Musculoskeletal: No clubbing; no joint deformity   Neurologic: Non-focal  Lymphatic: No lymphadenopathy  Psychiatry: AAOx3, mood & affect appropriate  Skin: No rashes, ecchymoses, or cyanosis    Labs Personally Reviewed 12/22/2019                          10.9   5.65  )-----------( 120      ( 20 Dec 2019 11:07 )             32.8     12-21    139  |  105  |  100<H>  ----------------------------<  107<H>  5.0   |  20<L>  |  3.01<H>    Ca    8.8      21 Dec 2019 06:27  Phos  5.6     12-19  Mg     2.7     12-19    TPro  7.2  /  Alb  3.3  /  TBili  0.5  /  DBili  x   /  AST  42<H>  /  ALT  32  /  AlkPhos  124<H>  12-21    PT/INR - ( 20 Dec 2019 11:07 )   PT: 64.1 sec;   INR: 5.29 ratio         PTT - ( 19 Dec 2019 19:54 )  PTT:60.8 sec      Serum Pro-Brain Natriuretic Peptide: 64411 pg/mL (12-20 @ 11:07)      Echo:  < from: Transthoracic Echocardiogram (11.27.19 @ 11:44) >  Dimensions:    Normal Values:  LA:     6.2    2.0 - 4.0 cm  Ao:     3.3    2.0 - 3.8cm  SEPTUM: 1.0    0.6 - 1.2 cm  PWT:    1.2    0.6 - 1.1 cm  LVIDd:  5.3    3.0 - 5.6 cm  LVIDs:  4.5    1.8 - 4.0 cm  Derived variables:  LVMI: 132 g/m2  RWT: 0.45  Fractional short: 15 %  EF (Visual Estimate): 20-25 %  Doppler Peak Velocity (m/sec): AoV=0.8  ------------------------------------------------------------------------  Conclusions:  1. Moderate mitral regurgitation.  2. Transcatheter aortic valve replacement. Peak transaortic  valve gradient equals 3 mm Hg, which is probably normal in  the presence of a transcatheter aortic valve replacement.  Mild aortic regurgitation.  3. Moderate to severely dilated left atrial enlargement.  4. Mild left ventricular enlargement.  5. Endocardium not well visualized. Grossly severe global  left ventricular systolic dysfunction with regional  variation. Consider use of echo contrast for better  estimation of left ventricular function if clinically  indicated.  Abnormal septal motion.  6. Increased E/e'  is consistent with elevated left  ventricular filling pressure.  7. Moderate right atrial enlargement.  8. Normal right ventricular size with decreased right  ventricular systolic function.  9. Moderate-severe tricuspid regurgitation.  10. Estimated pulmonary artery systolic pressure equals 49  mm Hg, assuming right atrial pressure equals 8 mm Hg,  consistent with mild pulmonary pressures.  *** Compared with echocardiogram of 10/15/2018, the left  ventricular function has significantly worsened.    < end of copied text >    Cath:  < from: Cardiac Cath Lab - Adult (12.03.19 @ 15:13) >  CORONARY VESSELS: The coronarycirculation is right dominant.  LM:   --  LM: There was a 90 % stenosis.  LAD:   --  Mid LAD: There was a 100 % stenosis.  CX:   --  Proximal circumflex: There was a 100 % stenosis.  RCA:   --  RCA: This vessel was not injected.  GRAFTS:   --  Graft to the mid LAD: The graft was a LIMA. It was normal.  --  Graft to the 1st obtuse marginal: The graft was a saphenous vein graft  from the aorta. It was normal.  --  Graft to the RPL1: The graft was a saphenous vein graft from the aorta.  It was normal.    Pressures:  -- Aortic Pressure (S/D/M): 147/78/104  Pressures: -- Left Ventricle (s/edp): 145/20/--  Pressures:  -- Pulmonary Artery (S/D/M): 53/18/34  Pressures:  -- Pulmonary Capillary Wedge: 21/20/21  Pressures:  -- Right Atrium (a/v/M): 15/21/14  Pressures:  -- Right Ventricle (s/edp): 49/10/--  O2 Sats:  -- AO: 11.4/94.8/14.7  O2 Sats:  -- Pa: 11.7/50.2/7.99  Outputs:  -- CALCULATIONS: Body Surface Area: 1.73  Outputs:  -- OUTPUTS: CO by Leny: 3.23  Outputs:  -- OUTPUTS: Leny cardiac index: 1.86  Outputs:  -- OUTPUTS: O2 consumption: 216.73    < end of copied text >      Interpretation of Telemetry: AFib 80-100s, PVCs, 7 beats of wide complex tachycardia overnight      Assessment and Plan:   · Assessment		  79M with CAD s/p 3v CABG (LIMA-mLAD, SVT-OM1, SVG-RPL1 in 9/2007), severe aortic stenosis s/p TAVR 2016, known LBBB, COPD, current smoker, atrial fibrillation on warfarin, and CKD3 (baseline Cr 1.4-1.5), and newly reduced biventricular systolic function and worsening TR who presents with acute decompensated HF, volume overloaded with MERCED on CKD, hyperkalemia and elevated INR. He is in rate controlled AFib with frequent PVCs. He is generally low normotensive. He remains volume up on exam.     Recs  - Lasix 80 IV  - Toprol  - Holding  ACE-I/ARB/ARNI/MRA at this time given renal dysfunction  - Monitor INR  - General Cardiology will sign off; HF to follow    ANALILIA Bates

## 2019-12-22 NOTE — PROGRESS NOTE ADULT - ASSESSMENT
79M with CAD s/p 3v CABG (LIMA-mLAD, SVT-OM1, SVG-RPL1 in 9/2007), severe aortic stenosis s/p TAVR 2016, known LBBB, COPD, current smoker, atrial fibrillation on warfarin, and CKD3 (baseline Cr 1.4-1.5), and newly reduced biventricular systolic function and worsening TR who presents with acute decompensated HF, volume overloaded with MERCED on CKD, hyperkalemia and elevated INR. He is in rate controlled AFib with frequent PVCs. He is generally low normotensive. He remains volume up on exam.

## 2019-12-22 NOTE — PROGRESS NOTE ADULT - SUBJECTIVE AND OBJECTIVE BOX
Interval History:  feels well  states urinating well to lasix 80 mg IV     Medications:  albuterol/ipratropium for Nebulization. 3 milliLiter(s) Nebulizer every 6 hours  ammonium lactate 12% Lotion 1 Application(s) Topical two times a day  atorvastatin 20 milliGRAM(s) Oral at bedtime  budesonide 160 MICROgram(s)/formoterol 4.5 MICROgram(s) Inhaler 2 Puff(s) Inhalation two times a day  clotrimazole 1% Cream 1 Application(s) Topical every 12 hours  folic acid 1 milliGRAM(s) Oral daily  furosemide   Injectable 80 milliGRAM(s) IV Push two times a day  metoprolol succinate  milliGRAM(s) Oral daily  triamcinolone 0.1% Ointment 1 Application(s) Topical every 12 hours      Vitals:  T(C): 36.5 (19 @ 20:06), Max: 36.5 (19 @ 12:38)  HR: 87 (19 @ 20:06) (85 - 101)  BP: 96/62 (19 @ 20:06) (96/62 - 131/69)  BP(mean): --  RR: 20 (19 @ 20:06) (18 - 20)  SpO2: 95% (19 @ 20:06) (95% - 98%)    Daily     Daily Weight in k.2 (22 Dec 2019 07:54)    Weight (kg): 61.8 ( @ 07:40)    I&O's Summary    21 Dec 2019 07:  -  22 Dec 2019 07:00  --------------------------------------------------------  IN: 1260 mL / OUT: 1350 mL / NET: -90 mL    22 Dec 2019 07:01  -  22 Dec 2019 22:12  --------------------------------------------------------  IN: 1140 mL / OUT: 1750 mL / NET: -610 mL        Physical Exam:  Appearance: No Acute Distress  HEENT: PERRL  Neck: JVD approx 14 cm with HJR  Cardiovascular: Normal S1 S2, No murmurs/rubs/gallops  Respiratory: Clear to auscultation bilaterally  Gastrointestinal: Soft, Non-tender	  Skin: No cyanosis	  Neurologic: Non-focal  Extremities: 1+ LE edema; asterixis  Psychiatry: A & O x 3, Mood & affect appropriate    Labs:                        10.9   5.22  )-----------( 126      ( 22 Dec 2019 09:00 )             33.3     12-    143  |  104  |  100<H>  ----------------------------<  105<H>  5.1   |  23  |  3.25<H>    Ca    8.9      22 Dec 2019 06:57    TPro  7.2  /  Alb  3.3  /  TBili  0.5  /  DBili  x   /  AST  42<H>  /  ALT  32  /  AlkPhos  124<H>  12-21    PT/INR - ( 22 Dec 2019 08:57 )   PT: 26.4 sec;   INR: 2.24 ratio         PTT - ( 21 Dec 2019 10:00 )  PTT:49.2 sec

## 2019-12-22 NOTE — PROGRESS NOTE ADULT - PROBLEM SELECTOR PLAN 1
Patient with Non-oliguric MERCED secondary to diuretic use and NSAIDs. Patient with history of possible MDS? Serum creatinine 3.4 increased from baseline SCr: 1.1- 1.5. Last SCr: 1.6 on 12/5/19.    Recommend   - Serum creatinine improving on diuresis  - Renal US with non obstruct left renal calculi. Echogenic kidneys bilaterally  - Hold ACE/ARBs, and avoid NSAIDs, and other potential nephrotoxins  - Please check UA with urine spot TP/SC ratio  - Daily STANDING weights  - Monitor BMP and replete electrolytes on diuretics  - dose medications as per eGFR

## 2019-12-22 NOTE — PROGRESS NOTE ADULT - SUBJECTIVE AND OBJECTIVE BOX
[facility] Division of Kidney Diseases & Hypertension  FOLLOW UP NOTE  ------------------------------------------------------------------------------  Chief Complaint:Acute renal failure      24 hour events/subjective:  Patient seen this morning. Without complaints  NO acute events overnight  Continued on diuretics  UO: 1.3L  Diuretic dose increased      PAST HISTORY  --------------------------------------------------------------------------------  No significant changes to PMH, PSH, FHx, SHx, unless otherwise noted    ALLERGIES & MEDICATIONS  --------------------------------------------------------------------------------  Allergies    No Known Allergies    Intolerances      Standing Inpatient Medications  albuterol/ipratropium for Nebulization. 3 milliLiter(s) Nebulizer every 6 hours  ammonium lactate 12% Lotion 1 Application(s) Topical two times a day  atorvastatin 20 milliGRAM(s) Oral at bedtime  budesonide 160 MICROgram(s)/formoterol 4.5 MICROgram(s) Inhaler 2 Puff(s) Inhalation two times a day  clotrimazole 1% Cream 1 Application(s) Topical every 12 hours  folic acid 1 milliGRAM(s) Oral daily  furosemide   Injectable 80 milliGRAM(s) IV Push two times a day  metoprolol succinate  milliGRAM(s) Oral daily  triamcinolone 0.1% Ointment 1 Application(s) Topical every 12 hours    PRN Inpatient Medications      REVIEW OF SYSTEMS  --------------------------------------------------------------------------------  Gen: No  fevers/chills, weakness  Skin: No rashes  Head/Eyes/Ears/Mouth: No headache  Respiratory: + GAN  CV: No chest pain,   GI: No abdominal pain, diarrhea, constipation, nausea, vomiting  : No increased frequency, dysuria, hematuria, nocturia  MSK:  + LE swelling  Neuro: No dizziness/lightheadedness, weakness, seizures    All other systems were reviewed and are negative, except as noted.    VITALS/PHYSICAL EXAM  --------------------------------------------------------------------------------  T(C): 36.2 (12-22-19 @ 04:03), Max: 36.9 (12-21-19 @ 20:40)  HR: 89 (12-22-19 @ 05:03) (79 - 101)  BP: 131/69 (12-22-19 @ 05:03) (107/69 - 134/75)  RR: 19 (12-22-19 @ 05:03) (18 - 20)  SpO2: 96% (12-22-19 @ 05:03) (96% - 99%)  Wt(kg): --    Weight (kg): 61.8 (12-21-19 @ 07:40)      12-21-19 @ 07:01  -  12-22-19 @ 07:00  --------------------------------------------------------  IN: 1260 mL / OUT: 1350 mL / NET: -90 mL    Physical Exam:  	Gen: NAD, well-appearing  	HEENT: supple neck, mild JVD elevation  	Pulm: rales on the left lung   	CV:  S1S2  	Abd: +BS, soft   	Ext: + bilateral pitting edema  	Neuro: No focal deficits  	Skin: Warm, without rashes  	Vascular access: none    LABS/STUDIES  --------------------------------------------------------------------------------              10.9   5.95  >-----------<  122      [12-21-19 @ 10:06]              32.9     139  |  105  |  100  ----------------------------<  107      [12-21-19 @ 06:27]  5.0   |  20  |  3.01        Ca     8.8     [12-21-19 @ 06:27]    TPro  7.2  /  Alb  3.3  /  TBili  0.5  /  DBili  x   /  AST  42  /  ALT  32  /  AlkPhos  124  [12-21-19 @ 06:27]    PT/INR: PT 42.0 , INR 3.54       [12-21-19 @ 10:00]  PTT: 49.2       [12-21-19 @ 10:00]      Creatinine Trend:  SCr 3.01 [12-21 @ 06:27]  SCr 3.12 [12-20 @ 11:07]  SCr 3.40 [12-19 @ 21:27]  SCr 3.42 [12-19 @ 19:54]  SCr 1.68 [12-05 @ 05:19]      Urine Creatinine 56      [12-21-19 @ 07:09]  Urine Protein 21      [12-21-19 @ 07:09]  Urine Sodium 40      [12-21-19 @ 07:09]  Urine Urea Nitrogen 668      [12-21-19 @ 10:00]

## 2019-12-23 NOTE — PROGRESS NOTE ADULT - SUBJECTIVE AND OBJECTIVE BOX
Subjective: No overnight events.     Medications:  albuterol/ipratropium for Nebulization. 3 milliLiter(s) Nebulizer every 6 hours  ammonium lactate 12% Lotion 1 Application(s) Topical two times a day  atorvastatin 20 milliGRAM(s) Oral at bedtime  budesonide 160 MICROgram(s)/formoterol 4.5 MICROgram(s) Inhaler 2 Puff(s) Inhalation two times a day  clotrimazole 1% Cream 1 Application(s) Topical every 12 hours  folic acid 1 milliGRAM(s) Oral daily  furosemide   Injectable 80 milliGRAM(s) IV Push two times a day  melatonin 3 milliGRAM(s) Oral at bedtime  metoprolol succinate  milliGRAM(s) Oral daily  triamcinolone 0.1% Ointment 1 Application(s) Topical two times a day  warfarin 2.5 milliGRAM(s) Oral once      Physical Exam:    Vital Signs Last 24 Hrs  T(C): 36.2 (23 Dec 2019 13:19), Max: 36.5 (22 Dec 2019 20:06)  T(F): 97.1 (23 Dec 2019 13:19), Max: 97.7 (22 Dec 2019 20:06)  HR: 88 (23 Dec 2019 18:23) (84 - 95)  BP: 124/80 (23 Dec 2019 18:23) (96/62 - 134/74)  RR: 20 (23 Dec 2019 13:19) (20 - 20)  SpO2: 96% (23 Dec 2019 13:19) (95% - 96%)    Daily Weight in k.8 (23 Dec 2019 07:15)    I&O's Summary    22 Dec 2019 07:01  -  23 Dec 2019 07:00  --------------------------------------------------------  IN: 1140 mL / OUT: 2275 mL / NET: -1135 mL    23 Dec 2019 07:01  -  23 Dec 2019 20:00  --------------------------------------------------------  IN: 360 mL / OUT: 0 mL / NET: 360 mL    General: No distress. Comfortable.  HEENT: EOM intact.  Neck: Neck supple. JVP moderate to severely elevated. No masses  Chest: Clear to auscultation bilaterally  CV: Normal S1 and S2. No murmurs, rub, or gallops. Radial pulses normal. 1+ edema in legs bilaterally.   Abdomen: Soft, non-distended, non-tender  Skin: No rashes or skin breakdown  Neurology: Somnolent, but arousable. Asterixis. Sensation intact  Psych: Unable to assess affect.  Labs:                        10.9   5.22  )-----------( 126      ( 22 Dec 2019 09:00 )             33.3     12-23    140  |  103  |  94<H>  ----------------------------<  103<H>  4.5   |  22  |  3.09<H>    Ca    8.8      23 Dec 2019 06:36      PT/INR - ( 23 Dec 2019 08:18 )   PT: 21.6 sec;   INR: 1.87 ratio       Serum Pro-Brain Natriuretic Peptide: 22798 pg/mL ( @ 11:07)

## 2019-12-23 NOTE — PROGRESS NOTE ADULT - SUBJECTIVE AND OBJECTIVE BOX
Patient continues to have lower extremity swelling. Denies chest pain, shortness of breath.     GENERAL: No fevers, no chills.  EYES: No blurry vision,  No photophobia  ENT: No sore throat.  No dysphagia  Cardiovascular: No chest pain, palpitations, orthopnea  Pulmonary: No cough, no wheezing. No shortness of breath  Gastrointestinal: No abdominal pain, no diarrhea, no constipation.  Musculoskeletal: No weakness.  No myalgias. + lower extremity swelling   Dermatology:  + rashes.  Neuro: No Headache.  No vertigo.  No dizziness.  Psych: No anxiety, no depression.  Denies suicidal thoughts.    MEDICATIONS  (STANDING):  albuterol/ipratropium for Nebulization. 3 milliLiter(s) Nebulizer every 6 hours  ammonium lactate 12% Lotion 1 Application(s) Topical two times a day  atorvastatin 20 milliGRAM(s) Oral at bedtime  budesonide 160 MICROgram(s)/formoterol 4.5 MICROgram(s) Inhaler 2 Puff(s) Inhalation two times a day  clotrimazole 1% Cream 1 Application(s) Topical every 12 hours  folic acid 1 milliGRAM(s) Oral daily  furosemide   Injectable 80 milliGRAM(s) IV Push two times a day  metoprolol succinate  milliGRAM(s) Oral daily  triamcinolone 0.1% Ointment 1 Application(s) Topical every 12 hours  warfarin 2.5 milliGRAM(s) Oral once    MEDICATIONS  (PRN):    Vital Signs Last 24 Hrs  T(C): 36.2 (23 Dec 2019 13:19), Max: 36.5 (22 Dec 2019 20:06)  T(F): 97.1 (23 Dec 2019 13:19), Max: 97.7 (22 Dec 2019 20:06)  HR: 84 (23 Dec 2019 13:19) (84 - 95)  BP: 134/74 (23 Dec 2019 13:19) (96/62 - 134/74)  BP(mean): --  RR: 20 (23 Dec 2019 13:19) (20 - 20)  SpO2: 96% (23 Dec 2019 13:19) (95% - 96%)    GENERAL: NAD, well-developed  HEAD:  Atraumatic, Normocephalic  EYES: EOMI, PERRLA, conjunctiva and sclera clear  ENT: Pharynx not erythematous  PULMONARY: Clear to auscultation bilaterally; No wheeze  CARDIOVASCULAR: Regular rate and rhythm; No murmurs, rubs, or gallops  ABDOMEN: Soft, Nontender, Nondistended; Bowel sounds present  EXTREMITIES:  2+ Peripheral Pulses, No clubbing, cyanosis; +1 pitting edema up to knees   MUSCULOSKELETAL: No calf tenderness  PSYCH: AAOx3, normal affect  SKIN: severely dry skin on b/l arms and back with scabbing, tinea on right hand and b/l feet      .  LABS:                         10.9   5.22  )-----------( 126      ( 22 Dec 2019 09:00 )             33.3     12-23    140  |  103  |  94<H>  ----------------------------<  103<H>  4.5   |  22  |  3.09<H>    Ca    8.8      23 Dec 2019 06:36      PT/INR - ( 23 Dec 2019 08:18 )   PT: 21.6 sec;   INR: 1.87 ratio                   RADIOLOGY, EKG & ADDITIONAL TESTS: Reviewed.

## 2019-12-23 NOTE — PROGRESS NOTE ADULT - ASSESSMENT
79M with CAD s/p 3v CABG (LIMA-mLAD, SVT-OM1, SVG-RPL1 in 9/2007), severe aortic stenosis s/p TAVR 2016, known LBBB, COPD, current smoker, atrial fibrillation on warfarin, and CKD3 (baseline Cr 1.4-1.5), and newly reduced biventricular systolic function and worsening TR who presents with acute decompensated HF, volume overloaded with MERCED on CKD, hyperkalemia and elevated INR. He is in rate controlled AFib with frequent PVCs. He is generally low normotensive. He remains volume overloaded.     Please call me with questions at [phone].

## 2019-12-23 NOTE — PROGRESS NOTE ADULT - PROBLEM SELECTOR PLAN 1
- MERCED on CKD likely cardiorenal- improving  - potassium WNL  - No urgent need for dialysis per Nephrology  - daily BMP  - Renal diet  - Renal US with mildly increased cortical echogenicity bilaterally, which may be secondary to medical renal disease

## 2019-12-24 NOTE — PROGRESS NOTE ADULT - ASSESSMENT
79M with CAD s/p 3v CABG (LIMA-mLAD, SVT-OM1, SVG-RPL1 in 9/2007), severe aortic stenosis s/p TAVR 2016, known LBBB, COPD, current smoker, atrial fibrillation on warfarin, and CKD3 (baseline Cr 1.4-1.5), and newly reduced biventricular systolic function and worsening TR who presents with acute decompensated HF, volume overloaded with MERCED on CKD, hyperkalemia and elevated INR. He is in rate controlled AFib with frequent PVCs. He is generally low normotensive. He remains volume overloaded.     Please call me with questions at [phone].

## 2019-12-24 NOTE — CHART NOTE - NSCHARTNOTEFT_GEN_A_CORE
Medicine PA Episodic Note    Notified by RN of pt.  having 7 beats WC. Pt. was seen and examined at bedside, AO x 3, resting, in NAD. Pt. denies CP, SOB, palpitations, NV, weakness, lethargy, dizziness.    Plan:  - Continue to monitor  - Monitor VS  - Will endorse above to primary team in AM      Follow up with Attending in AM.    Delores Peck PA-C  Department of Medicine  Spectralink [id number]

## 2019-12-24 NOTE — PROGRESS NOTE ADULT - PROBLEM SELECTOR PLAN 1
Patient with Non-oliguric MERCED secondary to diuretic use and NSAIDs. Patient with history of possible MDS? Serum creatinine 3.4 increased from baseline SCr: 1.1- 1.5. Last SCr: 1.6 on 12/5/19. Serum creatinine improving on diuretics    Recommend   - Serum creatinine improving on diuresis  - Renal US with non obstruct left renal calculi. Echogenic kidneys bilaterally  - Hold ACE/ARBs, and avoid NSAIDs, and other potential nephrotoxins  - Daily STANDING weights  - Monitor BMP and replete electrolytes on diuretics  - dose medications as per eGFR

## 2019-12-24 NOTE — PHYSICAL THERAPY INITIAL EVALUATION ADULT - PLANNED THERAPY INTERVENTIONS, PT EVAL
1. Goal: Pt will be able to navigate 6 steps independently with cane to mimic what he has to do at home./gait training

## 2019-12-24 NOTE — PROGRESS NOTE ADULT - SUBJECTIVE AND OBJECTIVE BOX
Subjective: No current complaints. He notes no dizziness or cough.     Medications:  albuterol/ipratropium for Nebulization. 3 milliLiter(s) Nebulizer every 6 hours  ammonium lactate 12% Lotion 1 Application(s) Topical two times a day  atorvastatin 20 milliGRAM(s) Oral at bedtime  budesonide 160 MICROgram(s)/formoterol 4.5 MICROgram(s) Inhaler 2 Puff(s) Inhalation two times a day  clotrimazole 1% Cream 1 Application(s) Topical every 12 hours  folic acid 1 milliGRAM(s) Oral daily  furosemide   Injectable 80 milliGRAM(s) IV Push two times a day  melatonin 3 milliGRAM(s) Oral at bedtime  metoprolol succinate  milliGRAM(s) Oral daily  triamcinolone 0.1% Ointment 1 Application(s) Topical two times a day      Physical Exam:    Vital Signs Last 24 Hrs  T(C): 36.3 (24 Dec 2019 03:23), Max: 36.6 (23 Dec 2019 21:39)  T(F): 97.3 (24 Dec 2019 03:23), Max: 97.9 (23 Dec 2019 21:39)  HR: 86 (24 Dec 2019 09:15) (84 - 102)  BP: 109/68 (24 Dec 2019 09:15) (101/63 - 134/74)  RR: 18 (24 Dec 2019 03:23) (18 - 20)  SpO2: 94% (24 Dec 2019 03:23) (94% - 97%)      I&O's Summary    23 Dec 2019 07:01  -  24 Dec 2019 07:00  --------------------------------------------------------  IN: 780 mL / OUT: 780 mL / NET: 0 mL        General: No distress. Comfortable.  HEENT: EOM intact.  Neck: Neck supple. JVP moderately elevated. No masses  Chest: Clear to auscultation bilaterally  CV: Irregular rhythm. Normal S1 and S2. No rubs or gallops. Radial pulses normal. No edema.   Abdomen: Soft, non-distended, non-tender  Skin: No rashes.   Neurology: Alert. Sensation intact  Psych: Affect normal    Labs:    12-24    140  |  102  |  89<H>  ----------------------------<  102<H>  4.3   |  21<L>  |  2.87<H>    Ca    9.1      24 Dec 2019 05:49  Phos  4.9     12-24  Mg     2.3     12-24      PT/INR - ( 24 Dec 2019 08:34 )   PT: 21.1 sec;   INR: 1.83 ratio      Serum Pro-Brain Natriuretic Peptide: 92180 pg/mL (12-20 @ 11:07)

## 2019-12-24 NOTE — PROGRESS NOTE ADULT - PROBLEM SELECTOR PLAN 1
- MERCED on CKD likely cardiorenal- improving  - No urgent need for dialysis per Nephrology  - daily BMP  - Renal diet  - Renal US with mildly increased cortical echogenicity bilaterally, which may be secondary to medical renal disease  - dc renvela

## 2019-12-24 NOTE — PHYSICAL THERAPY INITIAL EVALUATION ADULT - PERTINENT HX OF CURRENT PROBLEM, REHAB EVAL
79 y.o. M with PMHx of CAD, severe AS (post-TAVR), COPD, current smoker, HTN, afib, and CKD3. recently admitted to hospitalist service for possible new onset CHF when he presented with shortness of breath. Pt diuresed and optimized on meds. Noted to have an anemia, thrombocytopenia, and elevated MCV concerning for MDS. After d/c developing b/l upper thigh pain. Now pt brought into the ED by his son for further HF work up.

## 2019-12-24 NOTE — PROGRESS NOTE ADULT - SUBJECTIVE AND OBJECTIVE BOX
lizandro printed, med pended to be signed.    Patient complaining of not getting enough sleep. very irritable at bedside-- both sons at bedside.    GENERAL: No fevers, no chills.  EYES: No blurry vision,  No photophobia  ENT: No sore throat.  No dysphagia  Cardiovascular: No chest pain, palpitations, orthopnea  Pulmonary: No cough, no wheezing. No shortness of breath  Gastrointestinal: No abdominal pain, no diarrhea, no constipation.    Musculoskeletal: No weakness.  No myalgias. + edema  Dermatology:  No rashes.  Neuro: No Headache.  No vertigo.  No dizziness. + insomnia  Psych: No anxiety, no depression.  Denies suicidal thoughts.    MEDICATIONS  (STANDING):  albuterol/ipratropium for Nebulization. 3 milliLiter(s) Nebulizer every 6 hours  ammonium lactate 12% Lotion 1 Application(s) Topical two times a day  atorvastatin 20 milliGRAM(s) Oral at bedtime  budesonide 160 MICROgram(s)/formoterol 4.5 MICROgram(s) Inhaler 2 Puff(s) Inhalation two times a day  clotrimazole 1% Cream 1 Application(s) Topical every 12 hours  folic acid 1 milliGRAM(s) Oral daily  furosemide   Injectable 80 milliGRAM(s) IV Push two times a day  melatonin 3 milliGRAM(s) Oral at bedtime  metoprolol succinate  milliGRAM(s) Oral daily  nicotine -  14 mG/24Hr(s) Patch 1 patch Transdermal daily  QUEtiapine 25 milliGRAM(s) Oral at bedtime  triamcinolone 0.1% Ointment 1 Application(s) Topical two times a day  warfarin 2.5 milliGRAM(s) Oral once    MEDICATIONS  (PRN):  QUEtiapine 25 milliGRAM(s) Oral daily PRN agitation    Vital Signs Last 24 Hrs  T(C): 36.2 (24 Dec 2019 12:37), Max: 36.6 (23 Dec 2019 21:39)  T(F): 97.2 (24 Dec 2019 12:37), Max: 97.9 (23 Dec 2019 21:39)  HR: 92 (24 Dec 2019 12:37) (86 - 102)  BP: 119/73 (24 Dec 2019 12:37) (101/63 - 129/78)  BP(mean): --  RR: 18 (24 Dec 2019 12:37) (18 - 19)  SpO2: 95% (24 Dec 2019 12:37) (94% - 97%)    GENERAL: NAD, well-developed  HEAD:  Atraumatic, Normocephalic  EYES: EOMI, PERRLA, conjunctiva and sclera clear  ENT: Pharynx not erythematous  PULMONARY: scant wheezing   CARDIOVASCULAR: Regular rate and rhythm; irregularly irregular   ABDOMEN: Soft, Nontender, Nondistended; Bowel sounds present  EXTREMITIES:  2+ Peripheral Pulses, No clubbing, cyanosis; + 2 pitting edema up mid shin   MUSCULOSKELETAL: No calf tenderness  PSYCH: AAOx3, normal affect  SKIN: warm and dry, No rashes or lesions    .  LABS:     12-24    140  |  102  |  89<H>  ----------------------------<  102<H>  4.3   |  21<L>  |  2.87<H>    Ca    9.1      24 Dec 2019 05:49  Phos  4.9     12-24  Mg     2.3     12-24      PT/INR - ( 24 Dec 2019 08:34 )   PT: 21.1 sec;   INR: 1.83 ratio                   RADIOLOGY, EKG & ADDITIONAL TESTS: Reviewed.

## 2019-12-24 NOTE — PROGRESS NOTE ADULT - SUBJECTIVE AND OBJECTIVE BOX
[facility] Division of Kidney Diseases & Hypertension  FOLLOW UP NOTE  -------------------------------------------------------------------------------  Chief Complaint:Acute renal failure      24 hour events/subjective:  Patient seen this morning without significant complaints  Patient had runs of nonsustained WCT yesterday, asymptomatic  No other acute events overnight  Says swelling has improved   SCr: 2.87       PAST HISTORY  --------------------------------------------------------------------------------  No significant changes to PMH, PSH, FHx, SHx, unless otherwise noted    ALLERGIES & MEDICATIONS  --------------------------------------------------------------------------------  Allergies    No Known Allergies    Intolerances      Standing Inpatient Medications  albuterol/ipratropium for Nebulization. 3 milliLiter(s) Nebulizer every 6 hours  ammonium lactate 12% Lotion 1 Application(s) Topical two times a day  atorvastatin 20 milliGRAM(s) Oral at bedtime  budesonide 160 MICROgram(s)/formoterol 4.5 MICROgram(s) Inhaler 2 Puff(s) Inhalation two times a day  clotrimazole 1% Cream 1 Application(s) Topical every 12 hours  folic acid 1 milliGRAM(s) Oral daily  furosemide   Injectable 80 milliGRAM(s) IV Push two times a day  melatonin 3 milliGRAM(s) Oral at bedtime  metoprolol succinate  milliGRAM(s) Oral daily  triamcinolone 0.1% Ointment 1 Application(s) Topical two times a day    PRN Inpatient Medications      REVIEW OF SYSTEMS  --------------------------------------------------------------------------------  Gen: No  fevers/chills, weakness  Skin: No rashes  Head/Eyes/Ears/Mouth: No headache  Respiratory: + GAN  CV: No chest pain,   GI: No abdominal pain, diarrhea, constipation, nausea, vomiting  : No increased frequency, dysuria, hematuria, nocturia  MSK:  + LE swelling  Neuro: No dizziness/lightheadedness, weakness, seizures    All other systems were reviewed and are negative, except as noted.    VITALS/PHYSICAL EXAM  --------------------------------------------------------------------------------  T(C): 36.3 (12-24-19 @ 03:23), Max: 36.6 (12-23-19 @ 21:39)  HR: 102 (12-24-19 @ 05:41) (84 - 102)  BP: 119/73 (12-24-19 @ 05:41) (101/63 - 134/74)  RR: 18 (12-24-19 @ 03:23) (18 - 20)  SpO2: 94% (12-24-19 @ 03:23) (94% - 97%)  Wt(kg): --        12-23-19 @ 07:01  -  12-24-19 @ 07:00  --------------------------------------------------------  IN: 780 mL / OUT: 780 mL / NET: 0 mL      Physical Exam:  	Gen: NAD, well-appearing  	HEENT: supple neck, mild JVD elevation  	Pulm: rales on the left lung   	CV:  S1S2  	Abd: +BS, soft   	Ext: + bilateral pitting edema  	Neuro: No focal deficits  	Skin: Warm, without rashes  	Vascular access: none      LABS/STUDIES  --------------------------------------------------------------------------------              10.9   5.22  >-----------<  126      [12-22-19 @ 09:00]              33.3     140  |  102  |  89  ----------------------------<  102      [12-24-19 @ 05:49]  4.3   |  21  |  2.87        Ca     9.1     [12-24-19 @ 05:49]      Mg     2.3     [12-24-19 @ 05:49]      Phos  4.9     [12-24-19 @ 05:49]      PT/INR: PT 21.6 , INR 1.87       [12-23-19 @ 08:18]      Creatinine Trend:  SCr 2.87 [12-24 @ 05:49]  SCr 3.09 [12-23 @ 06:36]  SCr 3.25 [12-22 @ 06:57]  SCr 3.01 [12-21 @ 06:27]  SCr 3.12 [12-20 @ 11:07]      Urine Creatinine 30      [12-22-19 @ 13:01]  Urine Protein 5      [12-22-19 @ 13:01]  Urine Sodium 40      [12-21-19 @ 07:09]  Urine Urea Nitrogen 668      [12-21-19 @ 10:00]

## 2019-12-25 NOTE — PROGRESS NOTE ADULT - SUBJECTIVE AND OBJECTIVE BOX
Patients like he has much more energy today-- he slept well with the seroquel. His swelling has improved.    GENERAL: No fevers, no chills.  EYES: No blurry vision,  No photophobia  ENT: No sore throat.  No dysphagia  Cardiovascular: No chest pain, palpitations, orthopnea  Pulmonary: No cough, no wheezing. No shortness of breath  Gastrointestinal: No abdominal pain, no diarrhea, no constipation.    Musculoskeletal: No weakness.  No myalgias.  Dermatology:  No rashes.  Neuro: No Headache.  No vertigo.  No dizziness.  Psych: No anxiety, no depression.  Denies suicidal thoughts.    MEDICATIONS  (STANDING):  albuterol/ipratropium for Nebulization. 3 milliLiter(s) Nebulizer every 6 hours  ammonium lactate 12% Lotion 1 Application(s) Topical two times a day  atorvastatin 20 milliGRAM(s) Oral at bedtime  budesonide 160 MICROgram(s)/formoterol 4.5 MICROgram(s) Inhaler 2 Puff(s) Inhalation two times a day  clotrimazole 1% Cream 1 Application(s) Topical every 12 hours  folic acid 1 milliGRAM(s) Oral daily  furosemide   Injectable 80 milliGRAM(s) IV Push two times a day  melatonin 3 milliGRAM(s) Oral at bedtime  metoprolol succinate  milliGRAM(s) Oral daily  nicotine -  14 mG/24Hr(s) Patch 1 patch Transdermal daily  QUEtiapine 25 milliGRAM(s) Oral at bedtime  triamcinolone 0.1% Ointment 1 Application(s) Topical two times a day  warfarin 2.5 milliGRAM(s) Oral once    MEDICATIONS  (PRN):  QUEtiapine 25 milliGRAM(s) Oral daily PRN agitation    Vital Signs Last 24 Hrs  T(C): 36.8 (25 Dec 2019 12:05), Max: 36.8 (25 Dec 2019 12:05)  T(F): 98.2 (25 Dec 2019 12:05), Max: 98.2 (25 Dec 2019 12:05)  HR: 93 (25 Dec 2019 12:05) (92 - 93)  BP: 119/71 (25 Dec 2019 12:05) (104/63 - 119/74)  BP(mean): --  RR: 17 (25 Dec 2019 12:05) (17 - 18)  SpO2: 94% (25 Dec 2019 12:05) (94% - 96%)    GENERAL: NAD, well-developed  HEAD:  Atraumatic, Normocephalic  EYES: EOMI, PERRLA, conjunctiva and sclera clear  ENT: Pharynx not erythematous  PULMONARY: Clear to auscultation bilaterally; No wheeze  CARDIOVASCULAR: Regular rate and rhythm; No murmurs, rubs, or gallops  ABDOMEN: Soft, Nontender, Nondistended; Bowel sounds present  EXTREMITIES:  2+ Peripheral Pulses, No clubbing, cyanosis; trace ankle edema   MUSCULOSKELETAL: No calf tenderness  PSYCH: AAOx3, normal affect  SKIN: warm and dry, No rashes or lesions    .  LABS:                         10.7   2.74  )-----------( 162      ( 25 Dec 2019 08:41 )             33.1     12-25    140  |  103  |  90<H>  ----------------------------<  99  4.1   |  22  |  2.39<H>    Ca    9.2      25 Dec 2019 05:59  Phos  4.9     12-24  Mg     2.3     12-25    TPro  7.6  /  Alb  3.5  /  TBili  0.6  /  DBili  x   /  AST  27  /  ALT  36  /  AlkPhos  130<H>  12-25    PT/INR - ( 24 Dec 2019 08:34 )   PT: 21.1 sec;   INR: 1.83 ratio               Lactate, Blood: 1.3 mmol/L (12-25 @ 05:59)      RADIOLOGY, EKG & ADDITIONAL TESTS: Reviewed.

## 2019-12-25 NOTE — PROGRESS NOTE ADULT - PROBLEM SELECTOR PLAN 1
- Continue Lasix 80mgIV BID  - Continue home dose of Toprol XL 100mg daily  - Hold on ACE-I/ARB/ARNI/MRA at this time given renal dysfunction  - Daily standing weights, strict I/Os

## 2019-12-25 NOTE — PROGRESS NOTE ADULT - SUBJECTIVE AND OBJECTIVE BOX
Subjective: No current complaints. He is more awake today. He notes less swelling in his legs.     Medications:  albuterol/ipratropium for Nebulization. 3 milliLiter(s) Nebulizer every 6 hours  ammonium lactate 12% Lotion 1 Application(s) Topical two times a day  atorvastatin 20 milliGRAM(s) Oral at bedtime  budesonide 160 MICROgram(s)/formoterol 4.5 MICROgram(s) Inhaler 2 Puff(s) Inhalation two times a day  clotrimazole 1% Cream 1 Application(s) Topical every 12 hours  folic acid 1 milliGRAM(s) Oral daily  furosemide   Injectable 80 milliGRAM(s) IV Push two times a day  melatonin 3 milliGRAM(s) Oral at bedtime  metoprolol succinate  milliGRAM(s) Oral daily  nicotine -  14 mG/24Hr(s) Patch 1 patch Transdermal daily  QUEtiapine 25 milliGRAM(s) Oral daily PRN  QUEtiapine 25 milliGRAM(s) Oral at bedtime  triamcinolone 0.1% Ointment 1 Application(s) Topical two times a day      Physical Exam:    Vital Signs Last 24 Hrs  T(C): 36.4 (25 Dec 2019 04:40), Max: 36.4 (25 Dec 2019 04:40)  T(F): 97.5 (25 Dec 2019 04:40), Max: 97.5 (25 Dec 2019 04:40)  HR: 92 (25 Dec 2019 04:40) (92 - 93)  BP: 104/63 (25 Dec 2019 04:40) (104/63 - 119/74)  RR: 17 (25 Dec 2019 04:40) (17 - 18)  SpO2: 94% (25 Dec 2019 04:40) (94% - 96%)    Daily Weight in k.2 (25 Dec 2019 07:15)    I&O's Summary    24 Dec 2019 07:01  -  25 Dec 2019 07:00  --------------------------------------------------------  IN: 720 mL / OUT: 1200 mL / NET: -480 mL    25 Dec 2019 07:01  -  25 Dec 2019 11:43  --------------------------------------------------------  IN: 240 mL / OUT: 0 mL / NET: 240 mL    General: No distress. Comfortable.  HEENT: EOM intact.  Neck: Neck supple. JVP moderately elevated. No masses  Chest: Clear to auscultation bilaterally  CV: Irregular rhythm with normal S1 and S2. No rubs or gallops. Radial pulses normal. 1+ edema in legs bilaterally  Abdomen: Soft, non-distended, non-tender  Skin: No rashes or skin breakdown  Neurology: Alert. Sensation intact  Psych: Affect normal    Labs:                        10.7   2.74  )-----------( 162      ( 25 Dec 2019 08:41 )             33.1         140  |  103  |  90<H>  ----------------------------<  99  4.1   |  22  |  2.39<H>    Ca    9.2      25 Dec 2019 05:59  Phos  4.9       Mg     2.3         TPro  7.6  /  Alb  3.5  /  TBili  0.6  /  DBili  x   /  AST  27  /  ALT  36  /  AlkPhos  130<H>      PT/INR - ( 24 Dec 2019 08:34 )   PT: 21.1 sec;   INR: 1.83 ratio      Serum Pro-Brain Natriuretic Peptide: 95578 pg/mL ( @ 11:07)  Lactate, Blood: 1.3 mmol/L ( @ 05:59)

## 2019-12-25 NOTE — PROGRESS NOTE ADULT - ASSESSMENT
Mr. Lopez is a 79 year old man with CAD s/p 3v CABG (LIMA-mLAD, SVT-OM1, SVG-RPL1 in 9/2007), severe aortic stenosis s/p TAVR 2016, known LBBB, COPD, current smoker, atrial fibrillation on warfarin, and CKD3 (baseline Cr 1.4-1.5), and newly reduced biventricular systolic function and worsening TR who presents with acute decompensated HF, volume overloaded with MERCED on CKD, hyperkalemia and elevated INR. He is in rate controlled AFib with frequent PVCs. He is normotensive. He remains volume overloaded, but is improving. His renal function continues to improve.     Please call me with questions at [phone].

## 2019-12-26 NOTE — PROGRESS NOTE ADULT - ASSESSMENT
Mr. Lopez is a 79 year old man with CAD s/p 3v CABG (LIMA-mLAD, SVT-OM1, SVG-RPL1 in 9/2007), severe aortic stenosis s/p TAVR 2016, known LBBB, COPD, current smoker, atrial fibrillation on warfarin, and CKD3 (baseline Cr 1.4-1.5), and newly reduced biventricular systolic function and worsening TR who presents with acute decompensated HF, volume overloaded with MERCED on CKD, hyperkalemia and elevated INR. He is in rate controlled AFib with frequent PVCs. He is normotensive. He remains volume overloaded, but is improving. His renal function continues to improve.     Please call me with questions at [phone].

## 2019-12-26 NOTE — PROGRESS NOTE ADULT - SUBJECTIVE AND OBJECTIVE BOX
Patient feels that his foot swelling has improved. Denies shortness of breath.    GENERAL: No fevers, no chills.  EYES: No blurry vision,  No photophobia  ENT: No sore throat.  No dysphagia  Cardiovascular: No chest pain, palpitations, orthopnea  Pulmonary: No cough, no wheezing. No shortness of breath  Gastrointestinal: No abdominal pain, no diarrhea, no constipation.    Musculoskeletal: No weakness.  No myalgias.  Dermatology:  No rashes.  Neuro: No Headache.  No vertigo.  No dizziness.  Psych: No anxiety, no depression.  Denies suicidal thoughts.    MEDICATIONS  (STANDING):  albuterol/ipratropium for Nebulization. 3 milliLiter(s) Nebulizer every 6 hours  ammonium lactate 12% Lotion 1 Application(s) Topical two times a day  atorvastatin 20 milliGRAM(s) Oral at bedtime  budesonide 160 MICROgram(s)/formoterol 4.5 MICROgram(s) Inhaler 2 Puff(s) Inhalation two times a day  clotrimazole 1% Cream 1 Application(s) Topical every 12 hours  folic acid 1 milliGRAM(s) Oral daily  furosemide   Injectable 80 milliGRAM(s) IV Push two times a day  melatonin 3 milliGRAM(s) Oral at bedtime  metoprolol succinate  milliGRAM(s) Oral daily  nicotine -  14 mG/24Hr(s) Patch 1 patch Transdermal daily  QUEtiapine 25 milliGRAM(s) Oral at bedtime  triamcinolone 0.1% Ointment 1 Application(s) Topical two times a day  warfarin 2.5 milliGRAM(s) Oral once    MEDICATIONS  (PRN):  QUEtiapine 25 milliGRAM(s) Oral daily PRN agitation    Vital Signs Last 24 Hrs  T(C): 36.6 (26 Dec 2019 12:01), Max: 36.6 (25 Dec 2019 20:56)  T(F): 97.9 (26 Dec 2019 12:01), Max: 97.9 (25 Dec 2019 20:56)  HR: 97 (26 Dec 2019 12:01) (60 - 97)  BP: 123/81 (26 Dec 2019 12:01) (111/65 - 123/81)  BP(mean): --  RR: 18 (26 Dec 2019 12:01) (17 - 20)  SpO2: 100% (26 Dec 2019 12:01) (94% - 100%)    GENERAL: NAD  HEAD:  Atraumatic, Normocephalic  EYES: EOMI, PERRLA, conjunctiva and sclera clear  ENT: Pharynx not erythematous  PULMONARY: Clear to auscultation bilaterally; No wheeze  CARDIOVASCULAR: irregularly irregular   ABDOMEN: Soft, Nontender, Nondistended; Bowel sounds present  EXTREMITIES:  2+ Peripheral Pulses, No clubbing, cyanosis; trace ankle edema   MUSCULOSKELETAL: No calf tenderness  PSYCH: AAOx3, normal affect  SKIN: warm and dry, No rashes or lesions    .  LABS:                         9.6    2.20  )-----------( 154      ( 26 Dec 2019 08:52 )             29.4     12-26    140  |  100  |  80<H>  ----------------------------<  99  3.9   |  23  |  2.22<H>    Ca    8.8      26 Dec 2019 05:58  Mg     2.3     12-25    TPro  7.6  /  Alb  3.5  /  TBili  0.6  /  DBili  x   /  AST  27  /  ALT  36  /  AlkPhos  130<H>  12-25    PT/INR - ( 26 Dec 2019 08:46 )   PT: 29.6 sec;   INR: 2.52 ratio                   RADIOLOGY, EKG & ADDITIONAL TESTS: Reviewed.

## 2019-12-26 NOTE — PROGRESS NOTE ADULT - PROBLEM SELECTOR PLAN 1
- MERCED on CKD likely cardiorenal- improving  - No urgent need for dialysis per Nephrology  - daily BMP  - Renal diet  - Renal US with mildly increased cortical echogenicity bilaterally, which may be secondary to medical renal disease

## 2019-12-26 NOTE — PROGRESS NOTE ADULT - SUBJECTIVE AND OBJECTIVE BOX
Subjective: No overnight events.     Medications:  albuterol/ipratropium for Nebulization. 3 milliLiter(s) Nebulizer every 6 hours  ammonium lactate 12% Lotion 1 Application(s) Topical two times a day  atorvastatin 20 milliGRAM(s) Oral at bedtime  budesonide 160 MICROgram(s)/formoterol 4.5 MICROgram(s) Inhaler 2 Puff(s) Inhalation two times a day  clotrimazole 1% Cream 1 Application(s) Topical every 12 hours  folic acid 1 milliGRAM(s) Oral daily  furosemide   Injectable 80 milliGRAM(s) IV Push two times a day  melatonin 3 milliGRAM(s) Oral at bedtime  metoprolol succinate  milliGRAM(s) Oral daily  nicotine -  14 mG/24Hr(s) Patch 1 patch Transdermal daily  QUEtiapine 25 milliGRAM(s) Oral daily PRN  QUEtiapine 25 milliGRAM(s) Oral at bedtime  triamcinolone 0.1% Ointment 1 Application(s) Topical two times a day  warfarin 2.5 milliGRAM(s) Oral once      Physical Exam:    Vital Signs Last 24 Hrs  T(C): 36.4 (26 Dec 2019 05:02), Max: 36.8 (25 Dec 2019 12:05)  T(F): 97.6 (26 Dec 2019 05:02), Max: 98.2 (25 Dec 2019 12:05)  HR: 94 (26 Dec 2019 05:02) (60 - 94)  BP: 111/65 (26 Dec 2019 05:02) (111/65 - 119/71)  RR: 17 (26 Dec 2019 05:02) (17 - 20)  SpO2: 94% (26 Dec 2019 05:02) (94% - 98%)      I&O's Summary    25 Dec 2019 07:01  -  26 Dec 2019 07:00  --------------------------------------------------------  IN: 600 mL / OUT: 1620 mL / NET: -1020 mL    26 Dec 2019 07:01  -  26 Dec 2019 10:55  --------------------------------------------------------  IN: 0 mL / OUT: 1100 mL / NET: -1100 mL    General: No distress. Comfortable.  HEENT: EOM intact.  Neck: Neck supple. JVP moderately elevated. No masses  Chest: Clear to auscultation bilaterally  CV: Irregular rhythm. Normal S1 and S2. No rubs or gallops. Radial pulses normal. No edema.   Abdomen: Soft, non-distended, non-tender  Skin: No rashes or skin breakdown  Neurology: Alert and oriented times three. Sensation intact  Psych: Affect normal    Labs:                        9.6    2.20  )-----------( 154      ( 26 Dec 2019 08:52 )             29.4     12-26    140  |  100  |  80<H>  ----------------------------<  99  3.9   |  23  |  2.22<H>    Ca    8.8      26 Dec 2019 05:58  Mg     2.3     12-25    TPro  7.6  /  Alb  3.5  /  TBili  0.6  /  DBili  x   /  AST  27  /  ALT  36  /  AlkPhos  130<H>  12-25    PT/INR - ( 26 Dec 2019 08:46 )   PT: 29.6 sec;   INR: 2.52 ratio        Serum Pro-Brain Natriuretic Peptide: 18243 pg/mL (12-20 @ 11:07)    Lactate, Blood: 1.3 mmol/L (12-25 @ 05:59)

## 2019-12-27 NOTE — DIETITIAN INITIAL EVALUATION ADULT. - PROBLEM SELECTOR PLAN 1
-No fluids given his HFrEF, patient encouraged to PO intake with fluid restrictions  -Renal called by ED, recommended Lokelma  -UA with microscopy, urine electrolytes to calculate FeUREA, UPCR ratio  -Renal US  -Treat hyperkalemia medically, consider standing Lokelma if still elevated  -Repeat potassium in several hours  -No urgent need for dialysis: slight uremic symptoms, otherwise not overloaded with no major electrolyte abnormalities which cannot be treated medically

## 2019-12-27 NOTE — PROGRESS NOTE ADULT - ASSESSMENT
79M with PMHx of CAD (post-CABG and PCI), severe aortic stenosis (post-TAVR), known LBBB, COPD, current smoker, HTN, atrial fibrillation, and CKD3 referred by Dr. Quiroz's office for elevated cr. Now with acute on chronic systolic chf exacerbation with roberto on ckd- improving.

## 2019-12-27 NOTE — PROGRESS NOTE ADULT - PROBLEM SELECTOR PLAN 1
Patient with Non-oliguric MERCED secondary to diuretic use and NSAIDs. Patient with history of possible MDS? Serum creatinine 3.4 increased from baseline SCr: 1.1- 1.5. Last SCr: 1.6 on 12/5/19. Serum creatinine improving on diuretics    Recommend   - Serum creatinine improving on diuresis current SCr: 2.14  - Renal US with non obstruct left renal calculi. Echogenic kidneys bilaterally  - Hold ACE/ARBs, and avoid NSAIDs, and other potential nephrotoxins  - Daily STANDING weights  - Monitor BMP and replete electrolytes while on diuretics  - dose medications as per eGFR  - Low phos diet

## 2019-12-27 NOTE — PROGRESS NOTE ADULT - SUBJECTIVE AND OBJECTIVE BOX
patient feels well. no complaints.    GENERAL: No fevers, no chills.  EYES: No blurry vision,  No photophobia  ENT: No sore throat.  No dysphagia  Cardiovascular: No chest pain, palpitations, orthopnea  Pulmonary: No cough, no wheezing. No shortness of breath  Gastrointestinal: No abdominal pain, no diarrhea, no constipation.   Musculoskeletal: No weakness.  No myalgias.  Dermatology:  No rashes.  Neuro: No Headache.  No vertigo.  No dizziness.  Psych: No anxiety, no depression.  Denies suicidal thoughts.    MEDICATIONS  (STANDING):  albuterol/ipratropium for Nebulization. 3 milliLiter(s) Nebulizer every 6 hours  ammonium lactate 12% Lotion 1 Application(s) Topical two times a day  atorvastatin 20 milliGRAM(s) Oral at bedtime  budesonide 160 MICROgram(s)/formoterol 4.5 MICROgram(s) Inhaler 2 Puff(s) Inhalation two times a day  clotrimazole 1% Cream 1 Application(s) Topical every 12 hours  folic acid 1 milliGRAM(s) Oral daily  furosemide   Injectable 80 milliGRAM(s) IV Push two times a day  melatonin 3 milliGRAM(s) Oral at bedtime  metoprolol succinate  milliGRAM(s) Oral daily  nicotine -  14 mG/24Hr(s) Patch 1 patch Transdermal daily  QUEtiapine 25 milliGRAM(s) Oral at bedtime  triamcinolone 0.1% Ointment 1 Application(s) Topical two times a day  warfarin 2.5 milliGRAM(s) Oral once    MEDICATIONS  (PRN):  QUEtiapine 25 milliGRAM(s) Oral daily PRN agitation    Vital Signs Last 24 Hrs  T(C): 36.5 (27 Dec 2019 13:40), Max: 36.6 (26 Dec 2019 20:42)  T(F): 97.7 (27 Dec 2019 13:40), Max: 97.8 (26 Dec 2019 20:42)  HR: 92 (27 Dec 2019 13:40) (87 - 94)  BP: 105/61 (27 Dec 2019 13:40) (101/62 - 105/61)  BP(mean): --  RR: 18 (27 Dec 2019 13:40) (18 - 20)  SpO2: 92% (27 Dec 2019 13:40) (92% - 95%)    GENERAL: NAD  HEAD:  Atraumatic, Normocephalic  EYES: EOMI, PERRLA, conjunctiva and sclera clear  ENT: Pharynx not erythematous  PULMONARY: Clear to auscultation bilaterally; No wheeze  CARDIOVASCULAR: Regular rate and rhythm; No murmurs, rubs, or gallops; + jvd  ABDOMEN: Soft, Nontender, Nondistended; Bowel sounds present  EXTREMITIES:  2+ Peripheral Pulses, No clubbing, cyanosis; trace edema up to mid shins   MUSCULOSKELETAL: No calf tenderness  PSYCH: AAOx3, normal affect  SKIN: warm and dry, No rashes or lesions    .  LABS:                         10.5   2.29  )-----------( 174      ( 27 Dec 2019 07:14 )             31.9     12-27    137  |  99  |  75<H>  ----------------------------<  95  3.8   |  22  |  2.14<H>    Ca    9.0      27 Dec 2019 06:10  Phos  4.6     12-27  Mg     2.0     12-27      PT/INR - ( 27 Dec 2019 08:49 )   PT: 31.0 sec;   INR: 2.62 ratio                   RADIOLOGY, EKG & ADDITIONAL TESTS: Reviewed.

## 2019-12-27 NOTE — DIETITIAN INITIAL EVALUATION ADULT. - OTHER INFO
"79M with PMHx of CAD (post-CABG and PCI), severe aortic stenosis (post-TAVR), known LBBB, COPD, current smoker, HTN, atrial fibrillation, and CKD3 referred by Dr. Quiroz's office for elevated cr. Now with acute on chronic systolic chf exacerbation with roberto on ckd- improving. "    Pt provided very little diet/weight related history upon visit. Seemed uninterested in RD assessment, declined Nutrition focused physical exam and Nutrition education at this time. Noted he was eating well PTA and notes his intake varies in-house due to his dislike for institutionalized food. Pt notes his only weight fluctuations are 2/2 fluid shifts, noting in-house 10 pound weight loss 2/2 lasix. Denies N+V, diarrhea/constipation; No chewing/swallowing difficulties. NKFA. Per H&P, takes Vitamin D3 and folic acid supplementation PTA.    Pt currently on Renal replacement diet. Pt not on HD, and with elevated phosphorous labs. Would recommend diet change to DASH, Phosphorous restriction diet. "79M with PMHx of CAD (post-CABG and PCI), severe aortic stenosis (post-TAVR), known LBBB, COPD, current smoker, HTN, atrial fibrillation, and CKD3 referred by Dr. Quiroz's office for elevated cr. Now with acute on chronic systolic chf exacerbation with roberto on ckd- improving. "    Pt provided very little diet/weight related history upon visit. Seemed uninterested in RD assessment, declined Nutrition focused physical exam and Nutrition education at this time. Noted he was eating well PTA and notes his intake varies in-house due to his dislike for institutionalized food. Pt notes his only weight fluctuations are 2/2 fluid shifts, noting in-house 10 pound weight loss 2/2 lasix; standing weight 12/21 136 pounds; standing weight obtained today, 12/27 notes 126 pounds. Denies N+V, diarrhea/constipation; No chewing/swallowing difficulties. NKFA. Per H&P, takes Vitamin D3 and folic acid supplementation PTA.    Pt currently on Renal replacement diet. Pt not on HD, and with elevated phosphorous labs. Would recommend diet change to DASH, Phosphorous restriction diet.

## 2019-12-27 NOTE — DIETITIAN INITIAL EVALUATION ADULT. - ADD RECOMMEND
1. Recommend d/c Renal replacement diet, pt not on HD and change diet order to DASH, Low Phosphorous diet. 2. Recommend Multivitamin supplementation daily. 3. Pt declined nutrition education at this time - made pt RD remains available for education PRN. 4. Monitor PO intake/tolerance, weights, labs, hydration status, bowels, and skin integrity.

## 2019-12-27 NOTE — DIETITIAN INITIAL EVALUATION ADULT. - PHYSICAL APPEARANCE
other (specify) Ht: 5'4"  Wt: 126 pounds BMI: 21.7 kg/m2 IBW: 130 pounds (+/-10%) 97%IBW  Edema: R+L leg nonpitting   Skin per nursing flowsheets: no pressure injuries noted

## 2019-12-27 NOTE — DIETITIAN INITIAL EVALUATION ADULT. - PROBLEM SELECTOR PLAN 8
-Performed smoking cessation counseling with patient. Reviewed modalities such as nicotine replacement therapy and pharmacological agents. Also reviewed deleterious effects of smoking such as increased malignancy and infection. 3 minutes spent with patient.   -Patient is wholeheartedly not interested in quitting, offer nicotine replacement therapy if he has cravings

## 2019-12-27 NOTE — PROGRESS NOTE ADULT - ASSESSMENT
Mr. Lopez is a 79 year old man with CAD s/p 3v CABG (LIMA-mLAD, SVT-OM1, SVG-RPL1 in 9/2007), severe aortic stenosis s/p TAVR 2016, known LBBB, COPD, current smoker, atrial fibrillation on warfarin, and CKD3 (baseline Cr 1.4-1.5), and newly reduced biventricular systolic function and worsening TR who presented with acute decompensated HF, volume overloaded with MERCED on CKD, hyperkalemia and elevated INR. He is in rate controlled AFib with frequent PVCs. He is normotensive. He remains volume overloaded, but is improving. His renal function continues to improve.     Please call me with questions at [phone]. Plan discussed with 2 [facility] medicine team.

## 2019-12-27 NOTE — DIETITIAN INITIAL EVALUATION ADULT. - PERTINENT MEDS FT
MEDICATIONS  (STANDING):  albuterol/ipratropium for Nebulization. 3 milliLiter(s) Nebulizer every 6 hours  ammonium lactate 12% Lotion 1 Application(s) Topical two times a day  atorvastatin 20 milliGRAM(s) Oral at bedtime  budesonide 160 MICROgram(s)/formoterol 4.5 MICROgram(s) Inhaler 2 Puff(s) Inhalation two times a day  clotrimazole 1% Cream 1 Application(s) Topical every 12 hours  folic acid 1 milliGRAM(s) Oral daily  furosemide   Injectable 80 milliGRAM(s) IV Push two times a day  melatonin 3 milliGRAM(s) Oral at bedtime  metoprolol succinate  milliGRAM(s) Oral daily  nicotine -  14 mG/24Hr(s) Patch 1 patch Transdermal daily  QUEtiapine 25 milliGRAM(s) Oral at bedtime  triamcinolone 0.1% Ointment 1 Application(s) Topical two times a day  warfarin 2.5 milliGRAM(s) Oral once

## 2019-12-27 NOTE — PROGRESS NOTE ADULT - SUBJECTIVE AND OBJECTIVE BOX
Subjective: No current complaints. He feels stronger. No swelling in his legs.     Medications:  albuterol/ipratropium for Nebulization. 3 milliLiter(s) Nebulizer every 6 hours  ammonium lactate 12% Lotion 1 Application(s) Topical two times a day  atorvastatin 20 milliGRAM(s) Oral at bedtime  budesonide 160 MICROgram(s)/formoterol 4.5 MICROgram(s) Inhaler 2 Puff(s) Inhalation two times a day  clotrimazole 1% Cream 1 Application(s) Topical every 12 hours  folic acid 1 milliGRAM(s) Oral daily  furosemide   Injectable 80 milliGRAM(s) IV Push two times a day  melatonin 3 milliGRAM(s) Oral at bedtime  metoprolol succinate  milliGRAM(s) Oral daily  nicotine -  14 mG/24Hr(s) Patch 1 patch Transdermal daily  QUEtiapine 25 milliGRAM(s) Oral daily PRN  QUEtiapine 25 milliGRAM(s) Oral at bedtime  triamcinolone 0.1% Ointment 1 Application(s) Topical two times a day      Physical Exam:    Vital Signs Last 24 Hrs  T(C): 36.6 (27 Dec 2019 04:58), Max: 36.6 (26 Dec 2019 20:42)  T(F): 97.8 (27 Dec 2019 04:58), Max: 97.8 (26 Dec 2019 20:42)  HR: 87 (27 Dec 2019 04:58) (87 - 94)  BP: 101/62 (27 Dec 2019 04:58) (101/62 - 102/61)  RR: 19 (27 Dec 2019 04:58) (19 - 20)  SpO2: 95% (27 Dec 2019 04:58) (94% - 95%)    Daily Weight in k.2 (27 Dec 2019 07:15)    I&O's Summary    26 Dec 2019 07:  -  27 Dec 2019 07:00  --------------------------------------------------------  IN: 300 mL / OUT: 1550 mL / NET: -1250 mL    27 Dec 2019 07:01  -  27 Dec 2019 12:14  --------------------------------------------------------  IN: 240 mL / OUT: 0 mL / NET: 240 mL    General: No distress. Comfortable.  HEENT: EOM intact.  Neck: Neck supple. JVP moderately elevated with positive HJR. No masses  Chest: Clear to auscultation bilaterally  CV: Irregular rhythm. Normal S1 and S2. No murmurs, rub, or gallops. Radial pulses normal. No edema in legs.   Abdomen: Soft, non-distended, non-tender  Skin: No rashes or skin breakdown  Neurology: Alert and oriented times three. Sensation intact  Psych: Affect normal    Labs:                        10.5   2.29  )-----------( 174      ( 27 Dec 2019 07:14 )             31.9         137  |  99  |  75<H>  ----------------------------<  95  3.8   |  22  |  2.14<H>    Ca    9.0      27 Dec 2019 06:10  Phos  4.6       Mg     2.0           PT/INR - ( 27 Dec 2019 08:49 )   PT: 31.0 sec;   INR: 2.62 ratio      Lactate, Blood: 1.3 mmol/L ( @ 05:59)

## 2019-12-27 NOTE — DIETITIAN INITIAL EVALUATION ADULT. - PERTINENT LABORATORY DATA
12-27 Na137 mmol/L Glu 95 mg/dL K+ 3.8 mmol/L Cr  2.14 mg/dL<H> BUN 75 mg/dL<H> Phos 4.6 mg/dL<H> Alb n/a   PAB n/a

## 2019-12-27 NOTE — PROGRESS NOTE ADULT - SUBJECTIVE AND OBJECTIVE BOX
[facility] Division of Kidney Diseases & Hypertension  FOLLOW UP NOTE  [phone]--------------------------------------------------------------------------------  Chief Complaint:Acute renal failure      24 hour events/subjective:  Patient seen without complaints  No acute events overnight  SCr: 2.14 today improving on diuretics       PAST HISTORY  --------------------------------------------------------------------------------  No significant changes to PMH, PSH, FHx, SHx, unless otherwise noted    ALLERGIES & MEDICATIONS  --------------------------------------------------------------------------------  Allergies    No Known Allergies    Intolerances      Standing Inpatient Medications  albuterol/ipratropium for Nebulization. 3 milliLiter(s) Nebulizer every 6 hours  ammonium lactate 12% Lotion 1 Application(s) Topical two times a day  atorvastatin 20 milliGRAM(s) Oral at bedtime  budesonide 160 MICROgram(s)/formoterol 4.5 MICROgram(s) Inhaler 2 Puff(s) Inhalation two times a day  clotrimazole 1% Cream 1 Application(s) Topical every 12 hours  folic acid 1 milliGRAM(s) Oral daily  furosemide   Injectable 80 milliGRAM(s) IV Push two times a day  melatonin 3 milliGRAM(s) Oral at bedtime  metoprolol succinate  milliGRAM(s) Oral daily  nicotine -  14 mG/24Hr(s) Patch 1 patch Transdermal daily  QUEtiapine 25 milliGRAM(s) Oral at bedtime  triamcinolone 0.1% Ointment 1 Application(s) Topical two times a day    PRN Inpatient Medications  QUEtiapine 25 milliGRAM(s) Oral daily PRN      REVIEW OF SYSTEMS  --------------------------------------------------------------------------------  Gen: No  fevers/chills, weakness  Skin: No rashes  Head/Eyes/Ears/Mouth: No headache  Respiratory: + GAN  CV: No chest pain,   GI: No abdominal pain, diarrhea, constipation, nausea, vomiting  : No increased frequency, dysuria, hematuria, nocturia  MSK:  + LE swelling  Neuro: No dizziness/lightheadedness, weakness, seizures    All other systems were reviewed and are negative, except as noted.  VITALS/PHYSICAL EXAM  --------------------------------------------------------------------------------  T(C): 36.6 (12-27-19 @ 04:58), Max: 36.6 (12-26-19 @ 12:01)  HR: 87 (12-27-19 @ 04:58) (87 - 97)  BP: 101/62 (12-27-19 @ 04:58) (101/62 - 123/81)  RR: 19 (12-27-19 @ 04:58) (18 - 20)  SpO2: 95% (12-27-19 @ 04:58) (94% - 100%)  Wt(kg): --        12-26-19 @ 07:01  -  12-27-19 @ 07:00  --------------------------------------------------------  IN: 300 mL / OUT: 1550 mL / NET: -1250 mL    12-27-19 @ 07:01  -  12-27-19 @ 09:52  --------------------------------------------------------  IN: 240 mL / OUT: 0 mL / NET: 240 mL      Physical Exam:  	Gen: NAD, well-appearing  	HEENT: supple neck,  	Pulm: rales on the left lung   	CV:  S1S2  	Abd: +BS, soft   	Ext: + bilateral pitting edema  	Neuro: No focal deficits  	Skin: Warm, without rashes  	Vascular access: none    LABS/STUDIES  --------------------------------------------------------------------------------              10.5   2.29  >-----------<  174      [12-27-19 @ 07:14]              31.9     137  |  99  |  75  ----------------------------<  95      [12-27-19 @ 06:10]  3.8   |  22  |  2.14        Ca     9.0     [12-27-19 @ 06:10]      Mg     2.0     [12-27-19 @ 06:10]      Phos  4.6     [12-27-19 @ 06:10]      PT/INR: PT 31.0 , INR 2.62       [12-27-19 @ 08:49]      Creatinine Trend:  SCr 2.14 [12-27 @ 06:10]  SCr 2.22 [12-26 @ 05:58]  SCr 2.39 [12-25 @ 05:59]  SCr 2.87 [12-24 @ 05:49]  SCr 3.09 [12-23 @ 06:36]      Urine Creatinine 30      [12-22-19 @ 13:01]  Urine Protein 5      [12-22-19 @ 13:01]  Urine Sodium 40      [12-21-19 @ 07:09]  Urine Urea Nitrogen 668      [12-21-19 @ 10:00]

## 2019-12-28 NOTE — PROGRESS NOTE ADULT - SUBJECTIVE AND OBJECTIVE BOX
patient has no complaints.    GENERAL: No fevers, no chills.  EYES: No blurry vision,  No photophobia  ENT: No sore throat.  No dysphagia  Cardiovascular: No chest pain, palpitations, orthopnea  Pulmonary: No cough, no wheezing. No shortness of breath  Gastrointestinal: No abdominal pain, no diarrhea, no constipation.  Musculoskeletal: No weakness.  No myalgias.  Dermatology:  No rashes.  Neuro: No Headache.  No vertigo.  No dizziness.  Psych: No anxiety, no depression.  Denies suicidal thoughts.    MEDICATIONS  (STANDING):  albuterol/ipratropium for Nebulization. 3 milliLiter(s) Nebulizer every 6 hours  ammonium lactate 12% Lotion 1 Application(s) Topical two times a day  atorvastatin 20 milliGRAM(s) Oral at bedtime  budesonide 160 MICROgram(s)/formoterol 4.5 MICROgram(s) Inhaler 2 Puff(s) Inhalation two times a day  clotrimazole 1% Cream 1 Application(s) Topical every 12 hours  folic acid 1 milliGRAM(s) Oral daily  furosemide   Injectable 80 milliGRAM(s) IV Push two times a day  melatonin 3 milliGRAM(s) Oral at bedtime  metoprolol succinate  milliGRAM(s) Oral daily  nicotine -  14 mG/24Hr(s) Patch 1 patch Transdermal daily  QUEtiapine 25 milliGRAM(s) Oral at bedtime  triamcinolone 0.1% Ointment 1 Application(s) Topical two times a day  warfarin 2.5 milliGRAM(s) Oral once    MEDICATIONS  (PRN):  QUEtiapine 25 milliGRAM(s) Oral daily PRN agitation    Vital Signs Last 24 Hrs  T(C): 36.8 (28 Dec 2019 14:11), Max: 36.8 (28 Dec 2019 14:11)  T(F): 98.2 (28 Dec 2019 14:11), Max: 98.2 (28 Dec 2019 14:11)  HR: 104 (28 Dec 2019 14:11) (85 - 104)  BP: 129/64 (28 Dec 2019 14:11) (106/71 - 129/64)  BP(mean): --  RR: 18 (28 Dec 2019 14:11) (18 - 18)  SpO2: 95% (28 Dec 2019 14:11) (95% - 96%)    GENERAL: NAD  HEAD:  Atraumatic, Normocephalic  EYES: EOMI, PERRLA, conjunctiva and sclera clear  ENT: Pharynx not erythematous  PULMONARY: Clear to auscultation bilaterally; No wheeze  CARDIOVASCULAR: Regular rate and rhythm; No murmurs, rubs, or gallops; +JVD  ABDOMEN: Soft, Nontender, Nondistended; Bowel sounds present  EXTREMITIES:  2+ Peripheral Pulses, No clubbing, cyanosis, or edema  MUSCULOSKELETAL: No calf tenderness  PSYCH: AAOx3, normal affect  SKIN: warm and dry, No rashes or lesions    .  LABS:                         10.6   2.73  )-----------( 176      ( 28 Dec 2019 04:02 )             32.8     12-28    137  |  97  |  69<H>  ----------------------------<  104<H>  4.1   |  24  |  2.08<H>    Ca    9.0      28 Dec 2019 04:02  Phos  4.6     12-27  Mg     2.1     12-28      PT/INR - ( 27 Dec 2019 08:49 )   PT: 31.0 sec;   INR: 2.62 ratio                   RADIOLOGY, EKG & ADDITIONAL TESTS: Reviewed.

## 2019-12-28 NOTE — PROGRESS NOTE ADULT - PROBLEM SELECTOR PLAN 1
- Continue Lasix 80mgIV BID. Possible transition to oral diuretics tomorrow.   - Continue home dose of Toprol XL 100mg daily  - Hold on ACE-I/ARB/ARNI/MRA at this time given renal dysfunction  - Daily standing weights, strict I/Os

## 2019-12-28 NOTE — PROGRESS NOTE ADULT - ASSESSMENT
Mr. Lopez is a 79 year old man with CAD s/p 3v CABG (LIMA-mLAD, SVT-OM1, SVG-RPL1 in 9/2007), severe aortic stenosis s/p TAVR 2016, known LBBB, COPD, current smoker, atrial fibrillation on warfarin, and CKD3 (baseline Cr 1.4-1.5), and newly reduced biventricular systolic function and worsening TR who presented with acute decompensated HF, volume overloaded with MERCED on CKD, hyperkalemia and elevated INR. He is in rate controlled AFib with frequent PVCs. He is normotensive. He remains volume overloaded, but is improving. His renal function continues to improve.     Please call me with questions at [phone]. Plan discussed with 2 [facility] medicine team.

## 2019-12-28 NOTE — PROGRESS NOTE ADULT - SUBJECTIVE AND OBJECTIVE BOX
Subjective: No current complaints. He is able to walk with assistance. No dyspnea.     Medications:  albuterol/ipratropium for Nebulization. 3 milliLiter(s) Nebulizer every 6 hours  ammonium lactate 12% Lotion 1 Application(s) Topical two times a day  atorvastatin 20 milliGRAM(s) Oral at bedtime  budesonide 160 MICROgram(s)/formoterol 4.5 MICROgram(s) Inhaler 2 Puff(s) Inhalation two times a day  clotrimazole 1% Cream 1 Application(s) Topical every 12 hours  folic acid 1 milliGRAM(s) Oral daily  furosemide   Injectable 80 milliGRAM(s) IV Push two times a day  melatonin 3 milliGRAM(s) Oral at bedtime  metoprolol succinate  milliGRAM(s) Oral daily  nicotine -  14 mG/24Hr(s) Patch 1 patch Transdermal daily  QUEtiapine 25 milliGRAM(s) Oral daily PRN  QUEtiapine 25 milliGRAM(s) Oral at bedtime  triamcinolone 0.1% Ointment 1 Application(s) Topical two times a day      Physical Exam:    Vital Signs Last 24 Hrs  T(C): 36.6 (28 Dec 2019 03:45), Max: 36.7 (27 Dec 2019 21:06)  T(F): 97.8 (28 Dec 2019 03:45), Max: 98 (27 Dec 2019 21:06)  HR: 93 (28 Dec 2019 03:45) (85 - 95)  BP: 118/73 (28 Dec 2019 03:45) (106/71 - 123/70)  RR: 18 (28 Dec 2019 03:45) (18 - 18)  SpO2: 96% (28 Dec 2019 03:45) (95% - 96%)    Daily Weight in k.8 (28 Dec 2019 07:15)    I&O's Summary    27 Dec 2019 07:  -  28 Dec 2019 07:00  --------------------------------------------------------  IN: 1252 mL / OUT: 1900 mL / NET: -648 mL    28 Dec 2019 07:01  -  28 Dec 2019 14:10  --------------------------------------------------------  IN: 720 mL / OUT: 0 mL / NET: 720 mL    General: No distress. Comfortable.  HEENT: EOM intact.  Neck: Neck supple. JVP mildly elevated. No masses  Chest: Clear to auscultation bilaterally  CV: Normal S1 and S2. No rubs or gallops. Radial pulses normal. No dependent edema.   Abdomen: Soft, non-distended, non-tender  Skin: No rashes or skin breakdown  Neurology: Alert and oriented times three. Sensation intact  Psych: Affect normal    Labs:                        10.6   2.73  )-----------( 176      ( 28 Dec 2019 04:02 )             32.8     12-    137  |  97  |  69<H>  ----------------------------<  104<H>  4.1   |  24  |  2.08<H>    Ca    9.0      28 Dec 2019 04:02  Phos  4.6     12  Mg     2.1     -    PT/INR - ( 27 Dec 2019 08:49 )   PT: 31.0 sec;   INR: 2.62 ratio

## 2019-12-29 NOTE — PROGRESS NOTE ADULT - PROBLEM SELECTOR PLAN 1
- Continue Lasix 80mgIV BID. If his creatinine worsens, we will send for right heart catheterization.   - Continue home dose of Toprol XL 100mg daily  - Hold on ACE-I/ARB/ARNI/MRA at this time given renal dysfunction  - Daily standing weights, strict I/Os

## 2019-12-29 NOTE — PROGRESS NOTE ADULT - SUBJECTIVE AND OBJECTIVE BOX
Subjective: No current complaints.     Medications:  albuterol/ipratropium for Nebulization. 3 milliLiter(s) Nebulizer every 6 hours  ammonium lactate 12% Lotion 1 Application(s) Topical two times a day  atorvastatin 20 milliGRAM(s) Oral at bedtime  budesonide 160 MICROgram(s)/formoterol 4.5 MICROgram(s) Inhaler 2 Puff(s) Inhalation two times a day  clotrimazole 1% Cream 1 Application(s) Topical every 12 hours  folic acid 1 milliGRAM(s) Oral daily  furosemide   Injectable 80 milliGRAM(s) IV Push two times a day  melatonin 3 milliGRAM(s) Oral at bedtime  metoprolol succinate  milliGRAM(s) Oral daily  nicotine -  14 mG/24Hr(s) Patch 1 patch Transdermal daily  QUEtiapine 25 milliGRAM(s) Oral daily PRN  QUEtiapine 25 milliGRAM(s) Oral at bedtime  triamcinolone 0.1% Ointment 1 Application(s) Topical two times a day  warfarin 2.5 milliGRAM(s) Oral once      Physical Exam:    Vital Signs Last 24 Hrs  T(C): 36.7 (29 Dec 2019 13:40), Max: 36.8 (28 Dec 2019 14:11)  T(F): 98 (29 Dec 2019 13:40), Max: 98.3 (28 Dec 2019 20:48)  HR: 62 (29 Dec 2019 13:40) (59 - 105)  BP: 101/57 (29 Dec 2019 13:40) (93/52 - 129/64)  RR: 18 (29 Dec 2019 13:40) (17 - 19)  SpO2: 92% (29 Dec 2019 13:40) (92% - 95%)     Daily Weight in k.5 (29 Dec 2019 07:15)    I&O's Summary    28 Dec 2019 07:  -  29 Dec 2019 07:00  --------------------------------------------------------  IN: 1260 mL / OUT: 1600 mL / NET: -340 mL    29 Dec 2019 07:  -  29 Dec 2019 13:43  --------------------------------------------------------  IN: 595 mL / OUT: 0 mL / NET: 595 mL    General: No distress. Comfortable.  HEENT: EOM intact.  Neck: Neck supple. JVP moderately elevated. No masses  Chest: Clear to auscultation bilaterally  CV: Irregular rhythm. Normal S1 and S2. No rubs or gallops. Radial pulses normal. No edema.  Abdomen: Soft, non-distended, non-tender  Skin: No rashes or skin breakdown  Neurology: Alert and oriented times three. Sensation intact  Psych: Affect normal    Labs:                        10.2   3.06  )-----------( 184      ( 29 Dec 2019 09:24 )             30.9     12-    137  |  97  |  78<H>  ----------------------------<  108<H>  4.4   |  26  |  2.41<H>    Ca    9.2      29 Dec 2019 05:05  Mg     2.1     12-28      PT/INR - ( 29 Dec 2019 09:00 )   PT: 35.2 sec;   INR: 2.96 ratio     PTT - ( 28 Dec 2019 15:53 )  PTT:34.0 sec

## 2019-12-29 NOTE — PROGRESS NOTE ADULT - SUBJECTIVE AND OBJECTIVE BOX
patient feels well. no complaints.    GENERAL: No fevers, no chills.  EYES: No blurry vision,  No photophobia  ENT: No sore throat.  No dysphagia  Cardiovascular: No chest pain, palpitations, orthopnea  Pulmonary: No cough, no wheezing. No shortness of breath  Gastrointestinal: No abdominal pain, no diarrhea, no constipation.   Musculoskeletal: No weakness.  No myalgias.  Dermatology:  No rashes.  Neuro: No Headache.  No vertigo.  No dizziness.  Psych: No anxiety, no depression.  Denies suicidal thoughts.    MEDICATIONS  (STANDING):  albuterol/ipratropium for Nebulization. 3 milliLiter(s) Nebulizer every 6 hours  ammonium lactate 12% Lotion 1 Application(s) Topical two times a day  atorvastatin 20 milliGRAM(s) Oral at bedtime  budesonide 160 MICROgram(s)/formoterol 4.5 MICROgram(s) Inhaler 2 Puff(s) Inhalation two times a day  clotrimazole 1% Cream 1 Application(s) Topical every 12 hours  folic acid 1 milliGRAM(s) Oral daily  furosemide   Injectable 80 milliGRAM(s) IV Push two times a day  melatonin 3 milliGRAM(s) Oral at bedtime  metoprolol succinate  milliGRAM(s) Oral daily  nicotine -  14 mG/24Hr(s) Patch 1 patch Transdermal daily  QUEtiapine 25 milliGRAM(s) Oral at bedtime  triamcinolone 0.1% Ointment 1 Application(s) Topical two times a day    MEDICATIONS  (PRN):  QUEtiapine 25 milliGRAM(s) Oral daily PRN agitation    Vital Signs Last 24 Hrs  T(C): 36.7 (29 Dec 2019 13:40), Max: 36.8 (28 Dec 2019 20:48)  T(F): 98 (29 Dec 2019 13:40), Max: 98.3 (28 Dec 2019 20:48)  HR: 62 (29 Dec 2019 13:40) (59 - 105)  BP: 101/57 (29 Dec 2019 13:40) (93/52 - 105/63)  BP(mean): --  RR: 18 (29 Dec 2019 13:40) (17 - 19)  SpO2: 92% (29 Dec 2019 13:40) (92% - 95%)    GENERAL: NAD  HEAD:  Atraumatic, Normocephalic  EYES: EOMI, PERRLA, conjunctiva and sclera clear  ENT: Pharynx not erythematous  PULMONARY: Clear to auscultation bilaterally; No wheeze  CARDIOVASCULAR: Regular rate and rhythm; No murmurs, rubs, or gallops; + JVD  ABDOMEN: Soft, Nontender, Nondistended; Bowel sounds present  EXTREMITIES:  2+ Peripheral Pulses, No clubbing, cyanosis, or edema  MUSCULOSKELETAL: No calf tenderness  PSYCH: AAOx3, normal affect  SKIN: warm and dry, No rashes or lesions    .  LABS:                         10.2   3.06  )-----------( 184      ( 29 Dec 2019 09:24 )             30.9     12-29    137  |  97  |  78<H>  ----------------------------<  108<H>  4.4   |  26  |  2.41<H>    Ca    9.2      29 Dec 2019 05:05  Mg     2.1     12-28      PT/INR - ( 29 Dec 2019 09:00 )   PT: 35.2 sec;   INR: 2.96 ratio         PTT - ( 28 Dec 2019 15:53 )  PTT:34.0 sec          RADIOLOGY, EKG & ADDITIONAL TESTS: Reviewed.

## 2019-12-29 NOTE — PROGRESS NOTE ADULT - ASSESSMENT
Mr. Lopez is a 79 year old man with CAD s/p 3v CABG (LIMA-mLAD, SVT-OM1, SVG-RPL1 in 9/2007), severe aortic stenosis s/p TAVR 2016, known LBBB, COPD, current smoker, atrial fibrillation on warfarin, and CKD3 (baseline Cr 1.4-1.5), and newly reduced biventricular systolic function and worsening TR who presented with acute decompensated HF, volume overloaded with MERCED on CKD, hyperkalemia and elevated INR. He is in rate controlled AFib with frequent PVCs. He is normotensive. He remains with elevated right sided central filling pressure, but has no extravascular volume expansion. His renal function appears slightly worse today.     Please call me with questions at [phone]. Plan discussed with 2 [facility] medicine team.

## 2019-12-29 NOTE — PROGRESS NOTE ADULT - PROBLEM SELECTOR PLAN 1
- MERCED on CKD likely cardiorenal- slightly worse today  - No urgent need for dialysis per Nephrology  - daily BMP  - Renal diet  - Renal US with mildly increased cortical echogenicity bilaterally, which may be secondary to medical renal disease

## 2019-12-30 NOTE — PROGRESS NOTE ADULT - SUBJECTIVE AND OBJECTIVE BOX
patient has no complaints.    GENERAL: No fevers, no chills.  EYES: No blurry vision,  No photophobia  ENT: No sore throat.  No dysphagia  Cardiovascular: No chest pain, palpitations, orthopnea  Pulmonary: No cough, no wheezing. No shortness of breath  Gastrointestinal: No abdominal pain, no diarrhea, no constipation.   Musculoskeletal: No weakness.  No myalgias.  Dermatology:  No rashes.  Neuro: No Headache.  No vertigo.  No dizziness.  Psych: No anxiety, no depression.  Denies suicidal thoughts.    MEDICATIONS  (STANDING):  albuterol/ipratropium for Nebulization. 3 milliLiter(s) Nebulizer every 6 hours  ammonium lactate 12% Lotion 1 Application(s) Topical two times a day  atorvastatin 20 milliGRAM(s) Oral at bedtime  budesonide 160 MICROgram(s)/formoterol 4.5 MICROgram(s) Inhaler 2 Puff(s) Inhalation two times a day  clotrimazole 1% Cream 1 Application(s) Topical every 12 hours  folic acid 1 milliGRAM(s) Oral daily  furosemide   Injectable 80 milliGRAM(s) IV Push two times a day  melatonin 3 milliGRAM(s) Oral at bedtime  metoprolol succinate  milliGRAM(s) Oral daily  nicotine -  14 mG/24Hr(s) Patch 1 patch Transdermal daily  QUEtiapine 25 milliGRAM(s) Oral at bedtime  triamcinolone 0.1% Ointment 1 Application(s) Topical two times a day    MEDICATIONS  (PRN):  QUEtiapine 25 milliGRAM(s) Oral daily PRN agitation    Vital Signs Last 24 Hrs  T(C): 36.6 (30 Dec 2019 12:25), Max: 36.7 (29 Dec 2019 21:41)  T(F): 97.9 (30 Dec 2019 12:25), Max: 98 (29 Dec 2019 21:41)  HR: 88 (30 Dec 2019 12:25) (84 - 97)  BP: 108/76 (30 Dec 2019 12:25) (99/55 - 115/74)  BP(mean): --  RR: 20 (30 Dec 2019 12:25) (20 - 20)  SpO2: 96% (30 Dec 2019 12:25) (92% - 96%)    GENERAL: NAD, well-developed  HEAD:  Atraumatic, Normocephalic  EYES: EOMI, PERRLA, conjunctiva and sclera clear  ENT: Pharynx not erythematous  PULMONARY: Clear to auscultation bilaterally; No wheeze  CARDIOVASCULAR: Regular rate and rhythm; No murmurs, rubs, or gallops  ABDOMEN: Soft, Nontender, Nondistended; Bowel sounds present  EXTREMITIES:  2+ Peripheral Pulses, No clubbing, cyanosis; trace edema up to knees   MUSCULOSKELETAL: No calf tenderness  PSYCH: AAOx3, normal affect  SKIN: warm and dry, No rashes or lesions    .  LABS:                         10.2   3.06  )-----------( 184      ( 29 Dec 2019 09:24 )             30.9     12-30    133<L>  |  94<L>  |  79<H>  ----------------------------<  91  3.8   |  24  |  2.21<H>    Ca    9.2      30 Dec 2019 05:40      PT/INR - ( 30 Dec 2019 08:37 )   PT: 28.3 sec;   INR: 2.42 ratio         PTT - ( 28 Dec 2019 15:53 )  PTT:34.0 sec          RADIOLOGY, EKG & ADDITIONAL TESTS: Reviewed.

## 2019-12-30 NOTE — PROGRESS NOTE ADULT - PROBLEM SELECTOR PLAN 1
- MERCED on CKD likely cardiorenal- improved today  - No urgent need for dialysis per Nephrology  - daily BMP  - Renal diet  - Renal US with mildly increased cortical echogenicity bilaterally, which may be secondary to medical renal disease

## 2019-12-30 NOTE — PROGRESS NOTE ADULT - PROBLEM SELECTOR PLAN 1
- stop lasix and siwtch to bumex 3 mg PO twice/day; near euvolemic  - Continue home dose of Toprol XL 100mg daily  - Hold on ACE-I/ARB/ARNI/MRA at this time given renal dysfunction; start hydral 10 mg q8h   - Daily standing weights, strict I/Os

## 2019-12-30 NOTE — PROGRESS NOTE ADULT - SUBJECTIVE AND OBJECTIVE BOX
[facility] Division of Kidney Diseases & Hypertension  FOLLOW UP NOTE  -------------------------------------------------------------------------------  Chief Complaint:Acute renal failure      24 hour events/subjective:  Patient seen this  morning without complaints  No acute events overnight  SCr: 2.4 during the weekend decreased to SCr: 2.21 today  Electrolytes grossly stable  UO: 1L        PAST HISTORY  --------------------------------------------------------------------------------  No significant changes to PMH, PSH, FHx, SHx, unless otherwise noted    ALLERGIES & MEDICATIONS  --------------------------------------------------------------------------------  Allergies    No Known Allergies    Intolerances      Standing Inpatient Medications  albuterol/ipratropium for Nebulization. 3 milliLiter(s) Nebulizer every 6 hours  ammonium lactate 12% Lotion 1 Application(s) Topical two times a day  atorvastatin 20 milliGRAM(s) Oral at bedtime  budesonide 160 MICROgram(s)/formoterol 4.5 MICROgram(s) Inhaler 2 Puff(s) Inhalation two times a day  clotrimazole 1% Cream 1 Application(s) Topical every 12 hours  folic acid 1 milliGRAM(s) Oral daily  furosemide   Injectable 80 milliGRAM(s) IV Push two times a day  melatonin 3 milliGRAM(s) Oral at bedtime  metoprolol succinate  milliGRAM(s) Oral daily  nicotine -  14 mG/24Hr(s) Patch 1 patch Transdermal daily  QUEtiapine 25 milliGRAM(s) Oral at bedtime  triamcinolone 0.1% Ointment 1 Application(s) Topical two times a day    PRN Inpatient Medications  QUEtiapine 25 milliGRAM(s) Oral daily PRN      REVIEW OF SYSTEMS  --------------------------------------------------------------------------------  Gen: No  fevers/chills  Skin: No rashes  Head/Eyes/Ears/Mouth: No headache; Normal hearing; Normal vision w/o blurriness  Respiratory: No dyspnea, cough, wheezing, hemoptysis  CV: No chest pain, PND, orthopnea  GI: No abdominal pain, diarrhea, constipation, nausea, vomiting  : No increased frequency, dysuria, hematuria, nocturia  MSK: No joint pain/swelling; no back pain; no edema  Neuro: No dizziness/lightheadedness, weakness, seizures, numbness, tingling      All other systems were reviewed and are negative, except as noted.    VITALS/PHYSICAL EXAM  --------------------------------------------------------------------------------  T(C): 36.6 (12-30-19 @ 05:07), Max: 36.7 (12-29-19 @ 13:40)  HR: 86 (12-30-19 @ 06:03) (62 - 97)  BP: 99/68 (12-30-19 @ 06:03) (99/55 - 115/74)  RR: 20 (12-30-19 @ 05:07) (18 - 20)  SpO2: 95% (12-30-19 @ 05:07) (92% - 95%)  Wt(kg): --        12-29-19 @ 07:01  -  12-30-19 @ 07:00  --------------------------------------------------------  IN: 595 mL / OUT: 1000 mL / NET: -405 mL      Physical Exam:  	Gen: NAD, well-appearing  	HEENT: supple neck,  	Pulm: rales on the left lung   	CV:  S1S2  	Abd: +BS, soft   	Ext: + bilateral pitting edema  	Neuro: No focal deficits  	Skin: Warm, without rashes  	Vascular access: none    LABS/STUDIES  --------------------------------------------------------------------------------              10.2   3.06  >-----------<  184      [12-29-19 @ 09:24]              30.9     133  |  94  |  79  ----------------------------<  91      [12-30-19 @ 05:40]  3.8   |  24  |  2.21        Ca     9.2     [12-30-19 @ 05:40]      PT/INR: PT 28.3 , INR 2.42       [12-30-19 @ 08:37]  PTT: 34.0       [12-28-19 @ 15:53]      Creatinine Trend:  SCr 2.21 [12-30 @ 05:40]  SCr 2.41 [12-29 @ 05:05]  SCr 2.08 [12-28 @ 04:02]  SCr 2.14 [12-27 @ 06:10]  SCr 2.22 [12-26 @ 05:58]

## 2019-12-30 NOTE — PROGRESS NOTE ADULT - ASSESSMENT
Mr. Lopez is a 79 year old man with CAD s/p 3v CABG (LIMA-mLAD, SVT-OM1, SVG-RPL1 in 9/2007), severe aortic stenosis s/p TAVR 2016, known LBBB, COPD, current smoker, atrial fibrillation on warfarin, and CKD3 (baseline Cr 1.4-1.5), and newly reduced LV systolic function and worsening TR who presented with acute decompensated HF, volume overloaded with MERCED on CKD, hyperkalemia and elevated INR. He is in rate controlled AFib with frequent PVCs. He is normotensive. TTE 11/27 reviewed - severe LV dysfunction (paradox septal motion likely d/t LBBB), mod MR, severe TR, mild RV dysfunction.

## 2019-12-30 NOTE — PROGRESS NOTE ADULT - PROBLEM SELECTOR PLAN 1
Patient with Non-oliguric MERCED secondary to diuretic use and NSAIDs. Serum creatinine 3.4 increased from baseline SCr: 1.1- 1.5. Last SCr: 1.6 on 12/5/19. Renal US with non obstruct left renal calculi. Echogenic kidneys bilaterally. Improved on IV diuresis however increased during the weekend and now with borderline low BP    Recommend   - Hold ACE/ARBs, and avoid NSAIDs, and other potential nephrotoxins for now. and dose medications as per eGFR  - Daily STANDING weights  - Monitor BMP and replete electrolytes while on diuretics  - Low phos diet  - Currently the patients blood pressures has been at Borderline SBP 90~. He is otherwise asymptomatic. MERCED could be related to hypovolemia from diuresis.  - CONSIDER reducing dose diuretics or repeat RHC. Patient with Non-oliguric MERCED secondary to diuretic use and NSAIDs. Serum creatinine 3.4 increased from baseline SCr: 1.1- 1.5. Last SCr: 1.6 on 12/5/19. Renal US with non obstruct left renal calculi. Echogenic kidneys bilaterally. Improved on IV diuresis however increased during the weekend and now with borderline low BP    Recommend   - Hold ACE/ARBs, and avoid NSAIDs, and other potential nephrotoxins for now. and dose medications as per eGFR  - Daily STANDING weights  - Monitor BMP and replete electrolytes while on diuretics  - Low phos diet  - Cont. diuresis; monitor volume status and BP closely

## 2019-12-30 NOTE — PROGRESS NOTE ADULT - SUBJECTIVE AND OBJECTIVE BOX
Interval History:  feels well  denies complaints     Medications:  albuterol/ipratropium for Nebulization. 3 milliLiter(s) Nebulizer every 6 hours  ammonium lactate 12% Lotion 1 Application(s) Topical two times a day  atorvastatin 20 milliGRAM(s) Oral at bedtime  budesonide 160 MICROgram(s)/formoterol 4.5 MICROgram(s) Inhaler 2 Puff(s) Inhalation two times a day  buMETAnide 3 milliGRAM(s) Oral two times a day  clotrimazole 1% Cream 1 Application(s) Topical every 12 hours  folic acid 1 milliGRAM(s) Oral daily  hydrALAZINE 10 milliGRAM(s) Oral every 8 hours  melatonin 3 milliGRAM(s) Oral at bedtime  metoprolol succinate  milliGRAM(s) Oral daily  nicotine -  14 mG/24Hr(s) Patch 1 patch Transdermal daily  QUEtiapine 25 milliGRAM(s) Oral daily PRN  QUEtiapine 25 milliGRAM(s) Oral at bedtime  triamcinolone 0.1% Ointment 1 Application(s) Topical two times a day      Vitals:  T(C): 36.6 (19 @ 20:55), Max: 36.6 (19 @ 05:07)  HR: 104 (19 @ 20:55) (69 - 104)  BP: 113/72 (19 @ 20:55) (99/55 - 113/72)  BP(mean): --  RR: 20 (19 @ 20:55) (20 - 20)  SpO2: 94% (19 @ 20:55) (94% - 96%)    Daily     Daily Weight in k.7 (30 Dec 2019 10:43)        I&O's Summary    29 Dec 2019 07:01  -  30 Dec 2019 07:00  --------------------------------------------------------  IN: 595 mL / OUT: 1000 mL / NET: -405 mL    Physical Exam:  Appearance: No Acute Distress  HEENT: PERRL  Neck: JVD approx 8-10 w/ mild HJR  Cardiovascular: Normal S1 S2, No murmurs/rubs/gallops  Respiratory: Clear to auscultation bilaterally  Gastrointestinal: Soft, Non-tender	  Skin: No cyanosis	  Neurologic: Non-focal  Extremities: No LE edema  Psychiatry: A & O x 3, Mood & affect appropriate    Labs:                        10.2   3.06  )-----------( 184      ( 29 Dec 2019 09:24 )             30.9     12-30    133<L>  |  94<L>  |  79<H>  ----------------------------<  91  3.8   |  24  |  2.21<H>    Ca    9.2      30 Dec 2019 05:40      PT/INR - ( 30 Dec 2019 08:37 )   PT: 28.3 sec;   INR: 2.42 ratio

## 2019-12-31 NOTE — DISCHARGE NOTE PROVIDER - NSDCMRMEDTOKEN_GEN_ALL_CORE_FT
albuterol 90 mcg/inh inhalation aerosol: 2 puff(s) inhaled every 6 hours, As needed, Wheezing  atorvastatin 20 mg oral tablet: 1 tab(s) orally once a day (at bedtime)  Coumadin 2.5 mg oral tablet: 1 tab(s) orally once a day  Ecotrin Adult Low Strength 81 mg oral delayed release tablet: 1 tab(s) orally once a day  folic acid 1 mg oral tablet: 1 tab(s) orally once a day  Lasix 40 mg oral tablet: 1 tab(s) orally once a day   nicotine 2 mg oral transmucosal gum: 1 gum chewed every 3 hours, As Needed   sacubitril-valsartan 49 mg-51 mg oral tablet: 1 tab(s) orally 2 times a day  Toprol- mg oral tablet, extended release: 1 tab(s) orally once a day  Trelegy Ellipta inhalation powder: 1 puff(s) inhaled once a day, As Needed    Vitamin D3 2000 intl units oral capsule: 1 cap(s) orally once a day albuterol 90 mcg/inh inhalation aerosol: 2 puff(s) inhaled every 6 hours, As needed, Wheezing  ammonium lactate 12% topical lotion: 1 application topically 2 times a day  atorvastatin 20 mg oral tablet: 1 tab(s) orally once a day (at bedtime)  bumetanide 1 mg oral tablet: 3 tab(s) orally 2 times a day  clotrimazole 1% topical cream: 1 application topically every 12 hours  Coumadin 2.5 mg oral tablet: 1 tab(s) orally once a day, 5mg tonight (1/1), then 2.5mg po nightly   folic acid 1 mg oral tablet: 1 tab(s) orally once a day  hydrALAZINE 10 mg oral tablet: 1 tab(s) orally every 8 hours  nicotine 2 mg oral transmucosal gum: 1 gum chewed every 3 hours, As Needed   SEROquel 25 mg oral tablet: 0.5 tab(s) orally once a day (at bedtime)   Toprol- mg oral tablet, extended release: 1 tab(s) orally 2 times a day  Trelegy Ellipta inhalation powder: 1 puff(s) inhaled once a day, As Needed    triamcinolone 0.1% topical ointment: 1 application topically 2 times a day  Vitamin D3 2000 intl units oral capsule: 1 cap(s) orally once a day albuterol 90 mcg/inh inhalation aerosol: 2 puff(s) inhaled every 6 hours, As needed, Wheezing  ammonium lactate 12% topical lotion: 1 application topically 2 times a day  atorvastatin 20 mg oral tablet: 1 tab(s) orally once a day (at bedtime)  bumetanide 1 mg oral tablet: 3 tab(s) orally 2 times a day  clotrimazole 1% topical cream: 1 application topically every 12 hours  Coumadin 2.5 mg oral tablet: 1 tab(s) orally once a day, 5mg tonight (1/1), then 2.5mg po nightly   folic acid 1 mg oral tablet: 1 tab(s) orally once a day  hydrALAZINE 10 mg oral tablet: 1 tab(s) orally every 8 hours  metoprolol tartrate 100 mg oral tablet: 1 tab(s) orally 2 times a day  nicotine 2 mg oral transmucosal gum: 1 gum chewed every 3 hours, As Needed   SEROquel 25 mg oral tablet: 0.5 tab(s) orally once a day (at bedtime)   Trelegy Ellipta inhalation powder: 1 puff(s) inhaled once a day, As Needed    triamcinolone 0.1% topical ointment: 1 application topically 2 times a day  Vitamin D3 2000 intl units oral capsule: 1 cap(s) orally once a day albuterol 90 mcg/inh inhalation aerosol: 2 puff(s) inhaled every 6 hours, As needed, Wheezing  ammonium lactate 12% topical lotion: 1 application topically 2 times a day  atorvastatin 20 mg oral tablet: 1 tab(s) orally once a day (at bedtime)  bumetanide 1 mg oral tablet: 3 tab(s) orally 2 times a day  clotrimazole 1% topical cream: 1 application topically every 12 hours  Coumadin 2.5 mg oral tablet: 1 tab(s) orally once a day, 5mg tonight (1/1), then 2.5mg po nightly   folic acid 1 mg oral tablet: 1 tab(s) orally once a day  hydrALAZINE 10 mg oral tablet: 1 tab(s) orally every 8 hours  metoprolol tartrate 100 mg oral tablet: 1 tab(s) orally 2 times a day  nicotine 2 mg oral transmucosal gum: 1 gum chewed every 3 hours, As Needed   oxyCODONE 5 mg oral capsule: 1 cap(s) orally every 12 hours MDD:2  SEROquel 25 mg oral tablet: 0.5 tab(s) orally once a day (at bedtime)   Trelegy Ellipta inhalation powder: 1 puff(s) inhaled once a day, As Needed    triamcinolone 0.1% topical ointment: 1 application topically 2 times a day  Vitamin D3 2000 intl units oral capsule: 1 cap(s) orally once a day albuterol 90 mcg/inh inhalation aerosol: 2 puff(s) inhaled every 6 hours, As needed, Wheezing  ammonium lactate 12% topical lotion: 1 application topically 2 times a day  atorvastatin 20 mg oral tablet: 1 tab(s) orally once a day (at bedtime)  bumetanide 1 mg oral tablet: 3 tab(s) orally 2 times a day  clotrimazole 1% topical cream: 1 application topically every 12 hours  Coumadin 2.5 mg oral tablet: 1 tab(s) orally once a day, 5mg tonight (1/1), then 2.5mg po nightly   folic acid 1 mg oral tablet: 1 tab(s) orally once a day  hydrALAZINE 10 mg oral tablet: 1 tab(s) orally every 8 hours  metoprolol tartrate 100 mg oral tablet: 1 tab(s) orally 2 times a day  nicotine 2 mg oral transmucosal gum: 1 gum chewed every 3 hours, As Needed   oxyCODONE 5 mg oral tablet: 1 tab(s) orally every 12 hours MDD:2  SEROquel 25 mg oral tablet: 0.5 tab(s) orally once a day (at bedtime)   Trelegy Ellipta inhalation powder: 1 puff(s) inhaled once a day, As Needed    triamcinolone 0.1% topical ointment: 1 application topically 2 times a day  Vitamin D3 2000 intl units oral capsule: 1 cap(s) orally once a day

## 2019-12-31 NOTE — CHART NOTE - NSCHARTNOTEFT_GEN_A_CORE
Last Name:  John     First Name: Erik  	: 1940        Medical Record#:   [id number]                                                       Procedure performed:	    Insertion single chamber ICD (99999)    Insertion of a LV pacing lead (55609)  	         Sedation initial 15 minutes (35334)   Sedation time additional 15 minutes (33754)X8 units  Procedure Date: 19                                                    Referring MD:                             Ramón Quiroz MD    Preprocedure Indications and Diagnoses:     1. Persistent AF (I48.1)  2. Left Bundle Branch Block (144.7)  3. Syncope:  no  4. LVEF:  30%  5. NYHA Class:  III  6. Heart disease type:   Ischemic cardiomyopathy  7. Valvular Disease: None    Postprocedure Diagnoses:    1. ICD Generator:  : Medtronic Model#   [id number]      Serial Number#  [id number]  2. MRI Compatible ?  Position:    Left Suprapectoral  3. Lead 1:   Access:   Left cephalic   Position:  RV apex  : Medtronic Model#   [id number]   active fix  Serial Number#  [id number]  4. Lead 2:   Access:   Left subclavian   Position:  CS-LV posterolateral  : Medtronic Model#   [id number]   passive tined   Serial Number#  [id number]  6. Venogram for access: ? occlusive of the CS  7. D-stat applied to pocket ?  8. Tyrx pouch:  ?    Complications:  None    EBL:   <50cc    Fluoroscopy:     23.2 Min   5332 cGycm2 339mGy    Anesthesiologist:  Conscious sedation per  ([id number]) and ([id number]) under my direct supervision. Total face to face sedation time of 135 minutes    Preprocedure antibiotics:  2 gm Cefazolin IV    Brief History:  79 year old man with persistent atrial fibrillation and ischemic cardiomyopathy, nonsustained VT and LVEF < 30% on maximal GDT for > 3 months with LBBB and indication for CRT-D    Procedure Details:    The patient was brought to the Electrophysiology Laboratory in the post absorptive state after informed consent was obtained and prepped and draped in the usual manner. Formal time out was performed.     2% bupivacaine was infiltrated along the left deltopectoral groove.   A 5 cm diagonal  incision was made with a 10 blade. Using sharp and blunt dissection and electrocautery, the pectoralis major and deltopectoral groove were exposed. Further dissection exposed the cephalic vein which was deemed large enough for passage of the RV lead and isolated with 0-0 silk.  Seldinger technique was used to cannulate the left subclavian vein for a second 9F peel away sheath for the LV delivery system. An 11 blade was used to make a venotomy in the cephalic and a glide wire was introduced under fluoroscopy over which a 9F peel away sheath was advanced and the RV ICD lead were advanced to the right heart under to the RV apex and the screw was advanced. The Attain delivery system was used over a [place] MCXL steerable catheter to gain CS access. Occlusive venography showed no acceptable branches and after some manipulation, I cannulated the low posterolateral branch with excellent results.      The leads were advanced to their respective conditions. Lead slack appeared excellent.  All stylets were removed. The leads were secured to the pectoralis major fascia using 0-0 silk around the suture sleeves.    Lead testing:  Lead			Amplitude (mV)	     Pacing threshold (V/ms)   Impedance (ohms)   Slew rate   RV apex                                             5.3	                   0.5/0.4	399	  CS posterolateral                                                              0.75/0.4	323	  RA                                           Port plugged		                            81 (RV coil)	                    A pocket was then made above the pectoralis major.  Bleeding sites were cauterized. The pocket was irrigated with antibiotic solution. The leads were connected to the generator and set screws were tightened. The leads passed the tug test. Excellent hemostasis was obtained. The pocket was then closed with 2-0 Vicryl, 3-0 Vicryl 4-0 Vicryl.   Steri-Strips  were used. Fluoroscopy showed excellent lead and generator position. Sponge and needle counts were correct. Chest X Ray ordered for the am.  Device programmed to VVI.   Sponge and needle counts were correct X 2.  Due to Atrial fib with rapid response preventing CRT pacing, I administered 10 mg IV metoprolol and increased the Lower rate (ppm): 90   Upper rate (ppm):  90.  The patient was recovered from sedation and returned to his room. His metoprolol succinate dose was increased to 200 mg daily, and 0.125 mg digoxin was added to his regimen to facilitate CRT pacing by slowing AF rapid response. His renal function and dig level can be followed closely. He may need AV junction ablation in the future if adequate rate control cannot be achieved pharmacologically.

## 2019-12-31 NOTE — PROGRESS NOTE ADULT - SUBJECTIVE AND OBJECTIVE BOX
Patient feels well, no complaints.    GENERAL: No fevers, no chills.  EYES: No blurry vision,  No photophobia  ENT: No sore throat.  No dysphagia  Cardiovascular: No chest pain, palpitations, orthopnea  Pulmonary: No cough, no wheezing. No shortness of breath  Gastrointestinal: No abdominal pain, no diarrhea, no constipation.    Musculoskeletal: No weakness.  No myalgias.  Dermatology:  No rashes.  Neuro: No Headache.  No vertigo.  No dizziness.  Psych: No anxiety, no depression.  Denies suicidal thoughts.    MEDICATIONS  (STANDING):  albuterol/ipratropium for Nebulization. 3 milliLiter(s) Nebulizer every 6 hours  ammonium lactate 12% Lotion 1 Application(s) Topical two times a day  atorvastatin 20 milliGRAM(s) Oral at bedtime  budesonide 160 MICROgram(s)/formoterol 4.5 MICROgram(s) Inhaler 2 Puff(s) Inhalation two times a day  buMETAnide 3 milliGRAM(s) Oral two times a day  clotrimazole 1% Cream 1 Application(s) Topical every 12 hours  folic acid 1 milliGRAM(s) Oral daily  hydrALAZINE 10 milliGRAM(s) Oral every 8 hours  melatonin 3 milliGRAM(s) Oral at bedtime  metoprolol succinate  milliGRAM(s) Oral daily  nicotine -  14 mG/24Hr(s) Patch 1 patch Transdermal daily  QUEtiapine 25 milliGRAM(s) Oral at bedtime  triamcinolone 0.1% Ointment 1 Application(s) Topical two times a day    MEDICATIONS  (PRN):  QUEtiapine 25 milliGRAM(s) Oral daily PRN agitation    Vital Signs Last 24 Hrs  T(C): 36.8 (31 Dec 2019 13:44), Max: 37.1 (31 Dec 2019 04:50)  T(F): 98.3 (31 Dec 2019 13:44), Max: 98.7 (31 Dec 2019 04:50)  HR: 97 (31 Dec 2019 13:44) (69 - 104)  BP: 116/75 (31 Dec 2019 13:44) (106/64 - 116/75)  BP(mean): --  RR: 18 (31 Dec 2019 13:44) (18 - 20)  SpO2: 93% (31 Dec 2019 04:50) (93% - 94%)    GENERAL: NAD  HEAD:  Atraumatic, Normocephalic  EYES: EOMI, PERRLA, conjunctiva and sclera clear  ENT: Pharynx not erythematous  PULMONARY: Clear to auscultation bilaterally; No wheeze  CARDIOVASCULAR: Regular rate and rhythm; No murmurs, rubs, or gallops  ABDOMEN: Soft, Nontender, Nondistended; Bowel sounds present  EXTREMITIES:  2+ Peripheral Pulses, No clubbing, cyanosis; trace edema   MUSCULOSKELETAL: No calf tenderness  PSYCH: AAOx3, normal affect  SKIN: warm and dry, No rashes or lesions    .  LABS:                         11.1   3.25  )-----------( 215      ( 31 Dec 2019 09:30 )             33.1     12-31    134<L>  |  92<L>  |  76<H>  ----------------------------<  101<H>  3.9   |  26  |  2.33<H>    Ca    9.3      31 Dec 2019 06:26  Mg     2.2     12-31      PT/INR - ( 31 Dec 2019 09:17 )   PT: 22.8 sec;   INR: 1.98 ratio                   RADIOLOGY, EKG & ADDITIONAL TESTS: Reviewed.

## 2019-12-31 NOTE — PROGRESS NOTE ADULT - PROBLEM SELECTOR PROBLEM 10
Discharge planning issues

## 2019-12-31 NOTE — PROGRESS NOTE ADULT - PROBLEM SELECTOR PROBLEM 6
CAD (coronary artery disease)
COPD (chronic obstructive pulmonary disease)
Supratherapeutic INR
Supratherapeutic INR
CAD (coronary artery disease)
CAD (coronary artery disease)
Supratherapeutic INR

## 2019-12-31 NOTE — DISCHARGE NOTE NURSING/CASE MANAGEMENT/SOCIAL WORK - NSDCPEWEB_GEN_ALL_CORE
[facility] for Tobacco Control website --- [email or web address] website --- [email or web address]

## 2019-12-31 NOTE — PROGRESS NOTE ADULT - PROBLEM SELECTOR PLAN 7
- Non-specific dermatitis  - exam showing xerosis with secondary excoriations  - Derm reccs appreciated ; continue triamcinolone 0.1% oint BID to back PRN (avoid face, groin, axillae)   - use for only 2 weeks at a time  -  provide Rx for patient upon discharge  - continue amlactin lotion BID as needed as pt reported symptomatic improvement
-Continue with Lipitor  -Hold ASA in setting of supra-therapeutic INR and likely platelet dysfunction given uremia  -Restart ASA when feasible given high risk patient
-  Non-specific dermatitis  - exam showing xerosis with secondary excoriations  - Derm reccs appreciated ; continue triamcinolone 0.1% oint BID to back PRN (avoid face, groin, axillae)   - use for only 2 weeks at a time  -  provide Rx for patient upon discharge  - continue amlactin lotion BID as needed as pt reported symptomatic improvement
-  Non-specific dermatitis  - exam showing xerosis with secondary excoriations  - Derm reccs appreciated ; continue triamcinolone 0.1% oint BID to back PRN (avoid face, groin, axillae)   - use for only 2 weeks at a time  -  provide Rx for patient upon discharge  - continue amlactin lotion BID as needed as pt reported symptomatic improvement

## 2019-12-31 NOTE — DISCHARGE NOTE PROVIDER - PROVIDER TOKENS
FREE:[LAST:[Lloyd],FIRST:[Malcolm],PHONE:[phone]],FAX:[(   )    -],ADDRESS:[[facility]],FOLLOWUP:[2 weeks]],PROVIDER:[TOKEN:[[id number]],FOLLOWUP:[2 weeks]]

## 2019-12-31 NOTE — DISCHARGE NOTE PROVIDER - HOSPITAL COURSE
79 year old man with CAD s/p 3v CABG (LIMA-mLAD, SVT-OM1, SVG-RPL1 in 9/2007), severe aortic stenosis s/p TAVR 2016, known LBBB, COPD, current smoker, atrial fibrillation on warfarin, and CKD3 (baseline Cr 1.4-1.5), and newly reduced LV systolic function and worsening TR who presented with acute decompensated HF, volume overloaded with MERCED on CKD, hyperkalemia and elevated INR. He is in rate controlled AFib with frequent PVCs. Seen and evaluated by HF and EP. s/p IV Lasix with clinical improvement and now stable on PO Bumex. Patient has frequent NSVT and s/p CRT-D implant with EP. Hospital course complicated by MERCED. s/p renal eval. Renal ultrasound 12/20 showing no hydronephrosis, mild increase cortical echogenicity b/l, non-obstructing left intrarenal calculi as noted on CT 1/2019 and small volume ascites. As per renal, no urgent HD warranted at this time, and renal function is improving. Patient remains stable for DC with close follow up with HF, EP, Renal, and PCP as per Dr. Valdez 79 year old man with CAD s/p 3v CABG (LIMA-mLAD, SVT-OM1, SVG-RPL1 in 9/2007), severe aortic stenosis s/p TAVR 2016, known LBBB, COPD, current smoker, atrial fibrillation on warfarin, and CKD3 (baseline Cr 1.4-1.5), and newly reduced LV systolic function and worsening TR who presented with acute decompensated HF, volume overloaded with MERCED on CKD, hyperkalemia and elevated INR. He is in rate controlled AFib with frequent PVCs. Seen and evaluated by HF and EP. s/p IV Lasix with clinical improvement and now stable on PO Bumex. Patient has frequent NSVT and s/p CRT-D implant with EP. Hospital course complicated by MERCED. s/p renal eval. Renal ultrasound 12/20 showing no hydronephrosis, mild increase cortical echogenicity b/l, non-obstructing left intrarenal calculi as noted on CT 1/2019 and small volume ascites. As per renal, no urgent HD warranted at this time, and renal function is improving. Patient remains stable for DC with close follow up with HF, EP, Renal, and PCP.

## 2019-12-31 NOTE — CONSULT NOTE ADULT - SUBJECTIVE AND OBJECTIVE BOX
Patient seen and evaluated at bedside    Chief Complaint:    HPI:  79M with PMHx of CAD s/p 3v CABG (LIMA-mLAD, SVT-OM1, SVG-RPL1 in 9/2007), severe aortic stenosis (post-TAVR in 2016), known LBBB, recently diagnosed HFrEF (LVEF 20-25%), COPD, current smoker, HTN, atrial fibrillation (on coumadin), and CKD3 (baseline Cr 1.4-1.5) referred by Dr. Quiroz's office for elevated creatine and potassium.     He has been following with Dr. Ramón Quiroz. On 11/27/19 TTE showed newly reduced LVEF 20-25% with RVSD and mod-severe TR from EF 60% in 10/18. He underwent coronary angiogram and RHC on 12/3 which showed patent grafts and elevating filling pressure with low output. He was admitted 12/3-12/5 for ADHF initially presenting with SOB for which he was diuresed and started on neurohormonal antagonists. He was also found to be anemic and thrombocytopenic with concern for MDS with negative BMBx. He was discharged home on Entresto 49-51mg BID, Toprol [id number] daily and lasix 40mg PO BID but shortly after discharge noted pain and heaviness in his LE thus increased his lasix to 60mg BID. Additionally he was noted to have taken 1gm NSAIDs several times for pain. He reports increase in LE swelling. Reports SOB at baseline, denies orthopnea, lightheadedness, abdominal bloating/pain/decreased appetite.  On admission K 6.7, BUN/Cr 110/3.42, INR 6.22 which were all increased from discharge labs on 12/5 which showed K 4.2, BUN/Cr 36/1.68, and INR 1.36. 12/20 proBNP elevated at 17481, lact 1.6, LFTs normal.    Given MERCED, neurohormonal antagonists held. Throughout admission, patient was diuresed and appears euvolemic today 12/31  EP consulted on 12/31 for placement of CRT-D  Last meal ended at 9am today, 12/31  Patient is amenable to Placement today  Telemetry review shows primarily Afib with LBBB QRS 140s. Additionally, at night, often having 17 beat NSVT runs.  Review of [employer] EKGs notes patient primarily in Afib      PMHx:   Heart failure  Moderate tricuspid regurgitation  Osteoarthritis  Bilateral carotid artery disease  Hypertension  COPD (chronic obstructive pulmonary disease)  Prostate cancer  Severe aortic stenosis by prior echocardiogram  Atrial fibrillation, unspecified type  Aortic valve stenosis, unspecified etiology  Erectile dysfunction, unspecified erectile dysfunction type  Coronary artery disease of bypass graft of native heart with stable angina pectoris  [place]    PSHx:   S/P TAVR (transcatheter aortic valve replacement)  S/P tonsillectomy  S/P prostatectomy  S/P hernia repair  S/P CABG x 3    Allergies:  No Known Allergies    Home Meds:  Home Medications:  Coumadin 2.5 mg oral tablet: 1 tab(s) orally once a day (20 Dec 2019 05:38)  Toprol- mg oral tablet, extended release: 1 tab(s) orally once a day (20 Dec 2019 05:38)  Trelegy Ellipta inhalation powder: 1 puff(s) inhaled once a day, As Needed   (20 Dec 2019 05:38)  Vitamin D3 2000 intl units oral capsule: 1 cap(s) orally once a day (20 Dec 2019 05:38)      Current Medications:   albuterol/ipratropium for Nebulization. 3 milliLiter(s) Nebulizer every 6 hours  ammonium lactate 12% Lotion 1 Application(s) Topical two times a day  atorvastatin 20 milliGRAM(s) Oral at bedtime  budesonide 160 MICROgram(s)/formoterol 4.5 MICROgram(s) Inhaler 2 Puff(s) Inhalation two times a day  buMETAnide 3 milliGRAM(s) Oral two times a day  clotrimazole 1% Cream 1 Application(s) Topical every 12 hours  folic acid 1 milliGRAM(s) Oral daily  hydrALAZINE 10 milliGRAM(s) Oral every 8 hours  melatonin 3 milliGRAM(s) Oral at bedtime  metoprolol succinate  milliGRAM(s) Oral daily  nicotine -  14 mG/24Hr(s) Patch 1 patch Transdermal daily  QUEtiapine 25 milliGRAM(s) Oral daily PRN  QUEtiapine 25 milliGRAM(s) Oral at bedtime  triamcinolone 0.1% Ointment 1 Application(s) Topical two times a day      FAMILY HISTORY:  Family history of heart disease (Child)    Social History:  Current smoker  Social alcohol use  No illicit drug use  Retired dentist    REVIEW OF SYSTEMS:  CONSTITUTIONAL: No weakness, fevers or chills  EYES/ENT: No visual changes;  No dysphagia  NECK: No pain or stiffness  RESPIRATORY: No cough, wheezing, hemoptysis; No shortness of breath  CARDIOVASCULAR: No chest pain or palpitations; No lower extremity edema  GASTROINTESTINAL: No abdominal or epigastric pain. No nausea, vomiting, or hematemesis; No diarrhea or constipation. No melena or hematochezia.  BACK: No back pain  GENITOURINARY: No dysuria, frequency or hematuria  NEUROLOGICAL: No numbness or weakness  SKIN: No itching, burning, rashes, or lesions   All other review of systems is negative unless indicated above.    Physical Exam:  T(F): 98.7 (12-31), Max: 98.7 (12-31)  HR: 98 (12-31) (69 - 104)  BP: 107/66 (12-31) (106/64 - 113/72)  RR: 20 (12-31)  SpO2: 93% (12-31)  Appearance: No Acute Distress  HEENT: PERRL  Neck: JVD approx 8-10 w/ mild HJR  Cardiovascular: Normal S1 S2, No murmurs/rubs/gallops  Respiratory: Clear to auscultation bilaterally  Gastrointestinal: Soft, Non-tender	  Skin: No cyanosis	  Neurologic: Non-focal  Extremities: No LE edema  Psychiatry: A & O x 3, Mood & affect appropriate      Cardiovascular Diagnostic Testing:    ECG: Personally reviewed:  Afib LBBB with PVCs    Echo: Personally reviewed:  Conclusions:  1. Moderate mitral regurgitation.  2. Transcatheter aortic valve replacement. Peak transaortic  valve gradient equals 3 mm Hg, which is probably normal in  the presence of a transcatheter aortic valve replacement.  Mild aortic regurgitation.  3. Moderate to severely dilated left atrial enlargement.  4. Mild left ventricular enlargement.  5. Endocardium not well visualized. Grossly severe global  left ventricular systolic dysfunction with regional  variation. Consider use of echo contrast for better  estimation of left ventricular function if clinically  indicated.  Abnormal septal motion.  6. Increased E/e'  is consistent with elevated left  ventricular filling pressure.  7. Moderate right atrial enlargement.  8. Normal right ventricular size with decreased right  ventricular systolic function.  9. Moderate-severe tricuspid regurgitation.  10. Estimated pulmonary artery systolic pressure equals 49  mm Hg, assuming right atrial pressure equals 8 mm Hg,  consistent with mild pulmonary pressures.  *** Compared with echocardiogram of 10/15/2018, the left  ventricular function has significantly worsened.  ------------------------------------------------------------------------  Confirmed on  11/27/2019 - 15:11:00 by Shivam Bates M.D.  ------------------------------------------------------------------------    Cath:  CONTRAST GIVEN: Omnipaque 33 ml.  MEDICATIONS GIVEN: Fentanyl, 25 mcg, IV. Midazolam, 0.5 mg, IV. Midazolam,  1 mg, IV.  VENTRICLES: No left ventriculogram was performed.  CORONARY VESSELS: The coronarycirculation is right dominant.  LM:   --  LM: There was a 90 % stenosis.  LAD:   --  Mid LAD: There was a 100 % stenosis.  CX:   --  Proximal circumflex: There was a 100 % stenosis.  RCA:   --  RCA: This vessel was not injected.  GRAFTS:   --  Graft to the mid LAD: The graft was a LIMA. It was normal.  --  Graft to the 1st obtuse marginal: The graft was a saphenous vein graft  from the aorta. It was normal.  --  Graft to the RPL1: The graft was a saphenous vein graft from the aorta.  It was normal.  COMPLICATIONS: There were no complications.  DIAGNOSTIC IMPRESSIONS: Unchanged coronary vasculature. High filling  pressures. Normal TAVR gradients. Low CO.  DIAGNOSTIC RECOMMENDATIONS: Admit. IV diruesis. Increase afterload (change  to Entresto).Heme eval for pancytopenia  Prepared and signed by  Ramón Quiroz M.D.  Signed 12/05/2019 08:18:21    Imaging:    CXR: Personally reviewed    IMPRESSION: Probable cardiomegaly. Clear lungs.    Labs: Personally reviewed                        11.1   3.25  )-----------( 215      ( 31 Dec 2019 09:30 )             33.1     12-31    134<L>  |  92<L>  |  76<H>  ----------------------------<  101<H>  3.9   |  26  |  2.33<H>    Ca    9.3      31 Dec 2019 06:26  Mg     2.2     12-31      PT/INR - ( 31 Dec 2019 09:17 )   PT: 22.8 sec;   INR: 1.98 ratio

## 2019-12-31 NOTE — CONSULT NOTE ADULT - ASSESSMENT
79M with PMHx of CAD s/p 3v CABG (LIMA-mLAD, SVT-OM1, SVG-RPL1 in 9/2007), severe aortic stenosis (post-TAVR in 2016), known LBBB, recently diagnosed HFrEF (LVEF 20-25%), COPD, current smoker, HTN, atrial fibrillation (on coumadin), and CKD3 (baseline Cr 1.4-1.5) referred by Dr. Quiroz's office for elevated creatine and potassium. Found to be in ADHF    #CRT-D placement:  EF has been 25% for the last month, not due to new ischemic disease based on [facility] 12/3. EKG with LBBB 140-150s and frequent NSVT 6-7s runs overnight, asymptomatic. Failed outpatient neurohormonal antagonists started on 12/3. Appears euvolemic and optimized for procedure.  -NPO today after breakfast (last ate 9am)  -CRT-D implant today    #Afib:  Continue metop 100, rate controlled  Continue coumadin, INR barely subtherapeutic today 1.9's, otherwise WNL during visit    For all cardiology related questions, please call the current cardiology fellow on service at this time.  ** Please go to [email or web address] ; login: [employer] (case-sensitive) **    Malcolm Desai MD  Department of Cardiovascular Disease

## 2019-12-31 NOTE — PROGRESS NOTE ADULT - PROBLEM SELECTOR PLAN 9
- on nicotine patch   - c/w Melatonin 3mg q bedtime, c/w seroquel at bedtime given significant insomnia
- on nicotine patch   - c/w Melatonin 3mg q bedtime, start seroquel at bedtime given significant insomnia
- on nicotine patch   - c/w Melatonin 3mg q bedtime, start seroquel at bedtime given significant insomnia
-Performed smoking cessation counseling with patient. Reviewed modalities such as nicotine replacement therapy and pharmacological agents. Also reviewed deleterious effects of smoking such as increased malignancy and infection. 3 minutes spent with patient.   -Patient is wholeheartedly not interested in quitting, offer nicotine replacement therapy if he has cravings
Patient is not interested in quitting after smoking cessation counselling  offer nicotine replacement therapy if he has cravings  - pt c/o anxiety and insomnia  - c/w Melatonin 3mg q bedtime
Patient is  not interested in quitting after smoking cessation counselling  offer nicotine replacement therapy if he has cravings
Patient is  not interested in quitting after smoking cessation counselling  offer nicotine replacement therapy if he has cravings  - pt c/o anxiety and insomnia  - start Melatonin 3mg q bedtime  - can give Xanax 0.5mg po x 1 today

## 2019-12-31 NOTE — CHART NOTE - NSCHARTNOTEFT_GEN_A_CORE
Medicine PA Note     JAYA WRIGHT  MRN-[id number]  Allergies    No Known Allergies    Intolerances      Notified by RN for 12 beats WCT. Pt seen and examined at bedside in NAD. Pt denies CP, palpitations, SOB, or lightheadedness.    Vital Signs Last 24 Hrs  T(C): 36.6 (19 @ 20:55), Max: 36.6 (19 @ 05:07)  T(F): 97.9 (19 @ 20:55), Max: 97.9 (19 @ 12:25)  HR: 104 (19 @ 20:55) (69 - 104)  BP: 113/72 (19 @ 20:55) (99/55 - 113/72)  BP(mean): --  RR: 20 (19 @ 20:55) (20 - 20)  SpO2: 94% (19 @ 20:55) (94% - 96%)  Daily     Daily Weight in k.7 (30 Dec 2019 10:43)  I&O's Summary    29 Dec 2019 07:01  -  30 Dec 2019 07:00  --------------------------------------------------------  IN: 595 mL / OUT: 1000 mL / NET: -405 mL    30 Dec 2019 07:01  -  31 Dec 2019 01:43  --------------------------------------------------------  IN: 240 mL / OUT: 1000 mL / NET: -760 mL      CAPILLARY BLOOD GLUCOSE                          10.2   3.06  )-----------( 184      ( 29 Dec 2019 09:24 )             30.9     12-30    133<L>  |  94<L>  |  79<H>  ----------------------------<  91  3.8   |  24  |  2.21<H>    Ca    9.2      30 Dec 2019 05:40      PT/INR - ( 30 Dec 2019 08:37 )   PT: 28.3 sec;   INR: 2.42 ratio                   Radiology:    PHYSICAL EXAM:  GENERAL: NAD, well-developed  HEAD:  Atraumatic, Normocephalic  EYES: EOMI, PERRLA, conjunctiva and sclera clear  NECK: Supple, +JVD  CHEST/LUNG: Clear to auscultation bilaterally; No wheeze  HEART: Regular rate and rhythm; No murmurs, rubs, or gallops  PSYCH: AAOx3  NEUROLOGY: non-focal  SKIN: No rashes or lesions    Assessment/Plan: HPI:  #12 beats WCT  -BMP and mag ordered  -Replete K>4, Mag>2  -Known LBBB with frequent PVC's  -Awaiting EP eval for possible CRT-D  -Previous 18 beats WCT on   -C/W toprol 100mg XL  -Cardio to f/u in the AM  -To endorse events to the day team        Igor Marie Jr, PA-C,   Department of Medicine

## 2019-12-31 NOTE — DISCHARGE NOTE NURSING/CASE MANAGEMENT/SOCIAL WORK - PATIENT PORTAL LINK FT
You can access the FollowMyHealth Patient Portal offered by [facility] by registering at the following website: [email or web address]. By joining [facility]’s FollowMyHealth portal, you will also be able to view your health information using other applications (apps) compatible with our system.

## 2019-12-31 NOTE — DISCHARGE NOTE PROVIDER - NSDCCPCAREPLAN_GEN_ALL_CORE_FT
PRINCIPAL DISCHARGE DIAGNOSIS  Diagnosis: MERCED (acute kidney injury)  Assessment and Plan of Treatment:       SECONDARY DISCHARGE DIAGNOSES  Diagnosis: Hyperkalemia  Assessment and Plan of Treatment: PRINCIPAL DISCHARGE DIAGNOSIS  Diagnosis: MERCED (acute kidney injury)  Assessment and Plan of Treatment: Avoid taking (NSAIDs) - (ex: Ibuprofen, Advil, Celebrex, Naprosyn)  Avoid taking any nephrotoxic agents (can harm kidneys) - Intravenous contrast for diagnostic testing, combination cold medications.  Have all medications adjusted for your renal function by your Health Care Provider.  Blood pressure control is important.  Take all medication as prescribed.      SECONDARY DISCHARGE DIAGNOSES  Diagnosis: Atrial fibrillation, unspecified type  Assessment and Plan of Treatment: Atrial fibrillation is the most common heart rhythm problem.  The condition puts you at risk for has stroke and heart attack  It helps if you control your blood pressure, not drink more than 1-2 alcohol drinks per day, cut down on caffeine, getting treatment for over active thyroid gland, and get regular exercise  Call your doctor if you feel your heart racing or beating unusually, chest tightness or pain, lightheaded, faint, shortness of breath especially with exercise  It is important to take your heart medication as prescribed  You may be on anticoagulation which is very important to take as directed - you may need blood work to monitor drug levels    Diagnosis: Acute on chronic systolic congestive heart failure  Assessment and Plan of Treatment: Weigh yourself daily.  If you gain 3lbs in 3 days, or 5lbs in a week call your Health Care Provider.  Do not eat or drink foods containing more than 2000mg of salt (sodium) in your diet every day.  Call your Health Care Provider if you have any swelling or increased swelling in your feet, ankles, and/or stomach.  Take all of your medication as directed.  If you become dizzy call your Health Care Provider.    Diagnosis: Hyperkalemia  Assessment and Plan of Treatment:

## 2019-12-31 NOTE — PROGRESS NOTE ADULT - PROBLEM SELECTOR PLAN 6
- Holding warfarin  - Monitor CBC and for any s/s of bleeding  - Check INR daily
- Holding warfarin  - Monitor CBC and for any s/s of bleeding  - Check INR daily
- c/w Lipitor  - resume aspirin when okay with cardiology
-Patient with diffuse wheezing on exam, sCW standing DuoNebs  -Start Symbicort 160-80 BID
-Continue with Lipitor  -Hold ASA in setting of supra-therapeutic INR and likely platelet dysfunction given uremia  -Restart ASA when feasible given high risk patient
-Continue with Lipitor  -Hold ASA in setting of supra-therapeutic INR and likely platelet dysfunction given uremia  -Restart ASA when feasible given high risk patient
- Holding warfarin  - Monitor CBC and for any s/s of bleeding  - Check INR daily

## 2019-12-31 NOTE — PROGRESS NOTE ADULT - PROBLEM SELECTOR PROBLEM 8
Bicytopenia

## 2019-12-31 NOTE — DISCHARGE NOTE NURSING/CASE MANAGEMENT/SOCIAL WORK - NSDCPEEMAIL_GEN_ALL_CORE
[facility] for Tobacco Control email [email or web address]

## 2020-01-01 ENCOUNTER — MEDICATION RENEWAL (OUTPATIENT)
Age: 80
End: 2020-01-01

## 2020-01-01 ENCOUNTER — APPOINTMENT (OUTPATIENT)
Dept: HEMATOLOGY ONCOLOGY | Facility: CLINIC | Age: 80
End: 2020-01-01

## 2020-01-01 ENCOUNTER — APPOINTMENT (OUTPATIENT)
Dept: CARDIOLOGY | Facility: CLINIC | Age: 80
End: 2020-01-01

## 2020-01-01 ENCOUNTER — NON-APPOINTMENT (OUTPATIENT)
Age: 80
End: 2020-01-01

## 2020-01-01 ENCOUNTER — APPOINTMENT (OUTPATIENT)
Dept: ELECTROPHYSIOLOGY | Facility: CLINIC | Age: 80
End: 2020-01-01
Payer: MEDICARE

## 2020-01-01 ENCOUNTER — OTHER (OUTPATIENT)
Age: 80
End: 2020-01-01

## 2020-01-01 ENCOUNTER — APPOINTMENT (OUTPATIENT)
Dept: CARDIOLOGY | Facility: CLINIC | Age: 80
End: 2020-01-01
Payer: MEDICARE

## 2020-01-01 ENCOUNTER — TRANSCRIPTION ENCOUNTER (OUTPATIENT)
Age: 80
End: 2020-01-01

## 2020-01-01 ENCOUNTER — APPOINTMENT (OUTPATIENT)
Dept: HEART FAILURE | Facility: CLINIC | Age: 80
End: 2020-01-01
Payer: MEDICARE

## 2020-01-01 ENCOUNTER — APPOINTMENT (OUTPATIENT)
Dept: HEART FAILURE | Facility: CLINIC | Age: 80
End: 2020-01-01

## 2020-01-01 ENCOUNTER — APPOINTMENT (OUTPATIENT)
Dept: ELECTROPHYSIOLOGY | Facility: CLINIC | Age: 80
End: 2020-01-01

## 2020-01-01 ENCOUNTER — INPATIENT (INPATIENT)
Facility: HOSPITAL | Age: 80
LOS: 11 days | Discharge: HOME | DRG: 602 | End: 2020-02-29
Attending: INTERNAL MEDICINE | Admitting: HOSPITALIST
Payer: MEDICARE

## 2020-01-01 ENCOUNTER — RX RENEWAL (OUTPATIENT)
Age: 80
End: 2020-01-01

## 2020-01-01 ENCOUNTER — APPOINTMENT (OUTPATIENT)
Dept: CARDIOLOGY | Facility: CLINIC | Age: 80
End: 2020-01-01
Payer: OTHER MISCELLANEOUS

## 2020-01-01 ENCOUNTER — OUTPATIENT (OUTPATIENT)
Dept: OUTPATIENT SERVICES | Facility: HOSPITAL | Age: 80
LOS: 1 days | Discharge: HOME | End: 2020-01-01

## 2020-01-01 VITALS
OXYGEN SATURATION: 96 % | HEIGHT: 63.78 IN | HEART RATE: 88 BPM | DIASTOLIC BLOOD PRESSURE: 83 MMHG | SYSTOLIC BLOOD PRESSURE: 133 MMHG

## 2020-01-01 VITALS
SYSTOLIC BLOOD PRESSURE: 98 MMHG | OXYGEN SATURATION: 97 % | TEMPERATURE: 97 F | RESPIRATION RATE: 18 BRPM | WEIGHT: 128.09 LBS | HEIGHT: 64 IN | HEART RATE: 85 BPM | DIASTOLIC BLOOD PRESSURE: 60 MMHG

## 2020-01-01 VITALS
HEART RATE: 89 BPM | OXYGEN SATURATION: 94 % | SYSTOLIC BLOOD PRESSURE: 101 MMHG | TEMPERATURE: 98 F | RESPIRATION RATE: 18 BRPM | DIASTOLIC BLOOD PRESSURE: 68 MMHG

## 2020-01-01 VITALS
HEART RATE: 93 BPM | OXYGEN SATURATION: 96 % | DIASTOLIC BLOOD PRESSURE: 73 MMHG | HEIGHT: 63.78 IN | SYSTOLIC BLOOD PRESSURE: 94 MMHG

## 2020-01-01 VITALS
WEIGHT: 126 LBS | BODY MASS INDEX: 21.51 KG/M2 | OXYGEN SATURATION: 99 % | SYSTOLIC BLOOD PRESSURE: 103 MMHG | HEART RATE: 92 BPM | HEIGHT: 64 IN | DIASTOLIC BLOOD PRESSURE: 69 MMHG

## 2020-01-01 VITALS
WEIGHT: 124 LBS | SYSTOLIC BLOOD PRESSURE: 130 MMHG | DIASTOLIC BLOOD PRESSURE: 73 MMHG | HEART RATE: 92 BPM | BODY MASS INDEX: 21.28 KG/M2

## 2020-01-01 VITALS
HEART RATE: 64 BPM | DIASTOLIC BLOOD PRESSURE: 52 MMHG | BODY MASS INDEX: 22.81 KG/M2 | HEIGHT: 63.78 IN | WEIGHT: 132 LBS | SYSTOLIC BLOOD PRESSURE: 83 MMHG | RESPIRATION RATE: 12 BRPM

## 2020-01-01 VITALS — DIASTOLIC BLOOD PRESSURE: 71 MMHG | SYSTOLIC BLOOD PRESSURE: 105 MMHG | HEART RATE: 98 BPM

## 2020-01-01 VITALS
OXYGEN SATURATION: 92 % | HEART RATE: 97 BPM | TEMPERATURE: 97 F | SYSTOLIC BLOOD PRESSURE: 116 MMHG | DIASTOLIC BLOOD PRESSURE: 81 MMHG | RESPIRATION RATE: 18 BRPM

## 2020-01-01 DIAGNOSIS — L03.116 CELLULITIS OF LEFT LOWER LIMB: ICD-10-CM

## 2020-01-01 DIAGNOSIS — Z98.89 OTHER SPECIFIED POSTPROCEDURAL STATES: Chronic | ICD-10-CM

## 2020-01-01 DIAGNOSIS — Z02.9 ENCOUNTER FOR ADMINISTRATIVE EXAMINATIONS, UNSPECIFIED: ICD-10-CM

## 2020-01-01 DIAGNOSIS — D46.9 MYELODYSPLASTIC SYNDROME, UNSPECIFIED: ICD-10-CM

## 2020-01-01 DIAGNOSIS — Z95.2 PRESENCE OF PROSTHETIC HEART VALVE: Chronic | ICD-10-CM

## 2020-01-01 DIAGNOSIS — I50.23 ACUTE ON CHRONIC SYSTOLIC (CONGESTIVE) HEART FAILURE: ICD-10-CM

## 2020-01-01 DIAGNOSIS — D64.9 ANEMIA, UNSPECIFIED: ICD-10-CM

## 2020-01-01 DIAGNOSIS — Z95.2 PRESENCE OF PROSTHETIC HEART VALVE: ICD-10-CM

## 2020-01-01 DIAGNOSIS — Z90.89 ACQUIRED ABSENCE OF OTHER ORGANS: Chronic | ICD-10-CM

## 2020-01-01 DIAGNOSIS — I50.9 HEART FAILURE, UNSPECIFIED: ICD-10-CM

## 2020-01-01 DIAGNOSIS — Z90.79 ACQUIRED ABSENCE OF OTHER GENITAL ORGAN(S): Chronic | ICD-10-CM

## 2020-01-01 DIAGNOSIS — I25.708 ATHEROSCLEROSIS OF CORONARY ARTERY BYPASS GRAFT(S), UNSPECIFIED, WITH OTHER FORMS OF ANGINA PECTORIS: ICD-10-CM

## 2020-01-01 DIAGNOSIS — J44.9 CHRONIC OBSTRUCTIVE PULMONARY DISEASE, UNSPECIFIED: ICD-10-CM

## 2020-01-01 DIAGNOSIS — Z95.1 PRESENCE OF AORTOCORONARY BYPASS GRAFT: Chronic | ICD-10-CM

## 2020-01-01 DIAGNOSIS — E87.6 HYPOKALEMIA: ICD-10-CM

## 2020-01-01 DIAGNOSIS — Z29.9 ENCOUNTER FOR PROPHYLACTIC MEASURES, UNSPECIFIED: ICD-10-CM

## 2020-01-01 DIAGNOSIS — I50.22 CHRONIC SYSTOLIC (CONGESTIVE) HEART FAILURE: ICD-10-CM

## 2020-01-01 DIAGNOSIS — I48.91 UNSPECIFIED ATRIAL FIBRILLATION: ICD-10-CM

## 2020-01-01 DIAGNOSIS — I25.10 ATHEROSCLEROTIC HEART DISEASE OF NATIVE CORONARY ARTERY W/OUT ANGINA PECTORIS: ICD-10-CM

## 2020-01-01 DIAGNOSIS — N18.3 CHRONIC KIDNEY DISEASE, STAGE 3 (MODERATE): ICD-10-CM

## 2020-01-01 DIAGNOSIS — I10 ESSENTIAL (PRIMARY) HYPERTENSION: ICD-10-CM

## 2020-01-01 DIAGNOSIS — F17.200 NICOTINE DEPENDENCE, UNSPECIFIED, UNCOMPLICATED: ICD-10-CM

## 2020-01-01 DIAGNOSIS — R27.8 OTHER LACK OF COORDINATION: ICD-10-CM

## 2020-01-01 DIAGNOSIS — M89.9 DISORDER OF BONE, UNSPECIFIED: ICD-10-CM

## 2020-01-01 DIAGNOSIS — R09.89 OTHER SPECIFIED SYMPTOMS AND SIGNS INVOLVING THE CIRCULATORY AND RESPIRATORY SYSTEMS: ICD-10-CM

## 2020-01-01 DIAGNOSIS — Z95.810 PRESENCE OF AUTOMATIC (IMPLANTABLE) CARDIAC DEFIBRILLATOR: ICD-10-CM

## 2020-01-01 LAB
ALBUMIN SERPL ELPH-MCNC: 2.9 G/DL — LOW (ref 3.3–5)
ALBUMIN SERPL ELPH-MCNC: 3.2 G/DL — LOW (ref 3.3–5)
ALBUMIN SERPL ELPH-MCNC: 3.3 G/DL — SIGNIFICANT CHANGE UP (ref 3.3–5)
ALBUMIN SERPL ELPH-MCNC: 3.4 G/DL — SIGNIFICANT CHANGE UP (ref 3.3–5)
ALBUMIN SERPL ELPH-MCNC: 3.5 G/DL — SIGNIFICANT CHANGE UP (ref 3.3–5)
ALBUMIN SERPL ELPH-MCNC: 3.6 G/DL
ALBUMIN SERPL ELPH-MCNC: 3.7 G/DL — SIGNIFICANT CHANGE UP (ref 3.3–5)
ALBUMIN SERPL ELPH-MCNC: 3.7 G/DL — SIGNIFICANT CHANGE UP (ref 3.3–5)
ALBUMIN SERPL ELPH-MCNC: 3.9 G/DL — SIGNIFICANT CHANGE UP (ref 3.3–5)
ALP BLD-CCNC: 140 U/L
ALP SERPL-CCNC: 101 U/L — SIGNIFICANT CHANGE UP (ref 40–120)
ALP SERPL-CCNC: 106 U/L — SIGNIFICANT CHANGE UP (ref 40–120)
ALP SERPL-CCNC: 84 U/L — SIGNIFICANT CHANGE UP (ref 40–120)
ALP SERPL-CCNC: 85 U/L — SIGNIFICANT CHANGE UP (ref 40–120)
ALP SERPL-CCNC: 91 U/L — SIGNIFICANT CHANGE UP (ref 40–120)
ALP SERPL-CCNC: 92 U/L — SIGNIFICANT CHANGE UP (ref 40–120)
ALP SERPL-CCNC: 97 U/L — SIGNIFICANT CHANGE UP (ref 40–120)
ALP SERPL-CCNC: 99 U/L — SIGNIFICANT CHANGE UP (ref 40–120)
ALT FLD-CCNC: 10 U/L — SIGNIFICANT CHANGE UP (ref 10–45)
ALT FLD-CCNC: 15 U/L — SIGNIFICANT CHANGE UP (ref 10–45)
ALT FLD-CCNC: 19 U/L — SIGNIFICANT CHANGE UP (ref 10–45)
ALT FLD-CCNC: 6 U/L — LOW (ref 10–45)
ALT FLD-CCNC: 8 U/L — LOW (ref 10–45)
ALT FLD-CCNC: <5 U/L — LOW (ref 10–45)
ALT SERPL-CCNC: 16 U/L
AMMONIA BLD-MCNC: 63 UMOL/L — HIGH (ref 11–55)
ANION GAP SERPL CALC-SCNC: 14 MMOL/L
ANION GAP SERPL CALC-SCNC: 15 MMOL/L — SIGNIFICANT CHANGE UP (ref 5–17)
ANION GAP SERPL CALC-SCNC: 16 MMOL/L — SIGNIFICANT CHANGE UP (ref 5–17)
ANION GAP SERPL CALC-SCNC: 17 MMOL/L
ANION GAP SERPL CALC-SCNC: 17 MMOL/L — SIGNIFICANT CHANGE UP (ref 5–17)
ANION GAP SERPL CALC-SCNC: 18 MMOL/L — HIGH (ref 5–17)
ANION GAP SERPL CALC-SCNC: 19 MMOL/L — HIGH (ref 5–17)
ANION GAP SERPL CALC-SCNC: 20 MMOL/L — HIGH (ref 5–17)
ANION GAP SERPL CALC-SCNC: 20 MMOL/L — HIGH (ref 5–17)
ANISOCYTOSIS BLD QL: SIGNIFICANT CHANGE UP
ANISOCYTOSIS BLD QL: SIGNIFICANT CHANGE UP
ANISOCYTOSIS BLD QL: SLIGHT — SIGNIFICANT CHANGE UP
APTT BLD: 39.4 SEC — HIGH (ref 27.5–36.3)
APTT BLD: 41.2 SEC — HIGH (ref 27.5–36.3)
APTT BLD: 41.9 SEC — HIGH (ref 27.5–36.3)
APTT BLD: 43.4 SEC — HIGH (ref 27.5–36.3)
APTT BLD: 43.5 SEC — HIGH (ref 27.5–36.3)
APTT BLD: 45.6 SEC — HIGH (ref 27.5–36.3)
APTT BLD: 45.7 SEC — HIGH (ref 27.5–36.3)
APTT BLD: 46.2 SEC — HIGH (ref 27.5–36.3)
APTT BLD: 49.7 SEC — HIGH (ref 27.5–36.3)
AST SERPL-CCNC: 14 U/L — SIGNIFICANT CHANGE UP (ref 10–40)
AST SERPL-CCNC: 15 U/L — SIGNIFICANT CHANGE UP (ref 10–40)
AST SERPL-CCNC: 17 U/L — SIGNIFICANT CHANGE UP (ref 10–40)
AST SERPL-CCNC: 22 U/L
AST SERPL-CCNC: 23 U/L — SIGNIFICANT CHANGE UP (ref 10–40)
AST SERPL-CCNC: 25 U/L — SIGNIFICANT CHANGE UP (ref 10–40)
AST SERPL-CCNC: 25 U/L — SIGNIFICANT CHANGE UP (ref 10–40)
AST SERPL-CCNC: 34 U/L — SIGNIFICANT CHANGE UP (ref 10–40)
AST SERPL-CCNC: 52 U/L — HIGH (ref 10–40)
BASE EXCESS BLDV CALC-SCNC: 12.6 MMOL/L — HIGH (ref -2–2)
BASE EXCESS BLDV CALC-SCNC: 4.3 MMOL/L — HIGH (ref -2–2)
BASE EXCESS BLDV CALC-SCNC: 6.2 MMOL/L — HIGH (ref -2–2)
BASOPHILS # BLD AUTO: 0 K/UL — SIGNIFICANT CHANGE UP (ref 0–0.2)
BASOPHILS # BLD AUTO: 0.01 K/UL — SIGNIFICANT CHANGE UP (ref 0–0.2)
BASOPHILS NFR BLD AUTO: 0 % — SIGNIFICANT CHANGE UP (ref 0–2)
BASOPHILS NFR BLD AUTO: 0.4 % — SIGNIFICANT CHANGE UP (ref 0–2)
BILIRUB SERPL-MCNC: 0.2 MG/DL — SIGNIFICANT CHANGE UP (ref 0.2–1.2)
BILIRUB SERPL-MCNC: 0.3 MG/DL — SIGNIFICANT CHANGE UP (ref 0.2–1.2)
BILIRUB SERPL-MCNC: 0.4 MG/DL — SIGNIFICANT CHANGE UP (ref 0.2–1.2)
BILIRUB SERPL-MCNC: 0.5 MG/DL — SIGNIFICANT CHANGE UP (ref 0.2–1.2)
BILIRUB SERPL-MCNC: 0.5 MG/DL — SIGNIFICANT CHANGE UP (ref 0.2–1.2)
BILIRUB SERPL-MCNC: 0.6 MG/DL
BILIRUB SERPL-MCNC: 0.6 MG/DL — SIGNIFICANT CHANGE UP (ref 0.2–1.2)
BILIRUB SERPL-MCNC: 0.7 MG/DL — SIGNIFICANT CHANGE UP (ref 0.2–1.2)
BILIRUB SERPL-MCNC: 0.7 MG/DL — SIGNIFICANT CHANGE UP (ref 0.2–1.2)
BUN SERPL-MCNC: 101 MG/DL — HIGH (ref 7–23)
BUN SERPL-MCNC: 105 MG/DL — HIGH (ref 7–23)
BUN SERPL-MCNC: 71 MG/DL — HIGH (ref 7–23)
BUN SERPL-MCNC: 74 MG/DL — HIGH (ref 7–23)
BUN SERPL-MCNC: 76 MG/DL — HIGH (ref 7–23)
BUN SERPL-MCNC: 77 MG/DL
BUN SERPL-MCNC: 77 MG/DL
BUN SERPL-MCNC: 77 MG/DL — HIGH (ref 7–23)
BUN SERPL-MCNC: 78 MG/DL — HIGH (ref 7–23)
BUN SERPL-MCNC: 82 MG/DL — HIGH (ref 7–23)
BUN SERPL-MCNC: 82 MG/DL — HIGH (ref 7–23)
BUN SERPL-MCNC: 83 MG/DL — HIGH (ref 7–23)
BUN SERPL-MCNC: 84 MG/DL — HIGH (ref 7–23)
BUN SERPL-MCNC: 85 MG/DL — HIGH (ref 7–23)
BUN SERPL-MCNC: 85 MG/DL — HIGH (ref 7–23)
BUN SERPL-MCNC: 86 MG/DL — HIGH (ref 7–23)
BUN SERPL-MCNC: 87 MG/DL — HIGH (ref 7–23)
BUN SERPL-MCNC: 87 MG/DL — HIGH (ref 7–23)
BUN SERPL-MCNC: 88 MG/DL — HIGH (ref 7–23)
BUN SERPL-MCNC: 88 MG/DL — HIGH (ref 7–23)
BUN SERPL-MCNC: 90 MG/DL — HIGH (ref 7–23)
BUN SERPL-MCNC: 94 MG/DL — HIGH (ref 7–23)
CA-I SERPL-SCNC: 1.11 MMOL/L — LOW (ref 1.12–1.3)
CA-I SERPL-SCNC: 1.12 MMOL/L — SIGNIFICANT CHANGE UP (ref 1.12–1.3)
CA-I SERPL-SCNC: 1.17 MMOL/L — SIGNIFICANT CHANGE UP (ref 1.12–1.3)
CALCIUM SERPL-MCNC: 8.4 MG/DL — SIGNIFICANT CHANGE UP (ref 8.4–10.5)
CALCIUM SERPL-MCNC: 8.6 MG/DL — SIGNIFICANT CHANGE UP (ref 8.4–10.5)
CALCIUM SERPL-MCNC: 8.7 MG/DL — SIGNIFICANT CHANGE UP (ref 8.4–10.5)
CALCIUM SERPL-MCNC: 8.8 MG/DL — SIGNIFICANT CHANGE UP (ref 8.4–10.5)
CALCIUM SERPL-MCNC: 8.9 MG/DL
CALCIUM SERPL-MCNC: 9 MG/DL
CALCIUM SERPL-MCNC: 9 MG/DL — SIGNIFICANT CHANGE UP (ref 8.4–10.5)
CALCIUM SERPL-MCNC: 9 MG/DL — SIGNIFICANT CHANGE UP (ref 8.4–10.5)
CALCIUM SERPL-MCNC: 9.1 MG/DL — SIGNIFICANT CHANGE UP (ref 8.4–10.5)
CALCIUM SERPL-MCNC: 9.1 MG/DL — SIGNIFICANT CHANGE UP (ref 8.4–10.5)
CALCIUM SERPL-MCNC: 9.2 MG/DL — SIGNIFICANT CHANGE UP (ref 8.4–10.5)
CALCIUM SERPL-MCNC: 9.4 MG/DL — SIGNIFICANT CHANGE UP (ref 8.4–10.5)
CALCIUM SERPL-MCNC: 9.5 MG/DL — SIGNIFICANT CHANGE UP (ref 8.4–10.5)
CALCIUM SERPL-MCNC: 9.5 MG/DL — SIGNIFICANT CHANGE UP (ref 8.4–10.5)
CALCIUM SERPL-MCNC: 9.7 MG/DL — SIGNIFICANT CHANGE UP (ref 8.4–10.5)
CALCIUM SERPL-MCNC: 9.8 MG/DL — SIGNIFICANT CHANGE UP (ref 8.4–10.5)
CALCIUM SERPL-MCNC: 9.8 MG/DL — SIGNIFICANT CHANGE UP (ref 8.4–10.5)
CALCIUM SERPL-MCNC: 9.9 MG/DL — SIGNIFICANT CHANGE UP (ref 8.4–10.5)
CHLORIDE BLDV-SCNC: 100 MMOL/L — SIGNIFICANT CHANGE UP (ref 96–108)
CHLORIDE BLDV-SCNC: 91 MMOL/L — LOW (ref 96–108)
CHLORIDE BLDV-SCNC: 99 MMOL/L — SIGNIFICANT CHANGE UP (ref 96–108)
CHLORIDE SERPL-SCNC: 85 MMOL/L — LOW (ref 96–108)
CHLORIDE SERPL-SCNC: 86 MMOL/L — LOW (ref 96–108)
CHLORIDE SERPL-SCNC: 87 MMOL/L
CHLORIDE SERPL-SCNC: 87 MMOL/L — LOW (ref 96–108)
CHLORIDE SERPL-SCNC: 88 MMOL/L — LOW (ref 96–108)
CHLORIDE SERPL-SCNC: 88 MMOL/L — LOW (ref 96–108)
CHLORIDE SERPL-SCNC: 89 MMOL/L — LOW (ref 96–108)
CHLORIDE SERPL-SCNC: 89 MMOL/L — LOW (ref 96–108)
CHLORIDE SERPL-SCNC: 91 MMOL/L — LOW (ref 96–108)
CHLORIDE SERPL-SCNC: 92 MMOL/L — LOW (ref 96–108)
CHLORIDE SERPL-SCNC: 93 MMOL/L — LOW (ref 96–108)
CHLORIDE SERPL-SCNC: 94 MMOL/L
CHLORIDE SERPL-SCNC: 95 MMOL/L — LOW (ref 96–108)
CHLORIDE SERPL-SCNC: 96 MMOL/L — SIGNIFICANT CHANGE UP (ref 96–108)
CHLORIDE SERPL-SCNC: 97 MMOL/L — SIGNIFICANT CHANGE UP (ref 96–108)
CHLORIDE SERPL-SCNC: 97 MMOL/L — SIGNIFICANT CHANGE UP (ref 96–108)
CHLORIDE SERPL-SCNC: 98 MMOL/L — SIGNIFICANT CHANGE UP (ref 96–108)
CO2 BLDV-SCNC: 32 MMOL/L — HIGH (ref 22–30)
CO2 BLDV-SCNC: 33 MMOL/L — HIGH (ref 22–30)
CO2 BLDV-SCNC: 40 MMOL/L — HIGH (ref 22–30)
CO2 SERPL-SCNC: 22 MMOL/L — SIGNIFICANT CHANGE UP (ref 22–31)
CO2 SERPL-SCNC: 22 MMOL/L — SIGNIFICANT CHANGE UP (ref 22–31)
CO2 SERPL-SCNC: 23 MMOL/L — SIGNIFICANT CHANGE UP (ref 22–31)
CO2 SERPL-SCNC: 24 MMOL/L — SIGNIFICANT CHANGE UP (ref 22–31)
CO2 SERPL-SCNC: 25 MMOL/L — SIGNIFICANT CHANGE UP (ref 22–31)
CO2 SERPL-SCNC: 26 MMOL/L — SIGNIFICANT CHANGE UP (ref 22–31)
CO2 SERPL-SCNC: 27 MMOL/L — SIGNIFICANT CHANGE UP (ref 22–31)
CO2 SERPL-SCNC: 28 MMOL/L — SIGNIFICANT CHANGE UP (ref 22–31)
CO2 SERPL-SCNC: 29 MMOL/L
CO2 SERPL-SCNC: 29 MMOL/L — SIGNIFICANT CHANGE UP (ref 22–31)
CO2 SERPL-SCNC: 29 MMOL/L — SIGNIFICANT CHANGE UP (ref 22–31)
CO2 SERPL-SCNC: 31 MMOL/L
CO2 SERPL-SCNC: 33 MMOL/L — HIGH (ref 22–31)
CO2 SERPL-SCNC: 34 MMOL/L — HIGH (ref 22–31)
CO2 SERPL-SCNC: 35 MMOL/L — HIGH (ref 22–31)
CREAT SERPL-MCNC: 2.24 MG/DL — HIGH (ref 0.5–1.3)
CREAT SERPL-MCNC: 2.36 MG/DL
CREAT SERPL-MCNC: 2.42 MG/DL — HIGH (ref 0.5–1.3)
CREAT SERPL-MCNC: 2.6 MG/DL — HIGH (ref 0.5–1.3)
CREAT SERPL-MCNC: 2.66 MG/DL
CREAT SERPL-MCNC: 2.67 MG/DL — HIGH (ref 0.5–1.3)
CREAT SERPL-MCNC: 2.69 MG/DL — HIGH (ref 0.5–1.3)
CREAT SERPL-MCNC: 2.73 MG/DL — HIGH (ref 0.5–1.3)
CREAT SERPL-MCNC: 2.74 MG/DL — HIGH (ref 0.5–1.3)
CREAT SERPL-MCNC: 2.76 MG/DL — HIGH (ref 0.5–1.3)
CREAT SERPL-MCNC: 2.77 MG/DL — HIGH (ref 0.5–1.3)
CREAT SERPL-MCNC: 2.8 MG/DL — HIGH (ref 0.5–1.3)
CREAT SERPL-MCNC: 2.81 MG/DL — HIGH (ref 0.5–1.3)
CREAT SERPL-MCNC: 2.85 MG/DL — HIGH (ref 0.5–1.3)
CREAT SERPL-MCNC: 2.88 MG/DL — HIGH (ref 0.5–1.3)
CREAT SERPL-MCNC: 3.08 MG/DL — HIGH (ref 0.5–1.3)
CREAT SERPL-MCNC: 3.11 MG/DL — HIGH (ref 0.5–1.3)
CREAT SERPL-MCNC: 3.13 MG/DL — HIGH (ref 0.5–1.3)
CREAT SERPL-MCNC: 3.27 MG/DL — HIGH (ref 0.5–1.3)
CREAT SERPL-MCNC: 3.36 MG/DL — HIGH (ref 0.5–1.3)
CREAT SERPL-MCNC: 3.41 MG/DL — HIGH (ref 0.5–1.3)
CREAT SERPL-MCNC: 3.51 MG/DL — HIGH (ref 0.5–1.3)
CREAT SERPL-MCNC: 3.55 MG/DL — HIGH (ref 0.5–1.3)
CREAT SERPL-MCNC: 3.71 MG/DL — HIGH (ref 0.5–1.3)
CULTURE RESULTS: SIGNIFICANT CHANGE UP
DACRYOCYTES BLD QL SMEAR: SLIGHT — SIGNIFICANT CHANGE UP
DACRYOCYTES BLD QL SMEAR: SLIGHT — SIGNIFICANT CHANGE UP
ELLIPTOCYTES BLD QL SMEAR: SLIGHT — SIGNIFICANT CHANGE UP
EOSINOPHIL # BLD AUTO: 0 K/UL — SIGNIFICANT CHANGE UP (ref 0–0.5)
EOSINOPHIL # BLD AUTO: 0 K/UL — SIGNIFICANT CHANGE UP (ref 0–0.5)
EOSINOPHIL # BLD AUTO: 0.02 K/UL — SIGNIFICANT CHANGE UP (ref 0–0.5)
EOSINOPHIL # BLD AUTO: 0.03 K/UL — SIGNIFICANT CHANGE UP (ref 0–0.5)
EOSINOPHIL # BLD AUTO: 0.05 K/UL — SIGNIFICANT CHANGE UP (ref 0–0.5)
EOSINOPHIL NFR BLD AUTO: 0 % — SIGNIFICANT CHANGE UP (ref 0–6)
EOSINOPHIL NFR BLD AUTO: 0 % — SIGNIFICANT CHANGE UP (ref 0–6)
EOSINOPHIL NFR BLD AUTO: 0.8 % — SIGNIFICANT CHANGE UP (ref 0–6)
EOSINOPHIL NFR BLD AUTO: 0.9 % — SIGNIFICANT CHANGE UP (ref 0–6)
EOSINOPHIL NFR BLD AUTO: 1.7 % — SIGNIFICANT CHANGE UP (ref 0–6)
GAS PNL BLDV: 136 MMOL/L — SIGNIFICANT CHANGE UP (ref 135–145)
GAS PNL BLDV: 140 MMOL/L — SIGNIFICANT CHANGE UP (ref 135–145)
GAS PNL BLDV: 141 MMOL/L — SIGNIFICANT CHANGE UP (ref 135–145)
GAS PNL BLDV: SIGNIFICANT CHANGE UP
GLUCOSE BLDV-MCNC: 105 MG/DL — HIGH (ref 70–99)
GLUCOSE BLDV-MCNC: 108 MG/DL — HIGH (ref 70–99)
GLUCOSE BLDV-MCNC: 117 MG/DL — HIGH (ref 70–99)
GLUCOSE SERPL-MCNC: 100 MG/DL — HIGH (ref 70–99)
GLUCOSE SERPL-MCNC: 103 MG/DL — HIGH (ref 70–99)
GLUCOSE SERPL-MCNC: 103 MG/DL — HIGH (ref 70–99)
GLUCOSE SERPL-MCNC: 105 MG/DL — HIGH (ref 70–99)
GLUCOSE SERPL-MCNC: 109 MG/DL — HIGH (ref 70–99)
GLUCOSE SERPL-MCNC: 110 MG/DL — HIGH (ref 70–99)
GLUCOSE SERPL-MCNC: 115 MG/DL — HIGH (ref 70–99)
GLUCOSE SERPL-MCNC: 117 MG/DL — HIGH (ref 70–99)
GLUCOSE SERPL-MCNC: 118 MG/DL — HIGH (ref 70–99)
GLUCOSE SERPL-MCNC: 126 MG/DL — HIGH (ref 70–99)
GLUCOSE SERPL-MCNC: 128 MG/DL — HIGH (ref 70–99)
GLUCOSE SERPL-MCNC: 141 MG/DL — HIGH (ref 70–99)
GLUCOSE SERPL-MCNC: 153 MG/DL
GLUCOSE SERPL-MCNC: 186 MG/DL
GLUCOSE SERPL-MCNC: 83 MG/DL — SIGNIFICANT CHANGE UP (ref 70–99)
GLUCOSE SERPL-MCNC: 88 MG/DL — SIGNIFICANT CHANGE UP (ref 70–99)
GLUCOSE SERPL-MCNC: 94 MG/DL — SIGNIFICANT CHANGE UP (ref 70–99)
GLUCOSE SERPL-MCNC: 96 MG/DL — SIGNIFICANT CHANGE UP (ref 70–99)
HCO3 BLDV-SCNC: 30 MMOL/L — HIGH (ref 21–29)
HCO3 BLDV-SCNC: 32 MMOL/L — HIGH (ref 21–29)
HCO3 BLDV-SCNC: 38 MMOL/L — HIGH (ref 21–29)
HCT VFR BLD CALC: 24.1 % — LOW (ref 39–50)
HCT VFR BLD CALC: 24.8 % — LOW (ref 39–50)
HCT VFR BLD CALC: 26 % — LOW (ref 39–50)
HCT VFR BLD CALC: 27.5 % — LOW (ref 39–50)
HCT VFR BLD CALC: 28.1 % — LOW (ref 39–50)
HCT VFR BLD CALC: 28.5 % — LOW (ref 39–50)
HCT VFR BLD CALC: 28.5 % — LOW (ref 39–50)
HCT VFR BLD CALC: 30.2 % — LOW (ref 39–50)
HCT VFR BLD CALC: 30.5 % — LOW (ref 39–50)
HCT VFR BLD CALC: 30.6 % — LOW (ref 39–50)
HCT VFR BLD CALC: 32 % — LOW (ref 39–50)
HCT VFR BLD CALC: 33.3 % — LOW (ref 39–50)
HCT VFR BLD CALC: 33.3 % — LOW (ref 39–50)
HCT VFR BLD CALC: 36.3 % — LOW (ref 39–50)
HCT VFR BLDA CALC: 27 % — LOW (ref 39–50)
HCT VFR BLDA CALC: 29 % — LOW (ref 39–50)
HCT VFR BLDA CALC: 32 % — LOW (ref 39–50)
HGB BLD CALC-MCNC: 10.3 G/DL — LOW (ref 13–17)
HGB BLD CALC-MCNC: 8.6 G/DL — LOW (ref 13–17)
HGB BLD CALC-MCNC: 9.3 G/DL — LOW (ref 13–17)
HGB BLD-MCNC: 10 G/DL — LOW (ref 13–17)
HGB BLD-MCNC: 10.3 G/DL — LOW (ref 13–17)
HGB BLD-MCNC: 10.6 G/DL — LOW (ref 13–17)
HGB BLD-MCNC: 11.9 G/DL — LOW (ref 13–17)
HGB BLD-MCNC: 7.4 G/DL — LOW (ref 13–17)
HGB BLD-MCNC: 8 G/DL — LOW (ref 13–17)
HGB BLD-MCNC: 8 G/DL — LOW (ref 13–17)
HGB BLD-MCNC: 8.4 G/DL — LOW (ref 13–17)
HGB BLD-MCNC: 8.8 G/DL — LOW (ref 13–17)
HGB BLD-MCNC: 8.8 G/DL — LOW (ref 13–17)
HGB BLD-MCNC: 8.9 G/DL — LOW (ref 13–17)
HGB BLD-MCNC: 9.3 G/DL — LOW (ref 13–17)
HGB BLD-MCNC: 9.4 G/DL — LOW (ref 13–17)
HGB BLD-MCNC: 9.5 G/DL — LOW (ref 13–17)
HYPOCHROMIA BLD QL: SIGNIFICANT CHANGE UP
IMM GRANULOCYTES NFR BLD AUTO: 0.4 % — SIGNIFICANT CHANGE UP (ref 0–1.5)
INR BLD: 1.72 RATIO — HIGH (ref 0.88–1.16)
INR BLD: 1.9 RATIO — HIGH (ref 0.88–1.16)
INR BLD: 2.34 RATIO — HIGH (ref 0.88–1.16)
INR BLD: 2.52 RATIO — HIGH (ref 0.88–1.16)
INR BLD: 2.83 RATIO — HIGH (ref 0.88–1.16)
INR BLD: 2.92 RATIO — HIGH (ref 0.88–1.16)
INR BLD: 3.11 RATIO — HIGH (ref 0.88–1.16)
INR BLD: 3.38 RATIO — HIGH (ref 0.88–1.16)
INR BLD: 3.55 RATIO — HIGH (ref 0.88–1.16)
INR BLD: 3.71 RATIO — HIGH (ref 0.88–1.16)
INR BLD: 4.28 RATIO — HIGH (ref 0.88–1.16)
INR BLD: 5.42 RATIO — CRITICAL HIGH (ref 0.88–1.16)
INR BLD: 5.62 RATIO — CRITICAL HIGH (ref 0.88–1.16)
LACTATE BLDV-MCNC: 1.7 MMOL/L — SIGNIFICANT CHANGE UP (ref 0.7–2)
LACTATE BLDV-MCNC: 1.7 MMOL/L — SIGNIFICANT CHANGE UP (ref 0.7–2)
LACTATE BLDV-MCNC: 2.6 MMOL/L — HIGH (ref 0.7–2)
LACTATE BLDV-MCNC: 2.8 MMOL/L — HIGH (ref 0.7–2)
LYMPHOCYTES # BLD AUTO: 0.11 K/UL — LOW (ref 1–3.3)
LYMPHOCYTES # BLD AUTO: 0.13 K/UL — LOW (ref 1–3.3)
LYMPHOCYTES # BLD AUTO: 0.14 K/UL — LOW (ref 1–3.3)
LYMPHOCYTES # BLD AUTO: 0.17 K/UL — LOW (ref 1–3.3)
LYMPHOCYTES # BLD AUTO: 0.25 K/UL — LOW (ref 1–3.3)
LYMPHOCYTES # BLD AUTO: 3.5 % — LOW (ref 13–44)
LYMPHOCYTES # BLD AUTO: 3.5 % — LOW (ref 13–44)
LYMPHOCYTES # BLD AUTO: 4.4 % — LOW (ref 13–44)
LYMPHOCYTES # BLD AUTO: 5.7 % — LOW (ref 13–44)
LYMPHOCYTES # BLD AUTO: 7 % — LOW (ref 13–44)
MACROCYTES BLD QL: SIGNIFICANT CHANGE UP
MAGNESIUM SERPL-MCNC: 2.2 MG/DL — SIGNIFICANT CHANGE UP (ref 1.6–2.6)
MAGNESIUM SERPL-MCNC: 2.5 MG/DL
MAGNESIUM SERPL-MCNC: 2.6 MG/DL — SIGNIFICANT CHANGE UP (ref 1.6–2.6)
MAGNESIUM SERPL-MCNC: 2.7 MG/DL — HIGH (ref 1.6–2.6)
MAGNESIUM SERPL-MCNC: 2.7 MG/DL — HIGH (ref 1.6–2.6)
MAGNESIUM SERPL-MCNC: 2.8 MG/DL — HIGH (ref 1.6–2.6)
MAGNESIUM SERPL-MCNC: 2.9 MG/DL — HIGH (ref 1.6–2.6)
MAGNESIUM SERPL-MCNC: 2.9 MG/DL — HIGH (ref 1.6–2.6)
MAGNESIUM SERPL-MCNC: 3 MG/DL — HIGH (ref 1.6–2.6)
MAGNESIUM SERPL-MCNC: 3 MG/DL — HIGH (ref 1.6–2.6)
MAGNESIUM SERPL-MCNC: 3.1 MG/DL — HIGH (ref 1.6–2.6)
MANUAL SMEAR VERIFICATION: SIGNIFICANT CHANGE UP
MCHC RBC-ENTMCNC: 30.5 GM/DL — LOW (ref 32–36)
MCHC RBC-ENTMCNC: 30.7 GM/DL — LOW (ref 32–36)
MCHC RBC-ENTMCNC: 30.7 GM/DL — LOW (ref 32–36)
MCHC RBC-ENTMCNC: 30.8 GM/DL — LOW (ref 32–36)
MCHC RBC-ENTMCNC: 30.8 GM/DL — LOW (ref 32–36)
MCHC RBC-ENTMCNC: 30.9 GM/DL — LOW (ref 32–36)
MCHC RBC-ENTMCNC: 30.9 GM/DL — LOW (ref 32–36)
MCHC RBC-ENTMCNC: 31.1 GM/DL — LOW (ref 32–36)
MCHC RBC-ENTMCNC: 31.2 GM/DL — LOW (ref 32–36)
MCHC RBC-ENTMCNC: 31.3 GM/DL — LOW (ref 32–36)
MCHC RBC-ENTMCNC: 31.3 GM/DL — LOW (ref 32–36)
MCHC RBC-ENTMCNC: 31.8 GM/DL — LOW (ref 32–36)
MCHC RBC-ENTMCNC: 32.3 GM/DL — SIGNIFICANT CHANGE UP (ref 32–36)
MCHC RBC-ENTMCNC: 32.8 GM/DL — SIGNIFICANT CHANGE UP (ref 32–36)
MCHC RBC-ENTMCNC: 36.2 PG — HIGH (ref 27–34)
MCHC RBC-ENTMCNC: 36.5 PG — HIGH (ref 27–34)
MCHC RBC-ENTMCNC: 36.5 PG — HIGH (ref 27–34)
MCHC RBC-ENTMCNC: 36.6 PG — HIGH (ref 27–34)
MCHC RBC-ENTMCNC: 36.6 PG — HIGH (ref 27–34)
MCHC RBC-ENTMCNC: 36.7 PG — HIGH (ref 27–34)
MCHC RBC-ENTMCNC: 36.8 PG — HIGH (ref 27–34)
MCHC RBC-ENTMCNC: 37 PG — HIGH (ref 27–34)
MCHC RBC-ENTMCNC: 37 PG — HIGH (ref 27–34)
MCHC RBC-ENTMCNC: 37.1 PG — HIGH (ref 27–34)
MCHC RBC-ENTMCNC: 37.2 PG — HIGH (ref 27–34)
MCHC RBC-ENTMCNC: 37.7 PG — HIGH (ref 27–34)
MCHC RBC-ENTMCNC: 37.7 PG — HIGH (ref 27–34)
MCHC RBC-ENTMCNC: 38.8 PG — HIGH (ref 27–34)
MCV RBC AUTO: 114.9 FL — HIGH (ref 80–100)
MCV RBC AUTO: 117.3 FL — HIGH (ref 80–100)
MCV RBC AUTO: 117.6 FL — HIGH (ref 80–100)
MCV RBC AUTO: 118.4 FL — HIGH (ref 80–100)
MCV RBC AUTO: 118.5 FL — HIGH (ref 80–100)
MCV RBC AUTO: 118.5 FL — HIGH (ref 80–100)
MCV RBC AUTO: 118.7 FL — HIGH (ref 80–100)
MCV RBC AUTO: 118.7 FL — HIGH (ref 80–100)
MCV RBC AUTO: 118.8 FL — HIGH (ref 80–100)
MCV RBC AUTO: 119 FL — HIGH (ref 80–100)
MCV RBC AUTO: 119.1 FL — HIGH (ref 80–100)
MCV RBC AUTO: 119.8 FL — HIGH (ref 80–100)
MCV RBC AUTO: 120.4 FL — HIGH (ref 80–100)
MCV RBC AUTO: 120.5 FL — HIGH (ref 80–100)
MONOCYTES # BLD AUTO: 0 K/UL — SIGNIFICANT CHANGE UP (ref 0–0.9)
MONOCYTES # BLD AUTO: 0.01 K/UL — SIGNIFICANT CHANGE UP (ref 0–0.9)
MONOCYTES # BLD AUTO: 0.03 K/UL — SIGNIFICANT CHANGE UP (ref 0–0.9)
MONOCYTES NFR BLD AUTO: 0 % — LOW (ref 2–14)
MONOCYTES NFR BLD AUTO: 0.4 % — LOW (ref 2–14)
MONOCYTES NFR BLD AUTO: 0.9 % — LOW (ref 2–14)
NEUTROPHILS # BLD AUTO: 2.27 K/UL — SIGNIFICANT CHANGE UP (ref 1.8–7.4)
NEUTROPHILS # BLD AUTO: 2.9 K/UL — SIGNIFICANT CHANGE UP (ref 1.8–7.4)
NEUTROPHILS # BLD AUTO: 3.32 K/UL — SIGNIFICANT CHANGE UP (ref 1.8–7.4)
NEUTROPHILS # BLD AUTO: 3.63 K/UL — SIGNIFICANT CHANGE UP (ref 1.8–7.4)
NEUTROPHILS # BLD AUTO: 3.66 K/UL — SIGNIFICANT CHANGE UP (ref 1.8–7.4)
NEUTROPHILS NFR BLD AUTO: 90.3 % — HIGH (ref 43–77)
NEUTROPHILS NFR BLD AUTO: 91.2 % — HIGH (ref 43–77)
NEUTROPHILS NFR BLD AUTO: 92.3 % — HIGH (ref 43–77)
NEUTROPHILS NFR BLD AUTO: 93 % — HIGH (ref 43–77)
NEUTROPHILS NFR BLD AUTO: 94.8 % — HIGH (ref 43–77)
NEUTS BAND # BLD: 0.9 % — SIGNIFICANT CHANGE UP (ref 0–8)
NEUTS BAND # BLD: 2.6 % — SIGNIFICANT CHANGE UP (ref 0–8)
NRBC # BLD: 0 /100 WBCS — SIGNIFICANT CHANGE UP (ref 0–0)
NRBC # BLD: 1 /100 WBCS — HIGH (ref 0–0)
NRBC # BLD: 2 /100 WBCS — HIGH (ref 0–0)
NT-PROBNP SERPL-MCNC: ABNORMAL PG/ML
NT-PROBNP SERPL-SCNC: HIGH PG/ML (ref 0–300)
OTHER CELLS CSF MANUAL: 5 ML/DL — LOW (ref 18–22)
OTHER CELLS CSF MANUAL: 6 ML/DL — LOW (ref 18–22)
OTHER CELLS CSF MANUAL: 8 ML/DL — LOW (ref 18–22)
OVALOCYTES BLD QL SMEAR: SLIGHT — SIGNIFICANT CHANGE UP
OVALOCYTES BLD QL SMEAR: SLIGHT — SIGNIFICANT CHANGE UP
PCO2 BLDV: 54 MMHG — HIGH (ref 35–50)
PCO2 BLDV: 54 MMHG — HIGH (ref 35–50)
PCO2 BLDV: 57 MMHG — HIGH (ref 35–50)
PH BLDV: 7.35 — SIGNIFICANT CHANGE UP (ref 7.35–7.45)
PH BLDV: 7.38 — SIGNIFICANT CHANGE UP (ref 7.35–7.45)
PH BLDV: 7.46 — HIGH (ref 7.35–7.45)
PHOSPHATE SERPL-MCNC: 3.8 MG/DL — SIGNIFICANT CHANGE UP (ref 2.5–4.5)
PHOSPHATE SERPL-MCNC: 4.3 MG/DL — SIGNIFICANT CHANGE UP (ref 2.5–4.5)
PHOSPHATE SERPL-MCNC: 5 MG/DL — HIGH (ref 2.5–4.5)
PHOSPHATE SERPL-MCNC: 5.1 MG/DL — HIGH (ref 2.5–4.5)
PHOSPHATE SERPL-MCNC: 5.2 MG/DL — HIGH (ref 2.5–4.5)
PHOSPHATE SERPL-MCNC: 5.2 MG/DL — HIGH (ref 2.5–4.5)
PHOSPHATE SERPL-MCNC: 5.3 MG/DL — HIGH (ref 2.5–4.5)
PHOSPHATE SERPL-MCNC: 5.7 MG/DL — HIGH (ref 2.5–4.5)
PHOSPHATE SERPL-MCNC: 5.7 MG/DL — HIGH (ref 2.5–4.5)
PLAT MORPH BLD: NORMAL — SIGNIFICANT CHANGE UP
PLATELET # BLD AUTO: 147 K/UL — LOW (ref 150–400)
PLATELET # BLD AUTO: 148 K/UL — LOW (ref 150–400)
PLATELET # BLD AUTO: 151 K/UL — SIGNIFICANT CHANGE UP (ref 150–400)
PLATELET # BLD AUTO: 168 K/UL — SIGNIFICANT CHANGE UP (ref 150–400)
PLATELET # BLD AUTO: 173 K/UL — SIGNIFICANT CHANGE UP (ref 150–400)
PLATELET # BLD AUTO: 173 K/UL — SIGNIFICANT CHANGE UP (ref 150–400)
PLATELET # BLD AUTO: 176 K/UL — SIGNIFICANT CHANGE UP (ref 150–400)
PLATELET # BLD AUTO: 183 K/UL — SIGNIFICANT CHANGE UP (ref 150–400)
PLATELET # BLD AUTO: 187 K/UL — SIGNIFICANT CHANGE UP (ref 150–400)
PLATELET # BLD AUTO: 193 K/UL — SIGNIFICANT CHANGE UP (ref 150–400)
PLATELET # BLD AUTO: 195 K/UL — SIGNIFICANT CHANGE UP (ref 150–400)
PLATELET # BLD AUTO: 197 K/UL — SIGNIFICANT CHANGE UP (ref 150–400)
PLATELET # BLD AUTO: 207 K/UL — SIGNIFICANT CHANGE UP (ref 150–400)
PLATELET # BLD AUTO: 237 K/UL — SIGNIFICANT CHANGE UP (ref 150–400)
PO2 BLDV: 30 MMHG — SIGNIFICANT CHANGE UP (ref 25–45)
PO2 BLDV: 32 MMHG — SIGNIFICANT CHANGE UP (ref 25–45)
PO2 BLDV: 38 MMHG — SIGNIFICANT CHANGE UP (ref 25–45)
POIKILOCYTOSIS BLD QL AUTO: SLIGHT — SIGNIFICANT CHANGE UP
POLYCHROMASIA BLD QL SMEAR: SLIGHT — SIGNIFICANT CHANGE UP
POLYCHROMASIA BLD QL SMEAR: SLIGHT — SIGNIFICANT CHANGE UP
POTASSIUM BLDV-SCNC: 3.9 MMOL/L — SIGNIFICANT CHANGE UP (ref 3.5–5.3)
POTASSIUM BLDV-SCNC: 3.9 MMOL/L — SIGNIFICANT CHANGE UP (ref 3.5–5.3)
POTASSIUM BLDV-SCNC: 4.1 MMOL/L — SIGNIFICANT CHANGE UP (ref 3.5–5.3)
POTASSIUM SERPL-MCNC: 3.4 MMOL/L — LOW (ref 3.5–5.3)
POTASSIUM SERPL-MCNC: 3.6 MMOL/L — SIGNIFICANT CHANGE UP (ref 3.5–5.3)
POTASSIUM SERPL-MCNC: 3.7 MMOL/L — SIGNIFICANT CHANGE UP (ref 3.5–5.3)
POTASSIUM SERPL-MCNC: 3.8 MMOL/L — SIGNIFICANT CHANGE UP (ref 3.5–5.3)
POTASSIUM SERPL-MCNC: 3.9 MMOL/L — SIGNIFICANT CHANGE UP (ref 3.5–5.3)
POTASSIUM SERPL-MCNC: 4 MMOL/L — SIGNIFICANT CHANGE UP (ref 3.5–5.3)
POTASSIUM SERPL-MCNC: 4 MMOL/L — SIGNIFICANT CHANGE UP (ref 3.5–5.3)
POTASSIUM SERPL-MCNC: 4.1 MMOL/L — SIGNIFICANT CHANGE UP (ref 3.5–5.3)
POTASSIUM SERPL-MCNC: 4.1 MMOL/L — SIGNIFICANT CHANGE UP (ref 3.5–5.3)
POTASSIUM SERPL-MCNC: 4.2 MMOL/L — SIGNIFICANT CHANGE UP (ref 3.5–5.3)
POTASSIUM SERPL-MCNC: 4.2 MMOL/L — SIGNIFICANT CHANGE UP (ref 3.5–5.3)
POTASSIUM SERPL-MCNC: 4.3 MMOL/L — SIGNIFICANT CHANGE UP (ref 3.5–5.3)
POTASSIUM SERPL-MCNC: 4.4 MMOL/L — SIGNIFICANT CHANGE UP (ref 3.5–5.3)
POTASSIUM SERPL-MCNC: 4.7 MMOL/L — SIGNIFICANT CHANGE UP (ref 3.5–5.3)
POTASSIUM SERPL-MCNC: 7.2 MMOL/L — CRITICAL HIGH (ref 3.5–5.3)
POTASSIUM SERPL-SCNC: 3.4 MMOL/L — LOW (ref 3.5–5.3)
POTASSIUM SERPL-SCNC: 3.6 MMOL/L — SIGNIFICANT CHANGE UP (ref 3.5–5.3)
POTASSIUM SERPL-SCNC: 3.7 MMOL/L — SIGNIFICANT CHANGE UP (ref 3.5–5.3)
POTASSIUM SERPL-SCNC: 3.8 MMOL/L — SIGNIFICANT CHANGE UP (ref 3.5–5.3)
POTASSIUM SERPL-SCNC: 3.9 MMOL/L — SIGNIFICANT CHANGE UP (ref 3.5–5.3)
POTASSIUM SERPL-SCNC: 4 MMOL/L — SIGNIFICANT CHANGE UP (ref 3.5–5.3)
POTASSIUM SERPL-SCNC: 4 MMOL/L — SIGNIFICANT CHANGE UP (ref 3.5–5.3)
POTASSIUM SERPL-SCNC: 4.1 MMOL/L — SIGNIFICANT CHANGE UP (ref 3.5–5.3)
POTASSIUM SERPL-SCNC: 4.1 MMOL/L — SIGNIFICANT CHANGE UP (ref 3.5–5.3)
POTASSIUM SERPL-SCNC: 4.2 MMOL/L
POTASSIUM SERPL-SCNC: 4.2 MMOL/L — SIGNIFICANT CHANGE UP (ref 3.5–5.3)
POTASSIUM SERPL-SCNC: 4.2 MMOL/L — SIGNIFICANT CHANGE UP (ref 3.5–5.3)
POTASSIUM SERPL-SCNC: 4.3 MMOL/L — SIGNIFICANT CHANGE UP (ref 3.5–5.3)
POTASSIUM SERPL-SCNC: 4.4 MMOL/L — SIGNIFICANT CHANGE UP (ref 3.5–5.3)
POTASSIUM SERPL-SCNC: 4.7 MMOL/L — SIGNIFICANT CHANGE UP (ref 3.5–5.3)
POTASSIUM SERPL-SCNC: 5 MMOL/L
POTASSIUM SERPL-SCNC: 7.2 MMOL/L — CRITICAL HIGH (ref 3.5–5.3)
PROT SERPL-MCNC: 7 G/DL — SIGNIFICANT CHANGE UP (ref 6–8.3)
PROT SERPL-MCNC: 7.2 G/DL — SIGNIFICANT CHANGE UP (ref 6–8.3)
PROT SERPL-MCNC: 7.4 G/DL
PROT SERPL-MCNC: 7.8 G/DL — SIGNIFICANT CHANGE UP (ref 6–8.3)
PROT SERPL-MCNC: 8.2 G/DL — SIGNIFICANT CHANGE UP (ref 6–8.3)
PROT SERPL-MCNC: 8.4 G/DL — HIGH (ref 6–8.3)
PROT SERPL-MCNC: 8.5 G/DL — HIGH (ref 6–8.3)
PROTHROM AB SERPL-ACNC: 19.9 SEC — HIGH (ref 10–12.9)
PROTHROM AB SERPL-ACNC: 21.9 SEC — HIGH (ref 10–13.1)
PROTHROM AB SERPL-ACNC: 27.4 SEC — HIGH (ref 10–13.1)
PROTHROM AB SERPL-ACNC: 29.3 SEC — HIGH (ref 10–13.1)
PROTHROM AB SERPL-ACNC: 33.6 SEC — HIGH (ref 10–13.1)
PROTHROM AB SERPL-ACNC: 34.4 SEC — HIGH (ref 10–13.1)
PROTHROM AB SERPL-ACNC: 37 SEC — HIGH (ref 10–13.1)
PROTHROM AB SERPL-ACNC: 40.4 SEC — HIGH (ref 10–13.1)
PROTHROM AB SERPL-ACNC: 42.1 SEC — HIGH (ref 10–12.9)
PROTHROM AB SERPL-ACNC: 44.4 SEC — HIGH (ref 10–13.1)
PROTHROM AB SERPL-ACNC: 50.6 SEC — HIGH (ref 10–13.1)
PROTHROM AB SERPL-ACNC: 65.1 SEC — HIGH (ref 10–12.9)
PROTHROM AB SERPL-ACNC: 67 SEC — HIGH (ref 10–13.1)
RBC # BLD: 2 M/UL — LOW (ref 4.2–5.8)
RBC # BLD: 2.06 M/UL — LOW (ref 4.2–5.8)
RBC # BLD: 2.19 M/UL — LOW (ref 4.2–5.8)
RBC # BLD: 2.32 M/UL — LOW (ref 4.2–5.8)
RBC # BLD: 2.39 M/UL — LOW (ref 4.2–5.8)
RBC # BLD: 2.4 M/UL — LOW (ref 4.2–5.8)
RBC # BLD: 2.43 M/UL — LOW (ref 4.2–5.8)
RBC # BLD: 2.55 M/UL — LOW (ref 4.2–5.8)
RBC # BLD: 2.57 M/UL — LOW (ref 4.2–5.8)
RBC # BLD: 2.57 M/UL — LOW (ref 4.2–5.8)
RBC # BLD: 2.69 M/UL — LOW (ref 4.2–5.8)
RBC # BLD: 2.78 M/UL — LOW (ref 4.2–5.8)
RBC # BLD: 2.81 M/UL — LOW (ref 4.2–5.8)
RBC # BLD: 3.16 M/UL — LOW (ref 4.2–5.8)
RBC # FLD: 15.3 % — HIGH (ref 10.3–14.5)
RBC # FLD: 17.6 % — HIGH (ref 10.3–14.5)
RBC # FLD: 17.7 % — HIGH (ref 10.3–14.5)
RBC # FLD: 17.7 % — HIGH (ref 10.3–14.5)
RBC # FLD: 17.9 % — HIGH (ref 10.3–14.5)
RBC # FLD: 17.9 % — HIGH (ref 10.3–14.5)
RBC # FLD: 18 % — HIGH (ref 10.3–14.5)
RBC # FLD: 18 % — HIGH (ref 10.3–14.5)
RBC # FLD: 18.1 % — HIGH (ref 10.3–14.5)
RBC # FLD: 18.3 % — HIGH (ref 10.3–14.5)
RBC # FLD: 18.7 % — HIGH (ref 10.3–14.5)
RBC # FLD: 18.7 % — HIGH (ref 10.3–14.5)
RBC BLD AUTO: ABNORMAL
RBC BLD AUTO: ABNORMAL
RBC BLD AUTO: SIGNIFICANT CHANGE UP
SAO2 % BLDV: 41 % — LOW (ref 67–88)
SAO2 % BLDV: 43 % — LOW (ref 67–88)
SAO2 % BLDV: 67 % — SIGNIFICANT CHANGE UP (ref 67–88)
SODIUM SERPL-SCNC: 133 MMOL/L — LOW (ref 135–145)
SODIUM SERPL-SCNC: 134 MMOL/L — LOW (ref 135–145)
SODIUM SERPL-SCNC: 135 MMOL/L
SODIUM SERPL-SCNC: 135 MMOL/L — SIGNIFICANT CHANGE UP (ref 135–145)
SODIUM SERPL-SCNC: 136 MMOL/L — SIGNIFICANT CHANGE UP (ref 135–145)
SODIUM SERPL-SCNC: 137 MMOL/L
SODIUM SERPL-SCNC: 137 MMOL/L — SIGNIFICANT CHANGE UP (ref 135–145)
SODIUM SERPL-SCNC: 138 MMOL/L — SIGNIFICANT CHANGE UP (ref 135–145)
SODIUM SERPL-SCNC: 139 MMOL/L — SIGNIFICANT CHANGE UP (ref 135–145)
SODIUM SERPL-SCNC: 142 MMOL/L — SIGNIFICANT CHANGE UP (ref 135–145)
SPECIMEN SOURCE: SIGNIFICANT CHANGE UP
SPHEROCYTES BLD QL SMEAR: SLIGHT — SIGNIFICANT CHANGE UP
TARGETS BLD QL SMEAR: SLIGHT — SIGNIFICANT CHANGE UP
TOXIC GRANULES BLD QL SMEAR: PRESENT — SIGNIFICANT CHANGE UP
TROPONIN T, HIGH SENSITIVITY RESULT: 81 NG/L — HIGH (ref 0–51)
WBC # BLD: 2.46 K/UL — LOW (ref 3.8–10.5)
WBC # BLD: 2.88 K/UL — LOW (ref 3.8–10.5)
WBC # BLD: 2.95 K/UL — LOW (ref 3.8–10.5)
WBC # BLD: 3.06 K/UL — LOW (ref 3.8–10.5)
WBC # BLD: 3.46 K/UL — LOW (ref 3.8–10.5)
WBC # BLD: 3.61 K/UL — LOW (ref 3.8–10.5)
WBC # BLD: 3.69 K/UL — LOW (ref 3.8–10.5)
WBC # BLD: 3.77 K/UL — LOW (ref 3.8–10.5)
WBC # BLD: 3.8 K/UL — SIGNIFICANT CHANGE UP (ref 3.8–10.5)
WBC # BLD: 3.83 K/UL — SIGNIFICANT CHANGE UP (ref 3.8–10.5)
WBC # BLD: 3.87 K/UL — SIGNIFICANT CHANGE UP (ref 3.8–10.5)
WBC # BLD: 3.99 K/UL — SIGNIFICANT CHANGE UP (ref 3.8–10.5)
WBC # BLD: 4.22 K/UL — SIGNIFICANT CHANGE UP (ref 3.8–10.5)
WBC # BLD: 4.37 K/UL — SIGNIFICANT CHANGE UP (ref 3.8–10.5)
WBC # FLD AUTO: 2.46 K/UL — LOW (ref 3.8–10.5)
WBC # FLD AUTO: 2.88 K/UL — LOW (ref 3.8–10.5)
WBC # FLD AUTO: 2.95 K/UL — LOW (ref 3.8–10.5)
WBC # FLD AUTO: 3.06 K/UL — LOW (ref 3.8–10.5)
WBC # FLD AUTO: 3.46 K/UL — LOW (ref 3.8–10.5)
WBC # FLD AUTO: 3.61 K/UL — LOW (ref 3.8–10.5)
WBC # FLD AUTO: 3.69 K/UL — LOW (ref 3.8–10.5)
WBC # FLD AUTO: 3.77 K/UL — LOW (ref 3.8–10.5)
WBC # FLD AUTO: 3.8 K/UL — SIGNIFICANT CHANGE UP (ref 3.8–10.5)
WBC # FLD AUTO: 3.83 K/UL — SIGNIFICANT CHANGE UP (ref 3.8–10.5)
WBC # FLD AUTO: 3.87 K/UL — SIGNIFICANT CHANGE UP (ref 3.8–10.5)
WBC # FLD AUTO: 3.99 K/UL — SIGNIFICANT CHANGE UP (ref 3.8–10.5)
WBC # FLD AUTO: 4.22 K/UL — SIGNIFICANT CHANGE UP (ref 3.8–10.5)
WBC # FLD AUTO: 4.37 K/UL — SIGNIFICANT CHANGE UP (ref 3.8–10.5)

## 2020-01-01 PROCEDURE — 99233 SBSQ HOSP IP/OBS HIGH 50: CPT | Mod: GC

## 2020-01-01 PROCEDURE — 99223 1ST HOSP IP/OBS HIGH 75: CPT | Mod: GC

## 2020-01-01 PROCEDURE — 82330 ASSAY OF CALCIUM: CPT

## 2020-01-01 PROCEDURE — 99232 SBSQ HOSP IP/OBS MODERATE 35: CPT | Mod: GC

## 2020-01-01 PROCEDURE — 99223 1ST HOSP IP/OBS HIGH 75: CPT | Mod: GC,AI

## 2020-01-01 PROCEDURE — C1769: CPT

## 2020-01-01 PROCEDURE — 85014 HEMATOCRIT: CPT

## 2020-01-01 PROCEDURE — 82565 ASSAY OF CREATININE: CPT

## 2020-01-01 PROCEDURE — 87040 BLOOD CULTURE FOR BACTERIA: CPT

## 2020-01-01 PROCEDURE — 99232 SBSQ HOSP IP/OBS MODERATE 35: CPT

## 2020-01-01 PROCEDURE — 96376 TX/PRO/DX INJ SAME DRUG ADON: CPT

## 2020-01-01 PROCEDURE — 93283 PRGRMG EVAL IMPLANTABLE DFB: CPT

## 2020-01-01 PROCEDURE — C1892: CPT

## 2020-01-01 PROCEDURE — 84156 ASSAY OF PROTEIN URINE: CPT

## 2020-01-01 PROCEDURE — 82803 BLOOD GASES ANY COMBINATION: CPT

## 2020-01-01 PROCEDURE — 97161 PT EVAL LOW COMPLEX 20 MIN: CPT

## 2020-01-01 PROCEDURE — 84295 ASSAY OF SERUM SODIUM: CPT

## 2020-01-01 PROCEDURE — 97116 GAIT TRAINING THERAPY: CPT

## 2020-01-01 PROCEDURE — 82435 ASSAY OF BLOOD CHLORIDE: CPT

## 2020-01-01 PROCEDURE — 99215 OFFICE O/P EST HI 40 MIN: CPT

## 2020-01-01 PROCEDURE — 83735 ASSAY OF MAGNESIUM: CPT

## 2020-01-01 PROCEDURE — 99239 HOSP IP/OBS DSCHRG MGMT >30: CPT

## 2020-01-01 PROCEDURE — 73630 X-RAY EXAM OF FOOT: CPT | Mod: 26,LT

## 2020-01-01 PROCEDURE — 93971 EXTREMITY STUDY: CPT

## 2020-01-01 PROCEDURE — 94640 AIRWAY INHALATION TREATMENT: CPT

## 2020-01-01 PROCEDURE — 86850 RBC ANTIBODY SCREEN: CPT

## 2020-01-01 PROCEDURE — 82947 ASSAY GLUCOSE BLOOD QUANT: CPT

## 2020-01-01 PROCEDURE — 99233 SBSQ HOSP IP/OBS HIGH 50: CPT

## 2020-01-01 PROCEDURE — 93971 EXTREMITY STUDY: CPT | Mod: 26

## 2020-01-01 PROCEDURE — C1900: CPT

## 2020-01-01 PROCEDURE — 84132 ASSAY OF SERUM POTASSIUM: CPT

## 2020-01-01 PROCEDURE — 99285 EMERGENCY DEPT VISIT HI MDM: CPT | Mod: 25

## 2020-01-01 PROCEDURE — 71045 X-RAY EXAM CHEST 1 VIEW: CPT | Mod: 26

## 2020-01-01 PROCEDURE — 99024 POSTOP FOLLOW-UP VISIT: CPT

## 2020-01-01 PROCEDURE — 85730 THROMBOPLASTIN TIME PARTIAL: CPT

## 2020-01-01 PROCEDURE — 84100 ASSAY OF PHOSPHORUS: CPT

## 2020-01-01 PROCEDURE — 97530 THERAPEUTIC ACTIVITIES: CPT

## 2020-01-01 PROCEDURE — 84540 ASSAY OF URINE/UREA-N: CPT

## 2020-01-01 PROCEDURE — 84484 ASSAY OF TROPONIN QUANT: CPT

## 2020-01-01 PROCEDURE — 82140 ASSAY OF AMMONIA: CPT

## 2020-01-01 PROCEDURE — 80048 BASIC METABOLIC PNL TOTAL CA: CPT

## 2020-01-01 PROCEDURE — 85610 PROTHROMBIN TIME: CPT

## 2020-01-01 PROCEDURE — 86901 BLOOD TYPING SEROLOGIC RH(D): CPT

## 2020-01-01 PROCEDURE — 85045 AUTOMATED RETICULOCYTE COUNT: CPT

## 2020-01-01 PROCEDURE — 93010 ELECTROCARDIOGRAM REPORT: CPT

## 2020-01-01 PROCEDURE — 97110 THERAPEUTIC EXERCISES: CPT

## 2020-01-01 PROCEDURE — 99285 EMERGENCY DEPT VISIT HI MDM: CPT | Mod: GC

## 2020-01-01 PROCEDURE — C1889: CPT

## 2020-01-01 PROCEDURE — 85027 COMPLETE CBC AUTOMATED: CPT

## 2020-01-01 PROCEDURE — 82570 ASSAY OF URINE CREATININE: CPT

## 2020-01-01 PROCEDURE — 83605 ASSAY OF LACTIC ACID: CPT

## 2020-01-01 PROCEDURE — C1887: CPT

## 2020-01-01 PROCEDURE — 93000 ELECTROCARDIOGRAM COMPLETE: CPT

## 2020-01-01 PROCEDURE — 99213 OFFICE O/P EST LOW 20 MIN: CPT

## 2020-01-01 PROCEDURE — 71046 X-RAY EXAM CHEST 2 VIEWS: CPT

## 2020-01-01 PROCEDURE — 33225 L VENTRIC PACING LEAD ADD-ON: CPT

## 2020-01-01 PROCEDURE — 97162 PT EVAL MOD COMPLEX 30 MIN: CPT

## 2020-01-01 PROCEDURE — 96375 TX/PRO/DX INJ NEW DRUG ADDON: CPT

## 2020-01-01 PROCEDURE — 93005 ELECTROCARDIOGRAM TRACING: CPT

## 2020-01-01 PROCEDURE — 73590 X-RAY EXAM OF LOWER LEG: CPT

## 2020-01-01 PROCEDURE — 73630 X-RAY EXAM OF FOOT: CPT

## 2020-01-01 PROCEDURE — 83880 ASSAY OF NATRIURETIC PEPTIDE: CPT

## 2020-01-01 PROCEDURE — 33249 INSJ/RPLCMT DEFIB W/LEAD(S): CPT

## 2020-01-01 PROCEDURE — 71045 X-RAY EXAM CHEST 1 VIEW: CPT

## 2020-01-01 PROCEDURE — 73590 X-RAY EXAM OF LOWER LEG: CPT | Mod: 26,LT

## 2020-01-01 PROCEDURE — 71046 X-RAY EXAM CHEST 2 VIEWS: CPT | Mod: 26

## 2020-01-01 PROCEDURE — 96374 THER/PROPH/DIAG INJ IV PUSH: CPT

## 2020-01-01 PROCEDURE — C1777: CPT

## 2020-01-01 PROCEDURE — 80053 COMPREHEN METABOLIC PANEL: CPT

## 2020-01-01 PROCEDURE — 84300 ASSAY OF URINE SODIUM: CPT

## 2020-01-01 PROCEDURE — 86900 BLOOD TYPING SEROLOGIC ABO: CPT

## 2020-01-01 PROCEDURE — 76770 US EXAM ABDO BACK WALL COMP: CPT

## 2020-01-01 PROCEDURE — C1882: CPT

## 2020-01-01 RX ORDER — METOPROLOL TARTRATE 50 MG
50 TABLET ORAL
Refills: 0 | Status: DISCONTINUED | OUTPATIENT
Start: 2020-01-01 | End: 2020-01-01

## 2020-01-01 RX ORDER — IPRATROPIUM/ALBUTEROL SULFATE 18-103MCG
3 AEROSOL WITH ADAPTER (GRAM) INHALATION
Qty: 360 | Refills: 0
Start: 2020-01-01 | End: 2020-01-01

## 2020-01-01 RX ORDER — WARFARIN SODIUM 2.5 MG/1
1 TABLET ORAL
Qty: 0 | Refills: 0 | DISCHARGE

## 2020-01-01 RX ORDER — METOPROLOL TARTRATE 50 MG
1 TABLET ORAL
Qty: 0 | Refills: 0 | DISCHARGE

## 2020-01-01 RX ORDER — HYDROMORPHONE HYDROCHLORIDE 2 MG/ML
0.5 INJECTION INTRAMUSCULAR; INTRAVENOUS; SUBCUTANEOUS EVERY 4 HOURS
Refills: 0 | Status: DISCONTINUED | OUTPATIENT
Start: 2020-01-01 | End: 2020-01-01

## 2020-01-01 RX ORDER — HYDROMORPHONE HYDROCHLORIDE 2 MG/ML
0.25 INJECTION INTRAMUSCULAR; INTRAVENOUS; SUBCUTANEOUS ONCE
Refills: 0 | Status: DISCONTINUED | OUTPATIENT
Start: 2020-01-01 | End: 2020-01-01

## 2020-01-01 RX ORDER — IPRATROPIUM/ALBUTEROL SULFATE 18-103MCG
3 AEROSOL WITH ADAPTER (GRAM) INHALATION EVERY 6 HOURS
Refills: 0 | Status: DISCONTINUED | OUTPATIENT
Start: 2020-01-01 | End: 2020-01-01

## 2020-01-01 RX ORDER — QUETIAPINE FUMARATE 200 MG/1
0.5 TABLET, FILM COATED ORAL
Qty: 7.5 | Refills: 0
Start: 2020-01-01 | End: 2020-01-01

## 2020-01-01 RX ORDER — WARFARIN SODIUM 2.5 MG/1
5 TABLET ORAL ONCE
Refills: 0 | Status: DISCONTINUED | OUTPATIENT
Start: 2020-01-01 | End: 2020-01-01

## 2020-01-01 RX ORDER — OXYCODONE HYDROCHLORIDE 5 MG/1
5 TABLET ORAL ONCE
Refills: 0 | Status: DISCONTINUED | OUTPATIENT
Start: 2020-01-01 | End: 2020-01-01

## 2020-01-01 RX ORDER — HYDROMORPHONE HYDROCHLORIDE 2 MG/ML
0.5 INJECTION INTRAMUSCULAR; INTRAVENOUS; SUBCUTANEOUS ONCE
Refills: 0 | Status: DISCONTINUED | OUTPATIENT
Start: 2020-01-01 | End: 2020-01-01

## 2020-01-01 RX ORDER — OXYCODONE HYDROCHLORIDE 5 MG/1
1 TABLET ORAL
Qty: 120 | Refills: 0
Start: 2020-01-01 | End: 2020-01-01

## 2020-01-01 RX ORDER — WARFARIN SODIUM 2.5 MG/1
2.5 TABLET ORAL AT BEDTIME
Refills: 0 | Status: COMPLETED | OUTPATIENT
Start: 2020-01-01 | End: 2020-01-01

## 2020-01-01 RX ORDER — CEFAZOLIN SODIUM 1 G
1000 VIAL (EA) INJECTION ONCE
Refills: 0 | Status: COMPLETED | OUTPATIENT
Start: 2020-01-01 | End: 2020-01-01

## 2020-01-01 RX ORDER — POTASSIUM CHLORIDE 20 MEQ
40 PACKET (EA) ORAL ONCE
Refills: 0 | Status: DISCONTINUED | OUTPATIENT
Start: 2020-01-01 | End: 2020-01-01

## 2020-01-01 RX ORDER — METOPROLOL TARTRATE 50 MG
1 TABLET ORAL
Qty: 60 | Refills: 0
Start: 2020-01-01 | End: 2020-01-01

## 2020-01-01 RX ORDER — OXYCODONE HYDROCHLORIDE 5 MG/1
5 TABLET ORAL EVERY 6 HOURS
Refills: 0 | Status: DISCONTINUED | OUTPATIENT
Start: 2020-01-01 | End: 2020-01-01

## 2020-01-01 RX ORDER — FLUTICASONE PROPIONATE 220 MCG
50 AEROSOL WITH ADAPTER (GRAM) INHALATION
Qty: 1 | Refills: 0
Start: 2020-01-01 | End: 2020-01-01

## 2020-01-01 RX ORDER — BUMETANIDE 0.25 MG/ML
2 INJECTION INTRAMUSCULAR; INTRAVENOUS
Refills: 0 | Status: DISCONTINUED | OUTPATIENT
Start: 2020-01-01 | End: 2020-01-01

## 2020-01-01 RX ORDER — POLYETHYLENE GLYCOL 3350 17 G/17G
17 POWDER, FOR SOLUTION ORAL
Qty: 510 | Refills: 0
Start: 2020-01-01 | End: 2020-01-01

## 2020-01-01 RX ORDER — SENNA PLUS 8.6 MG/1
2 TABLET ORAL AT BEDTIME
Refills: 0 | Status: DISCONTINUED | OUTPATIENT
Start: 2020-01-01 | End: 2020-01-01

## 2020-01-01 RX ORDER — BUDESONIDE AND FORMOTEROL FUMARATE DIHYDRATE 160; 4.5 UG/1; UG/1
2 AEROSOL RESPIRATORY (INHALATION)
Refills: 0 | Status: DISCONTINUED | OUTPATIENT
Start: 2020-01-01 | End: 2020-01-01

## 2020-01-01 RX ORDER — POLYETHYLENE GLYCOL 3350 17 G/17G
17 POWDER, FOR SOLUTION ORAL
Qty: 0 | Refills: 0 | DISCHARGE
Start: 2020-01-01

## 2020-01-01 RX ORDER — HYDRALAZINE HYDROCHLORIDE 10 MG/1
10 TABLET ORAL EVERY 8 HOURS
Refills: 0 | Status: ACTIVE | COMMUNITY

## 2020-01-01 RX ORDER — UMECLIDINIUM BROMIDE AND VILANTEROL TRIFENATATE 62.5; 25 UG/1; UG/1
62.5-25 POWDER RESPIRATORY (INHALATION) DAILY
Qty: 1 | Refills: 11 | Status: DISCONTINUED | COMMUNITY
Start: 2018-02-08 | End: 2020-01-01

## 2020-01-01 RX ORDER — ALBUTEROL SULFATE 90 UG/1
108 (90 BASE) AEROSOL, METERED RESPIRATORY (INHALATION)
Qty: 1 | Refills: 3 | Status: DISCONTINUED | COMMUNITY
Start: 2019-03-04 | End: 2020-01-01

## 2020-01-01 RX ORDER — OXYCODONE HYDROCHLORIDE 5 MG/1
1 TABLET ORAL
Qty: 28 | Refills: 0
Start: 2020-01-01 | End: 2020-01-01

## 2020-01-01 RX ORDER — FLUTICASONE FUROATE, UMECLIDINIUM BROMIDE AND VILANTEROL TRIFENATATE 200; 62.5; 25 UG/1; UG/1; UG/1
1 POWDER RESPIRATORY (INHALATION)
Qty: 0 | Refills: 0 | DISCHARGE

## 2020-01-01 RX ORDER — AMLODIPINE BESYLATE 10 MG/1
10 TABLET ORAL
Qty: 90 | Refills: 3 | Status: DISCONTINUED | COMMUNITY
Start: 2019-04-21 | End: 2020-01-01

## 2020-01-01 RX ORDER — ALBUTEROL SULFATE 90 UG/1
108 (90 BASE) AEROSOL, METERED RESPIRATORY (INHALATION)
Qty: 6 | Refills: 3 | Status: ACTIVE | COMMUNITY
Start: 2019-02-14 | End: 1900-01-01

## 2020-01-01 RX ORDER — FENTANYL CITRATE 50 UG/ML
50 INJECTION INTRAVENOUS ONCE
Refills: 0 | Status: DISCONTINUED | OUTPATIENT
Start: 2020-01-01 | End: 2020-01-01

## 2020-01-01 RX ORDER — MORPHINE SULFATE 50 MG/1
4 CAPSULE, EXTENDED RELEASE ORAL EVERY 4 HOURS
Refills: 0 | Status: DISCONTINUED | OUTPATIENT
Start: 2020-01-01 | End: 2020-01-01

## 2020-01-01 RX ORDER — WARFARIN SODIUM 2.5 MG/1
2.5 TABLET ORAL ONCE
Refills: 0 | Status: COMPLETED | OUTPATIENT
Start: 2020-01-01 | End: 2020-01-01

## 2020-01-01 RX ORDER — POLYETHYLENE GLYCOL 3350 17 G/17G
17 POWDER, FOR SOLUTION ORAL DAILY
Refills: 0 | Status: DISCONTINUED | OUTPATIENT
Start: 2020-01-01 | End: 2020-01-01

## 2020-01-01 RX ORDER — MORPHINE SULFATE 50 MG/1
2 CAPSULE, EXTENDED RELEASE ORAL ONCE
Refills: 0 | Status: DISCONTINUED | OUTPATIENT
Start: 2020-01-01 | End: 2020-01-01

## 2020-01-01 RX ORDER — BUMETANIDE 0.25 MG/ML
4 INJECTION INTRAMUSCULAR; INTRAVENOUS
Refills: 0 | Status: DISCONTINUED | OUTPATIENT
Start: 2020-01-01 | End: 2020-01-01

## 2020-01-01 RX ORDER — BUMETANIDE 0.25 MG/ML
2 INJECTION INTRAMUSCULAR; INTRAVENOUS
Qty: 120 | Refills: 0
Start: 2020-01-01 | End: 2020-01-01

## 2020-01-01 RX ORDER — IPRATROPIUM/ALBUTEROL SULFATE 18-103MCG
3 AEROSOL WITH ADAPTER (GRAM) INHALATION
Qty: 1 | Refills: 0
Start: 2020-01-01

## 2020-01-01 RX ORDER — BUMETANIDE 0.25 MG/ML
1 INJECTION INTRAMUSCULAR; INTRAVENOUS
Qty: 60 | Refills: 0
Start: 2020-01-01 | End: 2020-01-01

## 2020-01-01 RX ORDER — MILRINONE LACTATE 1 MG/ML
0.25 INJECTION, SOLUTION INTRAVENOUS
Qty: 20 | Refills: 0 | Status: DISCONTINUED | OUTPATIENT
Start: 2020-01-01 | End: 2020-01-01

## 2020-01-01 RX ORDER — FOLIC ACID 0.8 MG
1 TABLET ORAL DAILY
Refills: 0 | Status: DISCONTINUED | OUTPATIENT
Start: 2020-01-01 | End: 2020-01-01

## 2020-01-01 RX ORDER — POTASSIUM CHLORIDE 20 MEQ
40 PACKET (EA) ORAL ONCE
Refills: 0 | Status: COMPLETED | OUTPATIENT
Start: 2020-01-01 | End: 2020-01-01

## 2020-01-01 RX ORDER — SENNA PLUS 8.6 MG/1
2 TABLET ORAL
Qty: 60 | Refills: 0
Start: 2020-01-01 | End: 2020-01-01

## 2020-01-01 RX ORDER — HYDRALAZINE HCL 50 MG
10 TABLET ORAL EVERY 8 HOURS
Refills: 0 | Status: DISCONTINUED | OUTPATIENT
Start: 2020-01-01 | End: 2020-01-01

## 2020-01-01 RX ORDER — OXYCODONE HYDROCHLORIDE 5 MG/1
5 TABLET ORAL EVERY 4 HOURS
Refills: 0 | Status: DISCONTINUED | OUTPATIENT
Start: 2020-01-01 | End: 2020-01-01

## 2020-01-01 RX ORDER — MORPHINE SULFATE 50 MG/1
2 CAPSULE, EXTENDED RELEASE ORAL EVERY 4 HOURS
Refills: 0 | Status: DISCONTINUED | OUTPATIENT
Start: 2020-01-01 | End: 2020-01-01

## 2020-01-01 RX ORDER — POTASSIUM CHLORIDE 20 MEQ
40 PACKET (EA) ORAL EVERY 4 HOURS
Refills: 0 | Status: DISCONTINUED | OUTPATIENT
Start: 2020-01-01 | End: 2020-01-01

## 2020-01-01 RX ORDER — OXYCODONE HYDROCHLORIDE 5 MG/1
1 TABLET ORAL
Qty: 56 | Refills: 0
Start: 2020-01-01 | End: 2020-01-01

## 2020-01-01 RX ORDER — BUMETANIDE 0.25 MG/ML
4 INJECTION INTRAMUSCULAR; INTRAVENOUS EVERY 8 HOURS
Refills: 0 | Status: DISCONTINUED | OUTPATIENT
Start: 2020-01-01 | End: 2020-01-01

## 2020-01-01 RX ORDER — WARFARIN SODIUM 2.5 MG/1
1 TABLET ORAL
Qty: 30 | Refills: 0
Start: 2020-01-01 | End: 2020-01-01

## 2020-01-01 RX ORDER — METOPROLOL SUCCINATE 100 MG/1
100 TABLET, EXTENDED RELEASE ORAL
Qty: 180 | Refills: 1 | Status: DISCONTINUED | COMMUNITY
Start: 2020-01-01 | End: 2020-01-01

## 2020-01-01 RX ORDER — POTASSIUM CHLORIDE 20 MEQ
20 PACKET (EA) ORAL ONCE
Refills: 0 | Status: COMPLETED | OUTPATIENT
Start: 2020-01-01 | End: 2020-01-01

## 2020-01-01 RX ORDER — BUMETANIDE 0.25 MG/ML
2 INJECTION INTRAMUSCULAR; INTRAVENOUS
Qty: 20 | Refills: 0 | Status: DISCONTINUED | OUTPATIENT
Start: 2020-01-01 | End: 2020-01-01

## 2020-01-01 RX ORDER — ACETAMINOPHEN 500 MG
1000 TABLET ORAL ONCE
Refills: 0 | Status: COMPLETED | OUTPATIENT
Start: 2020-01-01 | End: 2020-01-01

## 2020-01-01 RX ORDER — METOPROLOL TARTRATE 50 MG
100 TABLET ORAL
Refills: 0 | Status: DISCONTINUED | OUTPATIENT
Start: 2020-01-01 | End: 2020-01-01

## 2020-01-01 RX ORDER — HYDRALAZINE HCL 50 MG
1 TABLET ORAL
Qty: 90 | Refills: 0
Start: 2020-01-01 | End: 2020-01-01

## 2020-01-01 RX ORDER — MORPHINE SULFATE 50 MG/1
4 CAPSULE, EXTENDED RELEASE ORAL ONCE
Refills: 0 | Status: DISCONTINUED | OUTPATIENT
Start: 2020-01-01 | End: 2020-01-01

## 2020-01-01 RX ORDER — METOPROLOL TARTRATE 100 MG/1
100 TABLET, FILM COATED ORAL TWICE DAILY
Qty: 180 | Refills: 1 | Status: DISCONTINUED | COMMUNITY
Start: 2020-01-01 | End: 2020-01-01

## 2020-01-01 RX ORDER — BUMETANIDE 0.25 MG/ML
3 INJECTION INTRAMUSCULAR; INTRAVENOUS
Qty: 180 | Refills: 0
Start: 2020-01-01 | End: 2020-01-01

## 2020-01-01 RX ORDER — PIPERACILLIN AND TAZOBACTAM 4; .5 G/20ML; G/20ML
3.38 INJECTION, POWDER, LYOPHILIZED, FOR SOLUTION INTRAVENOUS ONCE
Refills: 0 | Status: COMPLETED | OUTPATIENT
Start: 2020-01-01 | End: 2020-01-01

## 2020-01-01 RX ORDER — ASPIRIN 81 MG/1
81 TABLET ORAL
Refills: 0 | Status: DISCONTINUED | COMMUNITY
Start: 2020-01-01 | End: 2020-01-01

## 2020-01-01 RX ORDER — METOLAZONE 5 MG/1
5 TABLET ORAL DAILY
Qty: 10 | Refills: 3 | Status: ACTIVE | COMMUNITY
Start: 2020-01-01 | End: 1900-01-01

## 2020-01-01 RX ORDER — PIPERACILLIN AND TAZOBACTAM 4; .5 G/20ML; G/20ML
3.38 INJECTION, POWDER, LYOPHILIZED, FOR SOLUTION INTRAVENOUS EVERY 12 HOURS
Refills: 0 | Status: DISCONTINUED | OUTPATIENT
Start: 2020-01-01 | End: 2020-01-01

## 2020-01-01 RX ORDER — OXYCODONE HYDROCHLORIDE 5 MG/1
1 TABLET ORAL
Qty: 10 | Refills: 0
Start: 2020-01-01 | End: 2020-01-01

## 2020-01-01 RX ORDER — ACETAMINOPHEN 500 MG
975 TABLET ORAL EVERY 8 HOURS
Refills: 0 | Status: COMPLETED | OUTPATIENT
Start: 2020-01-01 | End: 2020-01-01

## 2020-01-01 RX ORDER — CEFAZOLIN SODIUM 1 G
VIAL (EA) INJECTION
Refills: 0 | Status: DISCONTINUED | OUTPATIENT
Start: 2020-01-01 | End: 2020-01-01

## 2020-01-01 RX ORDER — BUMETANIDE 1 MG/1
1 TABLET ORAL TWICE DAILY
Qty: 240 | Refills: 0 | Status: ACTIVE | COMMUNITY
Start: 2020-01-01

## 2020-01-01 RX ORDER — FUROSEMIDE 20 MG/1
20 TABLET ORAL DAILY
Qty: 7 | Refills: 0 | Status: DISCONTINUED | COMMUNITY
Start: 2019-01-01 | End: 2020-01-01

## 2020-01-01 RX ORDER — HEPARIN SODIUM 5000 [USP'U]/ML
5000 INJECTION INTRAVENOUS; SUBCUTANEOUS EVERY 8 HOURS
Refills: 0 | Status: DISCONTINUED | OUTPATIENT
Start: 2020-01-01 | End: 2020-01-01

## 2020-01-01 RX ORDER — VANCOMYCIN HCL 1 G
1000 VIAL (EA) INTRAVENOUS ONCE
Refills: 0 | Status: COMPLETED | OUTPATIENT
Start: 2020-01-01 | End: 2020-01-01

## 2020-01-01 RX ORDER — METOPROLOL SUCCINATE 50 MG/1
50 TABLET, EXTENDED RELEASE ORAL TWICE DAILY
Refills: 0 | Status: ACTIVE | COMMUNITY
Start: 2020-01-01

## 2020-01-01 RX ORDER — CEFAZOLIN SODIUM 1 G
1000 VIAL (EA) INJECTION EVERY 12 HOURS
Refills: 0 | Status: DISCONTINUED | OUTPATIENT
Start: 2020-01-01 | End: 2020-01-01

## 2020-01-01 RX ORDER — POTASSIUM CHLORIDE 1500 MG/1
20 TABLET, EXTENDED RELEASE ORAL
Qty: 30 | Refills: 3 | Status: DISCONTINUED | COMMUNITY
Start: 2019-01-01 | End: 2020-01-01

## 2020-01-01 RX ORDER — FUROSEMIDE 40 MG/1
40 TABLET ORAL
Qty: 180 | Refills: 1 | Status: DISCONTINUED | COMMUNITY
Start: 2019-01-01 | End: 2020-01-01

## 2020-01-01 RX ORDER — METOPROLOL TARTRATE 50 MG
100 TABLET ORAL AT BEDTIME
Refills: 0 | Status: DISCONTINUED | OUTPATIENT
Start: 2020-01-01 | End: 2020-01-01

## 2020-01-01 RX ORDER — SOD,AMMONIUM,POTASSIUM LACTATE
1 CREAM (GRAM) TOPICAL
Qty: 1 | Refills: 0
Start: 2020-01-01 | End: 2020-01-01

## 2020-01-01 RX ORDER — HEPARIN SODIUM 5000 [USP'U]/ML
5000 INJECTION INTRAVENOUS; SUBCUTANEOUS EVERY 12 HOURS
Refills: 0 | Status: DISCONTINUED | OUTPATIENT
Start: 2020-01-01 | End: 2020-01-01

## 2020-01-01 RX ORDER — SENNA PLUS 8.6 MG/1
2 TABLET ORAL
Qty: 0 | Refills: 0 | DISCHARGE
Start: 2020-01-01

## 2020-01-01 RX ORDER — ATORVASTATIN CALCIUM 80 MG/1
20 TABLET, FILM COATED ORAL AT BEDTIME
Refills: 0 | Status: DISCONTINUED | OUTPATIENT
Start: 2020-01-01 | End: 2020-01-01

## 2020-01-01 RX ORDER — BUMETANIDE 0.25 MG/ML
1 INJECTION INTRAMUSCULAR; INTRAVENOUS
Qty: 20 | Refills: 0 | Status: DISCONTINUED | OUTPATIENT
Start: 2020-01-01 | End: 2020-01-01

## 2020-01-01 RX ORDER — WARFARIN SODIUM 2.5 MG/1
2.5 TABLET ORAL AT BEDTIME
Refills: 0 | Status: DISCONTINUED | OUTPATIENT
Start: 2020-01-01 | End: 2020-01-01

## 2020-01-01 RX ORDER — LACTULOSE 10 G/15ML
30 SOLUTION ORAL
Qty: 1800 | Refills: 0
Start: 2020-01-01 | End: 2020-01-01

## 2020-01-01 RX ORDER — LACTULOSE 10 G/15ML
20 SOLUTION ORAL
Refills: 0 | Status: DISCONTINUED | OUTPATIENT
Start: 2020-01-01 | End: 2020-01-01

## 2020-01-01 RX ORDER — POTASSIUM CHLORIDE 1500 MG/1
20 TABLET, EXTENDED RELEASE ORAL TWICE DAILY
Qty: 60 | Refills: 2 | Status: ACTIVE | COMMUNITY
Start: 2020-01-01 | End: 1900-01-01

## 2020-01-01 RX ORDER — RAMIPRIL 5 MG/1
5 CAPSULE ORAL
Qty: 180 | Refills: 3 | Status: DISCONTINUED | COMMUNITY
Start: 1900-01-01 | End: 2020-01-01

## 2020-01-01 RX ORDER — BUMETANIDE 0.25 MG/ML
4 INJECTION INTRAMUSCULAR; INTRAVENOUS EVERY 12 HOURS
Refills: 0 | Status: DISCONTINUED | OUTPATIENT
Start: 2020-01-01 | End: 2020-01-01

## 2020-01-01 RX ORDER — IPRATROPIUM/ALBUTEROL SULFATE 18-103MCG
3 AEROSOL WITH ADAPTER (GRAM) INHALATION ONCE
Refills: 0 | Status: COMPLETED | OUTPATIENT
Start: 2020-01-01 | End: 2020-01-01

## 2020-01-01 RX ADMIN — Medication 10 MILLIGRAM(S): at 15:27

## 2020-01-01 RX ADMIN — Medication 3 MILLILITER(S): at 10:59

## 2020-01-01 RX ADMIN — Medication 10 MILLIGRAM(S): at 21:05

## 2020-01-01 RX ADMIN — BUMETANIDE 132 MILLIGRAM(S): 0.25 INJECTION INTRAMUSCULAR; INTRAVENOUS at 05:11

## 2020-01-01 RX ADMIN — Medication 10 MILLIGRAM(S): at 13:04

## 2020-01-01 RX ADMIN — Medication 3 MILLILITER(S): at 05:03

## 2020-01-01 RX ADMIN — BUDESONIDE AND FORMOTEROL FUMARATE DIHYDRATE 2 PUFF(S): 160; 4.5 AEROSOL RESPIRATORY (INHALATION) at 10:58

## 2020-01-01 RX ADMIN — POLYETHYLENE GLYCOL 3350 17 GRAM(S): 17 POWDER, FOR SOLUTION ORAL at 11:45

## 2020-01-01 RX ADMIN — HEPARIN SODIUM 5000 UNIT(S): 5000 INJECTION INTRAVENOUS; SUBCUTANEOUS at 05:23

## 2020-01-01 RX ADMIN — Medication 1 MILLIGRAM(S): at 11:16

## 2020-01-01 RX ADMIN — MORPHINE SULFATE 2 MILLIGRAM(S): 50 CAPSULE, EXTENDED RELEASE ORAL at 01:37

## 2020-01-01 RX ADMIN — HYDROMORPHONE HYDROCHLORIDE 0.5 MILLIGRAM(S): 2 INJECTION INTRAMUSCULAR; INTRAVENOUS; SUBCUTANEOUS at 06:03

## 2020-01-01 RX ADMIN — BUMETANIDE 132 MILLIGRAM(S): 0.25 INJECTION INTRAMUSCULAR; INTRAVENOUS at 21:24

## 2020-01-01 RX ADMIN — MORPHINE SULFATE 2 MILLIGRAM(S): 50 CAPSULE, EXTENDED RELEASE ORAL at 22:30

## 2020-01-01 RX ADMIN — Medication 3 MILLILITER(S): at 17:08

## 2020-01-01 RX ADMIN — HYDROMORPHONE HYDROCHLORIDE 0.5 MILLIGRAM(S): 2 INJECTION INTRAMUSCULAR; INTRAVENOUS; SUBCUTANEOUS at 11:20

## 2020-01-01 RX ADMIN — Medication 1 TABLET(S): at 11:26

## 2020-01-01 RX ADMIN — OXYCODONE HYDROCHLORIDE 5 MILLIGRAM(S): 5 TABLET ORAL at 07:00

## 2020-01-01 RX ADMIN — HYDROMORPHONE HYDROCHLORIDE 0.5 MILLIGRAM(S): 2 INJECTION INTRAMUSCULAR; INTRAVENOUS; SUBCUTANEOUS at 00:11

## 2020-01-01 RX ADMIN — OXYCODONE HYDROCHLORIDE 5 MILLIGRAM(S): 5 TABLET ORAL at 16:28

## 2020-01-01 RX ADMIN — PIPERACILLIN AND TAZOBACTAM 25 GRAM(S): 4; .5 INJECTION, POWDER, LYOPHILIZED, FOR SOLUTION INTRAVENOUS at 17:34

## 2020-01-01 RX ADMIN — BUDESONIDE AND FORMOTEROL FUMARATE DIHYDRATE 2 PUFF(S): 160; 4.5 AEROSOL RESPIRATORY (INHALATION) at 17:26

## 2020-01-01 RX ADMIN — MORPHINE SULFATE 4 MILLIGRAM(S): 50 CAPSULE, EXTENDED RELEASE ORAL at 15:40

## 2020-01-01 RX ADMIN — SENNA PLUS 2 TABLET(S): 8.6 TABLET ORAL at 21:06

## 2020-01-01 RX ADMIN — LACTULOSE 20 GRAM(S): 10 SOLUTION ORAL at 05:40

## 2020-01-01 RX ADMIN — PIPERACILLIN AND TAZOBACTAM 25 GRAM(S): 4; .5 INJECTION, POWDER, LYOPHILIZED, FOR SOLUTION INTRAVENOUS at 05:18

## 2020-01-01 RX ADMIN — HEPARIN SODIUM 5000 UNIT(S): 5000 INJECTION INTRAVENOUS; SUBCUTANEOUS at 05:33

## 2020-01-01 RX ADMIN — POLYETHYLENE GLYCOL 3350 17 GRAM(S): 17 POWDER, FOR SOLUTION ORAL at 16:28

## 2020-01-01 RX ADMIN — Medication 10 MILLIGRAM(S): at 12:56

## 2020-01-01 RX ADMIN — Medication 400 MILLIGRAM(S): at 13:14

## 2020-01-01 RX ADMIN — Medication 3 MILLILITER(S): at 11:17

## 2020-01-01 RX ADMIN — ATORVASTATIN CALCIUM 20 MILLIGRAM(S): 80 TABLET, FILM COATED ORAL at 21:12

## 2020-01-01 RX ADMIN — OXYCODONE HYDROCHLORIDE 5 MILLIGRAM(S): 5 TABLET ORAL at 01:59

## 2020-01-01 RX ADMIN — HYDROMORPHONE HYDROCHLORIDE 0.5 MILLIGRAM(S): 2 INJECTION INTRAMUSCULAR; INTRAVENOUS; SUBCUTANEOUS at 06:18

## 2020-01-01 RX ADMIN — HYDROMORPHONE HYDROCHLORIDE 0.5 MILLIGRAM(S): 2 INJECTION INTRAMUSCULAR; INTRAVENOUS; SUBCUTANEOUS at 07:03

## 2020-01-01 RX ADMIN — OXYCODONE HYDROCHLORIDE 5 MILLIGRAM(S): 5 TABLET ORAL at 07:55

## 2020-01-01 RX ADMIN — BUDESONIDE AND FORMOTEROL FUMARATE DIHYDRATE 2 PUFF(S): 160; 4.5 AEROSOL RESPIRATORY (INHALATION) at 17:08

## 2020-01-01 RX ADMIN — OXYCODONE HYDROCHLORIDE 5 MILLIGRAM(S): 5 TABLET ORAL at 11:50

## 2020-01-01 RX ADMIN — BUDESONIDE AND FORMOTEROL FUMARATE DIHYDRATE 2 PUFF(S): 160; 4.5 AEROSOL RESPIRATORY (INHALATION) at 18:10

## 2020-01-01 RX ADMIN — LACTULOSE 20 GRAM(S): 10 SOLUTION ORAL at 05:02

## 2020-01-01 RX ADMIN — Medication 100 MILLIGRAM(S): at 14:26

## 2020-01-01 RX ADMIN — PIPERACILLIN AND TAZOBACTAM 25 GRAM(S): 4; .5 INJECTION, POWDER, LYOPHILIZED, FOR SOLUTION INTRAVENOUS at 05:33

## 2020-01-01 RX ADMIN — Medication 1 TABLET(S): at 11:49

## 2020-01-01 RX ADMIN — Medication 10 MILLIGRAM(S): at 05:49

## 2020-01-01 RX ADMIN — Medication 10 MILLIGRAM(S): at 17:35

## 2020-01-01 RX ADMIN — OXYCODONE HYDROCHLORIDE 5 MILLIGRAM(S): 5 TABLET ORAL at 16:51

## 2020-01-01 RX ADMIN — PIPERACILLIN AND TAZOBACTAM 25 GRAM(S): 4; .5 INJECTION, POWDER, LYOPHILIZED, FOR SOLUTION INTRAVENOUS at 05:29

## 2020-01-01 RX ADMIN — Medication 40 MILLIEQUIVALENT(S): at 17:26

## 2020-01-01 RX ADMIN — MORPHINE SULFATE 2 MILLIGRAM(S): 50 CAPSULE, EXTENDED RELEASE ORAL at 11:00

## 2020-01-01 RX ADMIN — OXYCODONE HYDROCHLORIDE 5 MILLIGRAM(S): 5 TABLET ORAL at 17:20

## 2020-01-01 RX ADMIN — MORPHINE SULFATE 2 MILLIGRAM(S): 50 CAPSULE, EXTENDED RELEASE ORAL at 17:25

## 2020-01-01 RX ADMIN — Medication 10 MILLIGRAM(S): at 06:00

## 2020-01-01 RX ADMIN — Medication 3 MILLILITER(S): at 11:35

## 2020-01-01 RX ADMIN — OXYCODONE HYDROCHLORIDE 5 MILLIGRAM(S): 5 TABLET ORAL at 06:12

## 2020-01-01 RX ADMIN — WARFARIN SODIUM 2.5 MILLIGRAM(S): 2.5 TABLET ORAL at 21:24

## 2020-01-01 RX ADMIN — Medication 100 MILLIGRAM(S): at 09:08

## 2020-01-01 RX ADMIN — Medication 1 TABLET(S): at 15:26

## 2020-01-01 RX ADMIN — Medication 3 MILLILITER(S): at 00:21

## 2020-01-01 RX ADMIN — ATORVASTATIN CALCIUM 20 MILLIGRAM(S): 80 TABLET, FILM COATED ORAL at 02:27

## 2020-01-01 RX ADMIN — PIPERACILLIN AND TAZOBACTAM 25 GRAM(S): 4; .5 INJECTION, POWDER, LYOPHILIZED, FOR SOLUTION INTRAVENOUS at 17:20

## 2020-01-01 RX ADMIN — MORPHINE SULFATE 2 MILLIGRAM(S): 50 CAPSULE, EXTENDED RELEASE ORAL at 10:35

## 2020-01-01 RX ADMIN — MORPHINE SULFATE 4 MILLIGRAM(S): 50 CAPSULE, EXTENDED RELEASE ORAL at 14:36

## 2020-01-01 RX ADMIN — Medication 10 MILLIGRAM(S): at 05:13

## 2020-01-01 RX ADMIN — Medication 1 TABLET(S): at 11:51

## 2020-01-01 RX ADMIN — POLYETHYLENE GLYCOL 3350 17 GRAM(S): 17 POWDER, FOR SOLUTION ORAL at 11:35

## 2020-01-01 RX ADMIN — BUMETANIDE 10 MG/HR: 0.25 INJECTION INTRAMUSCULAR; INTRAVENOUS at 15:10

## 2020-01-01 RX ADMIN — Medication 10 MILLIGRAM(S): at 05:33

## 2020-01-01 RX ADMIN — Medication 100 MILLIGRAM(S): at 05:03

## 2020-01-01 RX ADMIN — BUMETANIDE 5 MG/HR: 0.25 INJECTION INTRAMUSCULAR; INTRAVENOUS at 21:03

## 2020-01-01 RX ADMIN — BUDESONIDE AND FORMOTEROL FUMARATE DIHYDRATE 2 PUFF(S): 160; 4.5 AEROSOL RESPIRATORY (INHALATION) at 17:55

## 2020-01-01 RX ADMIN — Medication 10 MILLIGRAM(S): at 13:20

## 2020-01-01 RX ADMIN — Medication 10 MILLIGRAM(S): at 14:24

## 2020-01-01 RX ADMIN — OXYCODONE HYDROCHLORIDE 5 MILLIGRAM(S): 5 TABLET ORAL at 04:30

## 2020-01-01 RX ADMIN — Medication 1 MILLIGRAM(S): at 11:26

## 2020-01-01 RX ADMIN — OXYCODONE HYDROCHLORIDE 5 MILLIGRAM(S): 5 TABLET ORAL at 16:20

## 2020-01-01 RX ADMIN — MORPHINE SULFATE 2 MILLIGRAM(S): 50 CAPSULE, EXTENDED RELEASE ORAL at 21:54

## 2020-01-01 RX ADMIN — LACTULOSE 20 GRAM(S): 10 SOLUTION ORAL at 05:30

## 2020-01-01 RX ADMIN — BUMETANIDE 132 MILLIGRAM(S): 0.25 INJECTION INTRAMUSCULAR; INTRAVENOUS at 06:19

## 2020-01-01 RX ADMIN — HEPARIN SODIUM 5000 UNIT(S): 5000 INJECTION INTRAVENOUS; SUBCUTANEOUS at 05:03

## 2020-01-01 RX ADMIN — OXYCODONE HYDROCHLORIDE 5 MILLIGRAM(S): 5 TABLET ORAL at 17:30

## 2020-01-01 RX ADMIN — MORPHINE SULFATE 2 MILLIGRAM(S): 50 CAPSULE, EXTENDED RELEASE ORAL at 17:50

## 2020-01-01 RX ADMIN — SENNA PLUS 2 TABLET(S): 8.6 TABLET ORAL at 21:35

## 2020-01-01 RX ADMIN — Medication 1 TABLET(S): at 11:25

## 2020-01-01 RX ADMIN — BUMETANIDE 4 MILLIGRAM(S): 0.25 INJECTION INTRAMUSCULAR; INTRAVENOUS at 21:14

## 2020-01-01 RX ADMIN — Medication 3 MILLILITER(S): at 00:02

## 2020-01-01 RX ADMIN — OXYCODONE HYDROCHLORIDE 5 MILLIGRAM(S): 5 TABLET ORAL at 20:12

## 2020-01-01 RX ADMIN — Medication 10 MILLIGRAM(S): at 21:34

## 2020-01-01 RX ADMIN — PIPERACILLIN AND TAZOBACTAM 200 GRAM(S): 4; .5 INJECTION, POWDER, LYOPHILIZED, FOR SOLUTION INTRAVENOUS at 12:56

## 2020-01-01 RX ADMIN — Medication 50 MILLIGRAM(S): at 05:35

## 2020-01-01 RX ADMIN — Medication 1 MILLIGRAM(S): at 10:59

## 2020-01-01 RX ADMIN — MORPHINE SULFATE 2 MILLIGRAM(S): 50 CAPSULE, EXTENDED RELEASE ORAL at 04:36

## 2020-01-01 RX ADMIN — ATORVASTATIN CALCIUM 20 MILLIGRAM(S): 80 TABLET, FILM COATED ORAL at 21:35

## 2020-01-01 RX ADMIN — Medication 50 MILLIGRAM(S): at 20:59

## 2020-01-01 RX ADMIN — Medication 1 MILLIGRAM(S): at 11:48

## 2020-01-01 RX ADMIN — Medication 10 MILLIGRAM(S): at 21:03

## 2020-01-01 RX ADMIN — Medication 3 MILLILITER(S): at 11:53

## 2020-01-01 RX ADMIN — LACTULOSE 20 GRAM(S): 10 SOLUTION ORAL at 05:13

## 2020-01-01 RX ADMIN — Medication 100 MILLIGRAM(S): at 18:53

## 2020-01-01 RX ADMIN — Medication 975 MILLIGRAM(S): at 16:20

## 2020-01-01 RX ADMIN — Medication 50 MILLIGRAM(S): at 17:20

## 2020-01-01 RX ADMIN — Medication 100 MILLIGRAM(S): at 06:19

## 2020-01-01 RX ADMIN — Medication 3 MILLILITER(S): at 00:01

## 2020-01-01 RX ADMIN — HEPARIN SODIUM 5000 UNIT(S): 5000 INJECTION INTRAVENOUS; SUBCUTANEOUS at 05:50

## 2020-01-01 RX ADMIN — Medication 3 MILLILITER(S): at 17:23

## 2020-01-01 RX ADMIN — HEPARIN SODIUM 5000 UNIT(S): 5000 INJECTION INTRAVENOUS; SUBCUTANEOUS at 13:53

## 2020-01-01 RX ADMIN — Medication 250 MILLIGRAM(S): at 15:13

## 2020-01-01 RX ADMIN — Medication 1 TABLET(S): at 10:09

## 2020-01-01 RX ADMIN — Medication 3 MILLILITER(S): at 05:49

## 2020-01-01 RX ADMIN — ATORVASTATIN CALCIUM 20 MILLIGRAM(S): 80 TABLET, FILM COATED ORAL at 21:23

## 2020-01-01 RX ADMIN — PIPERACILLIN AND TAZOBACTAM 25 GRAM(S): 4; .5 INJECTION, POWDER, LYOPHILIZED, FOR SOLUTION INTRAVENOUS at 18:10

## 2020-01-01 RX ADMIN — HEPARIN SODIUM 5000 UNIT(S): 5000 INJECTION INTRAVENOUS; SUBCUTANEOUS at 11:47

## 2020-01-01 RX ADMIN — POLYETHYLENE GLYCOL 3350 17 GRAM(S): 17 POWDER, FOR SOLUTION ORAL at 11:48

## 2020-01-01 RX ADMIN — Medication 1 TABLET(S): at 11:50

## 2020-01-01 RX ADMIN — Medication 100 MILLIGRAM(S): at 14:11

## 2020-01-01 RX ADMIN — Medication 3 MILLILITER(S): at 05:13

## 2020-01-01 RX ADMIN — Medication 50 MILLIGRAM(S): at 08:00

## 2020-01-01 RX ADMIN — MORPHINE SULFATE 4 MILLIGRAM(S): 50 CAPSULE, EXTENDED RELEASE ORAL at 03:03

## 2020-01-01 RX ADMIN — Medication 100 MILLIGRAM(S): at 05:13

## 2020-01-01 RX ADMIN — Medication 1 APPLICATION(S): at 05:51

## 2020-01-01 RX ADMIN — HEPARIN SODIUM 5000 UNIT(S): 5000 INJECTION INTRAVENOUS; SUBCUTANEOUS at 13:04

## 2020-01-01 RX ADMIN — OXYCODONE HYDROCHLORIDE 5 MILLIGRAM(S): 5 TABLET ORAL at 03:36

## 2020-01-01 RX ADMIN — Medication 50 MILLIGRAM(S): at 05:39

## 2020-01-01 RX ADMIN — Medication 100 MILLIGRAM(S): at 21:12

## 2020-01-01 RX ADMIN — Medication 1 TABLET(S): at 12:25

## 2020-01-01 RX ADMIN — Medication 100 MILLIGRAM(S): at 08:45

## 2020-01-01 RX ADMIN — Medication 5 MILLIGRAM(S): at 16:28

## 2020-01-01 RX ADMIN — Medication 10 MILLIGRAM(S): at 21:12

## 2020-01-01 RX ADMIN — MORPHINE SULFATE 2 MILLIGRAM(S): 50 CAPSULE, EXTENDED RELEASE ORAL at 02:59

## 2020-01-01 RX ADMIN — HEPARIN SODIUM 5000 UNIT(S): 5000 INJECTION INTRAVENOUS; SUBCUTANEOUS at 05:30

## 2020-01-01 RX ADMIN — BUMETANIDE 3 MILLIGRAM(S): 0.25 INJECTION INTRAMUSCULAR; INTRAVENOUS at 05:49

## 2020-01-01 RX ADMIN — Medication 3 MILLILITER(S): at 17:20

## 2020-01-01 RX ADMIN — BUDESONIDE AND FORMOTEROL FUMARATE DIHYDRATE 2 PUFF(S): 160; 4.5 AEROSOL RESPIRATORY (INHALATION) at 05:14

## 2020-01-01 RX ADMIN — BUDESONIDE AND FORMOTEROL FUMARATE DIHYDRATE 2 PUFF(S): 160; 4.5 AEROSOL RESPIRATORY (INHALATION) at 17:22

## 2020-01-01 RX ADMIN — OXYCODONE HYDROCHLORIDE 5 MILLIGRAM(S): 5 TABLET ORAL at 23:30

## 2020-01-01 RX ADMIN — HEPARIN SODIUM 5000 UNIT(S): 5000 INJECTION INTRAVENOUS; SUBCUTANEOUS at 05:12

## 2020-01-01 RX ADMIN — BUDESONIDE AND FORMOTEROL FUMARATE DIHYDRATE 2 PUFF(S): 160; 4.5 AEROSOL RESPIRATORY (INHALATION) at 05:33

## 2020-01-01 RX ADMIN — Medication 50 MILLIGRAM(S): at 05:18

## 2020-01-01 RX ADMIN — LACTULOSE 20 GRAM(S): 10 SOLUTION ORAL at 21:24

## 2020-01-01 RX ADMIN — OXYCODONE HYDROCHLORIDE 5 MILLIGRAM(S): 5 TABLET ORAL at 12:33

## 2020-01-01 RX ADMIN — Medication 50 MILLIGRAM(S): at 17:25

## 2020-01-01 RX ADMIN — Medication 3 MILLILITER(S): at 11:48

## 2020-01-01 RX ADMIN — HEPARIN SODIUM 5000 UNIT(S): 5000 INJECTION INTRAVENOUS; SUBCUTANEOUS at 13:19

## 2020-01-01 RX ADMIN — Medication 10 MILLIGRAM(S): at 21:06

## 2020-01-01 RX ADMIN — Medication 3 MILLILITER(S): at 00:55

## 2020-01-01 RX ADMIN — OXYCODONE HYDROCHLORIDE 5 MILLIGRAM(S): 5 TABLET ORAL at 20:42

## 2020-01-01 RX ADMIN — BUDESONIDE AND FORMOTEROL FUMARATE DIHYDRATE 2 PUFF(S): 160; 4.5 AEROSOL RESPIRATORY (INHALATION) at 06:20

## 2020-01-01 RX ADMIN — LACTULOSE 20 GRAM(S): 10 SOLUTION ORAL at 17:35

## 2020-01-01 RX ADMIN — Medication 3 MILLILITER(S): at 18:53

## 2020-01-01 RX ADMIN — SENNA PLUS 2 TABLET(S): 8.6 TABLET ORAL at 21:34

## 2020-01-01 RX ADMIN — LACTULOSE 20 GRAM(S): 10 SOLUTION ORAL at 17:19

## 2020-01-01 RX ADMIN — SENNA PLUS 2 TABLET(S): 8.6 TABLET ORAL at 21:12

## 2020-01-01 RX ADMIN — Medication 3 MILLILITER(S): at 05:40

## 2020-01-01 RX ADMIN — BUMETANIDE 2 MILLIGRAM(S): 0.25 INJECTION INTRAMUSCULAR; INTRAVENOUS at 13:03

## 2020-01-01 RX ADMIN — MILRINONE LACTATE 4.36 MICROGRAM(S)/KG/MIN: 1 INJECTION, SOLUTION INTRAVENOUS at 15:09

## 2020-01-01 RX ADMIN — HEPARIN SODIUM 5000 UNIT(S): 5000 INJECTION INTRAVENOUS; SUBCUTANEOUS at 17:23

## 2020-01-01 RX ADMIN — Medication 50 MILLIGRAM(S): at 18:14

## 2020-01-01 RX ADMIN — Medication 0.12 MILLIGRAM(S): at 05:54

## 2020-01-01 RX ADMIN — MORPHINE SULFATE 4 MILLIGRAM(S): 50 CAPSULE, EXTENDED RELEASE ORAL at 02:11

## 2020-01-01 RX ADMIN — Medication 10 MILLIGRAM(S): at 21:07

## 2020-01-01 RX ADMIN — Medication 400 MILLIGRAM(S): at 21:09

## 2020-01-01 RX ADMIN — FENTANYL CITRATE 50 MICROGRAM(S): 50 INJECTION INTRAVENOUS at 15:08

## 2020-01-01 RX ADMIN — WARFARIN SODIUM 2.5 MILLIGRAM(S): 2.5 TABLET ORAL at 21:34

## 2020-01-01 RX ADMIN — HYDROMORPHONE HYDROCHLORIDE 0.5 MILLIGRAM(S): 2 INJECTION INTRAMUSCULAR; INTRAVENOUS; SUBCUTANEOUS at 01:10

## 2020-01-01 RX ADMIN — Medication 3 MILLILITER(S): at 05:32

## 2020-01-01 RX ADMIN — OXYCODONE HYDROCHLORIDE 5 MILLIGRAM(S): 5 TABLET ORAL at 06:01

## 2020-01-01 RX ADMIN — Medication 50 MILLIGRAM(S): at 17:26

## 2020-01-01 RX ADMIN — HEPARIN SODIUM 5000 UNIT(S): 5000 INJECTION INTRAVENOUS; SUBCUTANEOUS at 17:08

## 2020-01-01 RX ADMIN — OXYCODONE HYDROCHLORIDE 5 MILLIGRAM(S): 5 TABLET ORAL at 01:05

## 2020-01-01 RX ADMIN — OXYCODONE HYDROCHLORIDE 5 MILLIGRAM(S): 5 TABLET ORAL at 01:29

## 2020-01-01 RX ADMIN — POLYETHYLENE GLYCOL 3350 17 GRAM(S): 17 POWDER, FOR SOLUTION ORAL at 11:51

## 2020-01-01 RX ADMIN — Medication 3 MILLILITER(S): at 00:20

## 2020-01-01 RX ADMIN — MORPHINE SULFATE 4 MILLIGRAM(S): 50 CAPSULE, EXTENDED RELEASE ORAL at 05:16

## 2020-01-01 RX ADMIN — ATORVASTATIN CALCIUM 20 MILLIGRAM(S): 80 TABLET, FILM COATED ORAL at 21:07

## 2020-01-01 RX ADMIN — Medication 1 APPLICATION(S): at 05:50

## 2020-01-01 RX ADMIN — Medication 3 MILLILITER(S): at 12:25

## 2020-01-01 RX ADMIN — OXYCODONE HYDROCHLORIDE 5 MILLIGRAM(S): 5 TABLET ORAL at 18:25

## 2020-01-01 RX ADMIN — Medication 10 MILLIGRAM(S): at 21:25

## 2020-01-01 RX ADMIN — Medication 100 MILLIGRAM(S): at 10:09

## 2020-01-01 RX ADMIN — Medication 975 MILLIGRAM(S): at 20:59

## 2020-01-01 RX ADMIN — OXYCODONE HYDROCHLORIDE 5 MILLIGRAM(S): 5 TABLET ORAL at 00:22

## 2020-01-01 RX ADMIN — BUDESONIDE AND FORMOTEROL FUMARATE DIHYDRATE 2 PUFF(S): 160; 4.5 AEROSOL RESPIRATORY (INHALATION) at 05:40

## 2020-01-01 RX ADMIN — OXYCODONE HYDROCHLORIDE 5 MILLIGRAM(S): 5 TABLET ORAL at 00:37

## 2020-01-01 RX ADMIN — HEPARIN SODIUM 5000 UNIT(S): 5000 INJECTION INTRAVENOUS; SUBCUTANEOUS at 21:04

## 2020-01-01 RX ADMIN — HEPARIN SODIUM 5000 UNIT(S): 5000 INJECTION INTRAVENOUS; SUBCUTANEOUS at 21:06

## 2020-01-01 RX ADMIN — Medication 975 MILLIGRAM(S): at 06:55

## 2020-01-01 RX ADMIN — HEPARIN SODIUM 5000 UNIT(S): 5000 INJECTION INTRAVENOUS; SUBCUTANEOUS at 21:11

## 2020-01-01 RX ADMIN — ATORVASTATIN CALCIUM 20 MILLIGRAM(S): 80 TABLET, FILM COATED ORAL at 20:59

## 2020-01-01 RX ADMIN — Medication 3 MILLILITER(S): at 11:47

## 2020-01-01 RX ADMIN — Medication 3 MILLILITER(S): at 21:07

## 2020-01-01 RX ADMIN — Medication 10 MILLIGRAM(S): at 21:35

## 2020-01-01 RX ADMIN — Medication 20 MILLIEQUIVALENT(S): at 23:52

## 2020-01-01 RX ADMIN — Medication 3 MILLILITER(S): at 11:19

## 2020-01-01 RX ADMIN — BUMETANIDE 132 MILLIGRAM(S): 0.25 INJECTION INTRAMUSCULAR; INTRAVENOUS at 13:18

## 2020-01-01 RX ADMIN — Medication 3 MILLILITER(S): at 05:50

## 2020-01-01 RX ADMIN — Medication 975 MILLIGRAM(S): at 15:26

## 2020-01-01 RX ADMIN — HEPARIN SODIUM 5000 UNIT(S): 5000 INJECTION INTRAVENOUS; SUBCUTANEOUS at 17:25

## 2020-01-01 RX ADMIN — Medication 100 MILLIGRAM(S): at 21:00

## 2020-01-01 RX ADMIN — HEPARIN SODIUM 5000 UNIT(S): 5000 INJECTION INTRAVENOUS; SUBCUTANEOUS at 05:18

## 2020-01-01 RX ADMIN — MORPHINE SULFATE 4 MILLIGRAM(S): 50 CAPSULE, EXTENDED RELEASE ORAL at 21:56

## 2020-01-01 RX ADMIN — MORPHINE SULFATE 4 MILLIGRAM(S): 50 CAPSULE, EXTENDED RELEASE ORAL at 21:04

## 2020-01-01 RX ADMIN — PIPERACILLIN AND TAZOBACTAM 25 GRAM(S): 4; .5 INJECTION, POWDER, LYOPHILIZED, FOR SOLUTION INTRAVENOUS at 05:39

## 2020-01-01 RX ADMIN — ATORVASTATIN CALCIUM 20 MILLIGRAM(S): 80 TABLET, FILM COATED ORAL at 21:14

## 2020-01-01 RX ADMIN — Medication 1 MILLIGRAM(S): at 11:51

## 2020-01-01 RX ADMIN — Medication 50 MILLIGRAM(S): at 05:23

## 2020-01-01 RX ADMIN — MORPHINE SULFATE 2 MILLIGRAM(S): 50 CAPSULE, EXTENDED RELEASE ORAL at 09:00

## 2020-01-01 RX ADMIN — Medication 3 MILLILITER(S): at 12:58

## 2020-01-01 RX ADMIN — ATORVASTATIN CALCIUM 20 MILLIGRAM(S): 80 TABLET, FILM COATED ORAL at 21:04

## 2020-01-01 RX ADMIN — LACTULOSE 20 GRAM(S): 10 SOLUTION ORAL at 06:00

## 2020-01-01 RX ADMIN — MORPHINE SULFATE 2 MILLIGRAM(S): 50 CAPSULE, EXTENDED RELEASE ORAL at 05:54

## 2020-01-01 RX ADMIN — SENNA PLUS 2 TABLET(S): 8.6 TABLET ORAL at 21:05

## 2020-01-01 RX ADMIN — HEPARIN SODIUM 5000 UNIT(S): 5000 INJECTION INTRAVENOUS; SUBCUTANEOUS at 21:34

## 2020-01-01 RX ADMIN — Medication 1 TABLET(S): at 11:47

## 2020-01-01 RX ADMIN — BUMETANIDE 2 MILLIGRAM(S): 0.25 INJECTION INTRAMUSCULAR; INTRAVENOUS at 05:13

## 2020-01-01 RX ADMIN — HEPARIN SODIUM 5000 UNIT(S): 5000 INJECTION INTRAVENOUS; SUBCUTANEOUS at 06:00

## 2020-01-01 RX ADMIN — SENNA PLUS 2 TABLET(S): 8.6 TABLET ORAL at 21:18

## 2020-01-01 RX ADMIN — BUDESONIDE AND FORMOTEROL FUMARATE DIHYDRATE 2 PUFF(S): 160; 4.5 AEROSOL RESPIRATORY (INHALATION) at 05:03

## 2020-01-01 RX ADMIN — Medication 3 MILLILITER(S): at 11:51

## 2020-01-01 RX ADMIN — Medication 10 MILLIGRAM(S): at 07:31

## 2020-01-01 RX ADMIN — OXYCODONE HYDROCHLORIDE 5 MILLIGRAM(S): 5 TABLET ORAL at 06:55

## 2020-01-01 RX ADMIN — SENNA PLUS 2 TABLET(S): 8.6 TABLET ORAL at 21:03

## 2020-01-01 RX ADMIN — FENTANYL CITRATE 50 MICROGRAM(S): 50 INJECTION INTRAVENOUS at 15:35

## 2020-01-01 RX ADMIN — Medication 1 MILLIGRAM(S): at 10:09

## 2020-01-01 RX ADMIN — Medication 3 MILLILITER(S): at 05:14

## 2020-01-01 RX ADMIN — Medication 1 MILLIGRAM(S): at 11:19

## 2020-01-01 RX ADMIN — Medication 3 MILLILITER(S): at 17:19

## 2020-01-01 RX ADMIN — HEPARIN SODIUM 5000 UNIT(S): 5000 INJECTION INTRAVENOUS; SUBCUTANEOUS at 06:19

## 2020-01-01 RX ADMIN — OXYCODONE HYDROCHLORIDE 5 MILLIGRAM(S): 5 TABLET ORAL at 23:52

## 2020-01-01 RX ADMIN — Medication 975 MILLIGRAM(S): at 05:24

## 2020-01-01 RX ADMIN — Medication 1 MILLIGRAM(S): at 12:25

## 2020-01-01 RX ADMIN — Medication 1 MILLIGRAM(S): at 11:35

## 2020-01-01 RX ADMIN — Medication 3 MILLILITER(S): at 17:35

## 2020-01-01 RX ADMIN — Medication 50 MILLIGRAM(S): at 17:49

## 2020-01-01 RX ADMIN — Medication 3 MILLILITER(S): at 00:00

## 2020-01-01 RX ADMIN — MORPHINE SULFATE 4 MILLIGRAM(S): 50 CAPSULE, EXTENDED RELEASE ORAL at 03:32

## 2020-01-01 RX ADMIN — WARFARIN SODIUM 2.5 MILLIGRAM(S): 2.5 TABLET ORAL at 21:03

## 2020-01-01 RX ADMIN — Medication 3 MILLILITER(S): at 12:37

## 2020-01-01 RX ADMIN — Medication 975 MILLIGRAM(S): at 14:06

## 2020-01-01 RX ADMIN — MORPHINE SULFATE 2 MILLIGRAM(S): 50 CAPSULE, EXTENDED RELEASE ORAL at 08:45

## 2020-01-01 RX ADMIN — HYDROMORPHONE HYDROCHLORIDE 0.5 MILLIGRAM(S): 2 INJECTION INTRAMUSCULAR; INTRAVENOUS; SUBCUTANEOUS at 16:20

## 2020-01-01 RX ADMIN — WARFARIN SODIUM 2.5 MILLIGRAM(S): 2.5 TABLET ORAL at 21:04

## 2020-01-01 RX ADMIN — Medication 3 MILLILITER(S): at 05:59

## 2020-01-01 RX ADMIN — HYDROMORPHONE HYDROCHLORIDE 0.5 MILLIGRAM(S): 2 INJECTION INTRAMUSCULAR; INTRAVENOUS; SUBCUTANEOUS at 10:21

## 2020-01-01 RX ADMIN — OXYCODONE HYDROCHLORIDE 5 MILLIGRAM(S): 5 TABLET ORAL at 22:33

## 2020-01-01 RX ADMIN — Medication 3 MILLILITER(S): at 18:10

## 2020-01-01 RX ADMIN — Medication 3 MILLILITER(S): at 05:23

## 2020-01-01 RX ADMIN — OXYCODONE HYDROCHLORIDE 5 MILLIGRAM(S): 5 TABLET ORAL at 16:52

## 2020-01-01 RX ADMIN — MORPHINE SULFATE 2 MILLIGRAM(S): 50 CAPSULE, EXTENDED RELEASE ORAL at 12:30

## 2020-01-01 RX ADMIN — Medication 100 MILLIGRAM(S): at 17:23

## 2020-01-01 RX ADMIN — PIPERACILLIN AND TAZOBACTAM 25 GRAM(S): 4; .5 INJECTION, POWDER, LYOPHILIZED, FOR SOLUTION INTRAVENOUS at 06:00

## 2020-01-01 RX ADMIN — Medication 50 MILLIGRAM(S): at 17:35

## 2020-01-01 RX ADMIN — Medication 10 MILLIGRAM(S): at 05:39

## 2020-01-01 RX ADMIN — BUMETANIDE 2 MILLIGRAM(S): 0.25 INJECTION INTRAMUSCULAR; INTRAVENOUS at 17:23

## 2020-01-01 RX ADMIN — BUMETANIDE 10 MG/HR: 0.25 INJECTION INTRAMUSCULAR; INTRAVENOUS at 11:35

## 2020-01-01 RX ADMIN — FENTANYL CITRATE 50 MICROGRAM(S): 50 INJECTION INTRAVENOUS at 18:18

## 2020-01-01 RX ADMIN — OXYCODONE HYDROCHLORIDE 5 MILLIGRAM(S): 5 TABLET ORAL at 01:37

## 2020-01-01 RX ADMIN — Medication 10 MILLIGRAM(S): at 15:16

## 2020-01-01 RX ADMIN — BUMETANIDE 4 MILLIGRAM(S): 0.25 INJECTION INTRAMUSCULAR; INTRAVENOUS at 11:48

## 2020-01-01 RX ADMIN — OXYCODONE HYDROCHLORIDE 5 MILLIGRAM(S): 5 TABLET ORAL at 11:26

## 2020-01-01 RX ADMIN — MORPHINE SULFATE 2 MILLIGRAM(S): 50 CAPSULE, EXTENDED RELEASE ORAL at 12:01

## 2020-01-01 RX ADMIN — MORPHINE SULFATE 4 MILLIGRAM(S): 50 CAPSULE, EXTENDED RELEASE ORAL at 04:01

## 2020-01-01 RX ADMIN — Medication 10 MILLIGRAM(S): at 14:06

## 2020-01-01 RX ADMIN — FENTANYL CITRATE 50 MICROGRAM(S): 50 INJECTION INTRAVENOUS at 13:31

## 2020-01-01 RX ADMIN — BUDESONIDE AND FORMOTEROL FUMARATE DIHYDRATE 2 PUFF(S): 160; 4.5 AEROSOL RESPIRATORY (INHALATION) at 17:19

## 2020-01-01 RX ADMIN — Medication 100 MILLIGRAM(S): at 21:23

## 2020-01-01 RX ADMIN — Medication 1 MILLIGRAM(S): at 11:50

## 2020-01-01 RX ADMIN — HEPARIN SODIUM 5000 UNIT(S): 5000 INJECTION INTRAVENOUS; SUBCUTANEOUS at 15:27

## 2020-01-01 RX ADMIN — BUDESONIDE AND FORMOTEROL FUMARATE DIHYDRATE 2 PUFF(S): 160; 4.5 AEROSOL RESPIRATORY (INHALATION) at 17:20

## 2020-01-01 RX ADMIN — Medication 50 MILLIGRAM(S): at 05:50

## 2020-01-01 RX ADMIN — OXYCODONE HYDROCHLORIDE 5 MILLIGRAM(S): 5 TABLET ORAL at 15:27

## 2020-01-01 RX ADMIN — PIPERACILLIN AND TAZOBACTAM 25 GRAM(S): 4; .5 INJECTION, POWDER, LYOPHILIZED, FOR SOLUTION INTRAVENOUS at 17:49

## 2020-01-01 RX ADMIN — BUDESONIDE AND FORMOTEROL FUMARATE DIHYDRATE 2 PUFF(S): 160; 4.5 AEROSOL RESPIRATORY (INHALATION) at 05:50

## 2020-01-01 RX ADMIN — WARFARIN SODIUM 2.5 MILLIGRAM(S): 2.5 TABLET ORAL at 21:14

## 2020-01-01 RX ADMIN — BUDESONIDE AND FORMOTEROL FUMARATE DIHYDRATE 2 PUFF(S): 160; 4.5 AEROSOL RESPIRATORY (INHALATION) at 06:00

## 2020-01-01 RX ADMIN — Medication 3 MILLILITER(S): at 17:55

## 2020-01-01 RX ADMIN — Medication 975 MILLIGRAM(S): at 15:05

## 2020-01-01 RX ADMIN — ATORVASTATIN CALCIUM 20 MILLIGRAM(S): 80 TABLET, FILM COATED ORAL at 21:11

## 2020-01-01 RX ADMIN — ATORVASTATIN CALCIUM 20 MILLIGRAM(S): 80 TABLET, FILM COATED ORAL at 21:03

## 2020-01-01 RX ADMIN — MORPHINE SULFATE 2 MILLIGRAM(S): 50 CAPSULE, EXTENDED RELEASE ORAL at 07:00

## 2020-01-01 RX ADMIN — HEPARIN SODIUM 5000 UNIT(S): 5000 INJECTION INTRAVENOUS; SUBCUTANEOUS at 18:53

## 2020-01-01 RX ADMIN — BUMETANIDE 2 MILLIGRAM(S): 0.25 INJECTION INTRAMUSCULAR; INTRAVENOUS at 05:04

## 2020-01-01 RX ADMIN — Medication 3 MILLILITER(S): at 11:25

## 2020-01-01 RX ADMIN — PIPERACILLIN AND TAZOBACTAM 25 GRAM(S): 4; .5 INJECTION, POWDER, LYOPHILIZED, FOR SOLUTION INTRAVENOUS at 17:55

## 2020-01-01 RX ADMIN — MORPHINE SULFATE 2 MILLIGRAM(S): 50 CAPSULE, EXTENDED RELEASE ORAL at 05:50

## 2020-01-01 RX ADMIN — Medication 100 MILLIGRAM(S): at 21:14

## 2020-01-01 RX ADMIN — Medication 50 MILLIGRAM(S): at 17:19

## 2020-01-01 RX ADMIN — OXYCODONE HYDROCHLORIDE 5 MILLIGRAM(S): 5 TABLET ORAL at 05:12

## 2020-01-01 RX ADMIN — HEPARIN SODIUM 5000 UNIT(S): 5000 INJECTION INTRAVENOUS; SUBCUTANEOUS at 05:40

## 2020-01-01 RX ADMIN — Medication 100 MILLIGRAM(S): at 02:15

## 2020-01-01 RX ADMIN — Medication 1 TABLET(S): at 11:53

## 2020-01-01 RX ADMIN — Medication 250 MILLIGRAM(S): at 14:06

## 2020-01-01 RX ADMIN — ATORVASTATIN CALCIUM 20 MILLIGRAM(S): 80 TABLET, FILM COATED ORAL at 21:24

## 2020-01-01 RX ADMIN — Medication 1000 MILLIGRAM(S): at 22:34

## 2020-01-01 RX ADMIN — MORPHINE SULFATE 2 MILLIGRAM(S): 50 CAPSULE, EXTENDED RELEASE ORAL at 19:53

## 2020-01-01 RX ADMIN — Medication 10 MILLIGRAM(S): at 05:18

## 2020-01-01 RX ADMIN — BUDESONIDE AND FORMOTEROL FUMARATE DIHYDRATE 2 PUFF(S): 160; 4.5 AEROSOL RESPIRATORY (INHALATION) at 18:53

## 2020-01-01 RX ADMIN — HEPARIN SODIUM 5000 UNIT(S): 5000 INJECTION INTRAVENOUS; SUBCUTANEOUS at 21:09

## 2020-01-01 RX ADMIN — Medication 1 TABLET(S): at 11:35

## 2020-01-01 RX ADMIN — Medication 1000 MILLIGRAM(S): at 13:45

## 2020-01-01 RX ADMIN — ATORVASTATIN CALCIUM 20 MILLIGRAM(S): 80 TABLET, FILM COATED ORAL at 21:34

## 2020-01-01 RX ADMIN — Medication 10 MILLIGRAM(S): at 13:35

## 2020-01-01 RX ADMIN — Medication 1 MILLIGRAM(S): at 11:53

## 2020-01-01 RX ADMIN — BUDESONIDE AND FORMOTEROL FUMARATE DIHYDRATE 2 PUFF(S): 160; 4.5 AEROSOL RESPIRATORY (INHALATION) at 17:35

## 2020-01-01 RX ADMIN — LACTULOSE 20 GRAM(S): 10 SOLUTION ORAL at 18:09

## 2020-01-01 RX ADMIN — LACTULOSE 20 GRAM(S): 10 SOLUTION ORAL at 17:49

## 2020-01-01 RX ADMIN — MORPHINE SULFATE 4 MILLIGRAM(S): 50 CAPSULE, EXTENDED RELEASE ORAL at 06:58

## 2020-01-01 RX ADMIN — Medication 100 MILLIGRAM(S): at 17:08

## 2020-01-01 RX ADMIN — LACTULOSE 20 GRAM(S): 10 SOLUTION ORAL at 05:18

## 2020-01-01 RX ADMIN — HYDROMORPHONE HYDROCHLORIDE 0.5 MILLIGRAM(S): 2 INJECTION INTRAMUSCULAR; INTRAVENOUS; SUBCUTANEOUS at 15:22

## 2020-01-01 RX ADMIN — Medication 10 MILLIGRAM(S): at 06:34

## 2020-01-01 NOTE — PROGRESS NOTE ADULT - ASSESSMENT
79M with PMHx of CAD s/p 3v CABG (LIMA-mLAD, SVT-OM1, SVG-RPL1 in 9/2007), severe aortic stenosis (post-TAVR in 2016), known LBBB, recently diagnosed HFrEF (LVEF 20-25%), COPD, current smoker, HTN, atrial fibrillation (on coumadin), and CKD3 (baseline Cr 1.4-1.5) referred by Dr. Quiroz's office for elevated creatine and potassium. Found to be in ADHF with an EF of 25%.  Given frequent ectopy and runs of NSVT, chronic systolic CHF, LBBB, patient underwent biV ICD yesterday.  Today, wound clean, dry, intact.  Discussed with Dr. Briseno who discussed case with CHF service and recommendation is to stop digoxin. From an EP point of view, he may be discharged, but needs to follow-up in 2 weeks with EP section.     Bolivar Worthington MD  [phone]

## 2020-01-01 NOTE — PROGRESS NOTE ADULT - PROBLEM SELECTOR PLAN 2
- Continue to monitor with diuresis as above  - If worsening despite diuresis will consider addition of inotrope  - Renal ultrasound 12/20 showing no hydronephrosis, mild increase cortical echogenicity b/l, nonobstructing L intrarenal calculi as noted on CT 1/2019 and small volume ascites.
- Continue to monitor with diuresis as above  - If worsening despite diuresis will consider addition of inotrope  - Renal ultrasound 12/20 showing no hydronephrosis, mild increase cortical echogenicity b/l, nonobstructing L intrarenal calculi as noted on CT 1/2019 and small volume ascites.
- INR 1.8 today  - will give coumadin 2.5 mg once
- INR 1.8 today  - will give coumadin 2.5 mg once
- INR 1.83 today  - will give coumadin 2.5 mg once
- INR 2.52 today  - will give coumadin 2.5 mg once
- INR 2.62 today  - will give coumadin 2.5 mg once
- INR today 1.98  - will give coumadin today
- INR today 2.42  - will hold coumadin in the event of a possible RHC
- INR today 2.96  - will hold coumadin in the event of a possible RHC
- INR today pending  - will give coumadin 2.5 mg once
- Improving; continue to monitor with diuresis as above  - Renal ultrasound 12/20 showing no hydronephrosis, mild increase cortical echogenicity b/l, nonobstructing L intrarenal calculi as noted on CT 1/2019 and small volume ascites.
- Slightly worse today; continue to monitor with diuresis as above  - Renal ultrasound 12/20 showing no hydronephrosis, mild increase cortical echogenicity b/l, non-obstructing left intrarenal calculi as noted on CT 1/2019 and small volume ascites.
- overall improved  - Renal ultrasound 12/20 showing no hydronephrosis, mild increase cortical echogenicity b/l, non-obstructing left intrarenal calculi as noted on CT 1/2019 and small volume ascites.
- overall improved  - Renal ultrasound 12/20 showing no hydronephrosis, mild increase cortical echogenicity b/l, non-obstructing left intrarenal calculi as noted on CT 1/2019 and small volume ascites.
-Hold Warfarin given supratherapeutic INR  -Daily coags  -If patient has minor bleed will give Vitamin K, if major bleed then KCentra  -Restart Warfarin 2.5 mg daily when feasible  - NO current Bleed   - f/up INR In AM
- Warfarin on hold given supratherapeutic INR ; 3.5 today  -Daily coags  -monitor for bleeding  -Restart Warfarin 2.5 mg daily when feasible
- Warfarin was held given supratherapeutic INR on admission  - INR now therapeutic at 2.3 ; will restart Coumadin tonight at home dose 2.5mg po   -Daily coags  -monitor for bleeding
- Continue to monitor with diuresis as above  - If worsening despite diuresis will consider addition of inotrope  - Renal ultrasound 12/20 showing no hydronephrosis, mild increase cortical echogenicity b/l, nonobstructing L intrarenal calculi as noted on CT 1/2019 and small volume ascites.

## 2020-01-01 NOTE — PROGRESS NOTE ADULT - PROBLEM SELECTOR PLAN 1
- c/w bumex 3 mg twice/day  - increase toprol to 100 mg twice/day   - Hold on ACE-I/ARB/ARNI/MRA at this time given renal dysfunction; c/w hydral 10 mg q8h   - Daily standing weights, strict I/Os  - would not start on dig at this time given poor kidney function

## 2020-01-01 NOTE — PROGRESS NOTE ADULT - PROBLEM SELECTOR PROBLEM 5
Atrial fibrillation, unspecified type
COPD (chronic obstructive pulmonary disease)
LBBB (left bundle branch block)
COPD (chronic obstructive pulmonary disease)
COPD (chronic obstructive pulmonary disease)
LBBB (left bundle branch block)

## 2020-01-01 NOTE — PROGRESS NOTE ADULT - SUBJECTIVE AND OBJECTIVE BOX
Interval History:  underwent CRT-D w/o complications  would like to go home today  has NSVT; had received toprol 200 last night and started on dig     Medications:  albuterol/ipratropium for Nebulization. 3 milliLiter(s) Nebulizer every 6 hours  ammonium lactate 12% Lotion 1 Application(s) Topical two times a day  atorvastatin 20 milliGRAM(s) Oral at bedtime  budesonide 160 MICROgram(s)/formoterol 4.5 MICROgram(s) Inhaler 2 Puff(s) Inhalation two times a day  buMETAnide 3 milliGRAM(s) Oral two times a day  clotrimazole 1% Cream 1 Application(s) Topical every 12 hours  folic acid 1 milliGRAM(s) Oral daily  hydrALAZINE 10 milliGRAM(s) Oral every 8 hours  melatonin 3 milliGRAM(s) Oral at bedtime  metoprolol tartrate 100 milliGRAM(s) Oral two times a day  nicotine -  14 mG/24Hr(s) Patch 1 patch Transdermal daily  QUEtiapine 12.5 milliGRAM(s) Oral at bedtime  triamcinolone 0.1% Ointment 1 Application(s) Topical two times a day  warfarin 5 milliGRAM(s) Oral once      Vitals:  T(C): 36.7 (20 @ 11:53), Max: 36.7 (19 @ 22:42)  HR: 89 (20 @ 11:53) (89 - 97)  BP: 101/68 (20 @ 11:53) (94/67 - 104/68)  BP(mean): --  RR: 18 (20 @ 11:53) (17 - 18)  SpO2: 94% (20 @ 11:53) (94% - 99%)    Daily     Daily Weight in k.5 (2020 04:36)    Weight (kg): 56.2 ( @ 09:35)    I&O's Summary    31 Dec 2019 07:  -  2020 07:00  --------------------------------------------------------  IN: 600 mL / OUT: 975 mL / NET: -375 mL    2020 07:01  -  2020 20:28  --------------------------------------------------------  IN: 480 mL / OUT: 0 mL / NET: 480 mL    Physical Exam:  Appearance: No Acute Distress  HEENT: PERRL  Neck: JVD 8 cm with minimal HJR  Cardiovascular: Normal S1 S2, No murmurs/rubs/gallops  Respiratory: Clear to auscultation bilaterally  Gastrointestinal: Soft, Non-tender	  Skin: No cyanosis	  Neurologic: Non-focal  Extremities: No LE edema  Psychiatry: A & O x 3, Mood & affect appropriate    Labs:                        11.9   2.88  )-----------( 237      ( 2020 08:22 )             36.3         133<L>  |  93<L>  |  71<H>  ----------------------------<  100<H>  4.4   |  23  |  2.24<H>    Ca    9.5      2020 06:43  Mg     2.2         TPro  8.5<H>  /  Alb  3.7  /  TBili  0.7  /  DBili  x   /  AST  23  /  ALT  19  /  AlkPhos  106      PT/INR - ( 2020 06:45 )   PT: 19.9 sec;   INR: 1.72 ratio

## 2020-01-01 NOTE — PROVIDER CONTACT NOTE (OTHER) - ACTION/TREATMENT ORDERED:
AICD placed on 12/31/19  Please see notes for further details  Will cont to monitor
Ordered EKG, cont to monitor
Stat EKG, will continue to monitor.
will continue to monitor
will continue to monitor

## 2020-01-01 NOTE — CHART NOTE - NSCHARTNOTEFT_GEN_A_CORE
Called by RN to report patient with 16 beats of wct on tele   - Pt seen and examined, denies all complaints     - Assessment/Plan: HPI:  #16 beats WCT  -BMP and mag reviewed  - CRT-D placement completed on 01/01/20  -Previous 18 beats WCT on 12/27  -C/W toprol 100mg XL  -Dw Dr. schmidt and EP Glo Larson (no other interventions needed s/p CRT-d placement   - Pt cleared for Discharge

## 2020-01-01 NOTE — PROGRESS NOTE ADULT - PROBLEM SELECTOR PROBLEM 2
Acute kidney injury
Supratherapeutic INR
Acute kidney injury

## 2020-01-01 NOTE — PROGRESS NOTE ADULT - PROBLEM SELECTOR PROBLEM 1
Acute kidney injury
Acute on chronic systolic congestive heart failure
Renal failure (ARF), acute on chronic
Acute on chronic systolic congestive heart failure

## 2020-01-01 NOTE — PROGRESS NOTE ADULT - PROBLEM SELECTOR PLAN 4
- Continue BB  - Monitor on tele to assess PVC burden
- Continue BB  - Monitor on tele to assess PVC burden.
- c/w Toprol XL daily   - give coumadin 2.5 mg once today
- c/w Toprol XL daily   - hold coumadin today in the event he needs a rhc
- c/w Toprol XL daily   - hold coumadin today in the event he needs a rhc
-Hold Lasix in setting of hypovolemia  -Hold Entresto in setting of MERCED and hyperkalemia  -Daily weights  -Hold beta blocker per cardiology  -Follow up cardiology recommendations
-c/w Toprol XL   -No AC for now given supra-therapeutic INR
-c/w Toprol XL   -No AC for now given supra-therapeutic INR
- Continue BB  - Monitor on tele to assess PVC burden

## 2020-01-01 NOTE — PROGRESS NOTE ADULT - PROBLEM SELECTOR PLAN 5
- Consider EP consult to evaluate for CRT-D when no longer decompensated
- EP consult on Monday to evaluate for CRT-D
- EP consult on Monday to evaluate for CRT-D
- EP consult to evaluate for CRT-D; given paradoxical septal motion and severe LV dysfunction, would advocate for attempting CRT as no significant room for afterload uptitration
- c/w Damian q6h prn   - c/w Symbicort 160-80 BID
- s/p CRT-D
-COnt Metoprolol   -MOnitor HR   -No anti-coagulant for now given supra-therapeutic INR/ F/UP INR
-c/w Damian  - c/w Symbicort 160-80 BID
-c/w Damian  - c/w Symbicort 160-80 BID
- Consider EP consult to evaluate for CRT-D when no longer decompensated

## 2020-01-01 NOTE — CHART NOTE - NSCHARTNOTEFT_GEN_A_CORE
seen at bedside with two sons. pt reports some pain in ICD site, but otherwise it is controlled.  VSS, labs stable cbc, CXR reviewed post ICD placement, no ptx.  tele with NSVT  Impression: Acute on chronic sCHF, MERCED on CKD, s/p CRT-D 12/31  - clinically improved, d/w CHF Dr Chu stable for dc today  - as per d/w EP and CHF, plan for metoprolol 100 mg bid, d/c digoxin, d/w pt to take coumadin 5 mg tonight and c/w home dose 2.5 nightly afterwards  - d/w case management to provide list of private aids/nursing for family who is interested  - EP aware of tele findings, likely due to sCHF, with ICD in place, c/w metoprolol dose as above  - dc home. all ques answered w family at bedside. dc time 46 min.  - d/w Kayleen ELLIS

## 2020-01-01 NOTE — PROGRESS NOTE ADULT - PROBLEM SELECTOR PLAN 3
- Rate controlled  - Continue warfarin. INR therapeutic.
- Rate controlled  - Continue warfarin. INR therapeutic.
- Rate controlled  - Hold warfarin in the event of need for RHC. INR therapeutic.
- Rate controlled  - Toprol XL as above  - Holding AC given elevated INR
- Rate controlled  - Toprol XL as above  - Holding AC given elevated INR
- Rate controlled  - Warfarin resumed. INR currently subtherapeutic.
- Rate controlled  - on coumadin
- Rate controlled  - on coumadin
- c/w bumex PO BID   - CRT-D today at 3 pm   -Entresto on hold in setting of MERCED  -Daily weights  - c/w Toprol XL per HF team recs
-c/w Lasix 80MG IVP BID per HF team   -Entresto on hold in setting of MERCED  -Daily weights  - c/w Toprol XL per HF team recs
-c/w Lasix 80MG IVP BID per HF team  - hold coumadin for today in the event he needs a RHC   -Entresto on hold in setting of MERCED  -Daily weights  - c/w Toprol XL per HF team recs
-c/w Lasix 80MG IVP BID per HF team  -possible RHC In the am if cr worse tomorrow   -Entresto on hold in setting of MERCED  -Daily weights  - c/w Toprol XL per HF team recs
BNP >18K   - Pt is seen by HF team who recommended to start IV Lasix/ restart Metoprolol   -Hold Entresto   - ?Unsure if pt will need Vasodilator since not on ACEI or ARB/ await on HF rec  -Daily weights
-c/w Lasix 80MG IVP BID per HF team   -Entresto on hold in setting of MERCED and hyperkalemia  -Daily weights  -restarted on Toprol XL per HF team reccs
-c/w Lasix 80MG IVP BID per HF team   -Entresto on hold in setting of MERCED and hyperkalemia  -Daily weights  -restarted on Toprol XL per HF team reccs
- Rate controlled  - Toprol XL as above  - Holding AC given elevated INR

## 2020-01-01 NOTE — PROGRESS NOTE ADULT - PROBLEM SELECTOR PROBLEM 4
Atrial fibrillation, unspecified type
Chronic systolic heart failure
Frequent PVCs
Atrial fibrillation, unspecified type
Atrial fibrillation, unspecified type
Frequent PVCs

## 2020-01-01 NOTE — PROGRESS NOTE ADULT - SUBJECTIVE AND OBJECTIVE BOX
Subjective  Patient underwent biV ICD yesterday, minus atrial lead (permanent AF), for chronic systolic congestive heart failure, LBBB, NSVT. Today he notices discomfort left arm upon movement.  Otherwise he has no complaints and wants to go home.   Past Medical History    PAST MEDICAL & SURGICAL HISTORY:  Heart failure  Moderate tricuspid regurgitation  Osteoarthritis  Bilateral carotid artery disease  Hypertension  COPD (chronic obstructive pulmonary disease): cigar smoker for 29 years, quit 1/2019  Prostate cancer: Prostatectomy 2008, radiation 2011  Atrial fibrillation, unspecified type: on coumadin  Erectile dysfunction, unspecified erectile dysfunction type  Coronary artery disease of bypass graft of native heart with stable angina pectoris  [place]  S/P TAVR (transcatheter aortic valve replacement): 2016 in [facility]  S/P tonsillectomy: as a child  S/P prostatectomy: 11/18/2008  S/P hernia repair: inguinal, 1998  S/P CABG x 3: 9/17/2007 at [facility]      MEDICATIONS:  buMETAnide 3 milliGRAM(s) Oral two times a day  hydrALAZINE 10 milliGRAM(s) Oral every 8 hours    vancomycin  IVPB 1000 milliGRAM(s) IV Intermittent once    albuterol/ipratropium for Nebulization. 3 milliLiter(s) Nebulizer every 6 hours  budesonide 160 MICROgram(s)/formoterol 4.5 MICROgram(s) Inhaler 2 Puff(s) Inhalation two times a day    melatonin 3 milliGRAM(s) Oral at bedtime  QUEtiapine 12.5 milliGRAM(s) Oral at bedtime      atorvastatin 20 milliGRAM(s) Oral at bedtime    ammonium lactate 12% Lotion 1 Application(s) Topical two times a day  clotrimazole 1% Cream 1 Application(s) Topical every 12 hours  folic acid 1 milliGRAM(s) Oral daily  triamcinolone 0.1% Ointment 1 Application(s) Topical two times a day        Allergies    No Known Allergies    Intolerances        FAMILY HISTORY:  Family history of heart disease (Child)      SOCIAL HISTORY    Marital Status:   Occupation:   Lives with:     SUBSTANCE USE  Tobacco Usage:  ( ) never smoked   ( ) former smoker  ( ) current smoker; Packs per day:   Alcohol Usage: ( ) none  ( ) occasional ( ) 2-3 times a week ( ) daily; Last drink:   Recreational drugs ( ) None    REVIEW OF SYSTEMS:    CONSTITUTIONAL: No fevers, No chills, No fatigue, No weight gain  EYES: No vision changes   ENT: No congestion, No ear pain, No sore throat.  NECK: No pain, No stiffness  RESPIRATORY: No shortness of breath, No cough, No wheezing, No hemoptysis  CARDIOVASCULAR: No chest pain. No palpitations, No GAN, No orthopnea, No paroxysmal nocturnal dyspnea, No pleuritic pain  GASTROINTESTINAL: No abdominal pain, No nausea, No vomiting, No hematemesis, No diarrhea No constipation. No melena  GENITOURINARY: No dysuria, No frequency, No incontinence, No hematuria  NEUROLOGICAL: No dizziness, No lightheadedness, No syncope, No LOC, No headache, No numbness or weakness  EXTREMITIES: No Edema, No joint pain, He does have left arm/shoulder discomfort from new ICD site. No joint swelling.  PSYCHIATRIC: No anxiety, No depression  SKIN: No diaphoresis. No itching, No rashes, No pressure ulcers  HEME/LYMPH: No easy bruising, or bleeding gums  ALLERGY AND IMMUNOLOGIC: No hives or eczema    All other review of systems is negative unless indicated above.    VITAL SIGNS  T(C): 36.7 (01-01-20 @ 11:53), Max: 36.8 (12-31-19 @ 13:44)  HR: 89 (01-01-20 @ 11:53) (88 - 104)  BP: 101/68 (01-01-20 @ 11:53) (94/67 - 118/84)  RR: 18 (01-01-20 @ 11:53) (16 - 19)  SpO2: 94% (01-01-20 @ 11:53) (94% - 99%)  Wt(kg): --    PHYSICAL EXAM:    Appearance: NAD, no distress  HEENT:   Normal oral mucosa, PERRL, EOMI  Cardiovascular: Regular rate and rhythm, Normal S1 S2, No JVD, No murmurs  Respiratory: Lungs clear to auscultation. No rales, No rhonchi, No wheezing. No tenderness to palpation  Gastrointestinal:  Soft, Non-tender, + BS  Neurologic: Non-focal, A&Ox3  Skin: Warm and dry, No rashes, No ecchymosis, No cyanosis  Extremities: No clubbing, No cyanosis, No edema. Left ICD site clean, dry and intact.   Vascular: Peripheral pulses palpable 2+ bilaterally  Psychiatry: Mood & affect appropriate      	    		        I&O's Summary    31 Dec 2019 07:01  -  01 Jan 2020 07:00  --------------------------------------------------------  IN: 600 mL / OUT: 975 mL / NET: -375 mL        LABORATORY VALUES	 	                          11.9   2.88  )-----------( 237      ( 01 Jan 2020 08:22 )             36.3       01-01    133<L>  |  93<L>  |  71<H>  ----------------------------<  100<H>  4.4   |  23  |  2.24<H>  12-31    134<L>  |  92<L>  |  76<H>  ----------------------------<  101<H>  3.9   |  26  |  2.33<H>    Ca    9.5      01 Jan 2020 06:43  Ca    9.3      31 Dec 2019 06:26  Mg     2.2     01-01  Mg     2.2     12-31    TPro  8.5<H>  /  Alb  3.7  /  TBili  0.7  /  DBili  x   /  AST  23  /  ALT  19  /  AlkPhos  106  01-01    LIVER FUNCTIONS - ( 01 Jan 2020 06:43 )  Alb: 3.7 g/dL / Pro: 8.5 g/dL / ALK PHOS: 106 U/L / ALT: 19 U/L / AST: 23 U/L / GGT: x           Prothrombin Time, Plasma: 19.9 sec (01-01 @ 06:45)      CARDIAC MARKERS:              Serum Pro-Brain Natriuretic Peptide: 58496 pg/mL (12-20 @ 11:07)          Thyroid Stimulating Hormone, Serum: 1.22 uIU/mL (12-04 @ 17:27)        CAPILLARY BLOOD GLUCOSE              TELEMETRY: 	  Atrial fibrillation, ventricular pacing, occasional ventricular bigeminy, PVC response; triplets and 5 beat run of NSVT.   ECG:  	  RADIOLOGY:  OTHER: 	    PREVIOUS DIAGNOSTIC TESTING:    [ ] Echocardiogram:  [ ] Catheterization:  [ ] Stress Test:

## 2020-01-01 NOTE — PROGRESS NOTE ADULT - PROBLEM SELECTOR PROBLEM 3
Acute on chronic systolic congestive heart failure
Atrial fibrillation, unspecified type
Chronic systolic heart failure
Atrial fibrillation, unspecified type

## 2020-01-01 NOTE — PROGRESS NOTE ADULT - ASSESSMENT
Mr. Lopez is a 79 year old man with CAD s/p 3v CABG (LIMA-mLAD, SVT-OM1, SVG-RPL1 in 9/2007), severe aortic stenosis s/p TAVR 2016, known LBBB, COPD, current smoker, atrial fibrillation on warfarin, and CKD3 (baseline Cr 1.4-1.5), and newly reduced LV systolic function and worsening TR who presented with acute decompensated HF, volume overloaded with MERCED on CKD, hyperkalemia and elevated INR. He is in rate controlled AFib with frequent PVCs/NSVT. Underwetn CRT-D 12/31 w/o complication. He is normotensive and fairly euvolemic today. TTE 11/27 reviewed - severe LV dysfunction (paradox septal motion likely d/t LBBB), mod MR, severe TR, mild RV dysfunction. Stable to be discharged today.

## 2020-01-01 NOTE — PROGRESS NOTE ADULT - REASON FOR ADMISSION
Elevated Potassium

## 2020-01-01 NOTE — PROVIDER CONTACT NOTE (OTHER) - ASSESSMENT
VSS
/66, , Oxygen 96% on RA, Temp. 97.9
AO3, VSS, Pt asymptomatic, no complaints of SOB, chest pain, dizziness, or palpitations.
AO3-4, VSS, Pt asymptomatic, no complaints of SOB, chest pain, dizziness, or palpitations.
Pt was asleep during, VSS; Pt asymptomatic, no complaints of SOB, chest pain, dizziness, or palpitations.
VSS  Asymptomatic
pt a&ox3, forgetful bp 111/65 hr 91 oxygen 95% no complaints of cp or sob.

## 2020-01-02 PROBLEM — I50.9 HEART FAILURE, UNSPECIFIED: Chronic | Status: ACTIVE | Noted: 2019-01-01

## 2020-01-03 NOTE — REASON FOR VISIT
[Follow-Up - Clinic] : a clinic follow-up of [Aortic Stenosis] : aortic stenosis [Atrial Fibrillation] : atrial fibrillation [Coronary Artery Disease] : coronary artery disease [Medication Management] : Medication management [Hypertension] : hypertension [Heart Failure] : congestive heart failure

## 2020-01-03 NOTE — HISTORY OF PRESENT ILLNESS
[FreeTextEntry1] : Returning for follow-up\par \par Recent cath with patent grafts, high filling pressures, low CO\par Admitted for med titration - placed on Entresto and underwent IV diuresis\par Now returns\par \par Appears lethargic and more SOB\par Sons note a few episodes of confusion and falls\par \par He notes he has returned to taking lasix on his own\par No CP\par No bleeding\par Notes reduced Uo\par No increase in salt intake\par

## 2020-01-03 NOTE — DISCUSSION/SUMMARY
[FreeTextEntry1] : 78 y/o with HTN, Hchol, PAF on coumadin, Prostate Ca, CAD s/p CABG, AS s/p TAVR - \par \par New reduced EF (?related to LBBB or AF with RVR? vs viral?)\par \par Appears volume overloaded - but his confusion is concerning\par will check stat labs\par d/w HF for early intervention\par \par Addendum -\par labs reviewed. D/w Pt and son. New ARF and hyper K. Recc urgent ER visit for Rx (likely related to Entresto)\par

## 2020-01-03 NOTE — PHYSICAL EXAM
[General Appearance - Well Developed] : well developed [Normal Appearance] : normal appearance [Well Groomed] : well groomed [General Appearance - Well Nourished] : well nourished [No Deformities] : no deformities [Normal Conjunctiva] : the conjunctiva exhibited no abnormalities [Eyelids - No Xanthelasma] : the eyelids demonstrated no xanthelasmas [General Appearance - In No Acute Distress] : no acute distress [Normal Oral Mucosa] : normal oral mucosa [No Oral Cyanosis] : no oral cyanosis [Normal Jugular Venous A Waves Present] : normal jugular venous A waves present [No Oral Pallor] : no oral pallor [Normal Jugular Venous V Waves Present] : normal jugular venous V waves present [No Jugular Venous Snow A Waves] : no jugular venous snow A waves [Exaggerated Use Of Accessory Muscles For Inspiration] : no accessory muscle use [Respiration, Rhythm And Depth] : normal respiratory rhythm and effort [Prolonged Exp Time] : with prolonged expiratory time [Diffuse Wheezing] : diffuse wheezing [Heart Rate And Rhythm] : heart rate and rhythm were normal [Decreased Breath Sounds] : decreased breath sounds [Heart Sounds] : normal S1 and S2 [Systolic grade ___/6] : A grade [unfilled]/6 systolic murmur was heard. [Abdomen Soft] : soft [Abdomen Tenderness] : non-tender [Abnormal Walk] : normal gait [Abdomen Mass (___ Cm)] : no abdominal mass palpated [Cyanosis, Localized] : no localized cyanosis [Gait - Sufficient For Exercise Testing] : the gait was sufficient for exercise testing [Nail Clubbing] : no clubbing of the fingernails [Petechial Hemorrhages (___cm)] : no petechial hemorrhages [] : no rash [No Venous Stasis] : no venous stasis [Skin Color & Pigmentation] : normal skin color and pigmentation [No Skin Ulcers] : no skin ulcer [Skin Lesions] : no skin lesions [Oriented To Time, Place, And Person] : oriented to person, place, and time [Affect] : the affect was normal [No Xanthoma] : no  xanthoma was observed [Mood] : the mood was normal [No Anxiety] : not feeling anxious [FreeTextEntry1] : Trace b/l LE edema

## 2020-01-03 NOTE — REVIEW OF SYSTEMS
[Shortness Of Breath] : shortness of breath [Feeling Fatigued] : feeling fatigued [Anxiety] : anxiety [Dyspnea on exertion] : dyspnea during exertion [Negative] : Heme/Lymph [Confusion] : confusion was observed [Memory Lapses Or Loss] : memory lapses or loss

## 2020-01-08 PROBLEM — I50.9 CONGESTIVE HEART FAILURE: Status: ACTIVE | Noted: 2020-01-01

## 2020-01-15 PROBLEM — Z95.810 CARDIAC RESYNCHRONIZATION THERAPY DEFIBRILLATOR (CRT-D) IN PLACE: Status: ACTIVE | Noted: 2020-01-01

## 2020-01-16 NOTE — REASON FOR VISIT
[Follow-Up - From Hospitalization] : follow-up of a recent hospitalization for [Cardiomyopathy] : cardiomyopathy [Heart Failure] : congestive heart failure [Discharge Date: ___] : Discharge Date: [unfilled] [Admitted for Heart Failure] : patient was admitted for heart failure [Family Member] : family member [FreeTextEntry1] : INSTRUCTIONS:\par \par 1. Please INCREASE BUMETANIDE to 5mg (5 tablets) TWICE a day (take at 6AM and 3PM) starting 1/14 PM through Thursday 1/16. We will call you Thursday to follow up on your weights and how you're feeling.\par \par 2. Continue your other medications as you're currently taking them.\par \par 3. Labs to be drawn today. We will follow up with you regarding the results.\par \par 4. Follow up with Dr. Chu in 2 weeks.

## 2020-01-16 NOTE — PHYSICAL EXAM
[General Appearance - In No Acute Distress] : no acute distress [Well Groomed] : well groomed [Normal Conjunctiva] : the conjunctiva exhibited no abnormalities [No Oral Cyanosis] : no oral cyanosis [No Oral Pallor] : no oral pallor [Respiration, Rhythm And Depth] : normal respiratory rhythm and effort [Heart Sounds] : normal S1 and S2 [2+] : left 2+ [Bowel Sounds] : normal bowel sounds [Abdomen Tenderness] : non-tender [Abdomen Soft] : soft [] : no hepato-splenomegaly [Cyanosis, Localized] : no localized cyanosis [Skin Color & Pigmentation] : normal skin color and pigmentation [Skin Turgor] : normal skin turgor [Oriented To Time, Place, And Person] : oriented to person, place, and time [Affect] : the affect was normal [Mood] : the mood was normal [FreeTextEntry1] : L CW incision at site of CRT-D insertion well approximated without erythema or drainage, small areas of scab along incision

## 2020-01-16 NOTE — HISTORY OF PRESENT ILLNESS
[FreeTextEntry1] : Mr. Lopez 78 y/o M with CAD s/p 3v CABG (LIMA-mLAD, SVG- OM1, SVG- RPL1 in 9/2017), severe aortidc stenosis s/p TAVR in 2016, known LBBB, afib (on warfarin), CKD Stage 3 (baseline Cr 1.4-1.5), with newly reduced LV systolic function (LVEF 20-25%, LVIDd 5.3cm) s/p CRT-D (12/31), and COPD who presents today for follow up post hospital discharge.\par \par He was hospitalized at [facility] from 12/19-1/1 for ADHF with volume overload, MERCED on CKD, hyperkalemia and elevated INR. He was diuresed 14lbs. His beta blocker was resumed and low dose hydralazine was started. On 12/31 he underwent CRT-D placement w/o complication. Overall, his renal function improved and was stable prior to discharge (BUN/Cr 76/2.33). He was discharged home at weight of 122lbs on bumex 3mg BID, hydralazine 10mg TID and Toprol XL 100mg BID.\par \par Of note, he has been following with Dr. Ramón Quiroz. On 11/27/19 TTE showed newly reduced LVEF 20-25% with RVSD and mod-severe TR from EF 60% in 10/18. He underwent coronary angiogram and RHC on 12/3 which showed patent grafts and elevating filling pressure with low output. He was admitted 12/3-12/5 for ADHF initially presenting with SOB for which he was diuresed and started on neurohormonal antagonists. He was also found to be anemic and thrombocytopenic with concern for MDS with negative BMBx. He was discharged home on Entresto 49-51mg BID, Toprol [id number] daily and lasix 40mg PO BID but shortly after discharge noted pain and heaviness in his LE thus increased his lasix to 60mg BID. He was subsequently referred for most recent admission by Dr. Quiroz due to elevated potassium and Cr.\par \par He presents today feeling fair. He notes overall improvement from admission, however still feels deconditioned and dyspneic with minimal activity. His son notes that over all he has been functionally declining over the past year. Over the past two weeks since discharge he reports being minimally active outside of his home but is able to walk around the house and do his ADLs without dyspnea. He is able to climb six stairs but then needs to rest for a few moments to recover. His weight today is clinic is up 10lbs from discharge. At home he reports about a 6lb weight gain (122-128lbs). His home health nurse noted his BP to be 88/50 and in discussion with cardiology NP his hydralazine has been stopped since 1/3. He reports his SBP is generally in low 100s. He notes mild increase in his LE edema since discharge. Denies orthopnea, PND, CP, palpitations, lightheadedness, dizziness, abdominal distention/pain, melena, hematochezia, erythema and discharge from ICD insertion site, fevers and chills. He has been smoking 1/2 a cigar a daily and is not interested in quitting at this time.

## 2020-01-16 NOTE — ASSESSMENT
[FreeTextEntry1] : Mr. Lopez is a 80 y/o M current daily cigar smoker with CAD s/p 3v CAGB, severe AS s/p TAVR, known LBBB, afib (on warfarin), CKD 3 (b/l Cr 1.4-1.5), and recently reduced LV systolic function and worsening TR (LVEF 20-25%, LVIDd 5.3cm), s/p CRT-D who presents today for follow up post hospital discharge for ADHF. Recent decline in systolic function does not appear to be due to new ischemic disease based upon LHC done 12/3/19, which showed patent grafts. He is currently making gradual improvement in his activity tolerance although remains deconditioned with NYHA Class III symptoms. He is volume overloaded with marginal blood pressure on target dose beta blocker, however off vasodilators due to hypotension. His afib is well rate controlled and his INR was therapeutic at 2.7 on 1/12. Labs drawn today show his renal function remain elevated from baseline but are stable from hospital discharge. His proBNP is elevated at 34k from 19k on 12/20. I have recommended the following:\par \par 1. Chronic systolic heart failure:\par - Will increase bumex to 5mg BID x 2 days. Will follow up on 1/16 to reassess weights and determine ongoing diuretic dosing.\par - Labs drawn today as noted above, which were reviewed with patient\par - Will continue Toprol XL 100mg BID\par - No ACE/ARB/ARNI/MRA at this time given renal dysfunction\par - He will remain off hydralazine at this time given BP in 80s at times even off hydralazine. Asked that he keep a log of weights and BP to bring in to next apt. If BP more consistently >100 will consider resuming low dose vasodilator.\par - Discussed referral to cardiac rehab which he would like to think about further at this time\par \par 2. CAD s/p CABG:\par - Continue atorvastatin and BB\par -  ASA 81mg held during recent hospitalization given supratherapeutic INR and likely plt dysfunction given uremia. - Given improvement in INR and uremia would resume EC ASA 81mg daily\par \par 3. LBBB\par - s/p CRT-D. Site healing well without s/s of infection or hematoma\par - f/u with EP clinic scheduled in February\par \par 4. Atrial fibrillation:\par - Will continue BB and Coumadin\par - INR being followed by Dr. Quiroz\par \par 5. CKD Stage III:\par - Stable on most recent labs\par - Continue to monitor with diuresis\par - Discussed referral to cardiorenal service. Pt and his son declined at this time\par \par 6. Current smoker:\par - Discussed importance of smoking cessation\par - Offered nicotine patch and smoking cessation resources which he declined\par \par 6. Follow up with Dr. Ramirez in 2 weeks

## 2020-01-26 NOTE — REASON FOR VISIT
[Follow-Up - Clinic] : a clinic follow-up of [Aortic Stenosis] : aortic stenosis [Atrial Fibrillation] : atrial fibrillation [Heart Failure] : congestive heart failure [Coronary Artery Disease] : coronary artery disease [Hypertension] : hypertension [Medication Management] : Medication management

## 2020-01-26 NOTE — DISCUSSION/SUMMARY
[FreeTextEntry1] : 80 y/o with HTN, Hchol, PAF on coumadin, Prostate Ca, CAD s/p CABG, AS s/p TAVR - \par \par Relatively quickly developed new reduced EF (?related to LBBB or AF with RVR? vs viral? vs PVC?)\par Underwent cath revealing patent CABG\par Admitted for Low CO and ARF\par s/p BIV-AICD\par \par Appears euvolemic\par No change in meds\par Check electrolyte\par Encouraged f/u with Hf for further med titration\par

## 2020-01-26 NOTE — PHYSICAL EXAM
[General Appearance - Well Developed] : well developed [Normal Appearance] : normal appearance [Well Groomed] : well groomed [General Appearance - Well Nourished] : well nourished [General Appearance - In No Acute Distress] : no acute distress [No Deformities] : no deformities [Eyelids - No Xanthelasma] : the eyelids demonstrated no xanthelasmas [Normal Conjunctiva] : the conjunctiva exhibited no abnormalities [No Oral Pallor] : no oral pallor [Normal Oral Mucosa] : normal oral mucosa [No Oral Cyanosis] : no oral cyanosis [No Jugular Venous Snow A Waves] : no jugular venous snow A waves [Normal Jugular Venous A Waves Present] : normal jugular venous A waves present [Normal Jugular Venous V Waves Present] : normal jugular venous V waves present [Respiration, Rhythm And Depth] : normal respiratory rhythm and effort [Exaggerated Use Of Accessory Muscles For Inspiration] : no accessory muscle use [Diffuse Wheezing] : diffuse wheezing [Prolonged Exp Time] : with prolonged expiratory time [Decreased Breath Sounds] : decreased breath sounds [Heart Rate And Rhythm] : heart rate and rhythm were normal [Heart Sounds] : normal S1 and S2 [Systolic grade ___/6] : A grade [unfilled]/6 systolic murmur was heard. [Abdomen Soft] : soft [Abdomen Tenderness] : non-tender [Abdomen Mass (___ Cm)] : no abdominal mass palpated [Abnormal Walk] : normal gait [Gait - Sufficient For Exercise Testing] : the gait was sufficient for exercise testing [Cyanosis, Localized] : no localized cyanosis [Petechial Hemorrhages (___cm)] : no petechial hemorrhages [Nail Clubbing] : no clubbing of the fingernails [Skin Color & Pigmentation] : normal skin color and pigmentation [] : no rash [No Venous Stasis] : no venous stasis [Skin Lesions] : no skin lesions [No Skin Ulcers] : no skin ulcer [No Xanthoma] : no  xanthoma was observed [Oriented To Time, Place, And Person] : oriented to person, place, and time [Affect] : the affect was normal [Mood] : the mood was normal [No Anxiety] : not feeling anxious [FreeTextEntry1] : Trace b/l LE edema

## 2020-01-26 NOTE — REVIEW OF SYSTEMS
[Feeling Fatigued] : feeling fatigued [Dyspnea on exertion] : dyspnea during exertion [Confusion] : confusion was observed [Memory Lapses Or Loss] : memory lapses or loss [Anxiety] : anxiety [Negative] : Heme/Lymph [Shortness Of Breath] : no shortness of breath

## 2020-01-26 NOTE — HISTORY OF PRESENT ILLNESS
[FreeTextEntry1] : Returning for follow-up\par \par  Recent admission for ARF/Low CO\par S/p BiV AICD\par Taken off Entresto (? cause of RF?)\par \par Seeing HF now \par Feels weak and tired\par No angina\par No falls or syncope\par No orthopnea or PND\par No LE edema\par

## 2020-02-05 NOTE — HISTORY OF PRESENT ILLNESS
[FreeTextEntry1] : Referring: Dr. Quiroz\par \par Mr. 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 Quiroz. His LVEF had historically been normal but was noted to have an LVEF of 20-25% on 11/2019 with subsequent R/LHC showing patent grafts and elevated filling pressures with low cardiac output. He was hospitalized at [facility] from 12/19-1/1 for ADHF with volume overload and MERCED on CKD. He was diuresed 14 lbs and underwent CRT-D for LBBB ( ms). \par \par He was volume overloaded in clinic back in 1/14 for which bumex was increased and he subsequently diuresed well, has lost 6 lbs since that visit. \par \par He overall feels better since last visit but he still remains symptomatic. He can walk 1/2 block limited by fatigue and dyspnea. Denies dyspnea during household activities. This overall has been getting better. Denies orthopnea. Intermittent LE edema. Denies chest pain or chest pressure. Weights have been downtrending at home. \par \par

## 2020-02-05 NOTE — ASSESSMENT
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 \par

## 2020-02-05 NOTE — DISCUSSION/SUMMARY
[FreeTextEntry1] : # ACC/AHA Stage C/D mixed ischemic/non-ischemic cardiomyopathy\par - Etiology: has baseline CAD but EF recently declined with LHC revealing normal grafts\par - GDMT: current regimen is metoprolol succinate 100 mg daily. Deferring RAASi given CKD and borderline BP. \par - Diuretic: current regimen is bumex 3 mg BID. Will increase to 4 mg BID and discuss CardioMEMS with Dr. Quiroz to minimize risk of HF hospitalizations (two this year). \par - Device: s/p CRT-D.  Last interrogation 1/2020 with 77% CRT pacing and no events\par - Rhythm: AF, due to chronicity and dilated LA will defer rhythm control approach. If he develops suboptimal CRT pacing in the future may have to reconsider though\par - Labs: 1/23/20\par \par # CKD III - baseline Cr ~2.2\par - mostly stable, continue to monitor \par \par # CAD s/p CABG\par - continue statin, beta blocker \par \par # Chronic AF\par - rate controlled\par - continue coumadin\par \par # Active tobacco use with COPD\par - discussed importance of tobacco cessation, he is precontemplative \par \par Will see Dr. Quiroz in 1 month and me in 2 months

## 2020-02-05 NOTE — PHYSICAL EXAM
[General Appearance - In No Acute Distress] : no acute distress [Well Groomed] : well groomed [Normal Conjunctiva] : the conjunctiva exhibited no abnormalities [No Oral Pallor] : no oral pallor [No Oral Cyanosis] : no oral cyanosis [Respiration, Rhythm And Depth] : normal respiratory rhythm and effort [Heart Sounds] : normal S1 and S2 [2+] : left 2+ [] : no hepato-splenomegaly [Abdomen Soft] : soft [Bowel Sounds] : normal bowel sounds [Abdomen Tenderness] : non-tender [Cyanosis, Localized] : no localized cyanosis [Skin Color & Pigmentation] : normal skin color and pigmentation [Skin Turgor] : normal skin turgor [Oriented To Time, Place, And Person] : oriented to person, place, and time [Affect] : the affect was normal [Mood] : the mood was normal [FreeTextEntry1] : warm peripherally

## 2020-02-05 NOTE — REASON FOR VISIT
[Follow-Up - Clinic] : a clinic follow-up of [Cardiomyopathy] : cardiomyopathy [Heart Failure] : congestive heart failure [Admitted for Heart Failure] : patient was admitted for heart failure [Family Member] : family member

## 2020-02-17 NOTE — H&P ADULT - PROBLEM SELECTOR PLAN 1
Patient with HFrEF (EF 20-25% on 11/2019 TTE) presenting with acute on chronic lower extremity edema. CXR with pleural effusions, pulmonary congestion. proBNP 21K.  - Asymmetric lower extremity edema, left > right, with flat erythematous rash on left side concerning for infection. DDx includes cellulitis/SSTI vs zoster vs DVT  -DVT US of LLE negative  -Left foot x-ray pending to r/o abscess or necrotizing fasciitis. F/u read.  -s/p Vancomycin 1g x 1 in ED.  -ABX: Ancef (2/17-)  -Pain control: morphine 2mg q4h for severe pain, tylenol for moderate pain

## 2020-02-17 NOTE — H&P ADULT - PROBLEM SELECTOR PLAN 6
History CAD s/p PCI and CABG.  -Continue with metoprolol succinate as above  -Continue with home statin

## 2020-02-17 NOTE — H&P ADULT - NSHPREVIEWOFSYSTEMS_GEN_ALL_CORE
REVIEW OF SYSTEMS:    CONSTITUTIONAL: No weakness, fevers or chills  EYES/ENT: No visual changes;  No vertigo or throat pain   NECK: No pain or stiffness  RESPIRATORY: +cough (chronic). No wheezing, hemoptysis; No shortness of breath  CARDIOVASCULAR: No chest pain or palpitations. +lower extremity edema, left > right.  GASTROINTESTINAL: No abdominal or epigastric pain. No nausea, vomiting, or hematemesis; No diarrhea or constipation. No melena or hematochezia.  GENITOURINARY: No dysuria, frequency or hematuria  SKIN: painful rash on left lower extremity  NEUROLOGICAL: No numbness or weakness  All other review of systems is negative unless indicated above.

## 2020-02-17 NOTE — H&P ADULT - ATTENDING COMMENTS
Pt aw LLE cellulitis. Continue IV Ancef.     Recent aggressive diuresis, worsening kidney function. Will hold for now. Pt aw LLE cellulitis. Continue IV Ancef.     Recent aggressive diuresis, worsening kidney function. Monitor off diuretics.

## 2020-02-17 NOTE — H&P ADULT - HISTORY OF PRESENT ILLNESS
79M with PMHx HFrEF (EF 20-25% on 11/27/19) s/p AICD, CAD s/p PCI and CABG, severe aortic stenosis s/p TAVR, known LBBB, COPD, HTN, Afib (on Coumadin), and CKD3 who presents with lower extremity swelling, pain and erythema, left > right. Patient reports lower extremity swelling that began worsening 1 week ago, initially bilaterally, but now worse on the left. Associated with worsening erythema and pain. Tried to increase his Bumex dose at home, but lower extremity swelling continued to worsen. Reports good UOP. Denies fever/chills, lightheadedness/dizziness, chest pain, palpitations, SOB, orthopnea, PND. Reports occasional dry cough, chronic, unchanged from baseline. 79M with PMHx HFrEF (EF 20-25% on 11/27/19) s/p AICD, CAD s/p PCI and CABG, severe aortic stenosis s/p TAVR, known LBBB, COPD, HTN, Afib (on Coumadin), and CKD3 who presents with lower extremity swelling, pain and erythema, left > right. Patient reports lower extremity swelling that began worsening 1 week ago, initially bilaterally, but now worse on the left. Associated with worsening erythema and pain. Tried to increase his Bumex dose at home, but lower extremity swelling continued to worsen. Reports good UOP. Denies fever/chills, lightheadedness/dizziness, chest pain, palpitations, SOB, orthopnea, PND. Reports occasional dry cough, chronic, unchanged from baseline.    Of note, patient with recent admission 12/19/19-01/01/20 for acute decompensated heart failure c/b MERCED on CKD, hyperkalemia, and supratherapeutic INR.    In ED, patient afebrile, BP 98//75. Patient received fentanyl 50 mcg IV x 3, morphine 4mg IV x 1, and Vancomycin 1g x 1. 79M with PMHx HFrEF (EF 20-25% on 11/27/19) s/p AICD, CAD s/p PCI and CABG, severe aortic stenosis s/p TAVR, known LBBB, COPD, HTN, Afib (on Coumadin), and CKD3 who presents with lower extremity swelling, pain and erythema, left > right. Patient reports lower extremity swelling that began worsening 1 week ago, initially bilaterally, but now worse on the left. Associated with worsening erythema and pain. Tried to increase his Bumex dose at home, but lower extremity swelling continued to worsen. Reports good UOP. Denies fever/chills, lightheadedness/dizziness, chest pain, palpitations, SOB, orthopnea, PND. Reports occasional dry cough, chronic, unchanged from baseline.    Of note, patient with recent admission 12/19/19-01/01/20 for acute decompensated heart failure c/b MERCED on CKD, hyperkalemia, and supratherapeutic INR.    In ED, patient afebrile, BP 98//75. Patient received fentanyl 50 mcg IV x 3 and Vancomycin 1g x 1.

## 2020-02-17 NOTE — H&P ADULT - PROBLEM SELECTOR PLAN 7
no chills/no fever Patient discharged on 1/1/20 with serum Cr of 2.24. Serum Cr as low as 1.6-1.7 in 12/2019.  -Likely prerenal in etiology in setting of increased Bumex dose at home  -Hold diuretic  -Avoid nephrotoxic medications, renally dose medications

## 2020-02-17 NOTE — H&P ADULT - NSHPPHYSICALEXAM_GEN_ALL_CORE
T(C): 36.9 (17 Feb 2020 18:15), Max: 36.9 (17 Feb 2020 12:08)  T(F): 98.5 (17 Feb 2020 18:15), Max: 98.5 (17 Feb 2020 18:15)  HR: 89 (17 Feb 2020 18:15) (82 - 97)  BP: 119/75 (17 Feb 2020 18:15) (97/57 - 119/75)  RR: 19 (17 Feb 2020 18:15) (18 - 20)  SpO2: 97% (17 Feb 2020 18:15) (97% - 100%)    PHYSICAL EXAM:  GENERAL: elderly thin male lying in bed, in no acute respiratory distress  HEENT: dry mucous membranes  NECK: Supple, No JVD, Normal Thyroid  NERVOUS SYSTEM: Alert & Oriented X3, non-focal  CHEST/LUNG: Clear to auscultation bilaterally; No rales, rhonchi, wheezing, or rubs  HEART: Regular rate and rhythm; S1 and S2; No murmurs, rubs, or gallops  ABDOMEN: Soft, Nontender, Nondistended: Bowel sounds present  EXTREMITIES: 2+ Peripheral Pulses, No clubbing, cyanosis, or edema  LYMPH: No lymphadenopathy noted  SKIN: No rashes or lesions T(C): 36.9 (17 Feb 2020 18:15), Max: 36.9 (17 Feb 2020 12:08)  T(F): 98.5 (17 Feb 2020 18:15), Max: 98.5 (17 Feb 2020 18:15)  HR: 89 (17 Feb 2020 18:15) (82 - 97)  BP: 119/75 (17 Feb 2020 18:15) (97/57 - 119/75)  RR: 19 (17 Feb 2020 18:15) (18 - 20)  SpO2: 97% (17 Feb 2020 18:15) (97% - 100%)    PHYSICAL EXAM:  GENERAL: elderly thin male lying in bed, in no acute respiratory distress  HEENT: dry mucous membranes  NECK: Supple, No JVD  NERVOUS SYSTEM: Alert & Oriented X3, non-focal  CHEST/LUNG: expiratory wheezing heard in bilateral lung fields  HEART: irregularly irregular rhythm; normal S1 and S2; No murmurs, rubs, or gallops  ABDOMEN: Soft, Nontender, Nondistended: Bowel sounds present  EXTREMITIES: 2+ Peripheral Pulses, 2+ pitting edema on right, 3+ pitting edema on left  LYMPH: No lymphadenopathy noted  SKIN: flat erythematous rash on posterior aspect of lower extremity extending above knee, tender to touch

## 2020-02-17 NOTE — H&P ADULT - PROBLEM SELECTOR PLAN 9
Transitions of Care Status:  1.  Name of PCP: Dr. Quiroz [phone]  2.  PCP Contacted on Admission: [ ] Y    [ ] N    3.  PCP contacted at Discharge: [ ] Y    [ ] N    [ ] N/A  4.  Post-Discharge Appointment Date and Location:  5.  Summary of Handoff given to PCP:

## 2020-02-17 NOTE — ED PROVIDER NOTE - CLINICAL SUMMARY MEDICAL DECISION MAKING FREE TEXT BOX
Pt p/w acute on chronic worsening SOB and peripheral edema, sx likely related to hx of significant HF, pt denies recent firing of his AICD, denies CP, pt also w/ large red LLE susp for DVT for cellulitis, will obtain DVT study to r/o, pt tba tele for optimization of his HF, VS stable, cont to reassess -Sherrill Pt p/w acute on chronic worsening SOB and peripheral edema, sx likely related to hx of significant HF, pt denies recent firing of his AICD, denies CP, pt also w/ large red LLE susp for DVT vs cellulitis, will obtain DVT study to r/o, pt tba tele for optimization of his HF, VS stable, cont to reassess -Sherrill Pt p/w acute on chronic worsening SOB and peripheral edema, sx likely related to hx of significant HF, pt denies recent firing of his AICD, denies CP, pt also w/ large red LLE susp for DVT vs cellulitis, will obtain DVT study to r/o, pt tba tele for optimization of his HF, VS stable, cont to reassess -Sherrill      Attending note. Dyspnea due to COPD versus CHF. Cellulitis of left lower leg. IV antibiotics. Ultrasound to rule out DVT. Admission.

## 2020-02-17 NOTE — ED ADULT NURSE NOTE - NSIMPLEMENTINTERV_GEN_ALL_ED
Implemented All Fall Risk Interventions:  [place] to call system. Call bell, personal items and telephone within reach. Instruct patient to call for assistance. Room bathroom lighting operational. Non-slip footwear when patient is off stretcher. Physically safe environment: no spills, clutter or unnecessary equipment. Stretcher in lowest position, wheels locked, appropriate side rails in place. Provide visual cue, wrist band, yellow gown, etc. Monitor gait and stability. Monitor for mental status changes and reorient to person, place, and time. Review medications for side effects contributing to fall risk. Reinforce activity limits and safety measures with patient and family.

## 2020-02-17 NOTE — H&P ADULT - ASSESSMENT
79M with PMHx HFrEF (EF 20-25% on 11/27/19) s/p AICD, CAD s/p PCI and CABG, severe aortic stenosis s/p TAVR, known LBBB, COPD, HTN, Afib (on Coumadin), and CKD3 who presents with lower extremity swelling, pain and erythema, left > right 79M with PMHx HFrEF (EF 20-25% on 11/27/19) s/p AICD, CAD s/p PCI and CABG, severe aortic stenosis s/p TAVR, known LBBB, COPD, HTN, Afib (on Coumadin), and CKD3 who presents with lower extremity swelling likely 2/2 left lower extremity cellulitis.

## 2020-02-17 NOTE — H&P ADULT - PROBLEM SELECTOR PLAN 2
Asymmetric lower extremity edema, left > right, with flat erythematous rash on left side concerning for infection. DDx includes cellulitis/SSTI vs   -DVT US of LLE negative  - Asymmetric lower extremity edema, left > right, with flat erythematous rash on left side concerning for infection. DDx includes cellulitis/SSTI vs zoster vs DVT  -DVT US of LLE negative  -Left foot x-ray pending to r/o abscess or necrotizing fasciitis  - Patient with HFrEF (EF 20-25% on 11/2019 TTE) presenting with acute on chronic lower extremity edema. CXR with pleural effusions, pulmonary congestion. proBNP 21K. However patient looks otherwise relatively dry on exam.  -Continue with metoprolol succinate at reduced dose  -Hold diuretic in setting of worsening MERCED on CKD

## 2020-02-17 NOTE — ED PROVIDER NOTE - CARE PLAN
Principal Discharge DX:	Heart failure Principal Discharge DX:	Heart failure  Secondary Diagnosis:	Cellulitis of leg, left

## 2020-02-17 NOTE — ED PROVIDER NOTE - PSH
S/P CABG x 3  9/17/2007 at [facility]  S/P hernia repair  inguinal, 1998  S/P prostatectomy  11/18/2008  S/P TAVR (transcatheter aortic valve replacement)  2016 in [facility]  S/P tonsillectomy  as a child

## 2020-02-17 NOTE — ED PROVIDER NOTE - OBJECTIVE STATEMENT
79M with PMHx of CAD (post-CABG and PCI), severe aortic stenosis (post-TAVR), known LBBB, COPD, current smoker, HTN, atrial fibrillation, and CKD3, recent AICD placement for EF 30% 79M with PMHx of CAD (post-CABG and PCI), severe aortic stenosis (post-TAVR), known LBBB, COPD, current smoker, HTN, atrial fibrillation, and CKD3, recent AICD placement for EF 30%, p/w worsening LE edema and SOB. Pt complaining of significant pain in his LLE 2/2 swelling; this extremity is markedly more swollen and red than his LLE. Pt denies missing any doses of his budesonide at home. Pt continues to smoke daily. Pt reporting SOB that is mildly worsened from his baseline, but is speaking in full sentences and not using accessory muscles to breathe. 79M with PMHx of CAD (post-CABG and PCI), severe aortic stenosis (post-TAVR), known LBBB, COPD, current smoker, HTN, atrial fibrillation, and CKD3, recent AICD placement for EF 30%, p/w worsening LE edema and SOB. Pt complaining of significant pain in his LLE 2/2 swelling; this extremity is markedly more swollen and red than his LLE. Pt denies missing any doses of his budesonide at home. Pt continues to smoke daily. Pt reporting SOB that is mildly worsened from his baseline, but is speaking in full sentences and not using accessory muscles to breathe.         Attending note.  Patient was seen in room #26. Agree with the above. This is complaining of difficulty breathing and pain and swelling in the lower leg with redness the last couple of days. Patient reports be compliant with diet and medications. He denies any chest pain, palpitations, lightheadedness or near syncopal events. He denies any fever, numbness or paresthesia. he is compliant with his anticoagulants.

## 2020-02-17 NOTE — ED PROVIDER NOTE - PHYSICAL EXAMINATION
Attending note. Patient is alert and in no acute distress. Patient has no labored breathing. Pupils are 3 mm equal and reactive. Patient has dry mucous membranes. Patient has coarse breath sounds bilaterally. There is no no rales. Patient has fine expiratory wheeze anteriorly. There is no abdominal distention or tenderness. There is no hepatojugular reflux or JVD. There is no sacral edema. Patient has any edema of bilateral lower extremities. There is increased swelling in the left lower extremity appeared to the right with erythema increased heat consistent with cellulitis. Her is no focal neurologic deficit.

## 2020-02-17 NOTE — H&P ADULT - PROBLEM SELECTOR PLAN 3
Atrial fibrillation, rate-controlled, CHADS2-VASc = 5. On home Coumadin for anticoagulation.  -monitor PT/INR for daily Coumadin dosing Atrial fibrillation, rate-controlled with metoprolol succinate 100mg BID, CHADS2-VASc = 5. On home Coumadin 2.5mg daily for anticoagulation. INR 3.55 on admission.  -Hold Coumadin dose today.  -Continue with metoprolol succinate at lower dose 50mg BID given relative hypotension and to prevent reflex tachycardia  -monitor PT/INR for daily Coumadin dosing

## 2020-02-17 NOTE — H&P ADULT - NSHPSOCIALHISTORY_GEN_ALL_CORE
Smoking history: cigars, reports he has smoked his whole life since age 16, quit 2 days ago.  Denies EtOH. Denies other illicit drug use.

## 2020-02-17 NOTE — ED ADULT NURSE REASSESSMENT NOTE - NS ED NURSE REASSESS COMMENT FT1
1336 pt given pain meds as ordered and family at the bedside.  vs stable and pt brought lunch will continue to monitor [place]

## 2020-02-17 NOTE — H&P ADULT - NSICDXPASTSURGICALHX_GEN_ALL_CORE_FT
PAST SURGICAL HISTORY:  S/P CABG x 3 9/17/2007 at [facility]    S/P hernia repair inguinal, 1998    S/P prostatectomy 11/18/2008    S/P TAVR (transcatheter aortic valve replacement) 2016 in [facility]    S/P tonsillectomy as a child

## 2020-02-17 NOTE — ED PROVIDER NOTE - PMH
Atrial fibrillation, unspecified type  on coumadin  Bilateral carotid artery disease    COPD (chronic obstructive pulmonary disease)  cigar smoker for 29 years, quit 1/2019  Coronary artery disease of bypass graft of native heart with stable angina pectoris    Erectile dysfunction, unspecified erectile dysfunction type    Heart failure    Hypertension    Moderate tricuspid regurgitation    Osteoarthritis    Polio    Prostate cancer  Prostatectomy 2008, radiation 2011

## 2020-02-17 NOTE — ED ADULT NURSE NOTE - OBJECTIVE STATEMENT
80y/o male aaox4 ambulatory w/ assistance/cane hx COPD, HF, chronic cough,   pacemaker,   presents to the ED  from home c/o  weakness, R leg pain, as per pt he had placed pacemaker last January since then he will have multiple[le admission for fluid retention, last Friday started w/ redness, +3 edema, pain to touch, warm , +pulses  to the R lower leg,  +1 edema, +pulses  to L lower leg.  Denies fever, chills, n/v, abd pain, diarrhea/constipation, numbness/tingling, urinary issues, in no respiratory distress, no CP, PT safety maintained with family at bedside, call bell within reach and bed in the lowest position.

## 2020-02-18 NOTE — PHYSICAL THERAPY INITIAL EVALUATION ADULT - ADDITIONAL COMMENTS
Pt lives alone in , has a flight of stairs to enter. Previously (I) with bathing/toileting however needed assist with cooking, driving, etc. Has walk-in shower with chair. Ambulated with single cane. Has 24/7 HHA.

## 2020-02-18 NOTE — PROGRESS NOTE ADULT - ASSESSMENT
79M with PMHx HFrEF (EF 20-25% on 11/27/19) s/p AICD, CAD s/p PCI and CABG, severe aortic stenosis s/p TAVR, known LBBB, COPD, HTN, Afib (on Coumadin), and CKD3 who presents with lower extremity swelling likely 2/2 left lower extremity cellulitis.

## 2020-02-18 NOTE — PROGRESS NOTE ADULT - PROBLEM SELECTOR PROBLEM 7
CKD (chronic kidney disease), stage III Coronary artery disease of bypass graft of native heart with stable angina pectoris

## 2020-02-18 NOTE — PROGRESS NOTE ADULT - PROBLEM SELECTOR PLAN 8
DVT ppx: Coumadin - holding tonight for supratherapeutic INR  Diet: DASH/TLC Patient discharged on 1/1/20 with serum Cr of 2.24. Serum Cr as low as 1.6-1.7 in 12/2019.  -Likely prerenal in etiology in setting of increased Bumex dose at home  -Hold diuretic  -Avoid nephrotoxic medications, renally dose medications

## 2020-02-18 NOTE — PROGRESS NOTE ADULT - PROBLEM SELECTOR PLAN 1
Asymmetric lower extremity edema, left > right, with flat erythematous rash on left side concerning for infection. DDx includes cellulitis/SSTI vs zoster vs DVT  -DVT US of LLE negative  -Left foot x-ray pending to r/o abscess or necrotizing fasciitis. F/u read.  -s/p Vancomycin 1g x 1 in ED.  -ABX: Ancef (2/17-)  -Pain control: morphine 2mg q4h for severe pain, tylenol for moderate pain Asymmetric lower extremity edema, left > right, with flat erythematous rash on left side concerning for infection. DDx includes cellulitis/SSTI vs zoster vs DVT  -DVT US of LLE negative  -Left foot x-ray showing mod soft tissue swelling, no gas tracking. X-ray left tib-fib with sclerotic lesion.  -s/p Vancomycin 1g x 1 in ED.  -ABX: Ancef (2/17-)  -Pain control: morphine 2mg q4h for severe pain, tylenol for moderate pain

## 2020-02-18 NOTE — PHYSICAL THERAPY INITIAL EVALUATION ADULT - CRITERIA FOR SKILLED THERAPEUTIC INTERVENTIONS
TBD pending further PT eval for ambulation rehab potential/impairments found/functional limitations in following categories

## 2020-02-18 NOTE — PHYSICAL THERAPY INITIAL EVALUATION ADULT - PLANNED THERAPY INTERVENTIONS, PT EVAL
stairs: GOAL: Pt will ascend/descend (8) steps (I) with U HR and step to pattern in 4 weeks./strengthening/transfer training/balance training/gait training/bed mobility training

## 2020-02-18 NOTE — PHYSICAL THERAPY INITIAL EVALUATION ADULT - BALANCE TRAINING, PT EVAL
GOAL: Pt will demonstrate improved static/dynamic standing balance by 1/2 grade, in order to improve stability, decrease fall risk and increase independence with ADLs within 4 weeks.

## 2020-02-18 NOTE — PROGRESS NOTE ADULT - PROBLEM SELECTOR PLAN 3
Atrial fibrillation, rate-controlled with metoprolol succinate 100mg BID, CHADS2-VASc = 5. On home Coumadin 2.5mg daily for anticoagulation. INR 3.55 on admission.  -Hold Coumadin dose today.  -Continue with metoprolol succinate at lower dose 50mg BID given relative hypotension and to prevent reflex tachycardia  -monitor PT/INR for daily Coumadin dosing Patient with HFrEF (EF 20-25% on 11/2019 TTE) presenting with acute on chronic lower extremity edema. CXR with pleural effusions, pulmonary congestion. proBNP 21K. However patient looks otherwise relatively dry on exam.  -Continue with metoprolol succinate at reduced dose  -Hold diuretic in setting of worsening MERCED on CKD

## 2020-02-18 NOTE — PROGRESS NOTE ADULT - PROBLEM SELECTOR PLAN 5
Soft BPs on admission. Home med of metoprolol succinate 100mg BID.  -Continue with metoprolol succinate at reduced dose 50mg BID given relative hypotension, with hold parameters Wheezing on exam, current smoker.  -Deepika Wheezing on exam, current smoker.  -Duonebs q6h

## 2020-02-18 NOTE — PROGRESS NOTE ADULT - PROBLEM SELECTOR PLAN 2
Patient with HFrEF (EF 20-25% on 11/2019 TTE) presenting with acute on chronic lower extremity edema. CXR with pleural effusions, pulmonary congestion. proBNP 21K. However patient looks otherwise relatively dry on exam.  -Continue with metoprolol succinate at reduced dose  -Hold diuretic in setting of worsening MERCED on CKD Left tib-fib Xray shows sclerotic lesion in proximal tibia. DDx includes benign bone lesion vs malignancy (h/o prostate cancer)  -Clarify prostate cancer history

## 2020-02-18 NOTE — PROGRESS NOTE ADULT - PROBLEM SELECTOR PLAN 10
Transitions of Care Status:  1.  Name of PCP: Dr. Quiroz [phone]  2.  PCP Contacted on Admission: [ ] Y    [ ] N    3.  PCP contacted at Discharge: [ ] Y    [ ] N    [ ] N/A  4.  Post-Discharge Appointment Date and Location:  5.  Summary of Handoff given to PCP:

## 2020-02-18 NOTE — PROGRESS NOTE ADULT - PROBLEM SELECTOR PLAN 4
Wheezing on exam, current smoker.  -Deepika Atrial fibrillation, rate-controlled with metoprolol succinate 100mg BID, CHADS2-VASc = 5. On home Coumadin 2.5mg daily for anticoagulation. INR 3.55 on admission.  -Hold Coumadin dose today.  -Continue with metoprolol succinate at lower dose 50mg BID given relative hypotension and to prevent reflex tachycardia  -monitor PT/INR for daily Coumadin dosing Atrial fibrillation, rate-controlled with metoprolol succinate 100mg BID, CHADS2-VASc = 5. On home Coumadin 2.5mg daily for anticoagulation. INR 3.55 on admission.  -Hold Coumadin dose again today for supratherapeutic INR  -Continue with metoprolol succinate at lower dose 50mg BID given relative hypotension and to prevent reflex tachycardia  -monitor PT/INR for daily Coumadin dosing

## 2020-02-18 NOTE — PHYSICAL THERAPY INITIAL EVALUATION ADULT - PRECAUTIONS/LIMITATIONS, REHAB EVAL
fall precautions/As per Team 2 (#[id number]) resident, there are no weightbearing restrictions and pt is cleared for OOB activity. VA Duplex LE Vein: (-) L LE DVT. Soft tissue edema within the left calf. CXR 2/17: Trace bilateral pleural effusions. Mild pulmonary vascular congestion. Foot XR 2/17: Moderate soft tissue swelling. No soft tissue gas identified. Tib/Fib XR 2/17: Mild generalized soft tissue swelling. No soft tissue gas identified. Nonspecific 3 cm sclerotic lesion with irregular margins in the proximal tibia.

## 2020-02-18 NOTE — PROGRESS NOTE ADULT - PROBLEM SELECTOR PLAN 7
Patient discharged on 1/1/20 with serum Cr of 2.24. Serum Cr as low as 1.6-1.7 in 12/2019.  -Likely prerenal in etiology in setting of increased Bumex dose at home  -Hold diuretic  -Avoid nephrotoxic medications, renally dose medications History CAD s/p PCI and CABG.  -Continue with metoprolol succinate as above  -Continue with home statin

## 2020-02-18 NOTE — PROGRESS NOTE ADULT - PROBLEM SELECTOR PLAN 9
Transitions of Care Status:  1.  Name of PCP: Dr. Quiroz [phone]  2.  PCP Contacted on Admission: [ ] Y    [ ] N    3.  PCP contacted at Discharge: [ ] Y    [ ] N    [ ] N/A  4.  Post-Discharge Appointment Date and Location:  5.  Summary of Handoff given to PCP: DVT ppx: Coumadin - holding tonight for supratherapeutic INR  Diet: DASH/TLC

## 2020-02-18 NOTE — PHYSICAL THERAPY INITIAL EVALUATION ADULT - STRENGTHENING, PT EVAL
GOAL: Pt will improve bilateral LE strength by 1/2 grade, for increased limb stability, to improve gait and facilitate stair negotiation in 4 weeks.

## 2020-02-18 NOTE — PROGRESS NOTE ADULT - PROBLEM SELECTOR PLAN 6
History CAD s/p PCI and CABG.  -Continue with metoprolol succinate as above  -Continue with home statin Soft BPs on admission. Home med of metoprolol succinate 100mg BID.  -Continue with metoprolol succinate at reduced dose 50mg BID given relative hypotension, with hold parameters Soft BPs on admission. Home med of metoprolol succinate 100mg BID.  -Continue with metoprolol succinate at reduced dose 50mg BID given continued relative hypotension, with hold parameters

## 2020-02-18 NOTE — PHYSICAL THERAPY INITIAL EVALUATION ADULT - PERTINENT HX OF CURRENT PROBLEM, REHAB EVAL
79M with PMHx HFrEF (EF 20-25% on 11/27/19) s/p AICD, CAD s/p PCI and CABG, severe aortic stenosis s/p TAVR, known LBBB, COPD, HTN, Afib (on Coumadin), and CKD3 who p/w LE swelling, pain and erythema, (L>R). Patient reports LE swelling began worsening 1 week ago, initially bilaterally, but now worse on the left. Associated with worsening erythema and pain. Tried to increase his Bumex dose at home, but LE swelling continued to worsen.  CONTINUED BELOW:

## 2020-02-18 NOTE — PROGRESS NOTE ADULT - SUBJECTIVE AND OBJECTIVE BOX
Dalia Triana, PGY-1  Internal Medicine  Pager #: [phone] / [facility] [phone]    PROGRESS NOTE:     Patient is a 79y old  Male who presents with a chief complaint of Cellulitis (17 Feb 2020 18:39)      SUBJECTIVE / OVERNIGHT EVENTS:  -No acute overnight events. Overnight, patient used tylenol 975mg PO x 1, morphine 2mg IV x 2 for pain.    ADDITIONAL REVIEW OF SYSTEMS:    MEDICATIONS  (STANDING):  acetaminophen   Tablet .. 975 milliGRAM(s) Oral every 8 hours  albuterol/ipratropium for Nebulization 3 milliLiter(s) Nebulizer every 6 hours  atorvastatin 20 milliGRAM(s) Oral at bedtime  ceFAZolin   IVPB      ceFAZolin   IVPB 1000 milliGRAM(s) IV Intermittent every 12 hours  folic acid 1 milliGRAM(s) Oral daily  heparin  Injectable 5000 Unit(s) SubCutaneous every 12 hours  metoprolol tartrate 50 milliGRAM(s) Oral two times a day    MEDICATIONS  (PRN):  morphine  - Injectable 2 milliGRAM(s) IV Push every 4 hours PRN moderate to severe pain      CAPILLARY BLOOD GLUCOSE        I&O's Summary    17 Feb 2020 07:01  -  18 Feb 2020 07:00  --------------------------------------------------------  IN: 0 mL / OUT: 400 mL / NET: -400 mL        PHYSICAL EXAM:  Vital Signs Last 24 Hrs  T(C): 36.4 (18 Feb 2020 04:00), Max: 36.9 (17 Feb 2020 12:08)  T(F): 97.5 (18 Feb 2020 04:00), Max: 98.5 (17 Feb 2020 18:15)  HR: 80 (18 Feb 2020 05:44) (80 - 98)  BP: 105/65 (18 Feb 2020 05:44) (97/57 - 122/67)  BP(mean): --  RR: 17 (18 Feb 2020 04:00) (17 - 20)  SpO2: 98% (18 Feb 2020 04:00) (95% - 100%)    GENERAL: elderly thin male lying in bed, in no acute respiratory distress  HEENT: dry mucous membranes  NECK: Supple, No JVD  NERVOUS SYSTEM: Alert & Oriented X3, non-focal  CHEST/LUNG: expiratory wheezing heard in bilateral lung fields  HEART: irregularly irregular rhythm; normal S1 and S2; No murmurs, rubs, or gallops  ABDOMEN: Soft, Nontender, Nondistended: Bowel sounds present  EXTREMITIES: 2+ Peripheral Pulses, 2+ pitting edema on right, 3+ pitting edema on left  LYMPH: No lymphadenopathy noted  SKIN: flat erythematous rash on posterior aspect of lower extremity extending above knee, tender to touch    LABS:                        8.8    2.95  )-----------( 147      ( 18 Feb 2020 06:22 )             28.5     02-18    142  |  97  |  82<H>  ----------------------------<  109<H>  4.4   |  28  |  2.80<H>    Ca    8.8      18 Feb 2020 06:22    TPro  7.2  /  Alb  3.3  /  TBili  0.3  /  DBili  x   /  AST  14  /  ALT  10  /  AlkPhos  84  02-18    PT/INR - ( 17 Feb 2020 12:20 )   PT: 42.1 sec;   INR: 3.55 ratio         PTT - ( 17 Feb 2020 12:20 )  PTT:46.2 sec            RADIOLOGY & ADDITIONAL TESTS:  Results Reviewed:   Imaging Personally Reviewed:  Electrocardiogram Personally Reviewed:    COORDINATION OF CARE:  Care Discussed with Consultants/Other Providers [Y/N]:  Prior or Outpatient Records Reviewed [Y/N]: Dalia Triana, PGY-1  Internal Medicine  Pager #: [phone] / [facility] [phone]    PROGRESS NOTE:     Patient is a 79y old  Male who presents with a chief complaint of Cellulitis (17 Feb 2020 18:39)      SUBJECTIVE / OVERNIGHT EVENTS:  -No acute overnight events. Overnight, patient used tylenol 975mg PO x 1, morphine 2mg IV x 2 for pain.    Patient reports improvement in lower extremity pain and swelling. Pain medications working to alleviate pain.    ADDITIONAL REVIEW OF SYSTEMS:  Denies fever/chills, chest pain, SOB, abd pain, N/V/D, constipation.    MEDICATIONS  (STANDING):  acetaminophen   Tablet .. 975 milliGRAM(s) Oral every 8 hours  albuterol/ipratropium for Nebulization 3 milliLiter(s) Nebulizer every 6 hours  atorvastatin 20 milliGRAM(s) Oral at bedtime  ceFAZolin   IVPB      ceFAZolin   IVPB 1000 milliGRAM(s) IV Intermittent every 12 hours  folic acid 1 milliGRAM(s) Oral daily  heparin  Injectable 5000 Unit(s) SubCutaneous every 12 hours  metoprolol tartrate 50 milliGRAM(s) Oral two times a day    MEDICATIONS  (PRN):  morphine  - Injectable 2 milliGRAM(s) IV Push every 4 hours PRN moderate to severe pain      CAPILLARY BLOOD GLUCOSE        I&O's Summary    17 Feb 2020 07:01  -  18 Feb 2020 07:00  --------------------------------------------------------  IN: 0 mL / OUT: 400 mL / NET: -400 mL        PHYSICAL EXAM:  Vital Signs Last 24 Hrs  T(C): 36.4 (18 Feb 2020 04:00), Max: 36.9 (17 Feb 2020 12:08)  T(F): 97.5 (18 Feb 2020 04:00), Max: 98.5 (17 Feb 2020 18:15)  HR: 80 (18 Feb 2020 05:44) (80 - 98)  BP: 105/65 (18 Feb 2020 05:44) (97/57 - 122/67)  BP(mean): --  RR: 17 (18 Feb 2020 04:00) (17 - 20)  SpO2: 98% (18 Feb 2020 04:00) (95% - 100%)    GENERAL: elderly thin male lying in bed, in no acute respiratory distress  HEENT: dry mucous membranes  NECK: Supple, No JVD  NERVOUS SYSTEM: Alert & Oriented X3, non-focal  CHEST/LUNG: expiratory wheezing heard in bilateral lung fields  HEART: irregularly irregular rhythm; normal S1 and S2; No murmurs, rubs, or gallops  ABDOMEN: Soft, Nontender, Nondistended: Bowel sounds present  EXTREMITIES: 2+ Peripheral Pulses, 1+ pitting edema on right, 2+ pitting edema on left  LYMPH: No lymphadenopathy noted  SKIN: flat erythematous rash on posterior aspect of lower extremity extending above knee, improving    LABS:                        8.8    2.95  )-----------( 147      ( 18 Feb 2020 06:22 )             28.5     02-18    142  |  97  |  82<H>  ----------------------------<  109<H>  4.4   |  28  |  2.80<H>    Ca    8.8      18 Feb 2020 06:22    TPro  7.2  /  Alb  3.3  /  TBili  0.3  /  DBili  x   /  AST  14  /  ALT  10  /  AlkPhos  84  02-18    PT/INR - ( 17 Feb 2020 12:20 )   PT: 42.1 sec;   INR: 3.55 ratio         PTT - ( 17 Feb 2020 12:20 )  PTT:46.2 sec            RADIOLOGY & ADDITIONAL TESTS:  Results Reviewed:   Imaging Personally Reviewed:  Electrocardiogram Personally Reviewed:    COORDINATION OF CARE:  Care Discussed with Consultants/Other Providers [Y/N]:  Prior or Outpatient Records Reviewed [Y/N]:

## 2020-02-18 NOTE — PROVIDER CONTACT NOTE (OTHER) - ASSESSMENT
Pt was ingesting lunch at time of the episode, remains A&O x 4, VSS, denies CP/SOB/palpitation/pain/discomfort.

## 2020-02-18 NOTE — PHYSICAL THERAPY INITIAL EVALUATION ADULT - TRANSFER TRAINING, PT EVAL
GOAL: Pt will perform ALL transfers with (I), w/use of appropriate assistive device as needed, in 4 weeks.

## 2020-02-19 NOTE — DIETITIAN INITIAL EVALUATION ADULT. - PROBLEM SELECTOR PLAN 7
Patient discharged on 1/1/20 with serum Cr of 2.24. Serum Cr as low as 1.6-1.7 in 12/2019.  -Likely prerenal in etiology in setting of increased Bumex dose at home  -Hold diuretic  -Avoid nephrotoxic medications, renally dose medications

## 2020-02-19 NOTE — DIETITIAN INITIAL EVALUATION ADULT. - OTHER INFO
Pt is 79yoM with PMH significant for "HFrEF (EF 20-25% on 11/27/19) s/p AICD, CAD s/p PCI and CABG, severe aortic stenosis s/p TAVR, known LBBB, COPD, HTN, Afib (on Coumadin), and CKD3 who presents with lower extremity swelling likely 2/2 left lower extremity cellulitis."    Limited subjective information available at this time. Unable to obtain information regarding therapeutic diet followed PTA, if any, as well as po intake history. Pt noted on coumadin PTA.  Per previous RD note from 12/27/19, pt noted he was eating well prior to that admission, reported NKFA, denied any chewing/swallowing difficulties at baseline.   No acute GI distress noted at this time.  Pt consumed 100% of meals provided yesterday per nursing flow sheet.    Nutrition Supplements PTA: per H&P pt was taking folic acid and vitamin D3.  NKFA reported.    Weight history per chart: 146.6 lbs (12/3/19), 136.2 lbs (12/21/19), 122.3 lbs (1/1/20). Current dosing wt is 128 lbs and wt obtained yesterday was 129.4 lbs (2/18). Pt noted with h/o fluid shifts. No dry weight available at this time. Suspect lower weight status is masked by fluid at this time.    Pt unable to complete CHF nutrition education at this time. RD remains available.

## 2020-02-19 NOTE — CHART NOTE - NSCHARTNOTEFT_GEN_A_CORE
RD CHF education chart note. Pt not amenable to provision of nutrition education this AM (as noted in RD initial assessment), however RD called back to room by nurse liaison when pt aide arrived this afternoon, as pt aide requesting to have nutrition education provided to her in patient's presence.    Heart failure education provided to pt aide in detail. Discussed heart failure nutrition therapy, sodium and fluid intake, importance of diet adherence, daily weights monitoring with the patient. Reinforced importance of weight gain parameters and importance of contacting MD’s about weight changes. Provided handouts on heart failure nutrition therapy, reading heart healthy nutrition labels, heart healthy shopping tips and low sodium food list. Patient aide verbalized understanding demonstrated by teach back method. Pt states he has no questions at this time. RD contact information left with patient aide for any future questioning.    Viry Gandhi RD, CDN Pager: [phone]

## 2020-02-19 NOTE — PROGRESS NOTE ADULT - PROBLEM SELECTOR PLAN 4
Atrial fibrillation, rate-controlled with metoprolol succinate 100mg BID, CHADS2-VASc = 5. On home Coumadin 2.5mg daily for anticoagulation. INR 3.55 on admission.  -Hold Coumadin dose again today for supratherapeutic INR  -Continue with metoprolol succinate at lower dose 50mg BID given relative hypotension and to prevent reflex tachycardia  -monitor PT/INR for daily Coumadin dosing Atrial fibrillation, rate-controlled with metoprolol succinate 100mg BID, CHADS2-VASc = 5. On home Coumadin 2.5mg daily for anticoagulation. INR 3.55 on admission.  -Hold Coumadin dose again today for supratherapeutic INR  -Resume home metoprolol dose 100mg BID given patient running more tachy this morning to 100s  -monitor PT/INR for daily Coumadin dosing

## 2020-02-19 NOTE — PROGRESS NOTE ADULT - PROBLEM SELECTOR PLAN 6
Soft BPs on admission. Home med of metoprolol succinate 100mg BID.  -Continue with metoprolol succinate at reduced dose 50mg BID given continued relative hypotension, with hold parameters Macrocytic anemia, Hb stable at baseline, no signs of active bleeding. With leukopenia.  -Recent b12 and folate levels in 12/2019 wnl

## 2020-02-19 NOTE — PROGRESS NOTE ADULT - PROBLEM SELECTOR PLAN 7
History CAD s/p PCI and CABG.  -Continue with metoprolol succinate as above  -Continue with home statin Soft BPs on admission. Home med of metoprolol succinate 100mg BID.  -Continue with metoprolol succinate at reduced dose 50mg BID given continued relative hypotension, with hold parameters Soft BPs on admission. Home med of metoprolol succinate 100mg BID.  -Initially started with metoprolol succinate at reduced dose 50mg BID given continued relative hypotension, however BP now rising. Will increase back to home metoprolol dose 100mg BID

## 2020-02-19 NOTE — PROGRESS NOTE ADULT - PROBLEM SELECTOR PLAN 2
Left tib-fib Xray shows sclerotic lesion in proximal tibia. DDx includes benign bone lesion vs malignancy (h/o prostate cancer)  -Clarify prostate cancer history Left tib-fib Xray shows sclerotic lesion in proximal tibia. DDx includes benign bone lesion vs malignancy (h/o prostate cancer)  -Clarify prostate cancer history - patient previously following with Dr. Nito Littlejohn, currently looking for new urologist within [facility].

## 2020-02-19 NOTE — DIETITIAN INITIAL EVALUATION ADULT. - ETIOLOGY
suspect inadequate energy intake in setting of increased physiological demand for nutrients with multiple chronic comorbidities noted

## 2020-02-19 NOTE — DIETITIAN INITIAL EVALUATION ADULT. - ADD RECOMMEND
1. Continue current DASH/TLC diet as indicated and attempt to obtain additional food preferences as able to promote adequate po intake. 2. Offer oral nutrition supplement as able on follow up 3. Consider addition of multivitamin 4. Provide/review nutrition education as indicated 4. Will continue to monitor nutrient intake, wt, labs, f/u per protocol

## 2020-02-19 NOTE — DIETITIAN INITIAL EVALUATION ADULT. - PERTINENT MEDS FT
MEDICATIONS  (STANDING):  acetaminophen   Tablet .. 975 milliGRAM(s) Oral every 8 hours  albuterol/ipratropium for Nebulization 3 milliLiter(s) Nebulizer every 6 hours  atorvastatin 20 milliGRAM(s) Oral at bedtime  ceFAZolin   IVPB      ceFAZolin   IVPB 1000 milliGRAM(s) IV Intermittent every 12 hours  folic acid 1 milliGRAM(s) Oral daily  heparin  Injectable 5000 Unit(s) SubCutaneous every 12 hours  metoprolol tartrate 50 milliGRAM(s) Oral two times a day    MEDICATIONS  (PRN):  morphine  - Injectable 2 milliGRAM(s) IV Push every 4 hours PRN moderate to severe pain

## 2020-02-19 NOTE — DIETITIAN INITIAL EVALUATION ADULT. - REASON INDICATOR FOR ASSESSMENT
[facility] Medicine History & Physical Note    Date of Service  7/28/2018    Primary Care Physician  Cortez Dove M.D.    Consultants  Oral surgery     Code Status  Full code      Chief Complaint  Left sided facial pain, fever    History of Presenting Illness  Patient is a 51 year old female who comes to the ER because of a fever and left sided facial pain and swelling. About 3 days ago, she noticed increasing pain in her upper left tooth. She states she has cracked this tooth in the past but never followed up with a dentist. She has pain in that tooth when she tries to eat. Over the past 3 days, the left side of her face has become more swollen and tender and she also has pain in her left sinus. She took her temp last night and her temp was 101.9. She denies any trouble breathing or swallowing. She has pain when trying to open her mouth. In the ER, she had a CT maxillofacial bones which reveals let maxillary sinusitis due to erosion of the left maxillary tooth into the sinus along with left facial cellulitis. WBC is normal but temp on arrival is 102.8. Sodium is 131. All other blood work is unremarkable.     Review of Systems  Review of Systems   Constitutional: Positive for fever.   HENT: Positive for sinus pain.         Left sided facial pain and swelling   Eyes: Negative.    Respiratory: Negative.    Cardiovascular: Negative.    Gastrointestinal: Negative.    Musculoskeletal: Negative.    Neurological: Negative.    Psychiatric/Behavioral: Negative.        Past Medical History   has a past medical history of ASTHMA.    Surgical History   has a past surgical history that includes other orthopedic surgery (2006) and shreyas by laparoscopy (8/12/08).     Family History  family history is not on file.     Social History   reports that she has been smoking.  She has been smoking about 0.50 packs per day. She has never used smokeless tobacco. She reports that she drinks alcohol. She reports that she does not use  drugs.    Allergies  Allergies   Allergen Reactions   • Nkda [No Known Drug Allergy]        Medications  None       Physical Exam  Blood Pressure: 146/74   Temperature: (!) 39.3 °C (102.8 °F)   Pulse: 96   Respiration: 18   Pulse Oximetry: 94 %     Physical Exam   Gen: NAD, lying comfortably in bed  HEENT: moderate left sided facial swelling and tenderness to palpation  Chest: symmetrical expansion, no costochondral tenderness on palpation  Lungs: CTA B/L, no w/r/r  CV: +S1, S2, RRR  Abdomen: S/NT/ND, + BS x 4  Ext: no c/c/e, FROM x 4    Skin: dry, warm to touch   Neuro: CN II - XII intact, no focal motor or sensory deficits noted  Psych: A+O x 3, judgment is intact      Laboratory:  Recent Labs      18   2200   WBC  9.9   RBC  4.72   HEMOGLOBIN  13.0   HEMATOCRIT  39.7   MCV  84.1   MCH  27.5   MCHC  32.7*   RDW  41.6   PLATELETCT  222   MPV  9.0     Recent Labs      18   2200   SODIUM  131*   POTASSIUM  3.8   CHLORIDE  104   CO2  20   GLUCOSE  120*   BUN  9   CREATININE  0.80   CALCIUM  9.1     Recent Labs      18   2200   ALTSGPT  46   ASTSGOT  25   ALKPHOSPHAT  60   TBILIRUBIN  0.7   GLUCOSE  120*                 Lab Results   Component Value Date    TROPONINI <0.01 2014       Urinalysis:    Lab Results   Component Value Date    SPECGRAVITY <=1.005 2018    GLUCOSEUR Negative 2018    KETONES Negative 2018    NITRITE Negative 2018        Imagin2018 10:19 PM    HISTORY/REASON FOR EXAM: Facial pain    TECHNIQUE/EXAM DESCRIPTION AND NUMBER OF VIEWS:  CT scan of the maxillofacial with contrast, with reconstructions.    Thin-section helical imaging was obtained of the maxillofacial structures from the orbital roofs through the mandible. Coronal and sagittal multiplanar volume reformat images were generated from the axial data.,    80 mL of Omnipaque 350 nonionic contrast was injected intravenously.    Low dose optimization technique was utilized for this CT exam  including automated exposure control and adjustment of the mA and/or kV according to patient size.    COMPARISON:  None.    FINDINGS:    The visualized portions of the brain appear within normal limits.    Opacification of the left maxillary sinus is seen with left maxillary sinus hyperostosis. Partial opacification of the left ethmoid air cells is noted. There is erosion of left maxillary first molar tooth roots into the sinus.    There is left facial subcutaneous fat stranding identified. Overlying left facial skin thickening is noted.    No facial bone fractures are appreciated; the orbital floors are intact.    The globes and retrobulbar soft tissues appear within normal limits.   Impression         1.  Left maxillary sinusitis changes. There is erosion of left maxillary molar tooth root into the sinus with erosion of the tip of the tooth root, likely odontogenic infection extending into the sinus.  2.  Left facial cellulitis without visualized focal abscess. No enhancing fluid collection the soft tissues is seen to suggest abscess.       7/28/2018 12:40 AM    HISTORY/REASON FOR EXAM:  Jaw Pain.    TECHNIQUE/EXAM DESCRIPTION AND NUMBER OF VIEWS:  1 view(s) of the mandible.    COMPARISON:  None.    FINDINGS:    Smooth mandibular contours are seen. There is no visualized fracture line or cortical step-off. Opacification of the left maxillary sinus is seen.   Impression         1.  No acute traumatic bony injury identified.  2.  Opacification of the left maxillary sinus.             Assessment/Plan:    Hyponatremia   Assessment & Plan    Current sodium is 131. Started on normal saline. Monitor sodium levels        Pain, dental   Assessment & Plan    Continue IV Toradol prn pain        Facial cellulitis   Assessment & Plan    Secondary to apical abscess. Continue Unasyn. WBC and lactic acid are normal.        Apical abscess   Assessment & Plan    CT shows erosion of left maxillary molar tooth root into the sinus  causing left maxillary sinusitis. Oral surgery has been consulted and patient will have surgery tomorrow to remove tooth. Started on Unasyn. Keep NPO             Pt seen for nutrition consult for CHF nutrition education.  Information obtained from: electronic medical record, previous RD note (at time of RD visit, pt awake and alert with RN present in room, pt with multiple food complaints and speaks disrespectfully to this provider- deferring RD interview)

## 2020-02-19 NOTE — CHART NOTE - NSCHARTNOTEFT_GEN_A_CORE
Upon Nutritional Assessment by the Registered Dietitian your patient was determined to meet criteria / has evidence of the following diagnosis/diagnoses:          [ ]  Mild Protein Calorie Malnutrition        [x]  Moderate Protein Calorie Malnutrition        [ ] Severe Protein Calorie Malnutrition        [ ] Unspecified Protein Calorie Malnutrition        [ ] Underweight / BMI <19        [ ] Morbid Obesity / BMI > 40      Findings as based on:  [x] Comprehensive nutrition assessment   [ ] Nutrition Focused Physical Exam  [x] Other: pt with COPD, CHF, mild to moderate muscle wasting and fat loss, weight fluctuations noted in setting of increased physiological demand for nutrients with multiple chronic comorbidities noted    Nutrition Plan/Recommendations:    1. Continue current DASH/TLC diet as indicated and attempt to obtain additional food preferences as able to promote adequate po intake.   2. Offer oral nutrition supplement as able on follow up   3. Consider addition of multivitamin   4. Provide/review nutrition education as indicated   4. Will continue to monitor nutrient intake, wt, labs, f/u per protocol    RD remains available. Viry Gandhi RD, CDN Pager: [phone]       PROVIDER Section:     By signing this assessment you are acknowledging and agree with the diagnosis/diagnoses assigned by the Registered Dietitian    Comments:

## 2020-02-19 NOTE — DIETITIAN INITIAL EVALUATION ADULT. - PROBLEM SELECTOR PLAN 1
Asymmetric lower extremity edema, left > right, with flat erythematous rash on left side concerning for infection. DDx includes cellulitis/SSTI vs zoster vs DVT  -DVT US of LLE negative  -Left foot x-ray pending to r/o abscess or necrotizing fasciitis. F/u read.  -s/p Vancomycin 1g x 1 in ED.  -ABX: Ancef (2/17-)  -Pain control: morphine 2mg q4h for severe pain, tylenol for moderate pain

## 2020-02-19 NOTE — PROGRESS NOTE ADULT - PROBLEM SELECTOR PLAN 1
Asymmetric lower extremity edema, left > right, with flat erythematous rash on left side, likely representing cellulitis.  -DVT US of LLE negative  -Left foot x-ray showing mod soft tissue swelling, no gas tracking. X-ray left tib-fib with sclerotic lesion.  -s/p Vancomycin 1g x 1 in ED.  -ABX: Ancef (2/17-) day 3  -Pain control: morphine 2mg q4h for severe pain, tylenol for moderate pain Asymmetric lower extremity edema, left > right, with flat erythematous rash on left side, likely representing cellulitis.  -DVT US of LLE negative  -Left foot x-ray showing mod soft tissue swelling, no gas tracking. X-ray left tib-fib with sclerotic lesion.  -s/p Vancomycin 1g x 1 in ED.  -ABX: Ancef (2/17-) day 3  -Pain control: Dilaudid 0.5mg q4h PRN for severe pain (switch from morphine given MERCED on CKD), tylenol for moderate pain

## 2020-02-19 NOTE — DIETITIAN INITIAL EVALUATION ADULT. - PROBLEM SELECTOR PLAN 3
Atrial fibrillation, rate-controlled with metoprolol succinate 100mg BID, CHADS2-VASc = 5. On home Coumadin 2.5mg daily for anticoagulation. INR 3.55 on admission.  -Hold Coumadin dose today.  -Continue with metoprolol succinate at lower dose 50mg BID given relative hypotension and to prevent reflex tachycardia  -monitor PT/INR for daily Coumadin dosing

## 2020-02-19 NOTE — PROGRESS NOTE ADULT - PROBLEM SELECTOR PLAN 5
Wheezing on exam, current smoker.  -Duonebs q6h Wheezing on exam, current smoker. Unclear if patient using home nebs.  -Duonebs q6h

## 2020-02-19 NOTE — PROGRESS NOTE ADULT - PROBLEM SELECTOR PLAN 3
Physical Medicine & Rehabilitation Note-consult    Patient: Wilma Ponce MRN: [id number]  SSN: [id number]    YOB: 1927  Age: 81 y.o. Sex: female      Admit Date: 8/7/2018  Admitting Physician: Juan Miguel Moseley MD    Medical Decision Making/Plan/Recommend: Gait impairment and functional deficits following left total hip arthroplasty. Plan for BF discharge, sub acute rehab. Patient will benefit from daily skilled rehabilitation efforts and regular medical and nursing care at SNF. Continue PT, OT for LLE strengthening, mobility, transfers, and gait training. Will follow progress. Chief Complaint : Gait dysfunction secondary to below. Admit Diagnosis: Unilateral primary osteoarthritis, left hip [M16.12]  left  total hip arthroplasty   8/7/2018  Pain  DVT risk  Post op hemorrhagic anemia  Acute Rehab Dx:  Gait impairment  Mobility and ambulation deficits  Self Care/ADL deficits    Medical Dx:  Past Medical History:   Diagnosis Date    Anemia     EGD 10-3-12:  schatzki's ring, duodenal ulcer, gastritis. Colonoscopy 10-3-12: diverticulosis, colon polyp. repeat colonoscopy 10 years    HTN (hypertension), benign     Hypercholesteremia     Migraine      Subjective:       HPI: Wilma Ponce is a 81 y.o. female patient at [address] [place] [address] who was admitted on 8/7/2018  by Juan Miguel Moseley MD with below mentioned medical history, is being seen for Physical Medicine and Rehabilitation consult. Wilma Ponce had painful left hip due to severe DJD, that has been refractory to conservative management. The symptoms were aggravated by weight bearing, walking and activity, limiting daily function. patient underwent a left JANE per Dr. Juan Miguel Moseley MD on 8/7/2018. The patient's adilene operative course has been unevetful. We are consulted to assist with rehab needs and placement. Patient is to be WBAT LLE. Hip precautions are to be followed strictly.  Patient is starting to work with acute PT, OT, standing, taking steps with CGA so far. Patient still shows significant functional deficits due to right hip pain, decreased ROM and strength. Connie Harris is seen and examined today. Medical Records reviewed. Patient denies any other major pre morbid functional deficits. Patient has been active and independent prior to admission, but limited by hip pain. Prior Level of Function/Work/Activity:   Independent with functional mobility. Present level of function - bed mobility - CGA, transfers - CGA, decreased balance , ambulation - 25' with using a RW. Family History   Problem Relation Age of Onset    Cancer Sister      breast      Social History   Substance Use Topics    Smoking status: Never Smoker    Smokeless tobacco: Never Used    Alcohol use No     Past Surgical History:   Procedure Laterality Date    ENDOSCOPY, COLON, DIAGNOSTIC  10/3/12    diverticulosis, colon polyp    HX APPENDECTOMY      HX BACK SURGERY      HX HYSTERECTOMY      HX ORTHOPAEDIC      right wrist, left shoulder    HX REFRACTIVE SURGERY      HX TONSILLECTOMY        Prior to Admission medications    Medication Sig Start Date End Date Taking? Authorizing Provider   vit C/E/Zn/coppr/lutein/zeaxan (PRESERVISION AREDS 2 PO) Take  by mouth. Yes Historical Provider   carvedilol (COREG) 6.25 mg tablet Take 1 Tab by mouth two (2) times daily (with meals). Patient taking differently: Take 6.25 mg by mouth two (2) times daily (with meals). Take morning of surgery per anesthesia guidelines. 5/31/18  Yes Mir Castillo MD   aspirin delayed-release 81 mg tablet Take  by mouth daily.    Yes Historical Provider   DISABLED PLACARD (DISABLED PLACARD) [place] handic[place] tag -   Diagnosis - left knee arthritis M17.12 5/31/18   Mir Castillo MD     Allergies   Allergen Reactions    Codeine Other (comments)     GI upset    Penicillins Rash        Review of Systems: + knee pain, +antalgic gait. Denies chest pain, shortness of breath, cough, headache, visual problems, abdominal pain, dysurea, calf pain. Pertinent positives are as noted in the medical records and unremarkable otherwise. Objective:     Vitals:  Blood pressure 148/84, pulse (!) 58, temperature 97.6 °F (36.4 °C), resp. rate 16, height 5' 6\" (1.676 m), weight 180 lb (81.6 kg), SpO2 96 %. Temp (24hrs), Av.9 °F (36.6 °C), Min:97.6 °F (36.4 °C), Max:98.2 °F (36.8 °C)    BMI (calculated): 29.1 (18 0656)   Intake and Output:       Physical Exam:   General: Alert and age appropriately oriented. No acute cardio respiratory distress. HEENT: Normocephalic, no conjunctival pallor, no scleral icterus  Oral mucosa moist without cyanosis   Lungs: Clear to auscultation bilaterally. Respiration even and unlabored   Heart: Regular rate and rhythm, S1, S2   No  murmurs, clicks, rub or gallops   Abdomen: Soft, non-tender, non-distended. Genitourinary: defered   Neuromuscular:      Grossly no focal motor deficits. Left knee extension strong. Left ankle dorsiflexion 5/5  Left ankle plantarflexion 5/5  No sensory deficits distally BLE to soft touch. Skin/extremity: Calves non tender BLE. No rashes, no marginal erythema.                                                                                          Labs/Studies:  Recent Results (from the past 72 hour(s))   TYPE & SCREEN    Collection Time: 18  7:48 AM   Result Value Ref Range    Crossmatch Expiration 08/10/2018     ABO/Rh(D) Maxcine Dominique POSITIVE     Antibody screen NEG    GLUCOSE, POC    Collection Time: 18  7:58 AM   Result Value Ref Range    Glucose (POC) 116 (H) 65 - 100 mg/dL       Functional Assessment:  Reviewed participation and progress in therapies  Gross Assessment  AROM: Generally decreased, functional (18 1453)  Strength: Generally decreased, functional (18 1453)  Coordination: Generally decreased, functional (18 1453)   Bed Mobility  Supine to Sit: Contact guard assistance (08/07/18 1453)   Balance  Sitting: Intact (08/07/18 1453)  Standing: Pull to stand; With support (08/07/18 1453)               Bed/Mat Mobility  Supine to Sit: Contact guard assistance (08/07/18 1453)  Sit to Stand: Contact guard assistance (08/07/18 1453)  Bed to Chair: Contact guard assistance (08/07/18 1420)     Ambulation:  Gait  Gait Abnormalities: Antalgic (08/07/18 1453)  Distance (ft): 25 Feet (ft) (08/07/18 1453)  Assistive Device: Lonzell Leavens, rolling (08/07/18 1453)    Impression/Plan:     Active Problems:    Arthritis of left hip (8/7/2018)        Current Facility-Administered Medications   Medication Dose Route Frequency Provider Last Rate Last Dose    carvedilol (COREG) tablet 6.25 mg  6.25 mg Oral BID WITH MEALS Napolean Daily CEDRICK Green        0.9% sodium chloride infusion  100 mL/hr IntraVENous CONTINUOUS Napolean Daily CEDRICK Green        sodium chloride (NS) flush 5-10 mL  5-10 mL IntraVENous PRN CEDRICK Melendez        acetaminophen (OFIRMEV) infusion 1,000 mg  1,000 mg IntraVENous ONCE CEDRICK Melendez        [START ON 8/8/2018] acetaminophen (TYLENOL) tablet 1,000 mg  1,000 mg Oral Q6H CEDRICK Melendez        celecoxib (CELEBREX) capsule 200 mg  200 mg Oral Q12H CEDRICK Santos        naloxone [facility]) injection 0.2-0.4 mg  0.2-0.4 mg IntraVENous Q10MIN PRN CEDRICK Melendez        [START ON 8/8/2018] dexamethasone (DECADRON) injection 10 mg  10 mg IntraVENous ONCE CEDRICK Melendez        diphenhydrAMINE (BENADRYL) capsule 25 mg  25 mg Oral Q4H PRN CEDRICK Melendez        zolpidem (AMBIEN) tablet 5 mg  5 mg Oral QHS PRN CEDRICK Melendez        aspirin delayed-release tablet 81 mg  81 mg Oral Q12H CEDRICK Santos        ceFAZolin (ANCEF) 2 g/20 mL in sterile water IV syringe  2 g IntraVENous Q8H CEDRICK Santos        oxyCODONE IR (ROXICODONE) tablet 10 mg  10 mg Oral Q3H PRN Napolean Daily CEDRICK Green   10 mg at 08/07/18 1442    HYDROmorphone (PF) (DILAUDID) injection 1 mg  1 mg IntraVENous Q3H PRN CEDRICK England        [START ON 8/8/2018] senna-docusate (PERICOLACE) 8.6-50 mg per tablet 2 Tab  2 Tab Oral DAILY CEDRICK Wang        ondansetron (ZOFRAN ODT) tablet 8 mg  8 mg Oral Q8H PRN Cynthia Abdi MD   8 mg at 08/07/18 1654    promethazine (PHENERGAN) injection 6.25 mg  6.25 mg IntraMUSCular Q6H PRN Cynthia Abdi MD   6.25 mg at 08/07/18 1742        Recommendations: Recommend sub acute rehab at [facility]. Continue Acute Rehab Program. Continue gait training, transfer training, balance activities. Coordination of rehab/medical care. Counseling of Physical Medicine & Rehab care issues management. Monitoring and management of rehab conditions per the plan of care/orders. Rehabilitation Management/ Medical Management: 1. Devices:Walkers, Type: Rolling Walker  2. Consult:Rehab team including PT, OT,  and . 3. Disposition Rehab-discussed with patient. 4. Thigh-high or knee-high ERIKA's when out of bed. 5. DVT Prophylaxis - aspirin 81mg bid x 30days. 6. Incentive spirometer Q1H while awake  7. Post op hemorrhagic anemia- monitor. 8. Activity: WBAT LLE, total hip precautions. 9. Planned Labs: CBC,BMP  10. Pain Control:    Continue scheduled tylenol, celebrex and  PRN meds. 11.Wound Care: Keep left hip wound clean and dry and reinforce dressing PRN. May remove Aquacel 1 week post op ad replace with new one. Remove staples 12-14 post surgery, when incision appears appropriately closed and apply benzoin and 1/2\" steristrips. Follow up with ORTHO per instructions. Thank you for the opportunity to participate in the care of this patient.     Signed By: Anika Calabrese MD Patient with HFrEF (EF 20-25% on 11/2019 TTE) presenting with acute on chronic lower extremity edema. CXR with pleural effusions, pulmonary congestion. proBNP 21K. However patient looks otherwise relatively dry on exam.  -Continue with metoprolol succinate at reduced dose  -Hold diuretic in setting of worsening MERCED on CKD Patient with HFrEF (EF 20-25% on 11/2019 TTE) presenting with acute on chronic lower extremity edema. CXR with pleural effusions, pulmonary congestion. proBNP 21K. However patient looks otherwise relatively dry on exam.  -Resume home metoprolol dose 100mg BID as below  -Hold diuretic in setting of worsening MERCED on CKD

## 2020-02-19 NOTE — DIETITIAN INITIAL EVALUATION ADULT. - PROBLEM SELECTOR PLAN 2
Patient with HFrEF (EF 20-25% on 11/2019 TTE) presenting with acute on chronic lower extremity edema. CXR with pleural effusions, pulmonary congestion. proBNP 21K. However patient looks otherwise relatively dry on exam.  -Continue with metoprolol succinate at reduced dose  -Hold diuretic in setting of worsening MERCED on CKD

## 2020-02-19 NOTE — PROGRESS NOTE ADULT - SUBJECTIVE AND OBJECTIVE BOX
Dalia Triana, PGY-1  Internal Medicine  Pager #: [phone] / [facility] [phone]    PROGRESS NOTE:     Patient is a 79y old  Male who presents with a chief complaint of Cellulitis (18 Feb 2020 07:31)      SUBJECTIVE / OVERNIGHT EVENTS:  -No acute overnight events.    ADDITIONAL REVIEW OF SYSTEMS:    MEDICATIONS  (STANDING):  acetaminophen   Tablet .. 975 milliGRAM(s) Oral every 8 hours  albuterol/ipratropium for Nebulization 3 milliLiter(s) Nebulizer every 6 hours  atorvastatin 20 milliGRAM(s) Oral at bedtime  ceFAZolin   IVPB      ceFAZolin   IVPB 1000 milliGRAM(s) IV Intermittent every 12 hours  folic acid 1 milliGRAM(s) Oral daily  heparin  Injectable 5000 Unit(s) SubCutaneous every 12 hours  metoprolol tartrate 50 milliGRAM(s) Oral two times a day    MEDICATIONS  (PRN):  morphine  - Injectable 2 milliGRAM(s) IV Push every 4 hours PRN moderate to severe pain      CAPILLARY BLOOD GLUCOSE        I&O's Summary    18 Feb 2020 07:01  -  19 Feb 2020 07:00  --------------------------------------------------------  IN: 720 mL / OUT: 1300 mL / NET: -580 mL        PHYSICAL EXAM:  Vital Signs Last 24 Hrs  T(C): 36.7 (19 Feb 2020 04:02), Max: 36.7 (19 Feb 2020 04:02)  T(F): 98 (19 Feb 2020 04:02), Max: 98 (19 Feb 2020 04:02)  HR: 108 (19 Feb 2020 04:02) (91 - 108)  BP: 114/70 (19 Feb 2020 04:02) (112/62 - 133/69)  BP(mean): --  RR: 18 (19 Feb 2020 04:02) (18 - 18)  SpO2: 99% (19 Feb 2020 04:02) (98% - 100%)    CONSTITUTIONAL: NAD, well-developed  RESPIRATORY: Normal respiratory effort; lungs are clear to auscultation bilaterally  CARDIOVASCULAR: Regular rate and rhythm, normal S1 and S2, no murmur/rub/gallop; No lower extremity edema; Peripheral pulses are 2+ bilaterally  ABDOMEN: Nontender to palpation, normoactive bowel sounds, no rebound/guarding; No hepatosplenomegaly  MUSCLOSKELETAL: no clubbing or cyanosis of digits; no joint swelling or tenderness to palpation  PSYCH: A+O to person, place, and time; affect appropriate    LABS:                        8.8    2.95  )-----------( 147      ( 18 Feb 2020 06:22 )             28.5     02-19    139  |  96  |  76<H>  ----------------------------<  94  4.4   |  24  |  2.60<H>    Ca    9.1      19 Feb 2020 06:36  Phos  5.7     02-19  Mg     3.0     02-19    TPro  8.5<H>  /  Alb  3.5  /  TBili  0.4  /  DBili  x   /  AST  15  /  ALT  8<L>  /  AlkPhos  97  02-19    PT/INR - ( 18 Feb 2020 07:49 )   PT: 50.6 sec;   INR: 4.28 ratio         PTT - ( 18 Feb 2020 07:49 )  PTT:45.7 sec            RADIOLOGY & ADDITIONAL TESTS:  Results Reviewed:   Imaging Personally Reviewed:  Electrocardiogram Personally Reviewed:    COORDINATION OF CARE:  Care Discussed with Consultants/Other Providers [Y/N]:  Prior or Outpatient Records Reviewed [Y/N]: Dalia Triana, PGY-1  Internal Medicine  Pager #: [phone] / [facility] [phone]    PROGRESS NOTE:     Patient is a 79y old  Male who presents with a chief complaint of Cellulitis (18 Feb 2020 07:31)      SUBJECTIVE / OVERNIGHT EVENTS:  -No acute overnight events.    ADDITIONAL REVIEW OF SYSTEMS:    MEDICATIONS  (STANDING):  acetaminophen   Tablet .. 975 milliGRAM(s) Oral every 8 hours  albuterol/ipratropium for Nebulization 3 milliLiter(s) Nebulizer every 6 hours  atorvastatin 20 milliGRAM(s) Oral at bedtime  ceFAZolin   IVPB      ceFAZolin   IVPB 1000 milliGRAM(s) IV Intermittent every 12 hours  folic acid 1 milliGRAM(s) Oral daily  heparin  Injectable 5000 Unit(s) SubCutaneous every 12 hours  metoprolol tartrate 50 milliGRAM(s) Oral two times a day    MEDICATIONS  (PRN):  morphine  - Injectable 2 milliGRAM(s) IV Push every 4 hours PRN moderate to severe pain      CAPILLARY BLOOD GLUCOSE        I&O's Summary    18 Feb 2020 07:01  -  19 Feb 2020 07:00  --------------------------------------------------------  IN: 720 mL / OUT: 1300 mL / NET: -580 mL        PHYSICAL EXAM:  Vital Signs Last 24 Hrs  T(C): 36.7 (19 Feb 2020 04:02), Max: 36.7 (19 Feb 2020 04:02)  T(F): 98 (19 Feb 2020 04:02), Max: 98 (19 Feb 2020 04:02)  HR: 108 (19 Feb 2020 04:02) (91 - 108)  BP: 114/70 (19 Feb 2020 04:02) (112/62 - 133/69)  BP(mean): --  RR: 18 (19 Feb 2020 04:02) (18 - 18)  SpO2: 99% (19 Feb 2020 04:02) (98% - 100%)    GENERAL: elderly thin male lying in bed, in no acute respiratory distress  HEENT: dry mucous membranes  NECK: Supple, No JVD  NERVOUS SYSTEM: Alert & Oriented X3, non-focal  CHEST/LUNG: expiratory wheezing heard in bilateral lung fields  HEART: irregularly irregular rhythm; normal S1 and S2; No murmurs, rubs, or gallops  ABDOMEN: Soft, Nontender, Nondistended: Bowel sounds present  EXTREMITIES: 2+ Peripheral Pulses, 1+ pitting edema on right, 2+ pitting edema on left  LYMPH: No lymphadenopathy noted  SKIN: flat erythematous rash on posterior aspect of lower extremity extending above knee, improving      LABS:                        8.8    2.95  )-----------( 147      ( 18 Feb 2020 06:22 )             28.5     02-19    139  |  96  |  76<H>  ----------------------------<  94  4.4   |  24  |  2.60<H>    Ca    9.1      19 Feb 2020 06:36  Phos  5.7     02-19  Mg     3.0     02-19    TPro  8.5<H>  /  Alb  3.5  /  TBili  0.4  /  DBili  x   /  AST  15  /  ALT  8<L>  /  AlkPhos  97  02-19    PT/INR - ( 18 Feb 2020 07:49 )   PT: 50.6 sec;   INR: 4.28 ratio         PTT - ( 18 Feb 2020 07:49 )  PTT:45.7 sec            RADIOLOGY & ADDITIONAL TESTS:  Results Reviewed:   Imaging Personally Reviewed:  Electrocardiogram Personally Reviewed:    COORDINATION OF CARE:  Care Discussed with Consultants/Other Providers [Y/N]:  Prior or Outpatient Records Reviewed [Y/N]: Dalia Triana, PGY-1  Internal Medicine  Pager #: [phone] / [facility] [phone]    PROGRESS NOTE:     Patient is a 79y old  Male who presents with a chief complaint of Cellulitis (18 Feb 2020 07:31)      SUBJECTIVE / OVERNIGHT EVENTS:  -No acute overnight events.    Reports pain and swelling in LLE overall improving however still with significant pain with attempted ambulation. Denies fever/chills.    ADDITIONAL REVIEW OF SYSTEMS:  Denies chest pain, SOB, abd pain, N/V/D, constipation.    MEDICATIONS  (STANDING):  acetaminophen   Tablet .. 975 milliGRAM(s) Oral every 8 hours  albuterol/ipratropium for Nebulization 3 milliLiter(s) Nebulizer every 6 hours  atorvastatin 20 milliGRAM(s) Oral at bedtime  ceFAZolin   IVPB      ceFAZolin   IVPB 1000 milliGRAM(s) IV Intermittent every 12 hours  folic acid 1 milliGRAM(s) Oral daily  heparin  Injectable 5000 Unit(s) SubCutaneous every 12 hours  metoprolol tartrate 50 milliGRAM(s) Oral two times a day    MEDICATIONS  (PRN):  morphine  - Injectable 2 milliGRAM(s) IV Push every 4 hours PRN moderate to severe pain      CAPILLARY BLOOD GLUCOSE        I&O's Summary    18 Feb 2020 07:01  -  19 Feb 2020 07:00  --------------------------------------------------------  IN: 720 mL / OUT: 1300 mL / NET: -580 mL        PHYSICAL EXAM:  Vital Signs Last 24 Hrs  T(C): 36.7 (19 Feb 2020 04:02), Max: 36.7 (19 Feb 2020 04:02)  T(F): 98 (19 Feb 2020 04:02), Max: 98 (19 Feb 2020 04:02)  HR: 108 (19 Feb 2020 04:02) (91 - 108)  BP: 114/70 (19 Feb 2020 04:02) (112/62 - 133/69)  BP(mean): --  RR: 18 (19 Feb 2020 04:02) (18 - 18)  SpO2: 99% (19 Feb 2020 04:02) (98% - 100%)    GENERAL: elderly thin male sitting up in bed, in no acute respiratory distress on room air  HEENT: dry mucous membranes  NECK: Supple, No JVD  NERVOUS SYSTEM: Alert & Oriented X3, non-focal  CHEST/LUNG: expiratory wheezing heard in bilateral lung fields  HEART: irregularly irregular rhythm; normal S1 and S2; No murmurs, rubs, or gallops  ABDOMEN: Soft, Nontender, Nondistended: Bowel sounds present  EXTREMITIES: 2+ Peripheral Pulses, trace pitting pedal edema on right, 2+ pitting edema on left to knee  LYMPH: No lymphadenopathy noted  SKIN: flat erythematous rash on posterior aspect of lower extremity, improving, now extending to just below knee    LABS:                        8.8    2.95  )-----------( 147      ( 18 Feb 2020 06:22 )             28.5     02-19    139  |  96  |  76<H>  ----------------------------<  94  4.4   |  24  |  2.60<H>    Ca    9.1      19 Feb 2020 06:36  Phos  5.7     02-19  Mg     3.0     02-19    TPro  8.5<H>  /  Alb  3.5  /  TBili  0.4  /  DBili  x   /  AST  15  /  ALT  8<L>  /  AlkPhos  97  02-19    PT/INR - ( 18 Feb 2020 07:49 )   PT: 50.6 sec;   INR: 4.28 ratio         PTT - ( 18 Feb 2020 07:49 )  PTT:45.7 sec            RADIOLOGY & ADDITIONAL TESTS:  Results Reviewed:   Imaging Personally Reviewed:  Electrocardiogram Personally Reviewed:    COORDINATION OF CARE:  Care Discussed with Consultants/Other Providers [Y/N]:  Prior or Outpatient Records Reviewed [Y/N]:

## 2020-02-19 NOTE — DIETITIAN INITIAL EVALUATION ADULT. - PROBLEM SELECTOR PLAN 9
Transitions of Care Status:  1.  Name of PCP: Dr. Quiroz [phone]  2.  PCP Contacted on Admission: [ ] Y    [ ] N    3.  PCP contacted at Discharge: [ ] Y    [ ] N    [ ] N/A  4.  Post-Discharge Appointment Date and Location:  5.  Summary of Handoff given to PCP:

## 2020-02-19 NOTE — PROGRESS NOTE ADULT - PROBLEM SELECTOR PLAN 10
Transitions of Care Status:  1.  Name of PCP: Dr. Quiroz [phone]  2.  PCP Contacted on Admission: [ ] Y    [ ] N    3.  PCP contacted at Discharge: [ ] Y    [ ] N    [ ] N/A  4.  Post-Discharge Appointment Date and Location:  5.  Summary of Handoff given to PCP: DVT ppx: Coumadin - holding tonight for supratherapeutic INR  Diet: DASH/TLC    Transitions of Care Status:  1.  Name of PCP: Dr. Quiroz [phone]  2.  PCP Contacted on Admission: [ ] Y    [ ] N    3.  PCP contacted at Discharge: [ ] Y    [ ] N    [ ] N/A  4.  Post-Discharge Appointment Date and Location:  5.  Summary of Handoff given to PCP:

## 2020-02-20 NOTE — PROGRESS NOTE ADULT - PROBLEM SELECTOR PLAN 2
Left tib-fib Xray shows sclerotic lesion in proximal tibia. DDx includes benign bone lesion vs malignancy (h/o prostate cancer)  -Clarify prostate cancer history - patient previously following with Dr. Nito Littlejohn, currently looking for new urologist within [facility].

## 2020-02-20 NOTE — CONSULT NOTE ADULT - ATTENDING COMMENTS
Well known to me from prior hospitalization. He presented with b/l (L>R) swelling and has had dyspnea with minimal exertion. Reports escalating doses of bumex w/o much improvement in weight but notes fluid indiscretion. On exam, JVP approx 14-16 cm, irreg irreg rhythm, CTAB, nontender abdomen, 1+ pitting edema to thigh on L, trace on R; erythema on L and notable asterixis. Labs reviewed.   - give bumex 4 mg IV q8; not optimized for discharge  - c/w current meds  - poorly controlled afib and BiV paced 77% of the time; may need to consider AVJ ablation   - keep K>4, Mg>2   - standing weights daily

## 2020-02-20 NOTE — CONSULT NOTE ADULT - PROBLEM SELECTOR RECOMMENDATION 9
- At this time pt has signs of volume overload on physical exam including LE edema and elevated JVP. Additionally his weight is 8 pounds more then his dry weight of 122 lbs.   - At this time will start IV diuresis with Bumex 4 IV BID.   - Check daily standing weights and strict Is and Os.   - C/w home metoprolol 100 ER BID  - Monitor Cr and lytes.

## 2020-02-20 NOTE — CONSULT NOTE ADULT - SUBJECTIVE AND OBJECTIVE BOX
Patient seen and evaluated at bedside    Chief Complaint: SOB and LE edema     HPI:  Mr. Lopez is a 80 y/o M with a history of ACC/AHA Stage C/D mixed ischemic/nonischemic cardiomyopathy (LV 5.3 cm, EF 20-25%) with LBBB s/p CRT-D, CAD s/p 3v CABG (LIMA-LAD, SVG- OM1, SVG- RPL1 in 9/2017), severe aortic stenosis s/p TAVR in 2016, chronic AF (on warfarin), CKD Stage 3 (baseline Cr 1.4-1.5), newly diagnosed MDS, and prostate CA s/p prostatectomy. His LVEF had historically been normal but was noted to have an LVEF of 20-25% on 11/2019 with subsequent R/LHC showing patent grafts and elevated filling pressures with low cardiac output. He was hospitalized at [facility] from 12/19-1/1 for ADHF with volume overload and MERCED on CKD. He was diuresed 14 lbs and underwent CRT-D for LBBB ( ms). He was discharged at a weight of 122 lbs. On presentation to his out pt provider he was noted to have gained 10 lbs. Per his HHA who is at bedside the pt was having a great deal of oral fluid intake. Because of this his Bumex was increased from 3 mg BID to 4 mg BID. The pt noted that his weight was still elevated so he increased his dosage of bumex to 6 mg BID.     He presented to the ED on 2/17 with a chief complaint of SOB and LE edema worse in the left leg then the right. He also noted red skin changes. He was admitted to medicine for concern for LE cellulitis and MERCED. His diuretics have been held in the hospital. Cr has remained stable and pt is currently on room air, however, his weight was around 130 lbs.         PMHx:   Heart failure  Moderate tricuspid regurgitation  Osteoarthritis  Bilateral carotid artery disease  Hypertension  COPD (chronic obstructive pulmonary disease)  Prostate cancer  Severe aortic stenosis by prior echocardiogram  Atrial fibrillation, unspecified type  Aortic valve stenosis, unspecified etiology  Erectile dysfunction, unspecified erectile dysfunction type  Coronary artery disease of bypass graft of native heart with stable angina pectoris  [place]      PSHx:   S/P TAVR (transcatheter aortic valve replacement)  S/P tonsillectomy  S/P prostatectomy  S/P hernia repair  S/P CABG x 3      Allergies:  No Known Allergies    Home Medications:  metoprolol  BID  warfarin 2.5  Bumex 4 mg BID  atorvastatin 20 mg.       Current Medications:   albuterol/ipratropium for Nebulization 3 milliLiter(s) Nebulizer every 6 hours  atorvastatin 20 milliGRAM(s) Oral at bedtime  bisacodyl 5 milliGRAM(s) Oral every 12 hours PRN  budesonide 160 MICROgram(s)/formoterol 4.5 MICROgram(s) Inhaler 2 Puff(s) Inhalation two times a day  buMETAnide Injectable 4 milliGRAM(s) IV Push every 12 hours  ceFAZolin   IVPB      ceFAZolin   IVPB 1000 milliGRAM(s) IV Intermittent every 12 hours  folic acid 1 milliGRAM(s) Oral daily  heparin  Injectable 5000 Unit(s) SubCutaneous every 12 hours  metoprolol tartrate 100 milliGRAM(s) Oral two times a day  multivitamin 1 Tablet(s) Oral daily  oxyCODONE    IR 5 milliGRAM(s) Oral every 6 hours PRN  polyethylene glycol 3350 17 Gram(s) Oral daily  senna 2 Tablet(s) Oral at bedtime  warfarin 2.5 milliGRAM(s) Oral at bedtime      FAMILY HISTORY:  Family history of heart disease (Child)      Social History:  Smoking History:  Alcohol Use:  Drug Use:    REVIEW OF SYSTEMS:  Constitutional:     [ ] negative [ ] fevers [ ] chills [ ] weight loss [ ] weight gain  HEENT:                  [ ] negative [ ] dry eyes [ ] eye irritation [ ] postnasal drip [ ] nasal congestion  CV:                         [ ] negative  [ ] chest pain [ ] orthopnea [ ] palpitations [ ] murmur  Resp:                     [ ] negative [ ] cough [ ] shortness of breath [ ] dyspnea [ ] wheezing [ ] sputum [ ]hemoptysis  GI:                          [ ] negative [ ] nausea [ ] vomiting [ ] diarrhea [ ] constipation [ ] abd pain [ ] dysphagia   :                        [ ] negative [ ] dysuria [ ] nocturia [ ] hematuria [ ] increased urinary frequency  Musculoskeletal: [ ] negative [ ] back pain [ ] myalgias [ ] arthralgias [ ] fracture  Skin:                       [ ] negative [ ] rash [ ] itch  Neurological:        [ ] negative [ ] headache [ ] dizziness [ ] syncope [ ] weakness [ ] numbness  Psychiatric:           [ ] negative [ ] anxiety [ ] depression  Endocrine:            [ ] negative [ ] diabetes [ ] thyroid problem  Heme/Lymph:      [ ] negative [ ] anemia [ ] bleeding problem  Allergic/Immune: [ ] negative [ ] itchy eyes [ ] nasal discharge [ ] hives [ ] angioedema    [x ] All other systems negative  [ ] Unable to assess ROS due to      Physical Exam:  T(F): 98 (02-20), Max: 98 (02-20)  HR: 112 (02-20) (89 - 112)  BP: 121/78 (02-20) (95/58 - 121/78)  RR: 18 (02-20)  SpO2: 97% (02-20)  GENERAL: No acute distress, well-developed  HEAD:  Atraumatic, Normocephalic  ENT: EOMI, PERRLA, conjunctiva and sclera clear, Neck supple, elevated JVP, moist mucosa  CHEST/LUNG: Clear to auscultation bilaterally; No wheeze, equal breath sounds bilaterally   BACK: No spinal tenderness  HEART: Regular rate and rhythm; No murmurs, rubs, or gallops  ABDOMEN: Soft, Nontender, Nondistended; Bowel sounds present  EXTREMITIES:  No clubbing, cyanosis. B/l LE edema L worse the R  PSYCH: Nl behavior, nl affect  NEUROLOGY: AAOx3, non-focal, cranial nerves intact  SKIN: Normal color, No rashes or lesions  LINES:    Cardiovascular Diagnostic Testing:    ECG: Personally reviewed: BiV paced     Echo: Personally reviewed:  < from: Transthoracic Echocardiogram (11.27.19 @ 11:44) >  1. Moderate mitral regurgitation.  2. Transcatheter aortic valve replacement. Peak transaortic  valve gradient equals 3 mm Hg, which is probably normal in  the presence of a transcatheter aortic valve replacement.  Mild aortic regurgitation.  3. Moderate to severely dilated left atrial enlargement.  4. Mild left ventricular enlargement.  5. Endocardium not well visualized. Grossly severe global  left ventricular systolic dysfunction with regional  variation. Consider use of echo contrast for better  estimation of left ventricular function if clinically  indicated.  Abnormal septal motion.  6. Increased E/e'  is consistent with elevated left  ventricular filling pressure.  7. Moderate right atrial enlargement.  8. Normal right ventricular size with decreased right  ventricular systolic function.  9. Moderate-severe tricuspid regurgitation.  10. Estimated pulmonary artery systolic pressure equals 49  mm Hg, assuming right atrial pressure equals 8 mm Hg,  consistent with mild pulmonary pressures.    < end of copied text >    Cath:  < from: Cardiac Cath Lab - Adult (12.03.19 @ 15:13) >  LM:   --  LM: There was a 90 % stenosis.  LAD:   --  Mid LAD: There was a 100 % stenosis.  CX:   --  Proximal circumflex: There was a 100 % stenosis.  RCA:   --  RCA: This vessel was not injected.  GRAFTS:   --  Graft to the mid LAD: The graft was a LIMA. It was normal.  --  Graft to the 1st obtuse marginal: The graft was a saphenous vein graft  from the aorta. It was normal.  --  Graft to the RPL1: The graft was a saphenous vein graft from the aorta.  It was normal.  COMPLICATIONS: There were no complications.  DIAGNOSTIC IMPRESSIONS: Unchanged coronary vasculature. High filling  pressures. Normal TAVR gradients. Low CO.  DIAGNOSTIC RECOMMENDATIONS: Admit. IV diruesis. Increase afterload (change  to Entresto).Heme eval for pancytopenia  Prepared and signed by  Ramón Quiroz M.D.    < end of copied text >        CXR: Personally reviewed    Labs: Personally reviewed                        8.8    2.46  )-----------( 148      ( 20 Feb 2020 05:55 )             28.1     02-20    137  |  98  |  74<H>  ----------------------------<  105<H>  4.3   |  24  |  2.42<H>    Ca    8.7      20 Feb 2020 05:58  Phos  5.0     02-20  Mg     2.9     02-20    TPro  7.0  /  Alb  2.9<L>  /  TBili  0.2  /  DBili  x   /  AST  17  /  ALT  <5<L>  /  AlkPhos  99  02-20    PT/INR - ( 20 Feb 2020 08:31 )   PT: 29.3 sec;   INR: 2.52 ratio         PTT - ( 20 Feb 2020 08:31 )  PTT:43.5 sec  Serum Pro-Brain Natriuretic Peptide: [id number] pg/mL (02-17 @ 12:20)

## 2020-02-20 NOTE — DISCHARGE NOTE PROVIDER - HOSPITAL COURSE
79M with PMHx HFrEF (EF 20-25% on 11/27/19) s/p AICD, CAD s/p PCI and CABG, severe aortic stenosis s/p TAVR, known LBBB, COPD, HTN, Afib (on Coumadin), and CKD3 who presents with asymmetric lower extremity swelling, pain and erythema (left > right) x 1 week and who is admitted for left lower extremity cellulitis, complicated by MERCED on CKD. Of note, patient with recent admission 12/19/19-01/01/20 for acute decompensated heart failure c/b MERCED on CKD, hyperkalemia, and supratherapeutic INR. Patient had been discharged at that time on Bumex 3mg PO BID but on outpatiet follow-up had been increased to 4mg PO BID. Prior to the current admission, patient was concerned that his lower extremity edema was due to another CHF exacerbation and had self-increased his Bumex dose to 6mg PO BID. In ED, patient was started on antibiotics with Vancomycin 1g x 1 and then switched to Ancef on admission to complete a **-day course of antibiotics. Patient's diuretic was held in setting of MERCED on CKD. Patient's symptoms and serum Cr improved. Patient now stable for discharge to [facility]. 79M with PMHx HFrEF (EF 20-25% on 11/27/19) s/p AICD, CAD s/p PCI and CABG, severe aortic stenosis s/p TAVR, known LBBB, COPD, HTN, Afib (on Coumadin), and CKD3 who presents with asymmetric lower extremity swelling, pain and erythema (left > right) x 1 week and who is admitted for left lower extremity cellulitis, complicated by MERCED on CKD. Of note, patient with recent admission 12/19/19-01/01/20 for acute decompensated heart failure c/b MERCED on CKD, hyperkalemia, and supratherapeutic INR. Patient had been discharged at that time on Bumex 3mg PO BID but on outpatiet follow-up had been increased to 4mg PO BID. Prior to the current admission, patient was concerned that his lower extremity edema was due to another CHF exacerbation and had self-increased his Bumex dose to 6mg PO BID. In ED, patient was started on antibiotics with Vancomycin 1g x 1 and then switched to Ancef on admission. Patient's diuretic was held in setting of MERCED on CKD. Patient's symptoms and serum Cr improved. Patient now stable for discharge to [facility]. 79M with PMHx HFrEF (EF 20-25% on 11/27/19) s/p AICD, CAD s/p PCI and CABG, severe aortic stenosis s/p TAVR, known LBBB, COPD, HTN, Afib (on Coumadin), and CKD3 who presents with asymmetric lower extremity swelling, pain and erythema (left > right) x 1 week and who is admitted for left lower extremity cellulitis, complicated by MERCED on CKD. Of note, patient with recent admission 12/19/19-01/01/20 for acute decompensated heart failure c/b MERCED on CKD, hyperkalemia, and supratherapeutic INR. Patient had been discharged at that time on Bumex 3mg PO BID but on outpatiet follow-up had been increased to 4mg PO BID. Prior to the current admission, patient was concerned that his lower extremity edema was due to another CHF exacerbation and had self-increased his Bumex dose to 6mg PO BID. In ED, patient was started on antibiotics with Vancomycin 1g x 1 and then switched to Ancef (2/17-2/22); given no improvement on Ancef patient switched to Zosyn (2/22-2/27) with improvement of symptoms. Patient's diuretic was initially held in setting of MERCED on CKD, however patient's course was later complicated by acute decompensated heart failure. Heart failure team was consulted and patient was temporarily on Bumex gtt with Milrinone gtt for blood pressure support. Given overall decline of patient's status secondary to his worsening heart failure, patient and family decided on Hospice and both bumex and milrinone drips were discontinued. Patient now medically stable for discharge to home with home hospice care. 79M with PMHx HFrEF (EF 20-25% on 11/27/19) s/p AICD, CAD s/p PCI and CABG, severe aortic stenosis s/p TAVR, known LBBB, COPD, HTN, Afib (on Coumadin), and CKD3 who presents with asymmetric lower extremity swelling, pain and erythema (left > right) x 1 week and who is admitted for left lower extremity cellulitis, complicated by MERCED on CKD. Of note, patient with recent admission 12/19/19-01/01/20 for acute decompensated heart failure c/b MERCED on CKD, hyperkalemia, and supratherapeutic INR. Patient had been discharged at that time on Bumex 3mg PO BID but on outpatiet follow-up had been increased to 4mg PO BID. Prior to the current admission, patient was concerned that his lower extremity edema was due to another CHF exacerbation and had self-increased his Bumex dose to 6mg PO BID. In ED, patient was started on antibiotics with Vancomycin 1g x 1 and then switched to Ancef (2/17-2/22); given no improvement on Ancef patient switched to Zosyn (2/22-2/27) with improvement of symptoms. Patient's diuretic was initially held in setting of MERCED on CKD, however patient's course was later complicated by acute decompensated heart failure. Heart failure team was consulted and patient was started on Bumex gtt with Milrinone gtt for blood pressure support. Given overall decline of patient's status secondary to his worsening heart failure and MERCED on CKD, patient and family decided on Hospice and both bumex and milrinone drips were discontinued. Patient now on Bumex ***. Patient now medically stable for discharge to home with home hospice care. 79M with PMHx HFrEF (EF 20-25% on 11/27/19) s/p AICD, CAD s/p PCI and CABG, severe aortic stenosis s/p TAVR, known LBBB, COPD, HTN, Afib (on Coumadin), and CKD3 who presents with asymmetric lower extremity swelling, pain and erythema (left > right) x 1 week and who is admitted for left lower extremity cellulitis, complicated by MERCED on CKD. Of note, patient with recent admission 12/19/19-01/01/20 for acute decompensated heart failure c/b MERCED on CKD, hyperkalemia, and supratherapeutic INR. Patient had been discharged at that time on Bumex 3mg PO BID but on outpatiet follow-up had been increased to 4mg PO BID. Prior to the current admission, patient was concerned that his lower extremity edema was due to another CHF exacerbation and had self-increased his Bumex dose to 6mg PO BID. In ED, patient was started on antibiotics with Vancomycin 1g x 1 and then switched to Ancef (2/17-2/22); given no improvement on Ancef patient switched to Zosyn (2/22-2/27) with improvement of symptoms. Patient's diuretic was initially held in setting of MERCED on CKD, however patient's course was later complicated by acute decompensated heart failure. Heart failure team was consulted and patient was started on Bumex gtt with Milrinone gtt for blood pressure support. Given overall decline of patient's status secondary to his worsening heart failure and MERCED on CKD, patient and family decided on Hospice and both bumex and milrinone drips were discontinued. Patient now on Bumex 2mg PO BID. Patient now medically stable for discharge to home with home hospice care. 79M with PMHx HFrEF (EF 20-25% on 11/27/19) s/p AICD, CAD s/p PCI and CABG, severe aortic stenosis s/p TAVR, known LBBB, COPD, HTN, Afib (on Coumadin), and CKD3 who presents with asymmetric lower extremity swelling, pain and erythema (left > right) x 1 week and who is admitted for left lower extremity cellulitis, complicated by MERCED on CKD. Of note, patient with recent admission 12/19/19-01/01/20 for acute decompensated heart failure c/b MERCED on CKD, hyperkalemia, and supratherapeutic INR. Patient had been discharged at that time on Bumex 3mg PO BID but on outpatiet follow-up had been increased to 4mg PO BID. Prior to the current admission, patient was concerned that his lower extremity edema was due to another CHF exacerbation and had self-increased his Bumex dose to 6mg PO BID. In ED, patient was started on antibiotics with Vancomycin 1g x 1 and then switched to Ancef (2/17-2/22); given no improvement on Ancef patient switched to Zosyn (2/22-2/27) with improvement of symptoms. Patient's diuretic was initially held in setting of MERCED on CKD, however patient's course was later complicated by acute decompensated heart failure. Heart failure team was consulted and patient was started on Bumex gtt with Milrinone gtt for blood pressure support. Given overall decline of patient's status secondary to his worsening heart failure and MERCED on CKD, patient and family decided on Hospice and both bumex and milrinone drips were discontinued. Patient now on Bumex 4mg PO BID. Patient now medically stable for discharge to home with home hospice care. 79M with PMHx HFrEF (EF 20-25% on 11/27/19) s/p AICD, CAD s/p PCI and CABG, severe aortic stenosis s/p TAVR, known LBBB, COPD, active smoker, HTN, Afib (on Coumadin), and CKD3 who pw asymmetric lower extremity swelling, severe pain and erythema (left > right) x 1 week.         Of note, patient with recent admission 12/19/19-01/01/20 for acute decompensated heart failure c/b MERCED on CKD, hyperkalemia, and supratherapeutic INR. Patient had been discharged at that time on Bumex 3mg PO BID but on outpatiet follow-up had been increased to 4mg PO BID. Prior to the current admission, patient was concerned that his lower extremity edema was due to another CHF exacerbation and had self-increased his Bumex dose to 6mg PO BID.         In ED, patient was started on antibiotics with Vancomycin 1g x 1 and then switched to Ancef (2/17-2/22); given no improvement on Ancef patient switched to Zosyn (2/22-2/27) with improvement of symptoms. Doppler negative for DVT. Asymmetric swelling attributed to prior vein removal from that leg for CABG.        Patient's diuretic was initially held in setting of MERCED on CKD. Seen by the HF team, recommended starting  Bumex gtt with Milrinone gtt for blood pressure support.         Given overall decline of patient's status secondary to his worsening heart failure and MERCED on CKD, patient and family decided on Hospice and both bumex and milrinone drips were discontinued. Patient now on Bumex 4mg PO BID. Patient now medically stable for discharge to home with home hospice care.        Diagnoses: Acute systolic CHF; MERCED; Cellulitis of leg; Acute pain; Severe prot mayito malnutrition; Tobacco abuse; Therapeutic drug monitoring; Supratherapeutic INR; Hypokalemia; Paroxysmal a fib; Leukopenia; Anemia; Thrombocytopenia; Hyponatremia; CKD 4; CAD; COPD 79M with PMHx HFrEF (EF 20-25% on 11/27/19) s/p AICD, CAD s/p PCI and CABG, severe aortic stenosis s/p TAVR, known LBBB, COPD, active smoker, HTN, Afib (on Coumadin), and CKD3 who pw asymmetric lower extremity swelling, severe pain and erythema (left > right) x 1 week.         Of note, patient with recent admission 12/19/19-01/01/20 for acute decompensated heart failure c/b MERCED on CKD, hyperkalemia, and supratherapeutic INR. Patient had been discharged at that time on Bumex 3mg PO BID but on outpatiet follow-up had been increased to 4mg PO BID. Prior to the current admission, patient was concerned that his lower extremity edema was due to another CHF exacerbation and had self-increased his Bumex dose to 6mg PO BID.         In ED, patient was started on antibiotics with Vancomycin 1g x 1 and then switched to Ancef (2/17-2/22); given no improvement on Ancef patient switched to Zosyn (2/22-2/27) with improvement of symptoms. Doppler negative for DVT. Asymmetric swelling attributed to prior vein removal from that leg for CABG.        Patient's diuretic was initially held in setting of MERCED on CKD. Seen by the HF team, recommended starting  Bumex gtt with Milrinone gtt for blood pressure support.         Given overall decline of patient's status secondary to his worsening heart failure and MERCED on CKD, patient and family decided on Hospice and both bumex and milrinone drips were discontinued. Patient now on Bumex 4mg PO BID. Patient now medically stable for discharge to home with home hospice care.        Diagnoses: Acute systolic CHF; MERCED; Cellulitis of leg; Acute pain; Severe prot mayito malnutrition; Tobacco abuse; Therapeutic drug monitoring; Supratherapeutic INR; Hypokalemia; Paroxysmal a fib; Leukopenia; Anemia; Thrombocytopenia; Hyponatremia; CKD 4; CAD; COPD; Hypotension

## 2020-02-20 NOTE — DISCHARGE NOTE PROVIDER - NSDCFUADDAPPT_GEN_ALL_CORE_FT
Please follow-up with your primary care physician Dr. Quiroz within 1 week.    Please also follow-up with your heart failure specialist cardiologist Dr. Ramirez within 1 week. Please follow-up with your primary care physician/cardiologist Dr. Quiroz within 1 week.    Please also follow-up with your heart failure specialist cardiologist Dr. Ramirez within 1 week. Please follow-up with your primary care physician/cardiologist Dr. Quiroz within 1 week.    Please also follow-up with your heart failure specialist cardiologist Dr. Ramirez within 1 week.    Please follow-up with your urologist Dr. Sainz. Please follow-up with your primary care physician/cardiologist Dr. Quiroz within 1 week.    Please also follow-up with your heart failure specialist cardiologist Dr. Ramirez within 1 week.    Please follow-up with your urologist Dr. Sainz.    [facility] HOUSE CALLS PROGRAM:  [address] [place] [address] [id number]  [place] [address]  Please call this number [phone] to schedule a house calls appointment.

## 2020-02-20 NOTE — PROGRESS NOTE ADULT - PROBLEM SELECTOR PLAN 4
Atrial fibrillation, rate-controlled with metoprolol succinate 100mg BID, CHADS2-VASc = 5. On home Coumadin 2.5mg daily for anticoagulation. INR 3.55 on admission.  -Hold Coumadin dose again today for supratherapeutic INR  -C/w home Metoprolol 100mg BID  -monitor PT/INR for daily Coumadin dosing Atrial fibrillation, rate-controlled with metoprolol succinate 100mg BID, CHADS2-VASc = 5. On home Coumadin 2.5mg daily for anticoagulation. INR 3.55 on admission.  -INR goal 2-3, will give home dose 2.5mg today  -C/w home Metoprolol 100mg BID  -monitor PT/INR for daily Coumadin dosing

## 2020-02-20 NOTE — DISCHARGE NOTE PROVIDER - NSDCFUSCHEDAPPT_GEN_ALL_CORE_FT
JAYA WRIGHT ; 03/03/2020 ; [facility] [address]  JAYA WRIGHT ; 03/03/2020 ; [facility] [address]  JAYA WRIGHT ; 03/05/2020 ; [facility] [address] JAYA Pena ; 03/23/2020 ; [facility]  JAYA WRIGHT ; 04/08/2020 ; [facility] HeartFail 300 [facility]  JAYA WRIGHT ; 03/03/2020 ; [facility] [address]  JAYA WRIGHT ; 03/03/2020 ; [facility] [address]  JAYA WRIGHT ; 03/05/2020 ; [facility] Cardio [address] JAYA Pena ; 03/23/2020 ; [facility]  JAYA WRIGHT ; 04/08/2020 ; [facility] HeartFail 300 [facility]  JAYA WRIGHT ; 03/03/2020 ; [facility] [address]  JAYA WRIGHT ; 03/03/2020 ; [facility]io [address]  JAYA WRIGHT ; 03/05/2020 ; [facility] Cardio [address] JAYA Pena ; 03/23/2020 ; [facility]  JAYA WRIGHT ; 04/08/2020 ; [facility] HeartFail 300 [facility]  JAYA WRIGHT ; 03/03/2020 ; \[facility]"" Cardio [address]  JAYA WRIGHT ; 03/03/2020 ; \[facility] Cardio [address]  JAYA WRIGHT ; 03/05/2020 ; \[facility]\"" Cardio [address] JAYA Pena ; 03/23/2020 ; \[facility]"" [facility]  JAYA WRIGHT ; 04/08/2020 ; \[facility]"" HeartFail 300 [facility]  JAYA WRIGHT ; 03/03/2020 ; [facility]io [address]  JAYA WRIGHT ; 03/03/2020 ; [facility] Card[facility] [address]  JAYA WRIGHT ; 03/05/2020 ; [facility] Cardio [address] JAYA Pena ; 03/23/2020 ; [facility]  JAYA WRIGHT ; 04/08/2020 ; [facility] HeartFail 300 [facility]  JAYA WRIGHT ; 03/05/2020 ; [facility] Cardio [address] JAYA Pena ; 03/23/2020 ; NP [facility]  JAYA WRIGHT ; 04/08/2020 ; [facility] HeartFail 300 [facility]  JAYA WRIGHT ; 03/05/2020 ; [facility] Cardio [address] JAYA Pena ; 03/23/2020 ; NP [facility]  JAYA WRIGHT ; 04/08/2020 ; [facility] HeartFail 300 [facility]  JAYA WRIGHT ; 03/05/2020 ; [facility] Cardio [address] JAYA Pena ; 03/23/2020 ; NP [facility]  JAYA WRIGHT ; 04/08/2020 ; [facility] HeartFail 300 [facility]  JAYA WRIGHT ; 03/05/2020 ; [facility] Cardio [address] JAYA Pena ; 03/23/2020 ; NP [facility]  JAYA WRIGHT ; 04/08/2020 ; [facility] HeartFail 300 [facility]  JAYA WRIGHT ; 03/05/2020 ; [facility] Cardio [address] JAYA Pena ; 03/23/2020 ; NP Vanita [facility]  JAYA WRIGHT ; 04/08/2020 ; [facility] HeartFail 300 [facility]  JAYA WRIGHT ; 03/05/2020 ; [facility] Cardio [address] JAYA Pena ; 03/23/2020 ; NP [facility]  JAYA WRIGHT ; 04/08/2020 ; hospitals HeartFail 300 [facility]  JAYA WRIGHT ; 03/05/2020 ; [facility] Cardio [address] JAYA Pena ; 03/23/2020 ; NP [facility]  JAYA WRIGHT ; 04/08/2020 ; [facility] HeartFail 300 [facility]  JAYA WRIGHT ; 03/05/2020 ; [facility] Cardio [address] JAYA Pena ; 03/23/2020 ; NP Van[facility]  JAYA WRIGHT ; 04/08/2020 ; [facility] HeartFail 300 [facility]  JAYA WRIGHT ; 03/05/2020 ; [facility] Cardio [address] JAYA Pena ; 03/23/2020 ; NP [facility]  JAYA WRIGHT ; 04/08/2020 ; [facility]Fail [facility]  JAYA WRIGHT ; 03/05/2020 ; [facility] Cardio [address] JAYA Pena ; 03/23/2020 ; NP Vanita [facility]  JAYA WRIGHT ; 04/08/2020 ; [facility] HeartFail 300 [facility]  JAYA WRIGHT ; 03/05/2020 ; [facility] Cardio [address] JAYA Pena ; 03/23/2020 ; NP Van[facility]  JAYA WRIGHT ; 04/08/2020 ; [facility] HeartFail 300 [facility]

## 2020-02-20 NOTE — PROGRESS NOTE ADULT - PROBLEM SELECTOR PLAN 9
Patient discharged on 1/1/20 with serum Cr of 2.24. Serum Cr as low as 1.6-1.7 in 12/2019.  -Likely prerenal in etiology in setting of increased Bumex dose at home  -Hold diuretic  -Avoid nephrotoxic medications, renally dose medications Patient discharged on 1/1/20 with serum Cr of 2.24. Serum Cr as low as 1.6-1.7 in 12/2019. Serum Cr improving.  -Likely prerenal in etiology in setting of increased Bumex dose at home  -Hold diuretic  -Avoid nephrotoxic medications, renally dose medications

## 2020-02-20 NOTE — CONSULT NOTE ADULT - PROBLEM SELECTOR RECOMMENDATION 2
- Pts baseline Cr is 1.4 to 1.5, however, as an out pt his Cr has more recently been around 2.3.   - He has required more diuretics to maintain euvolemia, and would tolerate higher Cr to make pt more euvolemic.

## 2020-02-20 NOTE — DISCHARGE NOTE PROVIDER - CARE PROVIDERS DIRECT ADDRESSES
[email or web address]

## 2020-02-20 NOTE — CONSULT NOTE ADULT - ASSESSMENT
Mr. 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 He was admitted for concern for LE cellulitis, and has been treated with abx. He diuretics have been held in the hospital, and at this time he is volume overloaded with elevated filling pressure on exam and weight that is about 8 pounds over his most recent dry weight.

## 2020-02-20 NOTE — CONSULT NOTE ADULT - PROBLEM SELECTOR RECOMMENDATION 4
- Pts rates are 90s to 100s.   - Would not increase BB at this time  - Monitor HRs on diuresis.   - If HRs do not improve can consider increased Metoprolol once euvolemic.

## 2020-02-20 NOTE — PROGRESS NOTE ADULT - PROBLEM SELECTOR PLAN 1
Asymmetric lower extremity edema, left > right, with flat erythematous rash on left side, likely representing cellulitis.  -DVT US of LLE negative  -Left foot x-ray showing mod soft tissue swelling, no gas tracking. X-ray left tib-fib with sclerotic lesion.  -s/p Vancomycin 1g x 1 in ED.  -ABX: Ancef (2/17-) day 4/5**  -Pain control: IV Dilaudid 0.5mg q4h PRN for severe pain, tylenol standing Asymmetric lower extremity edema, left > right, with flat erythematous rash on left side, likely representing cellulitis.  -DVT US of LLE negative  -Left foot x-ray showing mod soft tissue swelling, no gas tracking. X-ray left tib-fib with sclerotic lesion.  -s/p Vancomycin 1g x 1 in ED.  -ABX: Ancef (2/17-) day 4/5  -Pain control: IV Dilaudid 0.5mg q4h PRN for severe pain, tylenol standing

## 2020-02-20 NOTE — PROGRESS NOTE ADULT - SUBJECTIVE AND OBJECTIVE BOX
Dalia Triana, PGY-1  Internal Medicine  Pager #: [phone] / [facility] [phone]    PROGRESS NOTE:     Patient is a 79y old  Male who presents with a chief complaint of Cellulitis (19 Feb 2020 07:21)      SUBJECTIVE / OVERNIGHT EVENTS:  -No acute events overnight.    ADDITIONAL REVIEW OF SYSTEMS:    MEDICATIONS  (STANDING):  albuterol/ipratropium for Nebulization 3 milliLiter(s) Nebulizer every 6 hours  atorvastatin 20 milliGRAM(s) Oral at bedtime  budesonide 160 MICROgram(s)/formoterol 4.5 MICROgram(s) Inhaler 2 Puff(s) Inhalation two times a day  ceFAZolin   IVPB      ceFAZolin   IVPB 1000 milliGRAM(s) IV Intermittent every 12 hours  folic acid 1 milliGRAM(s) Oral daily  heparin  Injectable 5000 Unit(s) SubCutaneous every 12 hours  metoprolol tartrate 100 milliGRAM(s) Oral two times a day  multivitamin 1 Tablet(s) Oral daily    MEDICATIONS  (PRN):  HYDROmorphone  Injectable 0.5 milliGRAM(s) IV Push every 4 hours PRN Severe Pain (7 - 10)      CAPILLARY BLOOD GLUCOSE        I&O's Summary    19 Feb 2020 07:01  -  20 Feb 2020 07:00  --------------------------------------------------------  IN: 240 mL / OUT: 770 mL / NET: -530 mL        PHYSICAL EXAM:  Vital Signs Last 24 Hrs  T(C): 36.6 (20 Feb 2020 04:44), Max: 36.6 (19 Feb 2020 20:27)  T(F): 97.8 (20 Feb 2020 04:44), Max: 97.9 (19 Feb 2020 20:27)  HR: 89 (20 Feb 2020 04:44) (89 - 98)  BP: 108/68 (20 Feb 2020 04:44) (101/66 - 122/74)  BP(mean): --  RR: 18 (20 Feb 2020 04:44) (18 - 18)  SpO2: 95% (20 Feb 2020 04:44) (95% - 98%)    GENERAL: elderly thin male sitting up in bed, in no acute respiratory distress on room air  HEENT: dry mucous membranes  NECK: Supple, No JVD  NERVOUS SYSTEM: Alert & Oriented X3, non-focal  CHEST/LUNG: expiratory wheezing heard in bilateral lung fields  HEART: irregularly irregular rhythm; normal S1 and S2; No murmurs, rubs, or gallops  ABDOMEN: Soft, Nontender, Nondistended: Bowel sounds present  EXTREMITIES: 2+ Peripheral Pulses, trace pitting pedal edema on right, 2+ pitting edema on left to knee  LYMPH: No lymphadenopathy noted  SKIN: flat erythematous rash on posterior aspect of lower extremity, improving, now extending to just below knee    LABS:                        8.8    2.46  )-----------( 148      ( 20 Feb 2020 05:55 )             28.1     02-20    137  |  98  |  74<H>  ----------------------------<  105<H>  4.3   |  24  |  2.42<H>    Ca    8.7      20 Feb 2020 05:58  Phos  5.0     02-20  Mg     2.9     02-20    TPro  7.0  /  Alb  2.9<L>  /  TBili  0.2  /  DBili  x   /  AST  17  /  ALT  <5<L>  /  AlkPhos  99  02-20    PT/INR - ( 19 Feb 2020 08:41 )   PT: 37.0 sec;   INR: 3.11 ratio         PTT - ( 19 Feb 2020 08:41 )  PTT:45.6 sec            RADIOLOGY & ADDITIONAL TESTS:  Results Reviewed:   Imaging Personally Reviewed:  Electrocardiogram Personally Reviewed:    COORDINATION OF CARE:  Care Discussed with Consultants/Other Providers [Y/N]:  Prior or Outpatient Records Reviewed [Y/N]: Dalia Triana, PGY-1  Internal Medicine  Pager #: [phone] / [facility] [phone]    PROGRESS NOTE:     Patient is a 79y old  Male who presents with a chief complaint of Cellulitis (19 Feb 2020 07:21)      SUBJECTIVE / OVERNIGHT EVENTS:  -No acute events overnight.    Reports pain and swelling in LLE is slightly improved compared to yesterday. Does not feel comfortable switching to a PO pain regimen. Reports no BM in 2 days.    ADDITIONAL REVIEW OF SYSTEMS:  Denies fever/chills, chest pain, SOB, abd pain, N/V/D.    MEDICATIONS  (STANDING):  albuterol/ipratropium for Nebulization 3 milliLiter(s) Nebulizer every 6 hours  atorvastatin 20 milliGRAM(s) Oral at bedtime  budesonide 160 MICROgram(s)/formoterol 4.5 MICROgram(s) Inhaler 2 Puff(s) Inhalation two times a day  ceFAZolin   IVPB      ceFAZolin   IVPB 1000 milliGRAM(s) IV Intermittent every 12 hours  folic acid 1 milliGRAM(s) Oral daily  heparin  Injectable 5000 Unit(s) SubCutaneous every 12 hours  metoprolol tartrate 100 milliGRAM(s) Oral two times a day  multivitamin 1 Tablet(s) Oral daily    MEDICATIONS  (PRN):  HYDROmorphone  Injectable 0.5 milliGRAM(s) IV Push every 4 hours PRN Severe Pain (7 - 10)      CAPILLARY BLOOD GLUCOSE        I&O's Summary    19 Feb 2020 07:01  -  20 Feb 2020 07:00  --------------------------------------------------------  IN: 240 mL / OUT: 770 mL / NET: -530 mL        PHYSICAL EXAM:  Vital Signs Last 24 Hrs  T(C): 36.6 (20 Feb 2020 04:44), Max: 36.6 (19 Feb 2020 20:27)  T(F): 97.8 (20 Feb 2020 04:44), Max: 97.9 (19 Feb 2020 20:27)  HR: 89 (20 Feb 2020 04:44) (89 - 98)  BP: 108/68 (20 Feb 2020 04:44) (101/66 - 122/74)  BP(mean): --  RR: 18 (20 Feb 2020 04:44) (18 - 18)  SpO2: 95% (20 Feb 2020 04:44) (95% - 98%)    GENERAL: elderly thin male sitting up in bed, in no acute respiratory distress on room air  HEENT: moist mucous membranes  NECK: Supple, No JVD  NERVOUS SYSTEM: Alert & Oriented X3, non-focal  CHEST/LUNG: expiratory wheezing heard in bilateral lung fields  HEART: irregularly irregular rhythm; normal S1 and S2; No murmurs, rubs, or gallops  ABDOMEN: Soft, Nontender, Nondistended: Bowel sounds present  EXTREMITIES: 2+ Peripheral Pulses, no pitting pedal edema on right, 2+ pitting edema on left to knee  LYMPH: No lymphadenopathy noted  SKIN: flat erythematous rash on posterior aspect of lower extremity, improving    LABS:                        8.8    2.46  )-----------( 148      ( 20 Feb 2020 05:55 )             28.1     02-20    137  |  98  |  74<H>  ----------------------------<  105<H>  4.3   |  24  |  2.42<H>    Ca    8.7      20 Feb 2020 05:58  Phos  5.0     02-20  Mg     2.9     02-20    TPro  7.0  /  Alb  2.9<L>  /  TBili  0.2  /  DBili  x   /  AST  17  /  ALT  <5<L>  /  AlkPhos  99  02-20    PT/INR - ( 19 Feb 2020 08:41 )   PT: 37.0 sec;   INR: 3.11 ratio         PTT - ( 19 Feb 2020 08:41 )  PTT:45.6 sec            RADIOLOGY & ADDITIONAL TESTS:  Results Reviewed:   Imaging Personally Reviewed:  Electrocardiogram Personally Reviewed:    COORDINATION OF CARE:  Care Discussed with Consultants/Other Providers [Y/N]:  Prior or Outpatient Records Reviewed [Y/N]:

## 2020-02-20 NOTE — PROGRESS NOTE ADULT - PROBLEM SELECTOR PLAN 6
Macrocytic anemia, Hb stable at baseline, no signs of active bleeding. With leukopenia.  -Recent b12 and folate levels in 12/2019 wnl

## 2020-02-20 NOTE — CONSULT NOTE ADULT - PROBLEM SELECTOR RECOMMENDATION 5
- c/w statin.   - ASA on hold now due to MDS?   - If ASA does not need to be on hold for this would restart.

## 2020-02-20 NOTE — CONSULT NOTE ADULT - PROBLEM SELECTOR RECOMMENDATION 6
- Pt recently diagnosed with MDS  - Would check TSH, folic acid, homocystine level, methylmalonic acid (based on last heme note in allscripts)

## 2020-02-20 NOTE — DISCHARGE NOTE PROVIDER - NSDCHHNEEDSERVICE_GEN_ALL_CORE
Other, specify... Other, specify.../Observation and assessment/Rehabilitation services/Medication teaching and assessment

## 2020-02-20 NOTE — DISCHARGE NOTE PROVIDER - NSDCMRMEDTOKEN_GEN_ALL_CORE_FT
atorvastatin 20 mg oral tablet: 1 tab(s) orally once a day (at bedtime)  bumetanide 1 mg oral tablet: 3 tab(s) orally 2 times a day  Coumadin 2.5 mg oral tablet: 1 tab(s) orally once a day, 5mg tonight (1/1), then 2.5mg po nightly   folic acid 1 mg oral tablet: 1 tab(s) orally once a day  metoprolol tartrate 100 mg oral tablet: 1 tab(s) orally 2 times a day  Vitamin D3 2000 intl units oral capsule: 1 cap(s) orally once a day atorvastatin 20 mg oral tablet: 1 tab(s) orally once a day (at bedtime)  bisacodyl 5 mg oral delayed release tablet: 1 tab(s) orally every 12 hours, As needed, Constipation  bumetanide 1 mg oral tablet: 3 tab(s) orally 2 times a day  Coumadin 2.5 mg oral tablet: 1 tab(s) orally once a day, 5mg tonight (1/1), then 2.5mg po nightly   folic acid 1 mg oral tablet: 1 tab(s) orally once a day  metoprolol tartrate 100 mg oral tablet: 1 tab(s) orally 2 times a day  Multiple Vitamins oral tablet: 1 tab(s) orally once a day  polyethylene glycol 3350 oral powder for reconstitution: 17 gram(s) orally once a day  senna oral tablet: 2 tab(s) orally once a day (at bedtime)  Vitamin D3 2000 intl units oral capsule: 1 cap(s) orally once a day atorvastatin 20 mg oral tablet: 1 tab(s) orally once a day (at bedtime)  bumetanide 1 mg oral tablet: 3 tab(s) orally 2 times a day  Coumadin 2.5 mg oral tablet: 1 tab(s) orally once a day, 5mg tonight (1/1), then 2.5mg po nightly   folic acid 1 mg oral tablet: 1 tab(s) orally once a day  Metoprolol Tartrate 100 mg oral tablet: 1 tab(s) orally 2 times a day  Vitamin D3 2000 intl units oral capsule: 1 cap(s) orally once a day bisacodyl 5 mg oral delayed release tablet: 1 tab(s) orally every 12 hours, As Needed -Constipation - for constipation   bumetanide 2 mg oral tablet: 1 tab(s) orally 2 times a day  Flovent Diskus 50 mcg/inh inhalation powder: 50 microgram(s) inhaled once a day   folic acid 1 mg oral tablet: 1 tab(s) orally once a day  hydrALAZINE 10 mg oral tablet: 1 tab(s) orally every 8 hours  ipratropium-albuterol 0.5 mg-2.5 mg/3 mLinhalation solution: 3 milliliter(s) inhaled every 6 hours  lactulose 10 g/15 mL oral syrup: 30 milliliter(s) orally 2 times a day  Metoprolol Succinate ER 50 mg oral tablet, extended release: 1 tab(s) orally 2 times a day  Multiple Vitamins oral tablet: 1 tab(s) orally once a day  oxyCODONE 5 mg oral tablet: 1 tab(s) orally every 6 hours, As Needed -for severe pain MDD:4 tabs  polyethylene glycol 3350 oral powder for reconstitution: 17 gram(s) orally once a day  senna oral tablet: 2 tab(s) orally once a day (at bedtime)  Vitamin D3 2000 intl units oral capsule: 1 cap(s) orally once a day  warfarin 2.5 mg oral tablet: Do not take through Sun 3/1. Check INR on Mon 3/2. If INR &lt; 3, take1 tab(s) orally once a day (at bedtime). bisacodyl 5 mg oral delayed release tablet: 1 tab(s) orally every 12 hours, As Needed -Constipation - for constipation   bumetanide 2 mg oral tablet: 2 tab(s) orally 2 times a day   Flovent Diskus 50 mcg/inh inhalation powder: 50 microgram(s) inhaled once a day   folic acid 1 mg oral tablet: 1 tab(s) orally once a day  hydrALAZINE 10 mg oral tablet: 1 tab(s) orally every 8 hours  ipratropium-albuterol 0.5 mg-2.5 mg/3 mLinhalation solution: 3 milliliter(s) inhaled every 6 hours  lactulose 10 g/15 mL oral syrup: 30 milliliter(s) orally 2 times a day  Metoprolol Succinate ER 50 mg oral tablet, extended release: 1 tab(s) orally 2 times a day  Multiple Vitamins oral tablet: 1 tab(s) orally once a day  oxyCODONE 5 mg oral tablet: 1 tab(s) orally every 6 hours, As Needed -for severe pain MDD:4 tabs  polyethylene glycol 3350 oral powder for reconstitution: 17 gram(s) orally once a day  senna oral tablet: 2 tab(s) orally once a day (at bedtime)  Vitamin D3 2000 intl units oral capsule: 1 cap(s) orally once a day  warfarin 2.5 mg oral tablet: Do not take through Sun 3/1. Check INR on Mon 3/2. If INR &lt; 3, take1 tab(s) orally once a day (at bedtime). bisacodyl 5 mg oral delayed release tablet: 1 tab(s) orally every 12 hours, As Needed -Constipation - for constipation   bumetanide 2 mg oral tablet: 2 tab(s) orally 2 times a day   Flovent Diskus 50 mcg/inh inhalation powder: 50 microgram(s) inhaled once a day   folic acid 1 mg oral tablet: 1 tab(s) orally once a day  hydrALAZINE 10 mg oral tablet: 1 tab(s) orally every 8 hours  ipratropium-albuterol 0.5 mg-2.5 mg/3 mLinhalation solution: 3 milliliter(s) inhaled every 6 hours  lactulose 10 g/15 mL oral syrup: 30 milliliter(s) orally 2 times a day  Metoprolol Succinate ER 50 mg oral tablet, extended release: 1 tab(s) orally 2 times a day  Multiple Vitamins oral tablet: 1 tab(s) orally once a day  oxyCODONE 5 mg oral tablet: 1 tab(s) orally every 6 hours, As Needed -for severe pain MDD:4 tabs  polyethylene glycol 3350 oral powder for reconstitution: 17 gram(s) orally once a day  senna oral tablet: 2 tab(s) orally once a day (at bedtime)  Vitamin D3 2000 intl units oral capsule: 1 cap(s) orally once a day  warfarin 2.5 mg oral tablet: Take1 tab(s) orally once a day (at bedtime) starting Sun 3/1 and get INR check on Mon 3/2. bisacodyl 5 mg oral delayed release tablet: 1 tab(s) orally every 12 hours, As Needed -Constipation - for constipation   bisacodyl 5 mg oral delayed release tablet: 1 tab(s) orally 2 times a day, As Needed - for constipation   bumetanide 2 mg oral tablet: 2 tab(s) orally 2 times a day   Flovent Diskus 50 mcg/inh inhalation powder: 50 microgram(s) inhaled once a day   Flovent Diskus 50 mcg/inh inhalation powder: 50 microgram(s) inhaled once a day   folic acid 1 mg oral tablet: 1 tab(s) orally once a day  hydrALAZINE 10 mg oral tablet: 1 tab(s) orally every 8 hours  hydrALAZINE 10 mg oral tablet: 1 tab(s) orally 3 times a day   ipratropium-albuterol 0.5 mg-2.5 mg/3 mLinhalation solution: 3 milliliter(s) by nebulizer every 6 hours   ipratropium-albuterol 0.5 mg-2.5 mg/3 mLinhalation solution: 3 milliliter(s) inhaled every 6 hours  lactulose 10 g/15 mL oral syrup: 30 milliliter(s) orally 2 times a day  Metoprolol Succinate ER 50 mg oral tablet, extended release: 1 tab(s) orally 2 times a day  Metoprolol Succinate ER 50 mg oral tablet, extended release: 1 tab(s) orally 2 times a day   Multiple Vitamins oral tablet: 1 tab(s) orally once a day  oxyCODONE 5 mg oral tablet: 1 tab(s) orally every 6 hours, As Needed -for severe pain MDD:4 tabs  oxyCODONE 5 mg oral tablet: 1 tab(s) orally every 6 hours, As Needed -for severe pain MDD:4 tabs  polyethylene glycol 3350 oral powder for reconstitution: 17 gram(s) orally once a day   polyethylene glycol 3350 oral powder for reconstitution: 17 gram(s) orally once a day  senna oral tablet: 2 tab(s) orally once a day (at bedtime)  senna oral tablet: 2 tab(s) orally once a day (at bedtime)   Vitamin D3 2000 intl units oral capsule: 1 cap(s) orally once a day  warfarin 2.5 mg oral tablet: Take1 tab(s) orally once a day (at bedtime) starting Sun 3/1 and get INR check on Mon 3/2.

## 2020-02-20 NOTE — PROGRESS NOTE ADULT - PROBLEM SELECTOR PLAN 10
DVT ppx: Coumadin - holding tonight for supratherapeutic INR  Diet: DASH/TLC    Transitions of Care Status:  1.  Name of PCP: Dr. Quiroz [phone]  2.  PCP Contacted on Admission: [ ] Y    [ ] N    3.  PCP contacted at Discharge: [ ] Y    [ ] N    [ ] N/A  4.  Post-Discharge Appointment Date and Location:  5.  Summary of Handoff given to PCP: DVT ppx: Coumadin - holding tonight for supratherapeutic INR  Diet: DASH/TLC  Bowel regimen: senna, miralax, dulcolax    Transitions of Care Status:  1.  Name of PCP: Dr. Quiroz [phone]  2.  PCP Contacted on Admission: [x] Y    [ ] N - emailed  3.  PCP contacted at Discharge: [ ] Y    [ ] N    [ ] N/A  4.  Post-Discharge Appointment Date and Location:  5.  Summary of Handoff given to PCP:

## 2020-02-20 NOTE — PROGRESS NOTE ADULT - PROBLEM SELECTOR PLAN 3
Patient with HFrEF (EF 20-25% on 11/2019 TTE) presenting with acute on chronic lower extremity edema. CXR with pleural effusions, pulmonary congestion. proBNP 21K. However patient looks otherwise relatively dry on exam.  -C/w home metoprolol dose 100mg BID as below  -Hold diuretic in setting of worsening MERCED on CKD Patient with HFrEF (EF 20-25% on 11/2019 TTE) presenting with acute on chronic lower extremity edema. CXR with pleural effusions, pulmonary congestion. proBNP 21K. However patient looked otherwise relatively dry on exam on admission. Now appearing euvolemic.  -C/w home metoprolol dose 100mg BID as below  -Hold diuretic in setting of MERCED on CKD  -Clarify Bumex dose with outpatient cardiologist

## 2020-02-20 NOTE — DISCHARGE NOTE PROVIDER - CARE PROVIDER_API CALL
Ramón Quiroz)  Cardiovascular Disease; Interventional Cardiology  [address] [place] [address]  Phone: [phone]  Fax: [phone]  Follow Up Time: 1 week    Kelsey Ramirez)  Cardiology; Internal Medicine  [address], [address] [place] [address]  Phone: [phone]  Fax: [phone]  Follow Up Time:

## 2020-02-21 NOTE — PROGRESS NOTE ADULT - PROBLEM SELECTOR PLAN 7
BP controlled. Home med of metoprolol succinate 100mg BID.  -C/w home metoprolol 100mg BID BP controlled. Home med of metoprolol 100mg BID.  -C/w home metoprolol 100mg BID

## 2020-02-21 NOTE — PROVIDER CONTACT NOTE (OTHER) - SITUATION
Pt reports that he "wants to kill himself" and states he has a plan; pt will not elaborate further. Pt is also refusing to wear Ace wrap around L leg despite education on importance.

## 2020-02-21 NOTE — PROGRESS NOTE ADULT - ATTENDING COMMENTS
Pt w systolic dysfunction aw LLE cellulitis- improved on abx. Now w fluid overload, started on diuresis per Cardiology.

## 2020-02-21 NOTE — CHART NOTE - NSCHARTNOTEFT_GEN_A_CORE
Nutrition Follow Up Note  Patient seen for: initial malnutrition follow up on 6TOW    Chart reviewed, events noted.    Source: pt, pt aide at bedside, electronic medical record     Diet : DASH/TLC     Patient reports: No acute GI distress reported, however no recent BM noted ?    PO intake : good/fair. Pt noted with 75%po intake at meals yesterday, 50% po intake this AM. Reports he liked the steak last night. Amenable to addition of oral nutrition supplement, ensure enlive at this time. CHF nutrition education provided two days ago to pt aide and pt present at that time, both deny having any nutrition related questions on this topic. Pt aide does accept nutrition education regarding vitamin K and coumadin drug nutrient interaction at this time and pt present, listening, but does not participate actively in education. Provided Taking Warfarin Safely Booklet. RD remains available.    Source for PO intake: pt, electronic medical record     Enteral /Parenteral Nutrition: n/a      Daily Weight in k.9 (02-19), Weight in k.7 (-18)    Pertinent Medications: MEDICATIONS  (STANDING):  albuterol/ipratropium for Nebulization 3 milliLiter(s) Nebulizer every 6 hours  atorvastatin 20 milliGRAM(s) Oral at bedtime  budesonide 160 MICROgram(s)/formoterol 4.5 MICROgram(s) Inhaler 2 Puff(s) Inhalation two times a day  buMETAnide IVPB 4 milliGRAM(s) IV Intermittent every 8 hours  ceFAZolin   IVPB      ceFAZolin   IVPB 1000 milliGRAM(s) IV Intermittent every 12 hours  folic acid 1 milliGRAM(s) Oral daily  heparin  Injectable 5000 Unit(s) SubCutaneous every 12 hours  metoprolol tartrate 100 milliGRAM(s) Oral two times a day  multivitamin 1 Tablet(s) Oral daily  polyethylene glycol 3350 17 Gram(s) Oral daily  senna 2 Tablet(s) Oral at bedtime    MEDICATIONS  (PRN):  bisacodyl 5 milliGRAM(s) Oral every 12 hours PRN Constipation  oxyCODONE    IR 5 milliGRAM(s) Oral every 6 hours PRN Severe Pain (7 - 10)    Pertinent Labs:   Finger Sticks:      Skin per nursing documentation: no pressure injuries noted   Edema: 1+ edema to R leg, 3+ edema to L leg noted    Estimated Needs:   [x] no change since previous assessment  [ ] recalculated:     Previous Nutrition Diagnosis: Malnutrition, moderate  Nutrition Diagnosis is: ongoing, to be addressed with oral nutrition supplement and consider continuing micronutrient supplementation  Nutrition care plan achieved.    New Nutrition Diagnosis: n/a    Recommend  1. Continue current DASH/TLC diet as indicated and continue to obtain/provide food preferences as feasible   2. Provide Ensure Enlive once daily (provides additional 350kcal, 20g protein)  3. Consider continuing current micronutrient supplementation  4. Provide/review nutrition education as indicated     Monitoring and Evaluation:     Continue to monitor Nutritional intake, Tolerance to diet prescription, weights, labs, skin integrity    RD remains available upon request and will follow up per protocol. Viry Gandhi RD, CDN Pager: [phone]

## 2020-02-21 NOTE — PROVIDER CONTACT NOTE (OTHER) - BACKGROUND
pt is 78 yo male. dx: heart failure, cellulitis of L leg. pmh: CKD, HTN, CAD, a fib, prostate cancer,

## 2020-02-21 NOTE — PROGRESS NOTE ADULT - PROBLEM SELECTOR PLAN 1
- Goal dry standing weight of 122 lbs.   - c/w IV diuresis with Bumex 4 IV BID.   - Check daily standing weights and strict Is and Os.   - C/w home metoprolol 100 ER BID  - Monitor Cr and lytes.

## 2020-02-21 NOTE — PROGRESS NOTE ADULT - PROBLEM SELECTOR PLAN 1
Asymmetric lower extremity edema, left > right, with flat erythematous rash on left side, likely representing cellulitis.  -DVT US of LLE negative, repeat negative  -Left foot x-ray showing mod soft tissue swelling, no gas tracking. X-ray left tib-fib with sclerotic lesion.  -s/p Vancomycin 1g x 1 in ED.  -ABX: Ancef (2/17-) day 5  -Pain control: Oxycodone 5mg PO q6h PRN severe, standing tylenol Asymmetric lower extremity edema, left > right, with flat erythematous rash on left side, likely representing cellulitis.  -DVT US of LLE negative, repeat negative  -Left foot x-ray showing mod soft tissue swelling, no gas tracking. X-ray left tib-fib with sclerotic lesion.  -s/p Vancomycin 1g x 1 in ED.  -ABX: Ancef (2/17-) day 5/7  -Pain control: Oxycodone 5mg PO q6h PRN severe, standing tylenol

## 2020-02-21 NOTE — PROGRESS NOTE ADULT - PROBLEM SELECTOR PLAN 4
Atrial fibrillation, rate-controlled with metoprolol succinate 100mg BID, CHADS2-VASc = 5. On home Coumadin 2.5mg daily for anticoagulation. INR 3.55 on admission.  -INR goal 2-3, will give home dose 2.5mg today  -C/w home Metoprolol 100mg BID  -monitor PT/INR for daily Coumadin dosing

## 2020-02-21 NOTE — PROGRESS NOTE ADULT - SUBJECTIVE AND OBJECTIVE BOX
Dalia Triana, PGY-1  Internal Medicine  Pager #: [phone] / [facility] [phone]    PROGRESS NOTE:     Patient is a 79y old  Male who presents with a chief complaint of Cellulitis (20 Feb 2020 18:54)      SUBJECTIVE / OVERNIGHT EVENTS:  -Yesterday, Heart Failure team consulted  -Overnight, patient with breakthrough pain, unrelieved by PO Oxycodone. Given IV Dilaudid 0.5 mg x 2 overnight.    ADDITIONAL REVIEW OF SYSTEMS:    MEDICATIONS  (STANDING):  albuterol/ipratropium for Nebulization 3 milliLiter(s) Nebulizer every 6 hours  atorvastatin 20 milliGRAM(s) Oral at bedtime  budesonide 160 MICROgram(s)/formoterol 4.5 MICROgram(s) Inhaler 2 Puff(s) Inhalation two times a day  buMETAnide IVPB 4 milliGRAM(s) IV Intermittent every 8 hours  ceFAZolin   IVPB      ceFAZolin   IVPB 1000 milliGRAM(s) IV Intermittent every 12 hours  folic acid 1 milliGRAM(s) Oral daily  heparin  Injectable 5000 Unit(s) SubCutaneous every 12 hours  metoprolol tartrate 100 milliGRAM(s) Oral two times a day  multivitamin 1 Tablet(s) Oral daily  polyethylene glycol 3350 17 Gram(s) Oral daily  senna 2 Tablet(s) Oral at bedtime    MEDICATIONS  (PRN):  bisacodyl 5 milliGRAM(s) Oral every 12 hours PRN Constipation  oxyCODONE    IR 5 milliGRAM(s) Oral every 6 hours PRN Severe Pain (7 - 10)      CAPILLARY BLOOD GLUCOSE        I&O's Summary    20 Feb 2020 07:01  -  21 Feb 2020 07:00  --------------------------------------------------------  IN: 720 mL / OUT: 300 mL / NET: 420 mL        PHYSICAL EXAM:  Vital Signs Last 24 Hrs  T(C): 36.4 (21 Feb 2020 06:09), Max: 36.7 (20 Feb 2020 14:56)  T(F): 97.5 (21 Feb 2020 06:09), Max: 98 (20 Feb 2020 14:56)  HR: 96 (21 Feb 2020 06:09) (89 - 112)  BP: 122/79 (21 Feb 2020 06:09) (95/58 - 122/79)  BP(mean): --  RR: 18 (21 Feb 2020 06:09) (18 - 18)  SpO2: 96% (21 Feb 2020 06:09) (96% - 97%)    GENERAL: elderly thin male sitting up in bed, in no acute respiratory distress on room air  HEENT: moist mucous membranes  NECK: Supple, No JVD  NERVOUS SYSTEM: Alert & Oriented X3, non-focal  CHEST/LUNG: expiratory wheezing heard in bilateral lung fields  HEART: irregularly irregular rhythm; normal S1 and S2; No murmurs, rubs, or gallops  ABDOMEN: Soft, Nontender, Nondistended: Bowel sounds present  EXTREMITIES: 2+ Peripheral Pulses, no pitting pedal edema on right, 2+ pitting edema on left to knee  LYMPH: No lymphadenopathy noted  SKIN: flat erythematous rash on posterior aspect of lower extremity,     LABS:                        8.8    2.46  )-----------( 148      ( 20 Feb 2020 05:55 )             28.1     02-20    137  |  98  |  74<H>  ----------------------------<  105<H>  4.3   |  24  |  2.42<H>    Ca    8.7      20 Feb 2020 05:58  Phos  5.0     02-20  Mg     2.9     02-20    TPro  7.0  /  Alb  2.9<L>  /  TBili  0.2  /  DBili  x   /  AST  17  /  ALT  <5<L>  /  AlkPhos  99  02-20    PT/INR - ( 20 Feb 2020 08:31 )   PT: 29.3 sec;   INR: 2.52 ratio         PTT - ( 20 Feb 2020 08:31 )  PTT:43.5 sec            RADIOLOGY & ADDITIONAL TESTS:  Results Reviewed:   Imaging Personally Reviewed:  Electrocardiogram Personally Reviewed:    COORDINATION OF CARE:  Care Discussed with Consultants/Other Providers [Y/N]:  Prior or Outpatient Records Reviewed [Y/N]: Dalia Triana, PGY-1  Internal Medicine  Pager #: [phone] / [facility] [phone]    PROGRESS NOTE:     Patient is a 79y old  Male who presents with a chief complaint of Cellulitis (20 Feb 2020 18:54)      SUBJECTIVE / OVERNIGHT EVENTS:  -Yesterday, Heart Failure team consulted, resumed diuresis.  -Overnight, patient with breakthrough pain, unrelieved by PO Oxycodone. Given IV Dilaudid 0.5 mg x 2 overnight.    ADDITIONAL REVIEW OF SYSTEMS:    MEDICATIONS  (STANDING):  albuterol/ipratropium for Nebulization 3 milliLiter(s) Nebulizer every 6 hours  atorvastatin 20 milliGRAM(s) Oral at bedtime  budesonide 160 MICROgram(s)/formoterol 4.5 MICROgram(s) Inhaler 2 Puff(s) Inhalation two times a day  buMETAnide IVPB 4 milliGRAM(s) IV Intermittent every 8 hours  ceFAZolin   IVPB      ceFAZolin   IVPB 1000 milliGRAM(s) IV Intermittent every 12 hours  folic acid 1 milliGRAM(s) Oral daily  heparin  Injectable 5000 Unit(s) SubCutaneous every 12 hours  metoprolol tartrate 100 milliGRAM(s) Oral two times a day  multivitamin 1 Tablet(s) Oral daily  polyethylene glycol 3350 17 Gram(s) Oral daily  senna 2 Tablet(s) Oral at bedtime    MEDICATIONS  (PRN):  bisacodyl 5 milliGRAM(s) Oral every 12 hours PRN Constipation  oxyCODONE    IR 5 milliGRAM(s) Oral every 6 hours PRN Severe Pain (7 - 10)      CAPILLARY BLOOD GLUCOSE        I&O's Summary    20 Feb 2020 07:01  -  21 Feb 2020 07:00  --------------------------------------------------------  IN: 720 mL / OUT: 300 mL / NET: 420 mL        PHYSICAL EXAM:  Vital Signs Last 24 Hrs  T(C): 36.4 (21 Feb 2020 06:09), Max: 36.7 (20 Feb 2020 14:56)  T(F): 97.5 (21 Feb 2020 06:09), Max: 98 (20 Feb 2020 14:56)  HR: 96 (21 Feb 2020 06:09) (89 - 112)  BP: 122/79 (21 Feb 2020 06:09) (95/58 - 122/79)  BP(mean): --  RR: 18 (21 Feb 2020 06:09) (18 - 18)  SpO2: 96% (21 Feb 2020 06:09) (96% - 97%)    GENERAL: elderly thin male sitting up in bed, in no acute respiratory distress on room air  HEENT: moist mucous membranes  NECK: Supple, No JVD  NERVOUS SYSTEM: Alert & Oriented X3, non-focal  CHEST/LUNG: expiratory wheezing heard in bilateral lung fields  HEART: irregularly irregular rhythm; normal S1 and S2; No murmurs, rubs, or gallops  ABDOMEN: Soft, Nontender, Nondistended: Bowel sounds present  EXTREMITIES: 2+ Peripheral Pulses, no pitting pedal edema on right, 2+ pitting edema on left to knee  LYMPH: No lymphadenopathy noted  SKIN: flat erythematous rash on posterior aspect of lower extremity,     LABS:                        8.8    2.46  )-----------( 148      ( 20 Feb 2020 05:55 )             28.1     02-20    137  |  98  |  74<H>  ----------------------------<  105<H>  4.3   |  24  |  2.42<H>    Ca    8.7      20 Feb 2020 05:58  Phos  5.0     02-20  Mg     2.9     02-20    TPro  7.0  /  Alb  2.9<L>  /  TBili  0.2  /  DBili  x   /  AST  17  /  ALT  <5<L>  /  AlkPhos  99  02-20    PT/INR - ( 20 Feb 2020 08:31 )   PT: 29.3 sec;   INR: 2.52 ratio         PTT - ( 20 Feb 2020 08:31 )  PTT:43.5 sec            RADIOLOGY & ADDITIONAL TESTS:  Results Reviewed:   Imaging Personally Reviewed:  Electrocardiogram Personally Reviewed:    COORDINATION OF CARE:  Care Discussed with Consultants/Other Providers [Y/N]:  Prior or Outpatient Records Reviewed [Y/N]: Dalia Triana, PGY-1  Internal Medicine  Pager #: [phone] / [facility] [phone]    PROGRESS NOTE:     Patient is a 79y old  Male who presents with a chief complaint of Cellulitis (20 Feb 2020 18:54)      SUBJECTIVE / OVERNIGHT EVENTS:  -Yesterday, Heart Failure team consulted, resumed diuresis.  -Overnight, patient with breakthrough pain, unrelieved by PO Oxycodone. Given IV Dilaudid 0.5 mg x 2 overnight.    Reports persistent lower extremity pain and swelling, unchanged from yesterday. States that adjustments to pain medications have not helped him. When questioned about statements overnight, asks if he should just die because pain unrelieved. Currently denies active suicidal ideation or plan.    Per RN, no BM yesterday even with bowel regimen.    ADDITIONAL REVIEW OF SYSTEMS:  Denies fever/chills, chest pain, SOB, abd pain, N/V/D.    MEDICATIONS  (STANDING):  albuterol/ipratropium for Nebulization 3 milliLiter(s) Nebulizer every 6 hours  atorvastatin 20 milliGRAM(s) Oral at bedtime  budesonide 160 MICROgram(s)/formoterol 4.5 MICROgram(s) Inhaler 2 Puff(s) Inhalation two times a day  buMETAnide IVPB 4 milliGRAM(s) IV Intermittent every 8 hours  ceFAZolin   IVPB      ceFAZolin   IVPB 1000 milliGRAM(s) IV Intermittent every 12 hours  folic acid 1 milliGRAM(s) Oral daily  heparin  Injectable 5000 Unit(s) SubCutaneous every 12 hours  metoprolol tartrate 100 milliGRAM(s) Oral two times a day  multivitamin 1 Tablet(s) Oral daily  polyethylene glycol 3350 17 Gram(s) Oral daily  senna 2 Tablet(s) Oral at bedtime    MEDICATIONS  (PRN):  bisacodyl 5 milliGRAM(s) Oral every 12 hours PRN Constipation  oxyCODONE    IR 5 milliGRAM(s) Oral every 6 hours PRN Severe Pain (7 - 10)      CAPILLARY BLOOD GLUCOSE        I&O's Summary    20 Feb 2020 07:01  -  21 Feb 2020 07:00  --------------------------------------------------------  IN: 720 mL / OUT: 300 mL / NET: 420 mL        PHYSICAL EXAM:  Vital Signs Last 24 Hrs  T(C): 36.4 (21 Feb 2020 06:09), Max: 36.7 (20 Feb 2020 14:56)  T(F): 97.5 (21 Feb 2020 06:09), Max: 98 (20 Feb 2020 14:56)  HR: 96 (21 Feb 2020 06:09) (89 - 112)  BP: 122/79 (21 Feb 2020 06:09) (95/58 - 122/79)  BP(mean): --  RR: 18 (21 Feb 2020 06:09) (18 - 18)  SpO2: 96% (21 Feb 2020 06:09) (96% - 97%)    GENERAL: elderly thin male sitting up in bed, in no acute respiratory distress on room air  HEENT: moist mucous membranes  NECK: Supple, No JVD  NERVOUS SYSTEM: Alert & Oriented X3, non-focal  CHEST/LUNG: expiratory wheezing heard in bilateral lung fields  HEART: irregularly irregular rhythm; normal S1 and S2; No murmurs, rubs, or gallops  ABDOMEN: Soft, Nontender, Nondistended: Bowel sounds present  EXTREMITIES: 2+ Peripheral Pulses, 1+ pitting pedal edema on right, 2+ pitting edema on left to knee  LYMPH: No lymphadenopathy noted  SKIN: flat erythematous rash on posterior aspect of lower extremity  NEURO/PSYCH: A/o x self/place. "2000" for year. Denies active suicidal ideation or plan. No focal deficits.    LABS:                        8.8    2.46  )-----------( 148      ( 20 Feb 2020 05:55 )             28.1     02-20    137  |  98  |  74<H>  ----------------------------<  105<H>  4.3   |  24  |  2.42<H>    Ca    8.7      20 Feb 2020 05:58  Phos  5.0     02-20  Mg     2.9     02-20    TPro  7.0  /  Alb  2.9<L>  /  TBili  0.2  /  DBili  x   /  AST  17  /  ALT  <5<L>  /  AlkPhos  99  02-20    PT/INR - ( 20 Feb 2020 08:31 )   PT: 29.3 sec;   INR: 2.52 ratio         PTT - ( 20 Feb 2020 08:31 )  PTT:43.5 sec      RADIOLOGY & ADDITIONAL TESTS:  Results Reviewed:   Imaging Personally Reviewed:  Electrocardiogram Personally Reviewed:    COORDINATION OF CARE:  Care Discussed with Consultants/Other Providers [Y/N]:  Prior or Outpatient Records Reviewed [Y/N]: Dalia Triana, PGY-1  Internal Medicine  Pager #: [phone] / [facility] [phone]    PROGRESS NOTE:     Patient is a 79y old  Male who presents with a chief complaint of Cellulitis (20 Feb 2020 18:54)      SUBJECTIVE / OVERNIGHT EVENTS:  -Yesterday, Heart Failure team consulted, resumed diuresis.  -Overnight, patient with breakthrough pain, unrelieved by PO Oxycodone. Given IV Dilaudid 0.5 mg x 2 overnight.    Reports persistent lower extremity pain and swelling, unchanged from yesterday. States that adjustments to pain medications have not helped him. When questioned about statements overnight, asks if he should just die because pain unrelieved. Currently denies active suicidal ideation or plan.    Per RN, no BM yesterday even with bowel regimen.    ADDITIONAL REVIEW OF SYSTEMS:  Denies fever/chills, chest pain, SOB, abd pain, N/V/D.    MEDICATIONS  (STANDING):  albuterol/ipratropium for Nebulization 3 milliLiter(s) Nebulizer every 6 hours  atorvastatin 20 milliGRAM(s) Oral at bedtime  budesonide 160 MICROgram(s)/formoterol 4.5 MICROgram(s) Inhaler 2 Puff(s) Inhalation two times a day  buMETAnide IVPB 4 milliGRAM(s) IV Intermittent every 8 hours  ceFAZolin   IVPB      ceFAZolin   IVPB 1000 milliGRAM(s) IV Intermittent every 12 hours  folic acid 1 milliGRAM(s) Oral daily  heparin  Injectable 5000 Unit(s) SubCutaneous every 12 hours  metoprolol tartrate 100 milliGRAM(s) Oral two times a day  multivitamin 1 Tablet(s) Oral daily  polyethylene glycol 3350 17 Gram(s) Oral daily  senna 2 Tablet(s) Oral at bedtime    MEDICATIONS  (PRN):  bisacodyl 5 milliGRAM(s) Oral every 12 hours PRN Constipation  oxyCODONE    IR 5 milliGRAM(s) Oral every 6 hours PRN Severe Pain (7 - 10)      CAPILLARY BLOOD GLUCOSE        I&O's Summary    20 Feb 2020 07:01  -  21 Feb 2020 07:00  --------------------------------------------------------  IN: 720 mL / OUT: 300 mL / NET: 420 mL        PHYSICAL EXAM:  Vital Signs Last 24 Hrs  T(C): 36.4 (21 Feb 2020 06:09), Max: 36.7 (20 Feb 2020 14:56)  T(F): 97.5 (21 Feb 2020 06:09), Max: 98 (20 Feb 2020 14:56)  HR: 96 (21 Feb 2020 06:09) (89 - 112)  BP: 122/79 (21 Feb 2020 06:09) (95/58 - 122/79)  BP(mean): --  RR: 18 (21 Feb 2020 06:09) (18 - 18)  SpO2: 96% (21 Feb 2020 06:09) (96% - 97%)    GENERAL: elderly thin male sitting up in bed, in no acute respiratory distress on room air, appears more fatigued  HEENT: moist mucous membranes  NECK: Supple, No JVD  NERVOUS SYSTEM: Alert & Oriented X3, non-focal  CHEST/LUNG: expiratory wheezing heard in bilateral lung fields  HEART: irregularly irregular rhythm; normal S1 and S2; No murmurs, rubs, or gallops  ABDOMEN: Soft, Nontender, Nondistended: Bowel sounds present  EXTREMITIES: 2+ Peripheral Pulses, 1+ pitting pedal edema on right, 2+ pitting edema on left to knee  LYMPH: No lymphadenopathy noted  SKIN: flat erythematous rash on posterior aspect of lower extremity  NEURO/PSYCH: A/o x self/place. "2000" for year. Denies active suicidal ideation or plan. No focal deficits.    LABS:                        8.8    2.46  )-----------( 148      ( 20 Feb 2020 05:55 )             28.1     02-20    137  |  98  |  74<H>  ----------------------------<  105<H>  4.3   |  24  |  2.42<H>    Ca    8.7      20 Feb 2020 05:58  Phos  5.0     02-20  Mg     2.9     02-20    TPro  7.0  /  Alb  2.9<L>  /  TBili  0.2  /  DBili  x   /  AST  17  /  ALT  <5<L>  /  AlkPhos  99  02-20    PT/INR - ( 20 Feb 2020 08:31 )   PT: 29.3 sec;   INR: 2.52 ratio         PTT - ( 20 Feb 2020 08:31 )  PTT:43.5 sec      RADIOLOGY & ADDITIONAL TESTS:  Results Reviewed:   Imaging Personally Reviewed:  Electrocardiogram Personally Reviewed:    COORDINATION OF CARE:  Care Discussed with Consultants/Other Providers [Y/N]:  Prior or Outpatient Records Reviewed [Y/N]:

## 2020-02-21 NOTE — PROGRESS NOTE ADULT - PROBLEM SELECTOR PLAN 3
Patient with HFrEF (EF 20-25% on 11/2019 TTE) presenting with acute on chronic lower extremity edema. CXR with pleural effusions, pulmonary congestion. proBNP 21K. However patient looked otherwise relatively dry on exam on admission and diuretic was initially held.  -C/w home metoprolol dose 100mg BID as below Patient with HFrEF (EF 20-25% on 11/2019 TTE) presenting with acute on chronic lower extremity edema. CXR with pleural effusions, pulmonary congestion. proBNP 21K. However patient looked otherwise relatively dry on exam on admission and diuretic was initially held.  -Heart Failure consulted, appreciate recs  -Bumex 4mg IV q8h  -Monitor BMP for K > 4, Mg > 2  -C/w home metoprolol dose 100mg BID  -strict I/Os, standing weights Patient with HFrEF (EF 20-25% on 11/2019 TTE) presenting with acute on chronic lower extremity edema. CXR with pleural effusions, pulmonary congestion. proBNP 21K. However patient looked otherwise relatively dry on exam on admission and diuretic was initially held. Now appearing more fluid overloaded  -Heart Failure consulted, appreciate recs  -Bumex 4mg IV q8h  -Resume Hydralazine 10mg TID (patient reportedly not taking immediately PTA but was supposed to be on per outpatient/HF team)  -Monitor BMP BID, goal K > 4, Mg > 2  -C/w home metoprolol dose 100mg BID  -strict I/Os, standing weights

## 2020-02-21 NOTE — CHART NOTE - NSCHARTNOTEFT_GEN_A_CORE
Called to see patient for suicidal ideation. Per nursing, patient was stating that he wanted to kill himself and endorsed having a plan. Went to see patient, however, patient asleep. Given his previous agitation, will let patient rest. Will put 1:1 on patient for now until patient can be evaluated by Behavioral Health.     Sandra Sheehan, PGY-1  Internal Medicine

## 2020-02-21 NOTE — PROGRESS NOTE ADULT - PROBLEM SELECTOR PLAN 8
History CAD s/p PCI and CABG.  -Continue with metoprolol succinate as above  -Continue with home statin History CAD s/p PCI and CABG.  -Continue with metoprolol as above  -Continue with home statin

## 2020-02-21 NOTE — PROGRESS NOTE ADULT - PROBLEM SELECTOR PLAN 6
Macrocytic anemia, Hb stable at baseline, no signs of active bleeding. With leukopenia.  -Recent b12 and folate levels in 12/2019 wnl Macrocytic anemia, Hb stable at baseline, no signs of active bleeding. With leukopenia. Recent diagnosis of MDS.  -Recent b12 and folate levels in 12/2019 wnl  -check TSH, folic acid, homocystine, MMA Macrocytic anemia, Hb stable at baseline, no signs of active bleeding. With leukopenia. Recent diagnosis of MDS by outpatient heme.  -Recent b12 and folate levels in 12/2019 wnl  -check TSH, folic acid, homocystine, MMA

## 2020-02-21 NOTE — PROGRESS NOTE ADULT - PROBLEM SELECTOR PLAN 9
Patient discharged on 1/1/20 with serum Cr of 2.24. Serum Cr as low as 1.6-1.7 in 12/2019. Serum Cr improving.  -Likely prerenal in etiology in setting of increased Bumex dose at home  -Hold diuretic  -Avoid nephrotoxic medications, renally dose medications Patient discharged on 1/1/20 with serum Cr of 2.24. Serum Cr as low as 1.6-1.7 in 12/2019. Serum Cr improving.  -Avoid nephrotoxic medications, renally dose medications

## 2020-02-21 NOTE — PROGRESS NOTE ADULT - PROBLEM SELECTOR PLAN 2
Left tib-fib Xray shows sclerotic lesion in proximal tibia. DDx includes benign bone lesion vs malignancy (h/o prostate cancer)  -Clarify prostate cancer history - patient previously following with Dr. Nito Littlejohn, currently looking for new urologist within [facility]. Left tib-fib Xray shows sclerotic lesion in proximal tibia. DDx includes benign bone lesion vs malignancy (h/o prostate cancer)  -Clarify prostate cancer history - patient previously following with Dr. Nito Sainz, currently looking for new urologist within [facility].

## 2020-02-21 NOTE — PROGRESS NOTE ADULT - SUBJECTIVE AND OBJECTIVE BOX
Patient seen and examined at bedside.    Overnight Events: Pt slightly confused today. States that he feels well without pain.       REVIEW OF SYSTEMS:  Constitutional:     [ ] negative [ ] fevers [ ] chills [ ] weight loss [ ] weight gain  HEENT:                  [ ] negative [ ] dry eyes [ ] eye irritation [ ] postnasal drip [ ] nasal congestion  CV:                         [ ] negative  [ ] chest pain [ ] orthopnea [ ] palpitations [ ] murmur  Resp:                     [ ] negative [ ] cough [ ] shortness of breath [ ] dyspnea [ ] wheezing [ ] sputum [ ]hemoptysis  GI:                          [ ] negative [ ] nausea [ ] vomiting [ ] diarrhea [ ] constipation [ ] abd pain [ ] dysphagia   :                        [ ] negative [ ] dysuria [ ] nocturia [ ] hematuria [ ] increased urinary frequency  Musculoskeletal: [ ] negative [ ] back pain [ ] myalgias [ ] arthralgias [ ] fracture  Skin:                       [ ] negative [ ] rash [ ] itch  Neurological:        [ ] negative [ ] headache [ ] dizziness [ ] syncope [ ] weakness [ ] numbness  Psychiatric:           [ ] negative [ ] anxiety [ ] depression  Endocrine:            [ ] negative [ ] diabetes [ ] thyroid problem  Heme/Lymph:      [ ] negative [ ] anemia [ ] bleeding problem  Allergic/Immune: [ ] negative [ ] itchy eyes [ ] nasal discharge [ ] hives [ ] angioedema    [x ] All other systems negative  [ ] Unable to assess ROS due to    Current Meds:  albuterol/ipratropium for Nebulization 3 milliLiter(s) Nebulizer every 6 hours  atorvastatin 20 milliGRAM(s) Oral at bedtime  bisacodyl 5 milliGRAM(s) Oral every 12 hours PRN  budesonide 160 MICROgram(s)/formoterol 4.5 MICROgram(s) Inhaler 2 Puff(s) Inhalation two times a day  buMETAnide IVPB 4 milliGRAM(s) IV Intermittent every 8 hours  ceFAZolin   IVPB      ceFAZolin   IVPB 1000 milliGRAM(s) IV Intermittent every 12 hours  folic acid 1 milliGRAM(s) Oral daily  heparin  Injectable 5000 Unit(s) SubCutaneous every 12 hours  hydrALAZINE 10 milliGRAM(s) Oral every 8 hours  metoprolol tartrate 100 milliGRAM(s) Oral two times a day  multivitamin 1 Tablet(s) Oral daily  oxyCODONE    IR 5 milliGRAM(s) Oral every 6 hours PRN  polyethylene glycol 3350 17 Gram(s) Oral daily  senna 2 Tablet(s) Oral at bedtime      PAST MEDICAL & SURGICAL HISTORY:  Heart failure  Moderate tricuspid regurgitation  Osteoarthritis  Bilateral carotid artery disease  Hypertension  COPD (chronic obstructive pulmonary disease): cigar smoker for 29 years, quit 1/2019  Prostate cancer: Prostatectomy 2008, radiation 2011  Atrial fibrillation, unspecified type: on coumadin  Erectile dysfunction, unspecified erectile dysfunction type  Coronary artery disease of bypass graft of native heart with stable angina pectoris  [place]  S/P TAVR (transcatheter [place] [facility] [place]  S/P tonsillectomy: as a child  S/P prostatectomy: 11/18/2008  S/P hernia repair: inguinal, 1998  S/P CABG x 3: 9/17/2007 [place] [facility] [place]      Vitals:  T(F): 97.7 (02-21), Max: 98 (02-20)  HR: 92 (02-21) (89 - 112)  BP: 101/66 (02-21) (95/58 - 122/79)  RR: 18 (02-21)  SpO2: 97% (02-21)  I&O's Summary    20 Feb 2020 07:01  -  21 Feb 2020 07:00  --------------------------------------------------------  IN: 720 mL / OUT: 300 mL / NET: 420 mL    21 Feb 2020 07:01  -  21 Feb 2020 13:18  --------------------------------------------------------  IN: 660 mL / OUT: 225 mL / NET: 435 mL        Physical Exam:  Appearance: No acute distress; well appearing  Eyes: PERRL, EOMI, pink conjunctiva  HENT: Normal oral mucosa  Cardiovascular: RRR, S1, S2, no murmurs, rubs, or gallops; b/l LE edema L>R; elevated JVP (11 mm HG)  Respiratory: Clear to auscultation bilaterally  Gastrointestinal: soft, non-tender, non-distended with normal bowel sounds  Musculoskeletal: No clubbing; no joint deformity   Neurologic: Non-focal  Lymphatic: No lymphadenopathy  Psychiatry: AAOx2, pt seemingly confused.   Skin: No rashes, ecchymoses, or cyanosis                          9.5    3.61  )-----------( 173      ( 21 Feb 2020 11:50 )             30.5     02-21    134<L>  |  91<L>  |  87<H>  ----------------------------<  83  4.4   |  28  |  2.81<H>    Ca    9.4      21 Feb 2020 11:50  Phos  5.7     02-21  Mg     3.0     02-21    TPro  7.0  /  Alb  2.9<L>  /  TBili  0.2  /  DBili  x   /  AST  17  /  ALT  <5<L>  /  AlkPhos  99  02-20    PT/INR - ( 20 Feb 2020 08:31 )   PT: 29.3 sec;   INR: 2.52 ratio         PTT - ( 20 Feb 2020 08:31 )  PTT:43.5 sec      Serum Pro-Brain Natriuretic Peptide: 32592 pg/mL (02-17 @ 12:20)          New ECG(s): Personally reviewed    Echo:  < from: Transthoracic Echocardiogram (11.27.19 @ 11:44) >  1. Moderate mitral regurgitation.  2. Transcatheter aortic valve replacement. Peak transaortic  valve gradient equals 3 mm Hg, which is probably normal in  the presence of a transcatheter aortic valve replacement.  Mild aortic regurgitation.  3. Moderate to severely dilated left atrial enlargement.  4. Mild left ventricular enlargement.  5. Endocardium not well visualized. Grossly severe global  left ventricular systolic dysfunction with regional  variation. Consider use of echo contrast for better  estimation of left ventricular function if clinically  indicated.  Abnormal septal motion.  6. Increased E/e'  is consistent with elevated left  ventricular filling pressure.  7. Moderate right atrial enlargement.  8. Normal right ventricular size with decreased right  ventricular systolic function.  9. Moderate-severe tricuspid regurgitation.  10. Estimated pulmonary artery systolic pressure equals 49  mm Hg, assuming right atrial pressure equals 8 mm Hg,  consistent with mild pulmonary pressures.    < end of copied text >      Interpretation of Telemetry: BiV paced 90s - 100

## 2020-02-21 NOTE — PROGRESS NOTE ADULT - PROBLEM SELECTOR PLAN 6
- Pt recently diagnosed with MDS  - Would check TSH, folic acid, homocystine level, methylmalonic acid (based on last heme note in allscripts).  - Pt was also supposed to have imaging of brain as an outpatient as well.

## 2020-02-21 NOTE — PROGRESS NOTE ADULT - PROBLEM SELECTOR PLAN 10
DVT ppx: Coumadin - holding tonight for supratherapeutic INR  Diet: DASH/TLC  Bowel regimen: senna, miralax, dulcolax    Transitions of Care Status:  1.  Name of PCP: Dr. Quiroz [phone]  2.  PCP Contacted on Admission: [x] Y    [ ] N - emailed  3.  PCP contacted at Discharge: [ ] Y    [ ] N    [ ] N/A  4.  Post-Discharge Appointment Date and Location:  5.  Summary of Handoff given to PCP: DVT ppx: Coumadin, for INR goal 2-3  Diet: DASH/TLC  Bowel regimen: senna, miralax, dulcolax    Transitions of Care Status:  1.  Name of PCP: Dr. Quiroz [phone]  2.  PCP Contacted on Admission: [x] Y    [ ] N - emailed  3.  PCP contacted at Discharge: [ ] Y    [ ] N    [ ] N/A  4.  Post-Discharge Appointment Date and Location:  5.  Summary of Handoff given to PCP:

## 2020-02-22 NOTE — PROGRESS NOTE ADULT - SUBJECTIVE AND OBJECTIVE BOX
Dalia Triana, PGY-1  Internal Medicine  Pager #: [phone] / [facility] [phone]    PROGRESS NOTE:     Patient is a 79y old  Male who presents with a chief complaint of Cellulitis (21 Feb 2020 13:17)      SUBJECTIVE / OVERNIGHT EVENTS:  -No acute events overnight.    ADDITIONAL REVIEW OF SYSTEMS:    MEDICATIONS  (STANDING):  albuterol/ipratropium for Nebulization 3 milliLiter(s) Nebulizer every 6 hours  atorvastatin 20 milliGRAM(s) Oral at bedtime  budesonide 160 MICROgram(s)/formoterol 4.5 MICROgram(s) Inhaler 2 Puff(s) Inhalation two times a day  buMETAnide IVPB 4 milliGRAM(s) IV Intermittent every 8 hours  ceFAZolin   IVPB      ceFAZolin   IVPB 1000 milliGRAM(s) IV Intermittent every 12 hours  folic acid 1 milliGRAM(s) Oral daily  heparin  Injectable 5000 Unit(s) SubCutaneous every 12 hours  hydrALAZINE 10 milliGRAM(s) Oral every 8 hours  lactulose Syrup 20 Gram(s) Oral two times a day  metoprolol tartrate 100 milliGRAM(s) Oral two times a day  multivitamin 1 Tablet(s) Oral daily  polyethylene glycol 3350 17 Gram(s) Oral daily  senna 2 Tablet(s) Oral at bedtime    MEDICATIONS  (PRN):  bisacodyl 5 milliGRAM(s) Oral every 12 hours PRN Constipation  oxyCODONE    IR 5 milliGRAM(s) Oral every 6 hours PRN Severe Pain (7 - 10)      CAPILLARY BLOOD GLUCOSE        I&O's Summary    21 Feb 2020 07:01  -  22 Feb 2020 07:00  --------------------------------------------------------  IN: 780 mL / OUT: 225 mL / NET: 555 mL        PHYSICAL EXAM:  Vital Signs Last 24 Hrs  T(C): 36.9 (22 Feb 2020 04:55), Max: 36.9 (21 Feb 2020 20:09)  T(F): 98.4 (22 Feb 2020 04:55), Max: 98.4 (21 Feb 2020 20:09)  HR: 90 (22 Feb 2020 04:55) (90 - 95)  BP: 118/72 (22 Feb 2020 04:55) (95/58 - 124/70)  BP(mean): --  RR: 18 (22 Feb 2020 04:55) (18 - 18)  SpO2: 97% (22 Feb 2020 04:55) (97% - 98%)    GENERAL: elderly thin male sitting up in bed, in no acute respiratory distress on room air, appears more fatigued  HEENT: moist mucous membranes  NECK: Supple, No JVD  NERVOUS SYSTEM: Alert & Oriented X3, non-focal  CHEST/LUNG: expiratory wheezing heard in bilateral lung fields  HEART: irregularly irregular rhythm; normal S1 and S2; No murmurs, rubs, or gallops  ABDOMEN: Soft, Nontender, Nondistended: Bowel sounds present  EXTREMITIES: 2+ Peripheral Pulses, 1+ pitting pedal edema on right, 2+ pitting edema on left to knee  LYMPH: No lymphadenopathy noted  SKIN: flat erythematous rash on posterior aspect of lower extremity  NEURO/PSYCH: A/o x self/place. "2000" for year. Denies active suicidal ideation or plan. No focal deficits.    LABS:                        9.5    3.61  )-----------( 173      ( 21 Feb 2020 11:50 )             30.5     02-21    134<L>  |  97  |  90<H>  ----------------------------<  103<H>  4.3   |  22  |  2.69<H>    Ca    8.6      21 Feb 2020 23:34  Phos  5.7     02-21  Mg     2.8     02-21      PT/INR - ( 21 Feb 2020 14:49 )   PT: 21.9 sec;   INR: 1.90 ratio         PTT - ( 21 Feb 2020 14:49 )  PTT:39.4 sec            RADIOLOGY & ADDITIONAL TESTS:  Results Reviewed:   Imaging Personally Reviewed:  Electrocardiogram Personally Reviewed:    COORDINATION OF CARE:  Care Discussed with Consultants/Other Providers [Y/N]:  Prior or Outpatient Records Reviewed [Y/N]: Dalia Triana, PGY-1  Internal Medicine  Pager #: [phone] / [facility] [phone]    PROGRESS NOTE:     Patient is a 79y old  Male who presents with a chief complaint of Cellulitis (21 Feb 2020 13:17)      SUBJECTIVE / OVERNIGHT EVENTS:  -No acute events overnight.    Patient reports lower extremity swelling and pain are unchanged from yesterday. Feels "lousy." Denies fever/chills, chest pain, SOB.    ADDITIONAL REVIEW OF SYSTEMS:  Denies abd pain, N/V/D, constipation.    MEDICATIONS  (STANDING):  albuterol/ipratropium for Nebulization 3 milliLiter(s) Nebulizer every 6 hours  atorvastatin 20 milliGRAM(s) Oral at bedtime  budesonide 160 MICROgram(s)/formoterol 4.5 MICROgram(s) Inhaler 2 Puff(s) Inhalation two times a day  buMETAnide IVPB 4 milliGRAM(s) IV Intermittent every 8 hours  ceFAZolin   IVPB      ceFAZolin   IVPB 1000 milliGRAM(s) IV Intermittent every 12 hours  folic acid 1 milliGRAM(s) Oral daily  heparin  Injectable 5000 Unit(s) SubCutaneous every 12 hours  hydrALAZINE 10 milliGRAM(s) Oral every 8 hours  lactulose Syrup 20 Gram(s) Oral two times a day  metoprolol tartrate 100 milliGRAM(s) Oral two times a day  multivitamin 1 Tablet(s) Oral daily  polyethylene glycol 3350 17 Gram(s) Oral daily  senna 2 Tablet(s) Oral at bedtime    MEDICATIONS  (PRN):  bisacodyl 5 milliGRAM(s) Oral every 12 hours PRN Constipation  oxyCODONE    IR 5 milliGRAM(s) Oral every 6 hours PRN Severe Pain (7 - 10)      CAPILLARY BLOOD GLUCOSE        I&O's Summary    21 Feb 2020 07:01  -  22 Feb 2020 07:00  --------------------------------------------------------  IN: 780 mL / OUT: 225 mL / NET: 555 mL        PHYSICAL EXAM:  Vital Signs Last 24 Hrs  T(C): 36.9 (22 Feb 2020 04:55), Max: 36.9 (21 Feb 2020 20:09)  T(F): 98.4 (22 Feb 2020 04:55), Max: 98.4 (21 Feb 2020 20:09)  HR: 90 (22 Feb 2020 04:55) (90 - 95)  BP: 118/72 (22 Feb 2020 04:55) (95/58 - 124/70)  BP(mean): --  RR: 18 (22 Feb 2020 04:55) (18 - 18)  SpO2: 97% (22 Feb 2020 04:55) (97% - 98%)    GENERAL: elderly thin male sitting up in bed, in no acute respiratory distress on room air, appears more fatigued  HEENT: moist mucous membranes  NECK: Supple, No JVD  NERVOUS SYSTEM: Alert & Oriented X3, non-focal  CHEST/LUNG: expiratory wheezing heard in bilateral lung fields  HEART: irregularly irregular rhythm; normal S1 and S2; No murmurs, rubs, or gallops  ABDOMEN: Soft, Nontender, Nondistended: Bowel sounds present  EXTREMITIES: 2+ Peripheral Pulses, 1+ pitting pedal edema on right, 2+ pitting edema on left to knee  LYMPH: No lymphadenopathy noted  SKIN: flat erythematous rash on posterior aspect of lower extremity  NEURO/PSYCH: A/o x self/place. "2000" for year. Denies active suicidal ideation or plan. No focal deficits.    LABS:                        9.5    3.61  )-----------( 173      ( 21 Feb 2020 11:50 )             30.5     02-21    134<L>  |  97  |  90<H>  ----------------------------<  103<H>  4.3   |  22  |  2.69<H>    Ca    8.6      21 Feb 2020 23:34  Phos  5.7     02-21  Mg     2.8     02-21      PT/INR - ( 21 Feb 2020 14:49 )   PT: 21.9 sec;   INR: 1.90 ratio         PTT - ( 21 Feb 2020 14:49 )  PTT:39.4 sec            RADIOLOGY & ADDITIONAL TESTS:  Results Reviewed:   Imaging Personally Reviewed:  Electrocardiogram Personally Reviewed:    COORDINATION OF CARE:  Care Discussed with Consultants/Other Providers [Y/N]:  Prior or Outpatient Records Reviewed [Y/N]: Dalia Triana, PGY-1  Internal Medicine  Pager #: [phone] / [facility] [phone]    PROGRESS NOTE:     Patient is a 79y old  Male who presents with a chief complaint of Cellulitis (21 Feb 2020 13:17)      SUBJECTIVE / OVERNIGHT EVENTS:  -No acute events overnight.    Patient reports lower extremity swelling and pain are unchanged from yesterday. Feels "lousy." Denies fever/chills, chest pain, SOB.    ADDITIONAL REVIEW OF SYSTEMS:  Denies abd pain, N/V/D, constipation.    MEDICATIONS  (STANDING):  albuterol/ipratropium for Nebulization 3 milliLiter(s) Nebulizer every 6 hours  atorvastatin 20 milliGRAM(s) Oral at bedtime  budesonide 160 MICROgram(s)/formoterol 4.5 MICROgram(s) Inhaler 2 Puff(s) Inhalation two times a day  buMETAnide IVPB 4 milliGRAM(s) IV Intermittent every 8 hours  ceFAZolin   IVPB      ceFAZolin   IVPB 1000 milliGRAM(s) IV Intermittent every 12 hours  folic acid 1 milliGRAM(s) Oral daily  heparin  Injectable 5000 Unit(s) SubCutaneous every 12 hours  hydrALAZINE 10 milliGRAM(s) Oral every 8 hours  lactulose Syrup 20 Gram(s) Oral two times a day  metoprolol tartrate 100 milliGRAM(s) Oral two times a day  multivitamin 1 Tablet(s) Oral daily  polyethylene glycol 3350 17 Gram(s) Oral daily  senna 2 Tablet(s) Oral at bedtime    MEDICATIONS  (PRN):  bisacodyl 5 milliGRAM(s) Oral every 12 hours PRN Constipation  oxyCODONE    IR 5 milliGRAM(s) Oral every 6 hours PRN Severe Pain (7 - 10)      CAPILLARY BLOOD GLUCOSE        I&O's Summary    21 Feb 2020 07:01  -  22 Feb 2020 07:00  --------------------------------------------------------  IN: 780 mL / OUT: 225 mL / NET: 555 mL        PHYSICAL EXAM:  Vital Signs Last 24 Hrs  T(C): 36.9 (22 Feb 2020 04:55), Max: 36.9 (21 Feb 2020 20:09)  T(F): 98.4 (22 Feb 2020 04:55), Max: 98.4 (21 Feb 2020 20:09)  HR: 90 (22 Feb 2020 04:55) (90 - 95)  BP: 118/72 (22 Feb 2020 04:55) (95/58 - 124/70)  BP(mean): --  RR: 18 (22 Feb 2020 04:55) (18 - 18)  SpO2: 97% (22 Feb 2020 04:55) (97% - 98%)    GENERAL: elderly thin male sitting up in bed, in no acute respiratory distress on room air, appears fatigued  HEENT: moist mucous membranes  NECK: Supple, No JVD  NERVOUS SYSTEM: Alert & Oriented X3, non-focal  CHEST/LUNG: expiratory wheezing heard in bilateral lung fields  HEART: irregularly irregular rhythm; normal S1 and S2; No murmurs, rubs, or gallops  ABDOMEN: Soft, Nontender, Nondistended: Bowel sounds present  EXTREMITIES: 2+ Peripheral Pulses, 1+ pitting pedal edema on right, 2+ pitting edema on left to knee  LYMPH: No lymphadenopathy noted  SKIN: flat erythematous rash on posterior aspect of lower extremity  NEURO/PSYCH: A/o x 3. No focal deficits.    LABS:                        9.5    3.61  )-----------( 173      ( 21 Feb 2020 11:50 )             30.5     02-21    134<L>  |  97  |  90<H>  ----------------------------<  103<H>  4.3   |  22  |  2.69<H>    Ca    8.6      21 Feb 2020 23:34  Phos  5.7     02-21  Mg     2.8     02-21      PT/INR - ( 21 Feb 2020 14:49 )   PT: 21.9 sec;   INR: 1.90 ratio         PTT - ( 21 Feb 2020 14:49 )  PTT:39.4 sec            RADIOLOGY & ADDITIONAL TESTS:  Results Reviewed:   Imaging Personally Reviewed:  Electrocardiogram Personally Reviewed:    COORDINATION OF CARE:  Care Discussed with Consultants/Other Providers [Y/N]:  Prior or Outpatient Records Reviewed [Y/N]:

## 2020-02-22 NOTE — PROGRESS NOTE ADULT - PROBLEM SELECTOR PLAN 3
Patient with HFrEF (EF 20-25% on 11/2019 TTE) presenting with acute on chronic lower extremity edema. CXR with pleural effusions, pulmonary congestion. proBNP 21K. However patient looked otherwise relatively dry on exam on admission and diuretic was initially held. Now appearing more fluid overloaded  -Heart Failure consulted, appreciate recs  -Bumex 4mg IV q8h  -Resume Hydralazine 10mg TID (patient reportedly not taking immediately PTA but was supposed to be on per outpatient/HF team)  -Monitor BMP BID, goal K > 4, Mg > 2  -C/w home metoprolol dose 100mg BID  -strict I/Os, standing weights Patient with HFrEF (EF 20-25% on 11/2019 TTE) presenting with acute on chronic lower extremity edema. CXR with pleural effusions, pulmonary congestion. proBNP 21K. However patient looked otherwise relatively dry on exam on admission and diuretic was initially held. Now appearing more fluid overloaded  -Heart Failure consulted, appreciate recs  -Switch from Bumex 4mg IV q8h to Bumex gtt @ 2mg/hr per HF recs  -Resume Hydralazine 10mg TID (patient reportedly not taking immediately PTA but was supposed to be on per outpatient/HF team)  -Monitor BMP BID, goal K > 4, Mg > 2  -C/w home metoprolol dose 100mg BID  -strict I/Os, standing weights

## 2020-02-22 NOTE — PROGRESS NOTE ADULT - SUBJECTIVE AND OBJECTIVE BOX
Patient seen and examined at bedside.    Overnight Events: Pt slightly confused still, however, improved.      REVIEW OF SYSTEMS:  Constitutional:     [ ] negative [ ] fevers [ ] chills [ ] weight loss [ ] weight gain  HEENT:                  [ ] negative [ ] dry eyes [ ] eye irritation [ ] postnasal drip [ ] nasal congestion  CV:                         [ ] negative  [ ] chest pain [ ] orthopnea [ ] palpitations [ ] murmur  Resp:                     [ ] negative [ ] cough [ ] shortness of breath [ ] dyspnea [ ] wheezing [ ] sputum [ ]hemoptysis  GI:                          [ ] negative [ ] nausea [ ] vomiting [ ] diarrhea [ ] constipation [ ] abd pain [ ] dysphagia   :                        [ ] negative [ ] dysuria [ ] nocturia [ ] hematuria [ ] increased urinary frequency  Musculoskeletal: [ ] negative [ ] back pain [ ] myalgias [ ] arthralgias [ ] fracture  Skin:                       [ ] negative [ ] rash [ ] itch  Neurological:        [ ] negative [ ] headache [ ] dizziness [ ] syncope [ ] weakness [ ] numbness  Psychiatric:           [ ] negative [ ] anxiety [ ] depression  Endocrine:            [ ] negative [ ] diabetes [ ] thyroid problem  Heme/Lymph:      [ ] negative [ ] anemia [ ] bleeding problem  Allergic/Immune: [ ] negative [ ] itchy eyes [ ] nasal discharge [ ] hives [ ] angioedema    [ x] All other systems negative  [ ] Unable to assess ROS due to    Current Meds:  albuterol/ipratropium for Nebulization 3 milliLiter(s) Nebulizer every 6 hours  atorvastatin 20 milliGRAM(s) Oral at bedtime  bisacodyl 5 milliGRAM(s) Oral every 12 hours PRN  budesonide 160 MICROgram(s)/formoterol 4.5 MICROgram(s) Inhaler 2 Puff(s) Inhalation two times a day  buMETAnide Infusion 2 mG/Hr IV Continuous <Continuous>  folic acid 1 milliGRAM(s) Oral daily  heparin  Injectable 5000 Unit(s) SubCutaneous every 8 hours  hydrALAZINE 10 milliGRAM(s) Oral every 8 hours  lactulose Syrup 20 Gram(s) Oral two times a day  metoprolol tartrate 100 milliGRAM(s) Oral two times a day  multivitamin 1 Tablet(s) Oral daily  oxyCODONE    IR 5 milliGRAM(s) Oral every 6 hours PRN  piperacillin/tazobactam IVPB.. 3.375 Gram(s) IV Intermittent every 12 hours  polyethylene glycol 3350 17 Gram(s) Oral daily  senna 2 Tablet(s) Oral at bedtime      PAST MEDICAL & SURGICAL HISTORY:  Heart failure  Moderate tricuspid regurgitation  Osteoarthritis  Bilateral carotid artery disease  Hypertension  COPD (chronic obstructive pulmonary disease): cigar smoker for 29 years, quit 1/2019  Prostate cancer: Prostatectomy 2008, radiation 2011  Atrial fibrillation, unspecified type: on coumadin  Erectile dysfunction, unspecified erectile dysfunction type  Coronary artery disease of bypass graft of native heart with stable angina pectoris  [place]  S/P TAVR (transcatheter aortic valve replacement): 2016 in [facility]  S/P tonsillectomy: as a child  S/P prostatectomy: 11/18/2008  S/P hernia repair: inguinal, 1998  S/P CABG x 3: 9/17/2007 at [facility]      Vitals:  T(F): 98.4 (02-22), Max: 98.4 (02-21)  HR: 90 (02-22) (90 - 95)  BP: 105/60 (02-22) (95/58 - 124/70)  RR: 18 (02-22)  SpO2: 97% (02-22)  I&O's Summary    21 Feb 2020 07:01  -  22 Feb 2020 07:00  --------------------------------------------------------  IN: 780 mL / OUT: 225 mL / NET: 555 mL    22 Feb 2020 07:01  -  22 Feb 2020 13:00  --------------------------------------------------------  IN: 585 mL / OUT: 500 mL / NET: 85 mL        Physical Exam:  Appearance: No acute distress; well appearing  Eyes: PERRL, EOMI, pink conjunctiva  HENT: Normal oral mucosa  Cardiovascular: RRR, S1, S2, no murmurs, rubs, or gallops; b/l edema L>R; elevated JVP (12 mmHg)  Respiratory: Clear to auscultation bilaterally  Gastrointestinal: soft, non-tender, non-distended with normal bowel sounds  Musculoskeletal: No clubbing; no joint deformity   Neurologic: Non-focal  Lymphatic: No lymphadenopathy  Psychiatry: AAOx3, mood & affect appropriate  Skin: No rashes, ecchymoses, or cyanosis                          9.5    3.61  )-----------( 173      ( 21 Feb 2020 11:50 )             30.5     02-22    137  |  91<L>  |  88<H>  ----------------------------<  103<H>  4.1   |  29  |  2.88<H>    Ca    9.2      22 Feb 2020 10:53  Phos  5.3     02-22  Mg     2.9     02-22      PT/INR - ( 21 Feb 2020 14:49 )   PT: 21.9 sec;   INR: 1.90 ratio         PTT - ( 21 Feb 2020 14:49 )  PTT:39.4 sec      Serum Pro-Brain Natriuretic Peptide: 38662 pg/mL (02-17 @ 12:20)          New ECG(s): Personally reviewed    Echo:  < from: Transthoracic Echocardiogram (11.27.19 @ 11:44) >  1. Moderate mitral regurgitation.  2. Transcatheter aortic valve replacement. Peak transaortic  valve gradient equals 3 mm Hg, which is probably normal in  the presence of a transcatheter aortic valve replacement.  Mild aortic regurgitation.  3. Moderate to severely dilated left atrial enlargement.  4. Mild left ventricular enlargement.  5. Endocardium not well visualized. Grossly severe global  left ventricular systolic dysfunction with regional  variation. Consider use of echo contrast for better  estimation of left ventricular function if clinically  indicated.  Abnormal septal motion.  6. Increased E/e'  is consistent with elevated left  ventricular filling pressure.  7. Moderate right atrial enlargement.  8. Normal right ventricular size with decreased right  ventricular systolic function.  9. Moderate-severe tricuspid regurgitation.  10. Estimated pulmonary artery systolic pressure equals 49  mm Hg, assuming right atrial pressure equals 8 mm Hg,  consistent with mild pulmonary pressures.    < end of copied text >        Interpretation of Telemetry: a-fib BiV paced 90s

## 2020-02-22 NOTE — PROGRESS NOTE ADULT - ATTENDING COMMENTS
Patient seen and examined.  Agree with resident note.  Meds, labs and vitals all reviewed.  Patient with LLE cellulitis, on day 5 of Ancef.  Afebrile, non toxic appearing.   May need a broader antibiotic given that redness and swelling has not significantly improved.  Continue with pain management, with bowel regimen.  Waxing, waning mental status, with likely component of delirium, due to infection, opiates, etc.   Heart failure following. Patient on Bumex IV. Patient seen and examined.  Agree with resident note.  Meds, labs and vitals all reviewed.  Patient with LLE cellulitis, on day 6 of Ancef.  Afebrile, non toxic appearing.   May need a broader antibiotic given that redness and swelling has not significantly improved.  Continue with pain management, with bowel regimen.  Waxing, waning mental status, with likely component of delirium, due to infection, opiates, etc.   Heart failure following. Patient on Bumex IV.

## 2020-02-22 NOTE — PROGRESS NOTE ADULT - ASSESSMENT
79M with PMHx HFrEF (EF 20-25% on 11/27/19) s/p AICD, CAD s/p PCI and CABG, severe aortic stenosis s/p TAVR, known LBBB, COPD, HTN, Afib (on Coumadin), and CKD3 who presents with lower extremity swelling likely 2/2 left lower extremity cellulitis, course c/b ADHF.

## 2020-02-22 NOTE — PROGRESS NOTE ADULT - ATTENDING COMMENTS
Overall poor response to bumex 4 mg q8h. Still volume overloaded. Continues to have pain in LLE with continued erythema. Labs reviewed - worsening renal function. May need to consider GOC given poor prognosis.   - will consider inotropes if no response to bumex gtt

## 2020-02-22 NOTE — PROGRESS NOTE ADULT - PROBLEM SELECTOR PLAN 1
Asymmetric lower extremity edema, left > right, with flat erythematous rash on left side, likely representing cellulitis.  -DVT US of LLE negative, repeat negative  -Left foot x-ray showing mod soft tissue swelling, no gas tracking. X-ray left tib-fib with sclerotic lesion.  -s/p Vancomycin 1g x 1 in ED.  -ABX: Ancef (2/17-) day 6/7  -Pain control: Oxycodone 5mg PO q6h PRN severe, standing tylenol Asymmetric lower extremity edema, left > right, with flat erythematous rash on left side, likely representing cellulitis.  -DVT US of LLE negative, repeat negative  -Left foot x-ray showing mod soft tissue swelling, no gas tracking. X-ray left tib-fib with sclerotic lesion.  -s/p Vancomycin 1g x 1 in ED.  -ABX: Ancef (2/17-) day 6/7 --> consider broaden ABX?  -Pain control: Oxycodone 5mg PO q6h PRN severe, standing tylenol Asymmetric lower extremity edema, left > right, with flat erythematous rash on left side, likely representing cellulitis.  -DVT US of LLE negative, repeat negative  -Left foot x-ray showing mod soft tissue swelling, no gas tracking. X-ray left tib-fib with sclerotic lesion.  -s/p Vancomycin 1g x 1 in ED.  -ABX: Ancef (2/17-2/22) day 6 --> given no improvement over last few days will broaden to Zosyn (2/22-)  -Pain control: Oxycodone 5mg PO q6h PRN severe, standing tylenol

## 2020-02-22 NOTE — PROGRESS NOTE ADULT - PROBLEM SELECTOR PLAN 6
Macrocytic anemia, Hb stable at baseline, no signs of active bleeding. With leukopenia. Recent diagnosis of MDS by outpatient heme.  -Recent b12 and folate levels in 12/2019 wnl  -check TSH, folic acid, homocystine, MMA

## 2020-02-22 NOTE — PROGRESS NOTE ADULT - PROBLEM SELECTOR PLAN 2
Left tib-fib Xray shows sclerotic lesion in proximal tibia. DDx includes benign bone lesion vs malignancy (h/o prostate cancer)  -Clarify prostate cancer history - patient previously following with Dr. Nito Sainz, currently looking for new urologist within [facility].

## 2020-02-22 NOTE — PROGRESS NOTE ADULT - PROBLEM SELECTOR PLAN 9
Patient discharged on 1/1/20 with serum Cr of 2.24. Serum Cr as low as 1.6-1.7 in 12/2019. Serum Cr improving.  -Avoid nephrotoxic medications, renally dose medications

## 2020-02-22 NOTE — PROGRESS NOTE ADULT - PROBLEM SELECTOR PLAN 10
DVT ppx: Coumadin, for INR goal 2-3  Diet: DASH/TLC  Bowel regimen: senna, miralax, dulcolax    Transitions of Care Status:  1.  Name of PCP: Dr. Quiroz [phone]  2.  PCP Contacted on Admission: [x] Y    [ ] N - emailed  3.  PCP contacted at Discharge: [ ] Y    [ ] N    [ ] N/A  4.  Post-Discharge Appointment Date and Location:  5.  Summary of Handoff given to PCP:

## 2020-02-22 NOTE — PROGRESS NOTE ADULT - PROBLEM SELECTOR PLAN 1
- Goal dry standing weight of 122 lbs, over the last 24 hours weight increased despite IV diuresis   - Agree with change to bumex gtt.    - Check daily standing weights and strict Is and Os.   - Change Lopressor to Toprol 50 mg BID  - Monitor Cr and lytes.

## 2020-02-23 NOTE — PROGRESS NOTE ADULT - ATTENDING COMMENTS
Overall poor prognosis which was discussed with patient and son. LLE appears better and less tender but renal function worsening with lactic acidosis. Will start inotrope as above.

## 2020-02-23 NOTE — PROGRESS NOTE ADULT - SUBJECTIVE AND OBJECTIVE BOX
Patient is a 79y old  Male who presents with a chief complaint of Cellulitis (22 Feb 2020 13:00)      SUBJECTIVE / OVERNIGHT EVENTS: Patient seen and examined at bedside.     REVIEW OF SYSTEMS:  CONSTITUTIONAL: No weakness, fevers or chills  EYES/ENT: No visual changes;  No vertigo or throat pain   NECK: No pain or stiffness  RESPIRATORY: No cough, wheezing, hemoptysis; No shortness of breath  CARDIOVASCULAR: No chest pain or palpitations  GASTROINTESTINAL: No abdominal pain, nausea, vomiting, or diarrhea. No melena or hematochezia.  GENITOURINARY: No dysuria, frequency or hematuria  NEUROLOGICAL: No numbness or weakness  SKIN: No itching, burning, rashes, or lesions   All other review of systems is negative unless indicated above.    MEDICATIONS  (STANDING):  albuterol/ipratropium for Nebulization 3 milliLiter(s) Nebulizer every 6 hours  atorvastatin 20 milliGRAM(s) Oral at bedtime  budesonide 160 MICROgram(s)/formoterol 4.5 MICROgram(s) Inhaler 2 Puff(s) Inhalation two times a day  buMETAnide Infusion 2 mG/Hr (10 mL/Hr) IV Continuous <Continuous>  folic acid 1 milliGRAM(s) Oral daily  heparin  Injectable 5000 Unit(s) SubCutaneous every 8 hours  hydrALAZINE 10 milliGRAM(s) Oral every 8 hours  lactulose Syrup 20 Gram(s) Oral two times a day  metoprolol succinate ER 50 milliGRAM(s) Oral two times a day  multivitamin 1 Tablet(s) Oral daily  piperacillin/tazobactam IVPB.. 3.375 Gram(s) IV Intermittent every 12 hours  polyethylene glycol 3350 17 Gram(s) Oral daily  senna 2 Tablet(s) Oral at bedtime    MEDICATIONS  (PRN):  bisacodyl 5 milliGRAM(s) Oral every 12 hours PRN Constipation  oxyCODONE    IR 5 milliGRAM(s) Oral every 4 hours PRN Severe Pain (7 - 10)      T(C): 36.2 (02-23-20 @ 04:45), Max: 36.9 (02-22-20 @ 11:55)  HR: 106 (02-23-20 @ 04:45) (90 - 106)  BP: 115/73 (02-23-20 @ 04:45) (101/71 - 115/73)  RR: 18 (02-23-20 @ 04:45) (18 - 18)  SpO2: 95% (02-23-20 @ 04:45) (95% - 97%)    CAPILLARY BLOOD GLUCOSE        I&O's Summary    21 Feb 2020 07:01  -  22 Feb 2020 07:00  --------------------------------------------------------  IN: 780 mL / OUT: 225 mL / NET: 555 mL    22 Feb 2020 07:01  -  23 Feb 2020 06:31  --------------------------------------------------------  IN: 1105 mL / OUT: 2100 mL / NET: -995 mL        GENERAL: No acute distress, well-developed  HEAD:  Atraumatic, Normocephalic  ENT: EOMI, PERRLA, No JVD, moist mucosa  CHEST/LUNG: Clear to auscultation bilaterally  BACK: No spinal tenderness  HEART: Regular rate and rhythm; No murmurs, rubs, or gallops  ABDOMEN: Soft, Nontender, Nondistended; Bowel sounds present  EXTREMITIES:  No clubbing, cyanosis, or edema  PSYCH: Nl behavior, nl affect  NEUROLOGY: AAOx3, non-focal, cranial nerves intact      LABS:                        9.5    3.61  )-----------( 173      ( 21 Feb 2020 11:50 )             30.5     02-22    136  |  93<L>  |  86<H>  ----------------------------<  118<H>  4.7   |  26  |  2.77<H>    Ca    9.2      22 Feb 2020 23:37  Phos  5.3     02-22  Mg     2.9     02-22      PT/INR - ( 22 Feb 2020 14:58 )   PT: 27.4 sec;   INR: 2.34 ratio         PTT - ( 22 Feb 2020 14:58 )  PTT:41.2 sec        RADIOLOGY & ADDITIONAL TESTS:  Imaging Personally Reviewed:  Consultant(s) Notes Reviewed:    Care Discussed with Consultants/Other Providers: Patient is a 79y old  Male who presents with a chief complaint of Cellulitis (22 Feb 2020 13:00)      SUBJECTIVE / OVERNIGHT EVENTS: Patient seen and examined at bedside. Long discussion with patient and his son Jose Manuel at beside. Patient was made DNR / DNI today after     REVIEW OF SYSTEMS:  CONSTITUTIONAL: No weakness, fevers or chills  EYES/ENT: No visual changes;  No vertigo or throat pain   NECK: No pain or stiffness  RESPIRATORY: No cough, wheezing, hemoptysis; No shortness of breath  CARDIOVASCULAR: No chest pain or palpitations  GASTROINTESTINAL: No abdominal pain, nausea, vomiting, or diarrhea. No melena or hematochezia.  GENITOURINARY: No dysuria, frequency or hematuria  NEUROLOGICAL: No numbness or weakness  SKIN: No itching, burning, rashes, or lesions   All other review of systems is negative unless indicated above.    MEDICATIONS  (STANDING):  albuterol/ipratropium for Nebulization 3 milliLiter(s) Nebulizer every 6 hours  atorvastatin 20 milliGRAM(s) Oral at bedtime  budesonide 160 MICROgram(s)/formoterol 4.5 MICROgram(s) Inhaler 2 Puff(s) Inhalation two times a day  buMETAnide Infusion 2 mG/Hr (10 mL/Hr) IV Continuous <Continuous>  folic acid 1 milliGRAM(s) Oral daily  heparin  Injectable 5000 Unit(s) SubCutaneous every 8 hours  hydrALAZINE 10 milliGRAM(s) Oral every 8 hours  lactulose Syrup 20 Gram(s) Oral two times a day  metoprolol succinate ER 50 milliGRAM(s) Oral two times a day  multivitamin 1 Tablet(s) Oral daily  piperacillin/tazobactam IVPB.. 3.375 Gram(s) IV Intermittent every 12 hours  polyethylene glycol 3350 17 Gram(s) Oral daily  senna 2 Tablet(s) Oral at bedtime    MEDICATIONS  (PRN):  bisacodyl 5 milliGRAM(s) Oral every 12 hours PRN Constipation  oxyCODONE    IR 5 milliGRAM(s) Oral every 4 hours PRN Severe Pain (7 - 10)      T(C): 36.2 (02-23-20 @ 04:45), Max: 36.9 (02-22-20 @ 11:55)  HR: 106 (02-23-20 @ 04:45) (90 - 106)  BP: 115/73 (02-23-20 @ 04:45) (101/71 - 115/73)  RR: 18 (02-23-20 @ 04:45) (18 - 18)  SpO2: 95% (02-23-20 @ 04:45) (95% - 97%)    CAPILLARY BLOOD GLUCOSE        I&O's Summary    21 Feb 2020 07:01  -  22 Feb 2020 07:00  --------------------------------------------------------  IN: 780 mL / OUT: 225 mL / NET: 555 mL    22 Feb 2020 07:01  -  23 Feb 2020 06:31  --------------------------------------------------------  IN: 1105 mL / OUT: 2100 mL / NET: -995 mL        GENERAL: No acute distress, well-developed  HEAD:  Atraumatic, Normocephalic  ENT: EOMI, PERRLA, No JVD, moist mucosa  CHEST/LUNG: Clear to auscultation bilaterally  BACK: No spinal tenderness  HEART: Regular rate and rhythm; No murmurs, rubs, or gallops  ABDOMEN: Soft, Nontender, Nondistended; Bowel sounds present  EXTREMITIES:  No clubbing, cyanosis, or edema  PSYCH: Nl behavior, nl affect  NEUROLOGY: AAOx3, non-focal, cranial nerves intact      LABS:                        9.5    3.61  )-----------( 173      ( 21 Feb 2020 11:50 )             30.5     02-22    136  |  93<L>  |  86<H>  ----------------------------<  118<H>  4.7   |  26  |  2.77<H>    Ca    9.2      22 Feb 2020 23:37  Phos  5.3     02-22  Mg     2.9     02-22      PT/INR - ( 22 Feb 2020 14:58 )   PT: 27.4 sec;   INR: 2.34 ratio         PTT - ( 22 Feb 2020 14:58 )  PTT:41.2 sec        RADIOLOGY & ADDITIONAL TESTS:  Imaging Personally Reviewed:  Consultant(s) Notes Reviewed:    Care Discussed with Consultants/Other Providers: Patient is a 79y old  Male who presents with a chief complaint of Cellulitis (22 Feb 2020 13:00)      SUBJECTIVE / OVERNIGHT EVENTS: Patient seen and examined at bedside. Overnight oxy was changed to to q3 hours, however this AM patient with pinpoint pupils and reversed oxy order back to q6 dosing. Long discussion with patient and his son Jose Manuel at beside. Patient was made DNR / DNI today after discussions with multiple providers. Patient also having difficulty with urinal and wanted to trial external condom cath. Patient  with worsening lactate overnight, seen HF with recommendations to continue the bumex gtt and adding on milrinone. Patient still has  leg pain and erythema, pain only slightly improved from yesterday. Blood cultures sent today.  Family refused stat duoneb this AM given no overt signs of SOB or breathing discomfort. Patient recieved 1 X IV tylenol order today for pain.     REVIEW OF SYSTEMS:  CONSTITUTIONAL: No weakness, fevers or chills  EYES/ENT: No visual changes;  No vertigo or throat pain   NECK: No pain or stiffness  RESPIRATORY: No cough, wheezing, hemoptysis; No shortness of breath  CARDIOVASCULAR: No chest pain or palpitations  GASTROINTESTINAL: No abdominal pain, nausea, vomiting, or diarrhea. No melena or hematochezia.  GENITOURINARY: No dysuria, frequency or hematuria  NEUROLOGICAL: No numbness or weakness  SKIN: No itching, burning, rashes, or lesions   All other review of systems is negative unless indicated above.    MEDICATIONS  (STANDING):  albuterol/ipratropium for Nebulization 3 milliLiter(s) Nebulizer every 6 hours  atorvastatin 20 milliGRAM(s) Oral at bedtime  budesonide 160 MICROgram(s)/formoterol 4.5 MICROgram(s) Inhaler 2 Puff(s) Inhalation two times a day  buMETAnide Infusion 2 mG/Hr (10 mL/Hr) IV Continuous <Continuous>  folic acid 1 milliGRAM(s) Oral daily  heparin  Injectable 5000 Unit(s) SubCutaneous every 8 hours  hydrALAZINE 10 milliGRAM(s) Oral every 8 hours  lactulose Syrup 20 Gram(s) Oral two times a day  metoprolol succinate ER 50 milliGRAM(s) Oral two times a day  multivitamin 1 Tablet(s) Oral daily  piperacillin/tazobactam IVPB.. 3.375 Gram(s) IV Intermittent every 12 hours  polyethylene glycol 3350 17 Gram(s) Oral daily  senna 2 Tablet(s) Oral at bedtime    MEDICATIONS  (PRN):  bisacodyl 5 milliGRAM(s) Oral every 12 hours PRN Constipation  oxyCODONE    IR 5 milliGRAM(s) Oral every 4 hours PRN Severe Pain (7 - 10)      T(C): 36.2 (02-23-20 @ 04:45), Max: 36.9 (02-22-20 @ 11:55)  HR: 106 (02-23-20 @ 04:45) (90 - 106)  BP: 115/73 (02-23-20 @ 04:45) (101/71 - 115/73)  RR: 18 (02-23-20 @ 04:45) (18 - 18)  SpO2: 95% (02-23-20 @ 04:45) (95% - 97%)    CAPILLARY BLOOD GLUCOSE        I&O's Summary    21 Feb 2020 07:01  -  22 Feb 2020 07:00  --------------------------------------------------------  IN: 780 mL / OUT: 225 mL / NET: 555 mL    22 Feb 2020 07:01  -  23 Feb 2020 06:31  --------------------------------------------------------  IN: 1105 mL / OUT: 2100 mL / NET: -995 mL        GENERAL: elderly thin male who was lying perpendicular in the bed in no apparent distress   HEENT: moist mucous membranes  NECK: +JVD  NERVOUS SYSTEM: Alert & Oriented X3, non-focal  CHEST/LUNG: expiratory wheezing heard in bilateral lung fields  HEART: irregularly irregular rhythm; normal S1 and S2; No murmurs, rubs, or gallops  ABDOMEN: Soft, Nontender, Nondistended: Bowel sounds present  EXTREMITIES: 2+ pitting edema b/l   LYMPH: No lymphadenopathy noted  SKIN: Left extremity with notable erythema, diffuse, tender to palpation  NEURO/PSYCH: A/o x 3. No focal deficits.      LABS:                        9.5    3.61  )-----------( 173      ( 21 Feb 2020 11:50 )             30.5     02-22    136  |  93<L>  |  86<H>  ----------------------------<  118<H>  4.7   |  26  |  2.77<H>    Ca    9.2      22 Feb 2020 23:37  Phos  5.3     02-22  Mg     2.9     02-22      PT/INR - ( 22 Feb 2020 14:58 )   PT: 27.4 sec;   INR: 2.34 ratio         PTT - ( 22 Feb 2020 14:58 )  PTT:41.2 sec        RADIOLOGY & ADDITIONAL TESTS:  Imaging Personally Reviewed:  Consultant(s) Notes Reviewed:    Care Discussed with Consultants/Other Providers:

## 2020-02-23 NOTE — PROGRESS NOTE ADULT - PROBLEM SELECTOR PLAN 3
Patient with HFrEF (EF 20-25% on 11/2019 TTE) presenting with acute on chronic lower extremity edema. CXR with pleural effusions, pulmonary congestion. proBNP 21K. However patient looked otherwise relatively dry on exam on admission and diuretic was initially held. Now appearing more fluid overloaded  -Heart Failure consulted, appreciate recs  -Switch from Bumex 4mg IV q8h to Bumex gtt @ 2mg/hr per HF recs  -Resume Hydralazine 10mg TID (patient reportedly not taking immediately PTA but was supposed to be on per outpatient/HF team)  -Monitor BMP BID, goal K > 4, Mg > 2  -C/w home metoprolol dose 100mg BID  -strict I/Os, standing weights Left tib-fib Xray shows sclerotic lesion in proximal tibia. DDx includes benign bone lesion vs malignancy (h/o prostate cancer)  -Clarify prostate cancer history - patient previously following with Dr. Nito Sainz, currently looking for new urologist within [facility].

## 2020-02-23 NOTE — PROGRESS NOTE ADULT - PROBLEM SELECTOR PLAN 2
Left tib-fib Xray shows sclerotic lesion in proximal tibia. DDx includes benign bone lesion vs malignancy (h/o prostate cancer)  -Clarify prostate cancer history - patient previously following with Dr. Nito Sainz, currently looking for new urologist within [facility]. Asymmetric lower extremity edema, left > right, with flat erythematous rash on left side, likely representing cellulitis.  -DVT US of LLE negative, repeat negative  -Left foot x-ray showing mod soft tissue swelling, no gas tracking. X-ray left tib-fib with sclerotic lesion.  -ABX: Ancef (2/17-2/22) day 6 --> given no improvement over last few days will broaden to Zosyn (2/22-)  -Pain control: Oxycodone 5mg PO q6h PRN severe, standing tylenol  - F/u blood cultures

## 2020-02-23 NOTE — PROGRESS NOTE ADULT - ASSESSMENT
Mr. 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 He was admitted for concern for LE cellulitis, and has been treated with abx. He diuretics have been held in the hospital, and at this time he is volume overloaded with elevated filling pressure on exam and weight that is about 8 pounds over his most recent dry weight. He was started on IV diuresis initially with standing Bumex pushes, and now with Bumex gtt, but he has not had a robust response. Lactate was checked and was noted to be elevated, so pt was started on milrinone 0.25 on 2/23.

## 2020-02-23 NOTE — PROGRESS NOTE ADULT - ATTENDING COMMENTS
Patient seen and examined.  Agree with resident note.  Meds, labs and vitals all reviewed.  Patient with end stage heart failure, COPD, LLE cellulitis, currently on Bumex infusion, still with significant overload.  Cardiology input appreciated.  Patient required escalation of Abx to Zosyn yesterday. Still with erythema, redness and some warmth of LLE.  Goals of care discussed with patient and son at bedside.  20 minutes spent.   Patient is very clear that he would not want CPR for irreversible causes.  He agrees to DNR/DNI in the scenario of cardiac arrest.   Would want to continue current interventions for the time being and reevaluate if condition does not improve.

## 2020-02-23 NOTE — PROVIDER CONTACT NOTE (CRITICAL VALUE NOTIFICATION) - ASSESSMENT
Pt AAOx3-4, no changes in mental status. Cont on ABT for Cellulitis on LE, Bumex gtt ongoing. On room air, no SOB/resp. distress. Medicated for pain with relief. On bed, sleeping.

## 2020-02-23 NOTE — PROGRESS NOTE ADULT - PROBLEM/PLAN-7
Chief Complaint   Patient presents with    Follow-up     1. Have you been to the ER, urgent care clinic since your last visit? Hospitalized since your last visit? No    2. Have you seen or consulted any other health care providers outside of the [address] [place] [address] since your last visit? Include any pap smears or colon screening. Dentist - lidocaine allergy updated. Patient here for immunization update. Grandmother concerned that patient repeatedly stick her finger in her rectum for various reasons.   Patient's grandmother would like refill on epi pen    Verbal Order received from Angeles Bolton NP for HPV#2 given IM in left arm and influenza right arm DISPLAY PLAN FREE TEXT

## 2020-02-23 NOTE — PROGRESS NOTE ADULT - PROBLEM SELECTOR PLAN 1
Asymmetric lower extremity edema, left > right, with flat erythematous rash on left side, likely representing cellulitis.  -DVT US of LLE negative, repeat negative  -Left foot x-ray showing mod soft tissue swelling, no gas tracking. X-ray left tib-fib with sclerotic lesion.  -s/p Vancomycin 1g x 1 in ED.  -ABX: Ancef (2/17-2/22) day 6 --> given no improvement over last few days will broaden to Zosyn (2/22-)  -Pain control: Oxycodone 5mg PO q6h PRN severe, standing tylenol Patient with HFrEF (EF 20-25% on 11/2019 TTE) presenting with acute on chronic lower extremity edema. CXR with pleural effusions, pulmonary congestion. proBNP 21K. Now with  worsening lactate  -Heart Failure following with recommendations to start c/w - Bumex gtt @ 2  - Start Milrinone @  rate of 0.25/ hour   -Resume Hydralazine 10mg TID (patient reportedly not taking immediately PTA but was supposed to be on per outpatient/HF team)  -Monitor BMP BID, goal K > 4, Mg > 2  -C/w home metoprolol dose 100mg BID  -strict I/Os, standing weights

## 2020-02-23 NOTE — PROGRESS NOTE ADULT - PROBLEM SELECTOR PLAN 1
- Goal dry standing weight of 122 lbs, over the last 24 hours weight increased despite IV diuresis and bumex gtt. Lactate also noted to be elevated.   - c/w bumex gtt @ 2  - Start Milrinone @ 0.25  - Check daily standing weights and strict Is and Os.   - c/w Toprol 50 mg BID  - Monitor Cr, Lactate, and lytes q12  - Agree with GOC discussions given progression of cardiomyopathy

## 2020-02-23 NOTE — PROGRESS NOTE ADULT - SUBJECTIVE AND OBJECTIVE BOX
Patient seen and examined at bedside.    Overnight Events: Pt still feels weak and unwell.       REVIEW OF SYSTEMS:  Constitutional:     [ ] negative [ ] fevers [ ] chills [ ] weight loss [ ] weight gain  HEENT:                  [ ] negative [ ] dry eyes [ ] eye irritation [ ] postnasal drip [ ] nasal congestion  CV:                         [ ] negative  [ ] chest pain [ ] orthopnea [ ] palpitations [ ] murmur  Resp:                     [ ] negative [ ] cough [ ] shortness of breath [ ] dyspnea [ ] wheezing [ ] sputum [ ]hemoptysis  GI:                          [ ] negative [ ] nausea [ ] vomiting [ ] diarrhea [ ] constipation [ ] abd pain [ ] dysphagia   :                        [ ] negative [ ] dysuria [ ] nocturia [ ] hematuria [ ] increased urinary frequency  Musculoskeletal: [ ] negative [ ] back pain [ ] myalgias [ ] arthralgias [ ] fracture  Skin:                       [ ] negative [ ] rash [ ] itch  Neurological:        [ ] negative [ ] headache [ ] dizziness [ ] syncope [ ] weakness [ ] numbness  Psychiatric:           [ ] negative [ ] anxiety [ ] depression  Endocrine:            [ ] negative [ ] diabetes [ ] thyroid problem  Heme/Lymph:      [ ] negative [ ] anemia [ ] bleeding problem  Allergic/Immune: [ ] negative [ ] itchy eyes [ ] nasal discharge [ ] hives [ ] angioedema    [ x] All other systems negative  [ ] Unable to assess ROS due to    Current Meds:  albuterol/ipratropium for Nebulization 3 milliLiter(s) Nebulizer every 6 hours  atorvastatin 20 milliGRAM(s) Oral at bedtime  bisacodyl 5 milliGRAM(s) Oral every 12 hours PRN  budesonide 160 MICROgram(s)/formoterol 4.5 MICROgram(s) Inhaler 2 Puff(s) Inhalation two times a day  buMETAnide Infusion 2 mG/Hr IV Continuous <Continuous>  folic acid 1 milliGRAM(s) Oral daily  heparin  Injectable 5000 Unit(s) SubCutaneous every 8 hours  hydrALAZINE 10 milliGRAM(s) Oral every 8 hours  lactulose Syrup 20 Gram(s) Oral two times a day  metoprolol succinate ER 50 milliGRAM(s) Oral two times a day  milrinone Infusion 0.25 MICROgram(s)/kG/Min IV Continuous <Continuous>  multivitamin 1 Tablet(s) Oral daily  oxyCODONE    IR 5 milliGRAM(s) Oral every 6 hours PRN  piperacillin/tazobactam IVPB.. 3.375 Gram(s) IV Intermittent every 12 hours  polyethylene glycol 3350 17 Gram(s) Oral daily  senna 2 Tablet(s) Oral at bedtime      PAST MEDICAL & SURGICAL HISTORY:  Heart failure  Moderate tricuspid regurgitation  Osteoarthritis  Bilateral carotid artery disease  Hypertension  COPD (chronic obstructive pulmonary disease): cigar smoker for 29 years, quit 1/2019  Prostate cancer: Prostatectomy 2008, radiation 2011  Atrial fibrillation, unspecified type: on coumadin  Erectile dysfunction, unspecified erectile dysfunction type  Coronary artery disease of bypass graft of native heart with stable angina pectoris  [place]  S/P TAVR (transcatheter aortic valve replacement): 2016 in [facility]  S/P tonsillectomy: as a child  S/P prostatectomy: 11/18/2008  S/P hernia repair: inguinal, 1998  S/P CABG x 3: 9/17/2007 at [facility]      Vitals:  T(F): 97.9 (02-23), Max: 97.9 (02-23)  HR: 102 (02-23) (99 - 106)  BP: 110/68 (02-23) (101/71 - 115/73)  RR: 18 (02-23)  SpO2: 95% (02-23)  I&O's Summary    22 Feb 2020 07:01  -  23 Feb 2020 07:00  --------------------------------------------------------  IN: 1105 mL / OUT: 2100 mL / NET: -995 mL    23 Feb 2020 07:01  -  23 Feb 2020 14:23  --------------------------------------------------------  IN: 570 mL / OUT: 650 mL / NET: -80 mL        Physical Exam:  Appearance: No acute distress; well appearing  Eyes: PERRL, EOMI, pink conjunctiva  HENT: Normal oral mucosa  Cardiovascular: RRR, S1, S2, no murmurs, rubs, or gallops; B/l LE edema; elevated JVP 12 mHg  Respiratory: Clear to auscultation bilaterally  Gastrointestinal: soft, non-tender, non-distended with normal bowel sounds  Musculoskeletal: No clubbing; no joint deformity   Neurologic: Non-focal  Lymphatic: No lymphadenopathy  Psychiatry: AAOx3, mood & affect appropriate  Skin: erythema noted over left LE                          10.0   4.22  )-----------( 193      ( 23 Feb 2020 06:35 )             32.0     02-23    138  |  92<L>  |  85<H>  ----------------------------<  117<H>  4.1   |  27  |  2.85<H>    Ca    9.5      23 Feb 2020 11:32  Phos  5.1     02-23  Mg     2.8     02-23      PT/INR - ( 22 Feb 2020 14:58 )   PT: 27.4 sec;   INR: 2.34 ratio         PTT - ( 22 Feb 2020 14:58 )  PTT:41.2 sec      Serum Pro-Brain Natriuretic Peptide: 02299 pg/mL (02-17 @ 12:20)          New ECG(s): Personally reviewed    Echo:  < from: Transthoracic Echocardiogram (11.27.19 @ 11:44) >  1. Moderate mitral regurgitation.  2. Transcatheter aortic valve replacement. Peak transaortic  valve gradient equals 3 mm Hg, which is probably normal in  the presence of a transcatheter aortic valve replacement.  Mild aortic regurgitation.  3. Moderate to severely dilated left atrial enlargement.  4. Mild left ventricular enlargement.  5. Endocardium not well visualized. Grossly severe global  left ventricular systolic dysfunction with regional  variation. Consider use of echo contrast for better  estimation of left ventricular function if clinically  indicated.  Abnormal septal motion.  6. Increased E/e'  is consistent with elevated left  ventricular filling pressure.  7. Moderate right atrial enlargement.  8. Normal right ventricular size with decreased right  ventricular systolic function.  9. Moderate-severe tricuspid regurgitation.  10. Estimated pulmonary artery systolic pressure equals 49  mm Hg, assuming right atrial pressure equals 8 mm Hg,  consistent with mild pulmonary pressures.    < end of copied text >      Interpretation of Telemetry: a-fib paced

## 2020-02-23 NOTE — PROGRESS NOTE ADULT - PROBLEM SELECTOR PLAN 10
DVT ppx: Coumadin, for INR goal 2-3  Diet: DASH/TLC  Bowel regimen: senna, miralax, dulcolax    Transitions of Care Status:  1.  Name of PCP: Dr. Quiroz [phone]  2.  PCP Contacted on Admission: [x] Y    [ ] N - emailed  3.  PCP contacted at Discharge: [ ] Y    [ ] N    [ ] N/A  4.  Post-Discharge Appointment Date and Location:  5.  Summary of Handoff given to PCP: code status : DNR / DNI   DVT ppx: Coumadin, for INR goal 2-3  Diet: DASH/TLC  Bowel regimen: senna, miralax, dulcolax    Transitions of Care Status:  1.  Name of PCP: Dr. Quiroz [phone]  2.  PCP Contacted on Admission: [x] Y    [ ] N - emailed  3.  PCP contacted at Discharge: [ ] Y    [ ] N    [ ] N/A  4.  Post-Discharge Appointment Date and Location:  5.  Summary of Handoff given to PCP:

## 2020-02-23 NOTE — PROGRESS NOTE ADULT - PROBLEM SELECTOR PLAN 4
Atrial fibrillation, rate-controlled with metoprolol succinate 100mg BID, CHADS2-VASc = 5. On home Coumadin 2.5mg daily for anticoagulation. INR 3.55 on admission.  -C/w home Metoprolol 100mg BID  -monitor PT/INR for daily Coumadin dosing, INR goal 2-3

## 2020-02-23 NOTE — PROVIDER CONTACT NOTE (CRITICAL VALUE NOTIFICATION) - BACKGROUND
Dx: Heart Failure, PMhx of CAD, Aortic stenopsis, COPD, HTN, A. Fib, CKD3, repeat AICD placement, LE cellulitis

## 2020-02-24 NOTE — PROGRESS NOTE ADULT - PROBLEM SELECTOR PLAN 3
Left tib-fib Xray shows sclerotic lesion in proximal tibia. DDx includes benign bone lesion vs malignancy (h/o prostate cancer)  -Clarify prostate cancer history - patient previously following with Dr. Nito Sainz, currently looking for new urologist within [facility]. Left tib-fib Xray shows sclerotic lesion in proximal tibia. DDx includes benign bone lesion vs malignancy (h/o prostate cancer)  -Needs to follow outpatient with his urologist, Dr. Nito Sainz, currently looking for new urologist within [facility].

## 2020-02-24 NOTE — PROGRESS NOTE ADULT - PROBLEM SELECTOR PLAN 1
Patient with HFrEF (EF 20-25% on 11/2019 TTE) presenting with acute on chronic lower extremity edema. CXR with pleural effusions, pulmonary congestion. proBNP 21K. Now with  worsening lactate  -Heart Failure following with recommendations to start c/w - Bumex gtt @ 2  - Start Milrinone @  rate of 0.25/ hour   -Resume Hydralazine 10mg TID (patient reportedly not taking immediately PTA but was supposed to be on per outpatient/HF team)  -Monitor BMP BID, goal K > 4, Mg > 2  -C/w home metoprolol dose 100mg BID  -strict I/Os, standing weights Patient with HFrEF (EF 20-25% with RV dysufnction, on 11/2019 TTE) presenting with acute LLE sweeling. CXR with pleural effusions, pulmonary congestion. proBNP 21K.   -Heart Failure following with recommendations to start c/w - Bumex gtt @ 2  - continue Milrinone @  rate of 0.25/ hour, pending lactate from this AM  -UO goal net negative 1-2L  -Resume Hydralazine 10mg TID (patient reportedly not taking immediately PTA but was supposed to be on per outpatient/HF team)  -Monitor BMP BID, goal K > 4, Mg > 2  -Metoprolol decreased to 50 mg BID with holding parameters  -strict I/Os, standing weights

## 2020-02-24 NOTE — PROVIDER CONTACT NOTE (OTHER) - ASSESSMENT
Patient is A&Ox4, c/o LE pain and cramping but otherwise denies chest pain/discomfort, SOB, palpitations, nausea/vomiting. Patient due for metoprolol PO, SBP 97/58mmhg - unable to administer due to hold parameters.

## 2020-02-24 NOTE — PROGRESS NOTE ADULT - PROBLEM SELECTOR PLAN 10
code status : DNR / DNI   DVT ppx: Coumadin, for INR goal 2-3  Diet: DASH/TLC  Bowel regimen: senna, miralax, dulcolax    Transitions of Care Status:  1.  Name of PCP: Dr. Quiroz [phone]  2.  PCP Contacted on Admission: [x] Y    [ ] N - emailed  3.  PCP contacted at Discharge: [ ] Y    [ ] N    [ ] N/A  4.  Post-Discharge Appointment Date and Location:  5.  Summary of Handoff given to PCP:

## 2020-02-24 NOTE — PROGRESS NOTE ADULT - PROBLEM SELECTOR PLAN 9
Patient discharged on 1/1/20 with serum Cr of 2.24. Serum Cr as low as 1.6-1.7 in 12/2019. Serum Cr improving.  -Avoid nephrotoxic medications, renally dose medications Patient discharged on 1/1/20 with serum Cr of 2.24. Serum Cr as low as 1.6-1.7 in 12/2019. Serum Cr INCREASED TODAY  -Avoid nephrotoxic medications, renally dose medications

## 2020-02-24 NOTE — PROGRESS NOTE ADULT - PROBLEM SELECTOR PLAN 3
- Abx per primary team.   - no DVT  - Abx coverage broadened - Abx broadened per primary team.   - no DVT

## 2020-02-24 NOTE — PROGRESS NOTE ADULT - PROBLEM SELECTOR PLAN 2
Asymmetric lower extremity edema, left > right, with flat erythematous rash on left side, likely representing cellulitis.  -DVT US of LLE negative, repeat negative  -Left foot x-ray showing mod soft tissue swelling, no gas tracking. X-ray left tib-fib with sclerotic lesion.  -ABX: Ancef (2/17-2/22) day 6 --> given no improvement over last few days will broaden to Zosyn (2/22-)  -Pain control: Oxycodone 5mg PO q6h PRN severe, standing tylenol  - F/u blood cultures Asymmetric lower extremity edema, left > right, with flat erythematous rash on left side, likely representing cellulitis, significantly improved   -DVT US of LLE negative, repeat negative  -Left foot x-ray showing mod soft tissue swelling, no gas tracking. X-ray left tib-fib with sclerotic lesion.  -ABX: Ancef (2/17-2/22), with limited improvement, broadened abx to Zosyn (2/22-)  -Pain control: Oxycodone 5mg PO q6h PRN severe, standing tylenol  - F/u blood cx (although SIRS negative)

## 2020-02-24 NOTE — PROGRESS NOTE ADULT - SUBJECTIVE AND OBJECTIVE BOX
Patient is a 79y old  Male who presents with a chief complaint of Cellulitis (23 Feb 2020 14:23)      SUBJECTIVE / OVERNIGHT EVENTS: Patient seen and examined at bedside. Yesterday, patient was started on milironone gtt with rising lactate 2.8. GOC discussion was started on admission, and patient has stated DNR/DNI. Patient's mood is improved. He had 5 beat of NSVT overnight, asymptomatic, VSS. Electrolytes were repleted with KCl 20 meq x 1. He endorses in LLE is improved in erythema, still TTP. His UO was 1.5L overnight. Still pending standing weight. He denies chest pain, SOB. He reports his mouth is very dry and is having difficulty speaking as a result.     On tele: 5 beats of NSVT.    MEDICATIONS  (STANDING):  albuterol/ipratropium for Nebulization 3 milliLiter(s) Nebulizer every 6 hours  atorvastatin 20 milliGRAM(s) Oral at bedtime  budesonide 160 MICROgram(s)/formoterol 4.5 MICROgram(s) Inhaler 2 Puff(s) Inhalation two times a day  buMETAnide Infusion 2 mG/Hr (10 mL/Hr) IV Continuous <Continuous>  folic acid 1 milliGRAM(s) Oral daily  heparin  Injectable 5000 Unit(s) SubCutaneous every 8 hours  hydrALAZINE 10 milliGRAM(s) Oral every 8 hours  lactulose Syrup 20 Gram(s) Oral two times a day  metoprolol succinate ER 50 milliGRAM(s) Oral two times a day  milrinone Infusion 0.25 MICROgram(s)/kG/Min (4.355 mL/Hr) IV Continuous <Continuous>  multivitamin 1 Tablet(s) Oral daily  piperacillin/tazobactam IVPB.. 3.375 Gram(s) IV Intermittent every 12 hours  polyethylene glycol 3350 17 Gram(s) Oral daily  senna 2 Tablet(s) Oral at bedtime    MEDICATIONS  (PRN):  bisacodyl 5 milliGRAM(s) Oral every 12 hours PRN Constipation  oxyCODONE    IR 5 milliGRAM(s) Oral every 6 hours PRN Severe Pain (7 - 10)      T(C): 36.2 (02-24-20 @ 05:58), Max: 36.6 (02-23-20 @ 11:38)  HR: 106 (02-24-20 @ 07:59) (95 - 115)  BP: 110/70 (02-24-20 @ 07:59) (97/58 - 119/71)  RR: 17 (02-24-20 @ 05:58) (17 - 18)  SpO2: 94% (02-24-20 @ 05:58) (93% - 95%)    PHYSICAL EXAM  GENERAL: elderly, frail male, NAD, well-developed  NEURO: AO x2 (stated year was 2000)  HEAD:  Atraumatic, Normocephalic  EYES: conjunctiva and sclera clear  NECK: Supple, ++JVD to mandible   CHEST/LUNG: Clear to auscultation bilaterally; No wheezes, rales or rhonchi  HEART: Regular rate and rhythm; No murmurs, rubs, or gallops  ABDOMEN: Soft, Nontender, Nondistended; Bowel sounds present, no masses.  EXTREMITIES:  2+ Peripheral Pulses, No clubbing, cyanosis, or edema  SKIN: Warm, dry, in tact, no rashes or lesions  PSYCH: affect appropriate    LABS:                        10.0   4.22  )-----------( 193      ( 23 Feb 2020 06:35 )             32.0     02-23    139  |  91<L>  |  84<H>  ----------------------------<  115<H>  3.8   |  29  |  3.11<H>    Ca    9.1      23 Feb 2020 22:48  Phos  5.2     02-23  Mg     2.6     02-23      PT/INR - ( 22 Feb 2020 14:58 )   PT: 27.4 sec;   INR: 2.34 ratio         PTT - ( 22 Feb 2020 14:58 )  PTT:41.2 sec        I&O's Summary    23 Feb 2020 07:01  -  24 Feb 2020 07:00  --------------------------------------------------------  IN: 950.2 mL / OUT: 2475 mL / NET: -1524.8 mL        Ngoc Rodrigues MD  Internal Medicine Resident, PGY1  Pager: [phone] / [phone]

## 2020-02-24 NOTE — PROGRESS NOTE ADULT - PROBLEM SELECTOR PLAN 2
- Pts baseline Cr is 1.4 to 1.5, however, as an out pt his Cr has more recently been around 2.3.   - He has required more diuretics to maintain euvolemia, and Cr is up trending. - Hemodynamic and stable now on milrinone  - Pts baseline Cr is 1.4 to 1.5, however, as an out pt his Cr has more recently been around 2.3.   - He has required more diuretics to maintain euvolemia, and Cr is up trending.  - May add diamox tomorrow if ongoing need for diuresis and elevated CO2

## 2020-02-24 NOTE — PROGRESS NOTE ADULT - PROBLEM SELECTOR PLAN 4
- Pts rates are 90s to 100s.   - c/w toprol BID  - Monitor HRs on diuresis. - Pts rates are 90s to 100s.   - c/w toprol BID  - on warfarin for AC

## 2020-02-24 NOTE — PROGRESS NOTE ADULT - ASSESSMENT
Mr. 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 He was admitted for concern for LE cellulitis, and has been treated with abx. He diuretics have been held in the hospital, and at this time he is volume overloaded with elevated filling pressure on exam and weight that is about 8 pounds over his most recent dry weight. He was started on IV diuresis initially with standing Bumex pushes, and now with Bumex gtt, but he has not had a robust response. Lactate was checked and was noted to be elevated, so pt was started on milrinone 0.25 on 2/23.

## 2020-02-24 NOTE — PROGRESS NOTE ADULT - SUBJECTIVE AND OBJECTIVE BOX
Patient seen and examined at bedside.    Overnight Events: Pt states that he still has SOB, and he lower ext swelling is improved. However, he does states that he is in a great deal of pain in both LE.       REVIEW OF SYSTEMS:  Constitutional:     [ ] negative [ ] fevers [ ] chills [ ] weight loss [ ] weight gain  HEENT:                  [ ] negative [ ] dry eyes [ ] eye irritation [ ] postnasal drip [ ] nasal congestion  CV:                         [ ] negative  [ ] chest pain [ ] orthopnea [ ] palpitations [ ] murmur  Resp:                     [ ] negative [ ] cough [ ] shortness of breath [ ] dyspnea [ ] wheezing [ ] sputum [ ]hemoptysis  GI:                          [ ] negative [ ] nausea [ ] vomiting [ ] diarrhea [ ] constipation [ ] abd pain [ ] dysphagia   :                        [ ] negative [ ] dysuria [ ] nocturia [ ] hematuria [ ] increased urinary frequency  Musculoskeletal: [ ] negative [ ] back pain [ ] myalgias [ ] arthralgias [ ] fracture  Skin:                       [ ] negative [ ] rash [ ] itch  Neurological:        [ ] negative [ ] headache [ ] dizziness [ ] syncope [ ] weakness [ ] numbness  Psychiatric:           [ ] negative [ ] anxiety [ ] depression  Endocrine:            [ ] negative [ ] diabetes [ ] thyroid problem  Heme/Lymph:      [ ] negative [ ] anemia [ ] bleeding problem  Allergic/Immune: [ ] negative [ ] itchy eyes [ ] nasal discharge [ ] hives [ ] angioedema    [x ] All other systems negative  [ ] Unable to assess ROS due to    Current Meds:  albuterol/ipratropium for Nebulization 3 milliLiter(s) Nebulizer every 6 hours  atorvastatin 20 milliGRAM(s) Oral at bedtime  bisacodyl 5 milliGRAM(s) Oral every 12 hours PRN  budesonide 160 MICROgram(s)/formoterol 4.5 MICROgram(s) Inhaler 2 Puff(s) Inhalation two times a day  buMETAnide Infusion 1 mG/Hr IV Continuous <Continuous>  folic acid 1 milliGRAM(s) Oral daily  heparin  Injectable 5000 Unit(s) SubCutaneous every 8 hours  hydrALAZINE 10 milliGRAM(s) Oral every 8 hours  lactulose Syrup 20 Gram(s) Oral two times a day  metoprolol succinate ER 50 milliGRAM(s) Oral two times a day  milrinone Infusion 0.25 MICROgram(s)/kG/Min IV Continuous <Continuous>  morphine  - Injectable 4 milliGRAM(s) IV Push every 4 hours PRN  multivitamin 1 Tablet(s) Oral daily  piperacillin/tazobactam IVPB.. 3.375 Gram(s) IV Intermittent every 12 hours  polyethylene glycol 3350 17 Gram(s) Oral daily  senna 2 Tablet(s) Oral at bedtime  warfarin 2.5 milliGRAM(s) Oral once      PAST MEDICAL & SURGICAL HISTORY:  Heart failure  Moderate tricuspid regurgitation  Osteoarthritis  Bilateral carotid artery disease  Hypertension  COPD (chronic obstructive pulmonary disease): cigar smoker for 29 years, quit 1/2019  Prostate cancer: Prostatectomy 2008, radiation 2011  Atrial fibrillation, unspecified type: on coumadin  Erectile dysfunction, unspecified erectile dysfunction type  Coronary artery disease of bypass graft of native heart with stable angina pectoris  [place]  S/P TAVR (transcatheter aortic valve replacement): 2016 in [facility]  S/P tonsillectomy: as a child  S/P prostatectomy: 11/18/2008  S/P hernia repair: inguinal, 1998  S/P CABG x 3: 9/17/2007 at [facility]      Vitals:  T(F): 97.2 (02-24), Max: 97.8 (02-24)  HR: 63 (02-24) (63 - 115)  BP: 126/66 (02-24) (94/63 - 126/78)  RR: 18 (02-24)  SpO2: 94% (02-24)  I&O's Summary    23 Feb 2020 07:01  -  24 Feb 2020 07:00  --------------------------------------------------------  IN: 950.2 mL / OUT: 2475 mL / NET: -1524.8 mL    24 Feb 2020 07:01  -  24 Feb 2020 19:57  --------------------------------------------------------  IN: 582 mL / OUT: 700 mL / NET: -118 mL        Physical Exam:  Appearance: ill appearing  Eyes: PERRL, EOMI, pink conjunctiva  HENT: Normal oral mucosa.   Cardiovascular: irregularly irregular. systolic murmur. Elevated JVP (12 mmHg)   Respiratory: Clear to auscultation bilaterally  Gastrointestinal: soft, non-tender, non-distended with normal bowel sounds  Musculoskeletal: No clubbing; no joint deformity   Neurologic: Non-focal  Lymphatic: No lymphadenopathy  Psychiatry: AAOx2, mood & affect appropriate  Skin: No rashes, ecchymoses, or cyanosis                          9.4    3.83  )-----------( 195      ( 24 Feb 2020 11:05 )             30.6     02-24    138  |  89<L>  |  84<H>  ----------------------------<  110<H>  4.2   |  33<H>  |  3.13<H>    Ca    9.9      24 Feb 2020 11:05  Phos  5.2     02-24  Mg     2.7     02-24      PT/INR - ( 24 Feb 2020 13:17 )   PT: 34.4 sec;   INR: 2.92 ratio         PTT - ( 24 Feb 2020 13:17 )  PTT:49.7 sec          Interpretation of Telemetry: a-fib BiV paced.

## 2020-02-24 NOTE — PROGRESS NOTE ADULT - PROBLEM SELECTOR PLAN 1
- Pt made a great deal of urine on bumex gtt @ 2. His lactate remains elevated.    - decrease bumex gtt to 1 mg/hr   - c/w Milrinone @ 0.25  - Check daily lactate, standing weights, and strict Is and Os.   - c/w Toprol 50 mg BID  - Monitor Cr, Lactate  - Agree with GOC discussions given progression of cardiomyopathy - Pt made a great deal of urine on bumex gtt @ 2. His lactate remains elevated.    - decrease bumex gtt to 1 mg/hr, but do not stop as his JVP remains high  - c/w Milrinone @ 0.25  - Check daily lactate, standing weights, and strict Is and Os.   - c/w Toprol 50 mg BID  - Monitor Cr, Lactate  - Agree with GOC discussions given progression of cardiomyopathy

## 2020-02-24 NOTE — PROVIDER CONTACT NOTE (OTHER) - BACKGROUND
Patient admitted for HF and cellulitis, Hx CAD, severe AS, COPD, HTN, Afib, CKD3, recent AICD. Being IV diuresed with bumex gtt and primacor gtt.

## 2020-02-24 NOTE — PROGRESS NOTE ADULT - PROBLEM SELECTOR PLAN 4
Atrial fibrillation, rate-controlled with metoprolol succinate 100mg BID, CHADS2-VASc = 5. On home Coumadin 2.5mg daily for anticoagulation. INR 3.55 on admission.  -C/w home Metoprolol 100mg BID  -monitor PT/INR for daily Coumadin dosing, INR goal 2-3 Atrial fibrillation, CHADS2-VASc = 5. On home Coumadin 2.5mg daily for anticoagulation.  -decreased BB to 50 mg BID  -monitor PT/INR for daily Coumadin dosing, INR goal 2-3, COUMADIN NEEDS TO BE DOSED TODAY

## 2020-02-24 NOTE — PROGRESS NOTE ADULT - ASSESSMENT
79M with PMHx HFrEF (EF 20-25% on 11/27/19) s/p AICD, CAD s/p PCI and CABG, severe aortic stenosis s/p TAVR, known LBBB, COPD, HTN, Afib (on Coumadin), and CKD3 who presents with lower extremity swelling likely 2/2 left lower extremity cellulitis, course c/b ADHF. 79M with PMHx HFrEF (EF 20-25% on 11/27/19) s/p AICD, CAD s/p PCI and CABG, severe aortic stenosis s/p TAVR, known LBBB, COPD, HTN, Afib (on Coumadin), and CKD3 who presents with lower extremity swelling likely 2/2 left lower extremity cellulitis, course c/b ADHF requiring Bumex and Milrinone gtt, c/b MERCED on CKD

## 2020-02-25 NOTE — PROGRESS NOTE ADULT - PROBLEM SELECTOR PLAN 10
code status : DNR / DNI   DVT ppx: Coumadin, for INR goal 2-3  Diet: DASH/TLC  Bowel regimen: senna, miralax, dulcolax    Transitions of Care Status:  1.  Name of PCP: Dr. Quiroz [phone]  2.  PCP Contacted on Admission: [x] Y    [ ] N - emailed  3.  PCP contacted at Discharge: [ ] Y    [ ] N    [ ] N/A  4.  Post-Discharge Appointment Date and Location:  5.  Summary of Handoff given to PCP:

## 2020-02-25 NOTE — PROGRESS NOTE ADULT - ATTENDING COMMENTS
Pt lethargic and confused today w worsening kidney function. Discussed w Dr Andrews- overall prognosis guarded. Home Milrinone recommended as it may improve quality of life.     Will decrease Bumex to bid for now. Continue Milrinone. Will discuss GOC with family. Pt lethargic and confused today w worsening kidney function. Discussed w Dr Andrews- overall prognosis guarded. Home Milrinone recommended as it may improve quality of life.     Will hold Bumex for now. Continue Milrinone. Will discuss GOC with family.

## 2020-02-25 NOTE — PROGRESS NOTE ADULT - PROBLEM SELECTOR PLAN 3
Left tib-fib Xray shows sclerotic lesion in proximal tibia. DDx includes benign bone lesion vs malignancy (h/o prostate cancer)  -Needs to follow outpatient with his urologist, Dr. Nito Sainz, currently looking for new urologist within [facility].

## 2020-02-25 NOTE — PROGRESS NOTE ADULT - PROBLEM SELECTOR PLAN 4
Atrial fibrillation, CHADS2-VASc = 5. On home Coumadin 2.5mg daily for anticoagulation.  -decreased BB to 50 mg BID  -monitor PT/INR for daily Coumadin dosing, INR goal 2-3, COUMADIN NEEDS TO BE DOSED TODAY

## 2020-02-25 NOTE — PROGRESS NOTE ADULT - PROBLEM SELECTOR PLAN 9
Patient discharged on 1/1/20 with serum Cr of 2.24. Serum Cr as low as 1.6-1.7 in 12/2019.  -Serum Cr uptrending in setting of more aggressive diuresis  -Avoid nephrotoxic medications when possible, renally dose medications

## 2020-02-25 NOTE — PROGRESS NOTE ADULT - PROBLEM SELECTOR PLAN 6
"        Mercedez Castilloler   3/20/2017 8:40 AM   Office Visit   MRN: [id number]    Department:  [facility]   Dept Phone:  [phone]    Description:  Female : 2002   Provider:  EUGENE DennyPLEORA.           Reason for Visit     Well Child           Allergies as of 3/20/2017     No Known Allergies      Vital Signs     Blood Pressure Pulse Temperature Respirations Height Weight    110/70 mmHg 80 36.6 °C (97.9 °F) 20 1.626 m (5' 4.02\") 61.236 kg (135 lb)    Body Mass Index Oxygen Saturation Last Menstrual Period Smoking Status          23.16 kg/m2 96% 2017 Never Smoker         Basic Information     Date Of Birth Sex Race Ethnicity Preferred Language    2002 Female White Non- English      Your appointments     Mar 20, 2017  8:40 AM   Well Child Exam with Duy Gamino, AAbhijeetPLEORA.   [address] [id number] [phone]           You will be receiving a confirmation call a few days before your appointment from our automated call confirmation system.              Problem List              ICD-10-CM Priority Class Noted - Resolved    Well child visit Z00.129   2016 - Present      Health Maintenance        Date Due Completion Dates    IMM HEP B VACCINE (1 of 3 - Primary Series) 2002 ---    IMM INACTIVATED POLIO VACCINE <17 YO (1 of 4 - All IPV Series) 2003 ---    IMM HEP A VACCINE (1 of 2 - Standard Series) 2004 ---    IMM DTaP/Tdap/Td Vaccine (1 - Tdap) 2010 ---    IMM HPV VACCINE (1 of 3 - Female 3 Dose Series) 2014 ---    IMM MENINGOCOCCAL VACCINE (MCV4) (1 of 2) 2014 ---    IMM VARICELLA (CHICKENPOX) VACCINE (1 of 2 - 2 Dose Adolescent Series) 2016 ---    IMM INFLUENZA (1) 2016 ---            Current Immunizations     No immunizations on file.      Below and/or attached are the medications your provider expects you to take. Review all of your home " medications and newly ordered medications with your provider and/or pharmacist. Follow medication instructions as directed by your provider and/or pharmacist. Please keep your medication list with you and share with your provider. Update the information when medications are discontinued, doses are changed, or new medications (including over-the-counter products) are added; and carry medication information at all times in the event of emergency situations     Allergies:  No Known Allergies          Medications  Valid as of: March 20, 2017 -  8:37 AM    Generic Name Brand Name Tablet Size Instructions for use    .                 Medicines prescribed today were sent to:     [facility] [address] [place] [address] (), [place] - [address]    [address] () [place] [address]    Phone: [phone] Fax: [phone]    Open 24 Hours?: No      Medication refill instructions:       If your prescription bottle indicates you have medication refills left, it is not necessary to call your provider’s office. Please contact your pharmacy and they will refill your medication.    If your prescription bottle indicates you do not have any refills left, you may request refills at any time through one of the following ways: The online [employer] system (except Urgent Care), by calling your provider’s office, or by asking your pharmacy to contact your provider’s office with a refill request. Medication refills are processed only during regular business hours and may not be available until the next business day. Your provider may request additional information or to have a follow-up visit with you prior to refilling your medication.   *Please Note: Medication refills are assigned a new Rx number when refilled electronically. Your pharmacy may indicate that no refills were authorized even though a new prescription for the same medication is available at the pharmacy. Please request the medicine by name with the pharmacy before  contacting your provider for a refill.           MyChart Access Code: Activation code not generated  Current MyChart Status: Active           Macrocytic anemia, Hb stable at baseline, no signs of active bleeding. With leukopenia. Recent diagnosis of MDS by outpatient heme.  -Recent b12 and folate levels in 12/2019 wnl  -check TSH, folic acid, homocystine, MMA

## 2020-02-25 NOTE — PROGRESS NOTE ADULT - ASSESSMENT
79M with PMHx HFrEF (EF 20-25% on 11/27/19) s/p AICD, CAD s/p PCI and CABG, severe aortic stenosis s/p TAVR, known LBBB, COPD, HTN, Afib (on Coumadin), and CKD3 who presents with lower extremity swelling likely 2/2 left lower extremity cellulitis, course c/b ADHF requiring Bumex and Milrinone gtt, c/b MERCED on CKD

## 2020-02-25 NOTE — PROGRESS NOTE ADULT - SUBJECTIVE AND OBJECTIVE BOX
Dalia Triana, PGY-1  Internal Medicine  Pager #: [phone] / [facility] [phone]    PROGRESS NOTE:     Patient is a 79y old  Male who presents with a chief complaint of Cellulitis (24 Feb 2020 19:57)      SUBJECTIVE / OVERNIGHT EVENTS:  -No acute events overnight. Bumex gtt at 1/hr per  recs.    ADDITIONAL REVIEW OF SYSTEMS:    MEDICATIONS  (STANDING):  albuterol/ipratropium for Nebulization 3 milliLiter(s) Nebulizer every 6 hours  atorvastatin 20 milliGRAM(s) Oral at bedtime  budesonide 160 MICROgram(s)/formoterol 4.5 MICROgram(s) Inhaler 2 Puff(s) Inhalation two times a day  buMETAnide Infusion 1 mG/Hr (5 mL/Hr) IV Continuous <Continuous>  folic acid 1 milliGRAM(s) Oral daily  heparin  Injectable 5000 Unit(s) SubCutaneous every 8 hours  hydrALAZINE 10 milliGRAM(s) Oral every 8 hours  lactulose Syrup 20 Gram(s) Oral two times a day  metoprolol succinate ER 50 milliGRAM(s) Oral two times a day  milrinone Infusion 0.25 MICROgram(s)/kG/Min (4.355 mL/Hr) IV Continuous <Continuous>  multivitamin 1 Tablet(s) Oral daily  piperacillin/tazobactam IVPB.. 3.375 Gram(s) IV Intermittent every 12 hours  polyethylene glycol 3350 17 Gram(s) Oral daily  senna 2 Tablet(s) Oral at bedtime    MEDICATIONS  (PRN):  bisacodyl 5 milliGRAM(s) Oral every 12 hours PRN Constipation  morphine  - Injectable 4 milliGRAM(s) IV Push every 4 hours PRN moderate  to severe pain      CAPILLARY BLOOD GLUCOSE        I&O's Summary    24 Feb 2020 07:01  -  25 Feb 2020 07:00  --------------------------------------------------------  IN: 656.4 mL / OUT: 1450 mL / NET: -793.6 mL        PHYSICAL EXAM:  Vital Signs Last 24 Hrs  T(C): 36.4 (25 Feb 2020 03:28), Max: 36.6 (25 Feb 2020 00:10)  T(F): 97.5 (25 Feb 2020 03:28), Max: 97.9 (25 Feb 2020 00:10)  HR: 73 (25 Feb 2020 06:34) (63 - 117)  BP: 94/53 (25 Feb 2020 06:34) (94/53 - 126/78)  BP(mean): --  RR: 18 (25 Feb 2020 03:28) (17 - 18)  SpO2: 96% (25 Feb 2020 03:28) (91% - 96%)    PHYSICAL EXAM  GENERAL: elderly, frail male, NAD  NEURO: AO x2 (stated year was 2000)  HEAD:  Atraumatic, Normocephalic  EYES: conjunctiva and sclera clear  NECK: Supple, ++JVD to mandible   CHEST/LUNG: Clear to auscultation bilaterally; No wheezes, rales or rhonchi  HEART: Regular rate and rhythm; No murmurs, rubs, or gallops  ABDOMEN: Soft, Nontender, Nondistended; Bowel sounds present, no masses.  EXTREMITIES:  2+ Peripheral Pulses, No clubbing, cyanosis, or edema  SKIN: Warm, dry, in tact, no rashes or lesions  PSYCH: affect appropriate  LABS:                        9.4    3.83  )-----------( 195      ( 24 Feb 2020 11:05 )             30.6     02-25    139  |  88<L>  |  84<H>  ----------------------------<  117<H>  4.0   |  35<H>  |  3.41<H>    Ca    9.9      25 Feb 2020 06:48  Phos  5.2     02-24  Mg     2.7     02-24    TPro  8.4<H>  /  Alb  3.7  /  TBili  0.5  /  DBili  x   /  AST  25  /  ALT  x   /  AlkPhos  101  02-25    PT/INR - ( 24 Feb 2020 13:17 )   PT: 34.4 sec;   INR: 2.92 ratio         PTT - ( 24 Feb 2020 13:17 )  PTT:49.7 sec          Culture - Blood (collected 23 Feb 2020 17:20)  Source: .Blood Blood-Peripheral  Preliminary Report (24 Feb 2020 18:01):    No growth to date.        RADIOLOGY & ADDITIONAL TESTS:  Results Reviewed:   Imaging Personally Reviewed:  Electrocardiogram Personally Reviewed:    COORDINATION OF CARE:  Care Discussed with Consultants/Other Providers [Y/N]:  Prior or Outpatient Records Reviewed [Y/N]: Dalia Triana, PGY-1  Internal Medicine  Pager #: [phone] / [facility] [phone]    PROGRESS NOTE:     Patient is a 79y old  Male who presents with a chief complaint of Cellulitis (24 Feb 2020 19:57)      SUBJECTIVE / OVERNIGHT EVENTS:  -No acute events overnight. Bumex gtt at 1/hr per  recs.    Patient see    ADDITIONAL REVIEW OF SYSTEMS:    MEDICATIONS  (STANDING):  albuterol/ipratropium for Nebulization 3 milliLiter(s) Nebulizer every 6 hours  atorvastatin 20 milliGRAM(s) Oral at bedtime  budesonide 160 MICROgram(s)/formoterol 4.5 MICROgram(s) Inhaler 2 Puff(s) Inhalation two times a day  buMETAnide Infusion 1 mG/Hr (5 mL/Hr) IV Continuous <Continuous>  folic acid 1 milliGRAM(s) Oral daily  heparin  Injectable 5000 Unit(s) SubCutaneous every 8 hours  hydrALAZINE 10 milliGRAM(s) Oral every 8 hours  lactulose Syrup 20 Gram(s) Oral two times a day  metoprolol succinate ER 50 milliGRAM(s) Oral two times a day  milrinone Infusion 0.25 MICROgram(s)/kG/Min (4.355 mL/Hr) IV Continuous <Continuous>  multivitamin 1 Tablet(s) Oral daily  piperacillin/tazobactam IVPB.. 3.375 Gram(s) IV Intermittent every 12 hours  polyethylene glycol 3350 17 Gram(s) Oral daily  senna 2 Tablet(s) Oral at bedtime    MEDICATIONS  (PRN):  bisacodyl 5 milliGRAM(s) Oral every 12 hours PRN Constipation  morphine  - Injectable 4 milliGRAM(s) IV Push every 4 hours PRN moderate  to severe pain      CAPILLARY BLOOD GLUCOSE        I&O's Summary    24 Feb 2020 07:01  -  25 Feb 2020 07:00  --------------------------------------------------------  IN: 656.4 mL / OUT: 1450 mL / NET: -793.6 mL        PHYSICAL EXAM:  Vital Signs Last 24 Hrs  T(C): 36.4 (25 Feb 2020 03:28), Max: 36.6 (25 Feb 2020 00:10)  T(F): 97.5 (25 Feb 2020 03:28), Max: 97.9 (25 Feb 2020 00:10)  HR: 73 (25 Feb 2020 06:34) (63 - 117)  BP: 94/53 (25 Feb 2020 06:34) (94/53 - 126/78)  BP(mean): --  RR: 18 (25 Feb 2020 03:28) (17 - 18)  SpO2: 96% (25 Feb 2020 03:28) (91% - 96%)    PHYSICAL EXAM  GENERAL: elderly, frail male, NAD  NEURO: AO x2 (stated year was 2000)  HEAD:  Atraumatic, Normocephalic  EYES: conjunctiva and sclera clear  NECK: Supple, ++JVD to mandible   CHEST/LUNG: Clear to auscultation bilaterally; No wheezes, rales or rhonchi  HEART: Regular rate and rhythm; No murmurs, rubs, or gallops  ABDOMEN: Soft, Nontender, Nondistended; Bowel sounds present, no masses.  EXTREMITIES:  2+ Peripheral Pulses, No clubbing, cyanosis, or edema  SKIN: Warm, dry, in tact, no rashes or lesions  PSYCH: affect appropriate  LABS:                        9.4    3.83  )-----------( 195      ( 24 Feb 2020 11:05 )             30.6     02-25    139  |  88<L>  |  84<H>  ----------------------------<  117<H>  4.0   |  35<H>  |  3.41<H>    Ca    9.9      25 Feb 2020 06:48  Phos  5.2     02-24  Mg     2.7     02-24    TPro  8.4<H>  /  Alb  3.7  /  TBili  0.5  /  DBili  x   /  AST  25  /  ALT  x   /  AlkPhos  101  02-25    PT/INR - ( 24 Feb 2020 13:17 )   PT: 34.4 sec;   INR: 2.92 ratio         PTT - ( 24 Feb 2020 13:17 )  PTT:49.7 sec          Culture - Blood (collected 23 Feb 2020 17:20)  Source: .Blood Blood-Peripheral  Preliminary Report (24 Feb 2020 18:01):    No growth to date.        RADIOLOGY & ADDITIONAL TESTS:  Results Reviewed:   Imaging Personally Reviewed:  Electrocardiogram Personally Reviewed:    COORDINATION OF CARE:  Care Discussed with Consultants/Other Providers [Y/N]:  Prior or Outpatient Records Reviewed [Y/N]: Dalia Triana, PGY-1  Internal Medicine  Pager #: [phone] / [facility] [phone]    PROGRESS NOTE:     Patient is a 79y old  Male who presents with a chief complaint of Cellulitis (24 Feb 2020 19:57)      SUBJECTIVE / OVERNIGHT EVENTS:  -No acute events overnight. Bumex gtt at 1/hr per  recs.    Patient reports LLE pain and swelling with mild improvement. Reports no BM in four days, however family at bedside reports he had BM this AM.    ADDITIONAL REVIEW OF SYSTEMS:  Denies fever/chills, chest pain, SOB, abd pain, N/V/D.    MEDICATIONS  (STANDING):  albuterol/ipratropium for Nebulization 3 milliLiter(s) Nebulizer every 6 hours  atorvastatin 20 milliGRAM(s) Oral at bedtime  budesonide 160 MICROgram(s)/formoterol 4.5 MICROgram(s) Inhaler 2 Puff(s) Inhalation two times a day  buMETAnide Infusion 1 mG/Hr (5 mL/Hr) IV Continuous <Continuous>  folic acid 1 milliGRAM(s) Oral daily  heparin  Injectable 5000 Unit(s) SubCutaneous every 8 hours  hydrALAZINE 10 milliGRAM(s) Oral every 8 hours  lactulose Syrup 20 Gram(s) Oral two times a day  metoprolol succinate ER 50 milliGRAM(s) Oral two times a day  milrinone Infusion 0.25 MICROgram(s)/kG/Min (4.355 mL/Hr) IV Continuous <Continuous>  multivitamin 1 Tablet(s) Oral daily  piperacillin/tazobactam IVPB.. 3.375 Gram(s) IV Intermittent every 12 hours  polyethylene glycol 3350 17 Gram(s) Oral daily  senna 2 Tablet(s) Oral at bedtime    MEDICATIONS  (PRN):  bisacodyl 5 milliGRAM(s) Oral every 12 hours PRN Constipation  morphine  - Injectable 4 milliGRAM(s) IV Push every 4 hours PRN moderate  to severe pain      CAPILLARY BLOOD GLUCOSE        I&O's Summary    24 Feb 2020 07:01  -  25 Feb 2020 07:00  --------------------------------------------------------  IN: 656.4 mL / OUT: 1450 mL / NET: -793.6 mL        PHYSICAL EXAM:  Vital Signs Last 24 Hrs  T(C): 36.4 (25 Feb 2020 03:28), Max: 36.6 (25 Feb 2020 00:10)  T(F): 97.5 (25 Feb 2020 03:28), Max: 97.9 (25 Feb 2020 00:10)  HR: 73 (25 Feb 2020 06:34) (63 - 117)  BP: 94/53 (25 Feb 2020 06:34) (94/53 - 126/78)  BP(mean): --  RR: 18 (25 Feb 2020 03:28) (17 - 18)  SpO2: 96% (25 Feb 2020 03:28) (91% - 96%)    PHYSICAL EXAM  GENERAL: elderly, frail male, NAD  NEURO: AO x2 (stated year was 2000)  HEAD:  Atraumatic, Normocephalic  EYES: conjunctiva and sclera clear  NECK: Supple   CHEST/LUNG: Clear to auscultation bilaterally; No wheezes, rales or rhonchi  HEART: Regular rate and rhythm; No murmurs, rubs, or gallops  ABDOMEN: Soft, Nontender, Nondistended; Bowel sounds present, no masses.  EXTREMITIES:  2+ Peripheral Pulses, no edema. Erythema improved in LLE. Mild tender to palpation.  SKIN: Warm, dry, in tact, no rashes or lesions  PSYCH: affect appropriate  LABS:                        9.4    3.83  )-----------( 195      ( 24 Feb 2020 11:05 )             30.6     02-25    139  |  88<L>  |  84<H>  ----------------------------<  117<H>  4.0   |  35<H>  |  3.41<H>    Ca    9.9      25 Feb 2020 06:48  Phos  5.2     02-24  Mg     2.7     02-24    TPro  8.4<H>  /  Alb  3.7  /  TBili  0.5  /  DBili  x   /  AST  25  /  ALT  x   /  AlkPhos  101  02-25    PT/INR - ( 24 Feb 2020 13:17 )   PT: 34.4 sec;   INR: 2.92 ratio         PTT - ( 24 Feb 2020 13:17 )  PTT:49.7 sec          Culture - Blood (collected 23 Feb 2020 17:20)  Source: .Blood Blood-Peripheral  Preliminary Report (24 Feb 2020 18:01):    No growth to date.        RADIOLOGY & ADDITIONAL TESTS:  Results Reviewed:   Imaging Personally Reviewed:  Electrocardiogram Personally Reviewed:    COORDINATION OF CARE:  Care Discussed with Consultants/Other Providers [Y/N]:  Prior or Outpatient Records Reviewed [Y/N]:

## 2020-02-25 NOTE — PROGRESS NOTE ADULT - PROBLEM SELECTOR PLAN 2
Asymmetric lower extremity edema, left > right, with flat erythematous rash on left side, likely representing cellulitis, significantly improved   -DVT US of LLE negative, repeat negative  -Left foot x-ray showing mod soft tissue swelling, no gas tracking. X-ray left tib-fib with sclerotic lesion.  -ABX: Ancef (2/17-2/22), with limited improvement, broadened abx to Zosyn (2/22-)  -Pain control: Oxycodone 5mg PO q6h PRN severe, standing tylenol  - F/u blood cx (although SIRS negative)

## 2020-02-25 NOTE — PROGRESS NOTE ADULT - PROBLEM SELECTOR PLAN 1
Patient with HFrEF (EF 20-25% with RV dysufnction, on 11/2019 TTE) presenting with acute LLE sweeling. CXR with pleural effusions, pulmonary congestion. proBNP 21K.   -Heart Failure following with recommendations to start c/w - Bumex gtt @ 1/hr  - continue Milrinone @  rate of 0.25mcg/ hour given elevated lactate  -UO goal net negative 1-2L  -Resume Hydralazine 10mg TID (patient reportedly not taking immediately PTA but was supposed to be on per outpatient/HF team)  -Monitor BMP BID, goal K > 4, Mg > 2  -Metoprolol decreased to 50 mg BID with holding parameters  -strict I/Os, standing weights Patient with HFrEF (EF 20-25% with RV dysufnction, on 11/2019 TTE) presenting with acute LLE sweeling. CXR with pleural effusions, pulmonary congestion. proBNP 21K.   -Heart Failure following with recommendations to start c/w - Bumex gtt @ 1/hr  - continue Milrinone @  rate of 0.25mcg/ hour given elevated lactate 2.8 yesterday. Lactate downtrended this AM to 1.7  -UO goal net negative 1-2L  -Resume Hydralazine 10mg TID (patient reportedly not taking immediately PTA but was supposed to be on per outpatient/HF team)  -Monitor BMP BID, goal K > 4, Mg > 2  -Metoprolol decreased to 50 mg BID with holding parameters  -strict I/Os, standing weights

## 2020-02-26 NOTE — PROGRESS NOTE ADULT - PROBLEM SELECTOR PLAN 1
Patient with HFrEF (EF 20-25% with RV dysufnction, on 11/2019 TTE) presenting with acute LLE sweeling. CXR with pleural effusions, pulmonary congestion. proBNP 21K.   -Heart Failure following  -D/c'd Bumex 2/26 in setting of worsening contraction alkalosis and MERCED  -started Milrinone @  rate of 0.25mcg/kg/min given elevated lactate which has since downtrended. Continue Milrinone for BP support.  -UO goal net negative 1-2L  -Resume Hydralazine 10mg TID (patient reportedly not taking immediately PTA but was supposed to be on per outpatient/HF team)  -Monitor BMP BID, goal K > 4, Mg > 2  -Metoprolol decreased to 50 mg BID with holding parameters  -strict I/Os, standing weights Patient with HFrEF (EF 20-25% with RV dysufnction, on 11/2019 TTE) presenting with acute LLE sweeling. CXR with pleural effusions, pulmonary congestion. proBNP 21K.   -Heart Failure following  -D/c'd Bumex 2/26 in setting of worsening contraction alkalosis and MERCED  -started Milrinone @  rate of 0.25mcg/kg/min given elevated lactate which has since downtrended. --> after further GOC discussion with patient and son Jose Manuel at bedside, d/c Milrinone.  -UO goal net negative 1-2L  -Resume Hydralazine 10mg TID (patient reportedly not taking immediately PTA but was supposed to be on per outpatient/HF team)  -Monitor BMP daily, goal K > 4, Mg > 2  -Metoprolol decreased to 50 mg BID with holding parameters  -strict I/Os, standing weights

## 2020-02-26 NOTE — PROGRESS NOTE ADULT - PROBLEM SELECTOR PLAN 2
Asymmetric lower extremity edema, left > right, with flat erythematous rash on left side, likely representing cellulitis, significantly improved   -DVT US of LLE negative, repeat negative  -Left foot x-ray showing mod soft tissue swelling, no gas tracking. X-ray left tib-fib with sclerotic lesion.  -ABX: Ancef (2/17-2/22), with limited improvement, broadened abx to Zosyn (2/22-)  -Pain control: morphine 4mg IV q4h PRN severe pain, standing tylenol  - F/u blood cx NGTD Asymmetric lower extremity edema, left > right, with flat erythematous rash on left side, likely representing cellulitis, significantly improved   -DVT US of LLE negative, repeat negative  -Left foot x-ray showing mod soft tissue swelling, no gas tracking. X-ray left tib-fib with sclerotic lesion.  -ABX: Ancef (2/17-2/22), with limited improvement, broadened abx to Zosyn (2/22-), plan for final dose tomorrow AM to complete course.  -Pain control: morphine 4mg IV q4h PRN severe pain, standing tylenol  - F/u blood cx NGTD

## 2020-02-26 NOTE — PROGRESS NOTE ADULT - PROBLEM SELECTOR PLAN 3
Left tib-fib Xray shows sclerotic lesion in proximal tibia. DDx includes benign bone lesion vs malignancy (h/o prostate cancer)  -Needs to follow outpatient with his urologist, Dr. Nito Sainz, currently looking for new urologist within [facility].

## 2020-02-26 NOTE — PROGRESS NOTE ADULT - SUBJECTIVE AND OBJECTIVE BOX
PROGRESS NOTE:     Patient is a 79y old  Male who presents with a chief complaint of Cellulitis (25 Feb 2020 07:39)      SUBJECTIVE / OVERNIGHT EVENTS:  -No acute overnight events.    ADDITIONAL REVIEW OF SYSTEMS:    MEDICATIONS  (STANDING):  albuterol/ipratropium for Nebulization 3 milliLiter(s) Nebulizer every 6 hours  atorvastatin 20 milliGRAM(s) Oral at bedtime  budesonide 160 MICROgram(s)/formoterol 4.5 MICROgram(s) Inhaler 2 Puff(s) Inhalation two times a day  folic acid 1 milliGRAM(s) Oral daily  heparin  Injectable 5000 Unit(s) SubCutaneous every 8 hours  hydrALAZINE 10 milliGRAM(s) Oral every 8 hours  lactulose Syrup 20 Gram(s) Oral two times a day  metoprolol succinate ER 50 milliGRAM(s) Oral two times a day  milrinone Infusion 0.25 MICROgram(s)/kG/Min (4.355 mL/Hr) IV Continuous <Continuous>  multivitamin 1 Tablet(s) Oral daily  piperacillin/tazobactam IVPB.. 3.375 Gram(s) IV Intermittent every 12 hours  polyethylene glycol 3350 17 Gram(s) Oral daily  senna 2 Tablet(s) Oral at bedtime  warfarin 2.5 milliGRAM(s) Oral at bedtime    MEDICATIONS  (PRN):  aluminum hydroxide/magnesium hydroxide/simethicone Suspension 30 milliLiter(s) Oral every 4 hours PRN Dyspepsia  bisacodyl 5 milliGRAM(s) Oral every 12 hours PRN Constipation  morphine  - Injectable 4 milliGRAM(s) IV Push every 4 hours PRN moderate  to severe pain      CAPILLARY BLOOD GLUCOSE        I&O's Summary    25 Feb 2020 07:01  -  26 Feb 2020 07:00  --------------------------------------------------------  IN: 251.6 mL / OUT: 650 mL / NET: -398.4 mL        PHYSICAL EXAM:  Vital Signs Last 24 Hrs  T(C): 36.7 (26 Feb 2020 04:26), Max: 36.7 (26 Feb 2020 04:26)  T(F): 98 (26 Feb 2020 04:26), Max: 98 (26 Feb 2020 04:26)  HR: 98 (26 Feb 2020 04:26) (98 - 108)  BP: 99/58 (26 Feb 2020 04:26) (96/56 - 122/79)  BP(mean): --  RR: 18 (26 Feb 2020 04:26) (18 - 18)  SpO2: 92% (26 Feb 2020 04:26) (88% - 94%)    PHYSICAL EXAM  GENERAL: elderly, frail male, NAD, appears more fatigued  NEURO: AO x2 (stated year was 2000)  HEAD:  Atraumatic, Normocephalic  EYES: conjunctiva and sclera clear  NECK: Supple   CHEST/LUNG: Clear to auscultation bilaterally; No wheezes, rales or rhonchi  HEART: Regular rate and rhythm; No murmurs, rubs, or gallops  ABDOMEN: Soft, Nontender, Nondistended; Bowel sounds present, no masses.  EXTREMITIES:  2+ Peripheral Pulses, no edema. Erythema improved in LLE. Mild tender to palpation.  SKIN: Warm, dry, in tact, no rashes or lesions  PSYCH: affect appropriate      LABS:                        8.4    3.87  )-----------( 197      ( 26 Feb 2020 06:25 )             27.5     02-26    135  |  85<L>  |  101<H>  ----------------------------<  110<H>  3.9   |  33<H>  |  3.71<H>    Ca    9.4      26 Feb 2020 06:25  Phos  5.2     02-24  Mg     2.7     02-24    TPro  7.8  /  Alb  3.4  /  TBili  0.5  /  DBili  x   /  AST  25  /  ALT  <5<L>  /  AlkPhos  85  02-26    PT/INR - ( 25 Feb 2020 09:04 )   PT: 33.6 sec;   INR: 2.83 ratio         PTT - ( 25 Feb 2020 09:04 )  PTT:43.4 sec          Culture - Blood (collected 23 Feb 2020 17:20)  Source: .Blood Blood-Peripheral  Preliminary Report (24 Feb 2020 18:01):    No growth to date.        RADIOLOGY & ADDITIONAL TESTS:  Results Reviewed:   Imaging Personally Reviewed:  Electrocardiogram Personally Reviewed:    COORDINATION OF CARE:  Care Discussed with Consultants/Other Providers [Y/N]:  Prior or Outpatient Records Reviewed [Y/N]: PROGRESS NOTE:     Patient is a 79y old  Male who presents with a chief complaint of Cellulitis (25 Feb 2020 07:39)      SUBJECTIVE / OVERNIGHT EVENTS:  -No acute overnight events.    Patient reports feeling better. Swelling and pain in left lower extremity improved, although still not able to ambulate fully due to pain. Denies fever/chills, chest pain, SOB. Reports constipation.    Son at bedside, concerned patient is cognitively declining. Would like to discuss GOC further, hospice.    ADDITIONAL REVIEW OF SYSTEMS:  Denies fever/chills, chest pain, SOB, abd pain, N/V/D.    MEDICATIONS  (STANDING):  albuterol/ipratropium for Nebulization 3 milliLiter(s) Nebulizer every 6 hours  atorvastatin 20 milliGRAM(s) Oral at bedtime  budesonide 160 MICROgram(s)/formoterol 4.5 MICROgram(s) Inhaler 2 Puff(s) Inhalation two times a day  folic acid 1 milliGRAM(s) Oral daily  heparin  Injectable 5000 Unit(s) SubCutaneous every 8 hours  hydrALAZINE 10 milliGRAM(s) Oral every 8 hours  lactulose Syrup 20 Gram(s) Oral two times a day  metoprolol succinate ER 50 milliGRAM(s) Oral two times a day  milrinone Infusion 0.25 MICROgram(s)/kG/Min (4.355 mL/Hr) IV Continuous <Continuous>  multivitamin 1 Tablet(s) Oral daily  piperacillin/tazobactam IVPB.. 3.375 Gram(s) IV Intermittent every 12 hours  polyethylene glycol 3350 17 Gram(s) Oral daily  senna 2 Tablet(s) Oral at bedtime  warfarin 2.5 milliGRAM(s) Oral at bedtime    MEDICATIONS  (PRN):  aluminum hydroxide/magnesium hydroxide/simethicone Suspension 30 milliLiter(s) Oral every 4 hours PRN Dyspepsia  bisacodyl 5 milliGRAM(s) Oral every 12 hours PRN Constipation  morphine  - Injectable 4 milliGRAM(s) IV Push every 4 hours PRN moderate  to severe pain      CAPILLARY BLOOD GLUCOSE        I&O's Summary    25 Feb 2020 07:01  -  26 Feb 2020 07:00  --------------------------------------------------------  IN: 251.6 mL / OUT: 650 mL / NET: -398.4 mL        PHYSICAL EXAM:  Vital Signs Last 24 Hrs  T(C): 36.7 (26 Feb 2020 04:26), Max: 36.7 (26 Feb 2020 04:26)  T(F): 98 (26 Feb 2020 04:26), Max: 98 (26 Feb 2020 04:26)  HR: 98 (26 Feb 2020 04:26) (98 - 108)  BP: 99/58 (26 Feb 2020 04:26) (96/56 - 122/79)  BP(mean): --  RR: 18 (26 Feb 2020 04:26) (18 - 18)  SpO2: 92% (26 Feb 2020 04:26) (88% - 94%)    PHYSICAL EXAM  GENERAL: elderly, frail male, NAD, appears more fatigued  NEURO: AO x2 (stated year was 2000)  HEAD:  Atraumatic, Normocephalic  EYES: conjunctiva and sclera clear  NECK: Supple   CHEST/LUNG: Clear to auscultation bilaterally; No wheezes, rales or rhonchi  HEART: Regular rate and rhythm; No murmurs, rubs, or gallops  ABDOMEN: Soft, Nontender, Nondistended; Bowel sounds present, no masses.  EXTREMITIES:  2+ Peripheral Pulses, 1+edema in LLE. Erythema improved in LLE.  SKIN: Warm, dry, in tact, no rashes or lesions  PSYCH: affect appropriate      LABS:                        8.4    3.87  )-----------( 197      ( 26 Feb 2020 06:25 )             27.5     02-26    135  |  85<L>  |  101<H>  ----------------------------<  110<H>  3.9   |  33<H>  |  3.71<H>    Ca    9.4      26 Feb 2020 06:25  Phos  5.2     02-24  Mg     2.7     02-24    TPro  7.8  /  Alb  3.4  /  TBili  0.5  /  DBili  x   /  AST  25  /  ALT  <5<L>  /  AlkPhos  85  02-26    PT/INR - ( 25 Feb 2020 09:04 )   PT: 33.6 sec;   INR: 2.83 ratio         PTT - ( 25 Feb 2020 09:04 )  PTT:43.4 sec          Culture - Blood (collected 23 Feb 2020 17:20)  Source: .Blood Blood-Peripheral  Preliminary Report (24 Feb 2020 18:01):    No growth to date.        RADIOLOGY & ADDITIONAL TESTS:  Results Reviewed:   Imaging Personally Reviewed:  Electrocardiogram Personally Reviewed:    COORDINATION OF CARE:  Care Discussed with Consultants/Other Providers [Y/N]:  Prior or Outpatient Records Reviewed [Y/N]:

## 2020-02-26 NOTE — PROGRESS NOTE ADULT - PROBLEM SELECTOR PLAN 6
Macrocytic anemia, Hb stable at baseline, no signs of active bleeding. With leukopenia. Recent diagnosis of MDS by outpatient heme.  -Recent b12 and folate levels in 12/2019 wnl

## 2020-02-26 NOTE — PROGRESS NOTE ADULT - PROBLEM SELECTOR PLAN 10
code status : DNR / DNI   DVT ppx: Coumadin, for INR goal 2-3  Diet: DASH/TLC  Bowel regimen: senna, miralax, dulcolax    Transitions of Care Status:  1.  Name of PCP: Dr. Quiroz [phone]  2.  PCP Contacted on Admission: [x] Y    [ ] N - emailed  3.  PCP contacted at Discharge: [ ] Y    [ ] N    [ ] N/A  4.  Post-Discharge Appointment Date and Location:  5.  Summary of Handoff given to PCP:

## 2020-02-26 NOTE — PROGRESS NOTE ADULT - PROBLEM SELECTOR PLAN 9
Patient discharged on 1/1/20 with serum Cr of 2.24. Serum Cr as low as 1.6-1.7 in 12/2019.  -Serum Cr uptrending in setting of more aggressive diuresis. Now holding Bumex  -Avoid nephrotoxic medications when possible, renally dose medications

## 2020-02-27 NOTE — GOALS OF CARE CONVERSATION - ADVANCED CARE PLANNING - CONVERSATION DETAILS
Pt was referred by the Medical Team. Met w/ the pt and son Jose Manuel Lopez; hospice services discussed at length. They are in agreement w/ home hospice services, DME (bed, 02, w/c, walker, commode, nebulizer) scheduled to be delivered 2/28; hospice consents signed. Son requested d/c home on Saturday 2/29. Thank you.
Pt was referred for hospice by the medical team. Spoke w/ the pt's son Freddy via phone - deferred to his brother Jose Manuel. V/m left for Jose Manuel, awaiting call back to discuss hospice services. Thank you.
79M with PMHx HFrEF (EF 20-25% on 11/27/19) s/p AICD, CAD s/p PCI and CABG, severe aortic stenosis s/p TAVR, known LBBB, COPD, HTN, Afib (on Coumadin), and CKD3 who presents with lower extremity swelling likely 2/2 left lower extremity cellulitis, course c/b ADHF requiring Bumex and Milrinone gtt, c/b MERCED on CKD. Extensive discussed with son at bedside, and son over the phone. Patient's overall clinical progrnosis declining. He at this time does not have capacity. There is no designed HCP. NOK consists of two sons, no spouse. Per family and patient, they would not want aggressive measures. They are requesting DNR/DNI. The patient at this time does not want to have a PICC line placed for a milrinone gtt. The goal is to go home. Discussed hospice which the family is agreeable to at this time, with goal home hospice. Referral made.

## 2020-02-27 NOTE — PROGRESS NOTE ADULT - PROBLEM SELECTOR PLAN 3
Left tib-fib Xray shows sclerotic lesion in proximal tibia. DDx includes benign bone lesion vs malignancy (h/o prostate cancer)  -Needs to follow outpatient with his urologist, Dr. Nito Sainz, currently looking for new urologist within [facility].

## 2020-02-27 NOTE — PROGRESS NOTE ADULT - PROBLEM SELECTOR PLAN 4
Atrial fibrillation, CHADS2-VASc = 5. On home Coumadin 2.5mg daily for anticoagulation.  -decreased BB to 50 mg BID  -monitor PT/INR for daily Coumadin dosing, INR goal 2-3, COUMADIN NEEDS TO BE DOSED TODAY Atrial fibrillation, CHADS2-VASc = 5. On home Coumadin 2.5mg daily for anticoagulation.  -decreased BB to 50 mg BID  -monitor PT/INR for daily Coumadin dosing, INR goal 2-3. HOLD COUMADIN TODAY.

## 2020-02-27 NOTE — PROGRESS NOTE ADULT - PROBLEM SELECTOR PLAN 10
code status : DNR / DNI   DVT ppx: Coumadin, for INR goal 2-3  Diet: DASH/TLC  Bowel regimen: senna, miralax, dulcolax    Transitions of Care Status:  1.  Name of PCP: Dr. Quiroz [phone]  2.  PCP Contacted on Admission: [x] Y    [ ] N - emailed  3.  PCP contacted at Discharge: [ ] Y    [ ] N    [ ] N/A  4.  Post-Discharge Appointment Date and Location:  5.  Summary of Handoff given to PCP:

## 2020-02-27 NOTE — PROGRESS NOTE ADULT - PROBLEM SELECTOR PLAN 2
Asymmetric lower extremity edema, left > right, with flat erythematous rash on left side, likely representing cellulitis, significantly improved   -DVT US of LLE negative, repeat negative  -Left foot x-ray showing mod soft tissue swelling, no gas tracking. X-ray left tib-fib with sclerotic lesion.  -ABX: Ancef (2/17-2/22), with limited improvement, broadened abx to Zosyn (2/22-2/27) - complete course this AM  -Pain control: morphine 4mg IV q4h PRN severe pain, standing tylenol  - Blood cx NGTD

## 2020-02-27 NOTE — PROGRESS NOTE ADULT - PROBLEM SELECTOR PLAN 1
Patient with HFrEF (EF 20-25% with RV dysufnction, on 11/2019 TTE) presenting with acute LLE sweeling. CXR with pleural effusions, pulmonary congestion. proBNP 21K.   -Heart Failure following  -D/c'd Bumex 2/26 in setting of worsening contraction alkalosis and MERCED  -Milrinone gtt stopped after further GOC discussion with patient and son Jose Manuel at bedside  -Resume Hydralazine 10mg TID  -Monitor BMP daily, goal K > 4, Mg > 2  -Metoprolol decreased to 50 mg BID with holding parameters  -strict I/Os, standing weights Patient with HFrEF (EF 20-25% with RV dysufnction, on 11/2019 TTE) presenting with acute LLE sweeling. CXR with pleural effusions, pulmonary congestion. proBNP 21K.   -Heart Failure following  -D/c'd Bumex 2/26 in setting of worsening contraction alkalosis and MERCED. However now with worsening urine output and fluid balance, will restart Bumex 2mg IV BID.  -Milrinone gtt stopped after further GOC discussion with patient and son Jose Manuel at bedside  -Resume Hydralazine 10mg TID  -Monitor BMP daily, goal K > 4, Mg > 2  -Metoprolol decreased to 50 mg BID with holding parameters  -strict I/Os, standing weights

## 2020-02-27 NOTE — PROGRESS NOTE ADULT - SUBJECTIVE AND OBJECTIVE BOX
Dalia Triana, PGY-1  Internal Medicine  Pager #: [phone] / [facility] [phone]    PROGRESS NOTE:     Patient is a 79y old  Male who presents with a chief complaint of Cellulitis (26 Feb 2020 07:33)      SUBJECTIVE / OVERNIGHT EVENTS:  -Yesterday hospice referral made after extensive conversation with patient/family regarding GOC. Milrinone gtt d/c'd.  -No acute events overnight    ADDITIONAL REVIEW OF SYSTEMS:    MEDICATIONS  (STANDING):  albuterol/ipratropium for Nebulization 3 milliLiter(s) Nebulizer every 6 hours  atorvastatin 20 milliGRAM(s) Oral at bedtime  budesonide 160 MICROgram(s)/formoterol 4.5 MICROgram(s) Inhaler 2 Puff(s) Inhalation two times a day  folic acid 1 milliGRAM(s) Oral daily  heparin  Injectable 5000 Unit(s) SubCutaneous every 8 hours  hydrALAZINE 10 milliGRAM(s) Oral every 8 hours  lactulose Syrup 20 Gram(s) Oral two times a day  metoprolol succinate ER 50 milliGRAM(s) Oral two times a day  multivitamin 1 Tablet(s) Oral daily  piperacillin/tazobactam IVPB.. 3.375 Gram(s) IV Intermittent every 12 hours  polyethylene glycol 3350 17 Gram(s) Oral daily  senna 2 Tablet(s) Oral at bedtime  warfarin 2.5 milliGRAM(s) Oral at bedtime    MEDICATIONS  (PRN):  aluminum hydroxide/magnesium hydroxide/simethicone Suspension 30 milliLiter(s) Oral every 4 hours PRN Dyspepsia  bisacodyl 5 milliGRAM(s) Oral every 12 hours PRN Constipation  morphine  - Injectable 4 milliGRAM(s) IV Push every 4 hours PRN moderate  to severe pain      CAPILLARY BLOOD GLUCOSE        I&O's Summary    26 Feb 2020 07:01  -  27 Feb 2020 07:00  --------------------------------------------------------  IN: 690 mL / OUT: 150 mL / NET: 540 mL        PHYSICAL EXAM:  Vital Signs Last 24 Hrs  T(C): 36.3 (27 Feb 2020 04:43), Max: 37.1 (26 Feb 2020 20:15)  T(F): 97.3 (27 Feb 2020 04:43), Max: 98.8 (26 Feb 2020 20:15)  HR: 97 (27 Feb 2020 04:43) (88 - 97)  BP: 128/71 (27 Feb 2020 04:43) (95/61 - 129/77)  BP(mean): --  RR: 17 (27 Feb 2020 04:43) (17 - 18)  SpO2: 93% (27 Feb 2020 04:43) (93% - 96%)    GENERAL: elderly, frail male, NAD, appears more fatigued  NEURO: AO x2 (stated year was 2000)  HEAD:  Atraumatic, Normocephalic  EYES: conjunctiva and sclera clear  NECK: Supple   CHEST/LUNG: Clear to auscultation bilaterally; No wheezes, rales or rhonchi  HEART: Regular rate and rhythm; No murmurs, rubs, or gallops  ABDOMEN: Soft, Nontender, Nondistended; Bowel sounds present, no masses.  EXTREMITIES:  2+ Peripheral Pulses, 1+edema in LLE. Erythema improved in LLE.  SKIN: Warm, dry, in tact, no rashes or lesions  PSYCH: affect appropriate      LABS:                        8.4    3.87  )-----------( 197      ( 26 Feb 2020 06:25 )             27.5     02-26    135  |  85<L>  |  101<H>  ----------------------------<  110<H>  3.9   |  33<H>  |  3.71<H>    Ca    9.4      26 Feb 2020 06:25    TPro  7.8  /  Alb  3.4  /  TBili  0.5  /  DBili  x   /  AST  25  /  ALT  <5<L>  /  AlkPhos  85  02-26    PT/INR - ( 26 Feb 2020 09:17 )   PT: 44.4 sec;   INR: 3.71 ratio         PTT - ( 26 Feb 2020 09:17 )  PTT:41.9 sec            RADIOLOGY & ADDITIONAL TESTS:  Results Reviewed:   Imaging Personally Reviewed:  Electrocardiogram Personally Reviewed:    COORDINATION OF CARE:  Care Discussed with Consultants/Other Providers [Y/N]:  Prior or Outpatient Records Reviewed [Y/N]: Dalia Triana, PGY-1  Internal Medicine  Pager #: [phone] / [facility] [phone]    PROGRESS NOTE:     Patient is a 79y old  Male who presents with a chief complaint of Cellulitis (26 Feb 2020 07:33)      SUBJECTIVE / OVERNIGHT EVENTS:  -Yesterday hospice referral made after extensive conversation with patient/family regarding GOC. Milrinone gtt d/c'd.  -No acute events overnight    Patient reports improvement in lower extremity pain, swelling, and erythema. Reports feeling "tired everywhere else." Denies fever/chills, chest pain, SOB, palpitations. Reports BM last night.    ADDITIONAL REVIEW OF SYSTEMS:  Denies fever/chills, chest pain, SOB, abdominal pain, N/V/D.    MEDICATIONS  (STANDING):  albuterol/ipratropium for Nebulization 3 milliLiter(s) Nebulizer every 6 hours  atorvastatin 20 milliGRAM(s) Oral at bedtime  budesonide 160 MICROgram(s)/formoterol 4.5 MICROgram(s) Inhaler 2 Puff(s) Inhalation two times a day  folic acid 1 milliGRAM(s) Oral daily  heparin  Injectable 5000 Unit(s) SubCutaneous every 8 hours  hydrALAZINE 10 milliGRAM(s) Oral every 8 hours  lactulose Syrup 20 Gram(s) Oral two times a day  metoprolol succinate ER 50 milliGRAM(s) Oral two times a day  multivitamin 1 Tablet(s) Oral daily  piperacillin/tazobactam IVPB.. 3.375 Gram(s) IV Intermittent every 12 hours  polyethylene glycol 3350 17 Gram(s) Oral daily  senna 2 Tablet(s) Oral at bedtime  warfarin 2.5 milliGRAM(s) Oral at bedtime    MEDICATIONS  (PRN):  aluminum hydroxide/magnesium hydroxide/simethicone Suspension 30 milliLiter(s) Oral every 4 hours PRN Dyspepsia  bisacodyl 5 milliGRAM(s) Oral every 12 hours PRN Constipation  morphine  - Injectable 4 milliGRAM(s) IV Push every 4 hours PRN moderate  to severe pain      CAPILLARY BLOOD GLUCOSE        I&O's Summary    26 Feb 2020 07:01  -  27 Feb 2020 07:00  --------------------------------------------------------  IN: 690 mL / OUT: 150 mL / NET: 540 mL        PHYSICAL EXAM:  Vital Signs Last 24 Hrs  T(C): 36.3 (27 Feb 2020 04:43), Max: 37.1 (26 Feb 2020 20:15)  T(F): 97.3 (27 Feb 2020 04:43), Max: 98.8 (26 Feb 2020 20:15)  HR: 97 (27 Feb 2020 04:43) (88 - 97)  BP: 128/71 (27 Feb 2020 04:43) (95/61 - 129/77)  BP(mean): --  RR: 17 (27 Feb 2020 04:43) (17 - 18)  SpO2: 93% (27 Feb 2020 04:43) (93% - 96%)    GENERAL: elderly, frail male, NAD sitting up in chair on room air, appears fatigued  NEURO: AO x person/place("[facility]")/year. Stated "January" for month.  HEAD:  Atraumatic, Normocephalic  EYES: conjunctiva and sclera clear  NECK: Supple   CHEST/LUNG: Poor inspiratory effort however clear to auscultation bilaterally; No wheezes, rales or rhonchi  HEART: Regular rate and rhythm; No murmurs, rubs, or gallops  ABDOMEN: Soft, Nontender, Nondistended; Bowel sounds present, no masses.  EXTREMITIES:  2+ Peripheral Pulses, 1+edema in LLE to below knee. Erythema resolved.  SKIN: Warm, dry, in tact, no rashes or lesions  PSYCH: affect appropriate      LABS:                        8.4    3.87  )-----------( 197      ( 26 Feb 2020 06:25 )             27.5     02-26    135  |  85<L>  |  101<H>  ----------------------------<  110<H>  3.9   |  33<H>  |  3.71<H>    Ca    9.4      26 Feb 2020 06:25    TPro  7.8  /  Alb  3.4  /  TBili  0.5  /  DBili  x   /  AST  25  /  ALT  <5<L>  /  AlkPhos  85  02-26    PT/INR - ( 26 Feb 2020 09:17 )   PT: 44.4 sec;   INR: 3.71 ratio         PTT - ( 26 Feb 2020 09:17 )  PTT:41.9 sec            RADIOLOGY & ADDITIONAL TESTS:  Results Reviewed:   Imaging Personally Reviewed:  Electrocardiogram Personally Reviewed:    COORDINATION OF CARE:  Care Discussed with Consultants/Other Providers [Y/N]:  Prior or Outpatient Records Reviewed [Y/N]:

## 2020-02-27 NOTE — PROGRESS NOTE ADULT - PROBLEM SELECTOR PLAN 9
Patient discharged on 1/1/20 with serum Cr of 2.24. Serum Cr as low as 1.6-1.7 in 12/2019.  -Serum Cr uptrending in setting of more aggressive diuresis. Now holding Bumex  -Avoid nephrotoxic medications when possible, renally dose medications Patient discharged on 1/1/20 with serum Cr of 2.24. Serum Cr as low as 1.6-1.7 in 12/2019.  -Serum Cr elevated from baseline in setting of more aggressive diuresis  -Avoid nephrotoxic medications when possible, renally dose medications

## 2020-02-28 NOTE — PROGRESS NOTE ADULT - ATTENDING COMMENTS
79M w CAD, AS, TAVR, smoker, af, CKD 3, ICD, CHF p/w L LE edema, cellulitis, improved on abx. Diuresed for acute systolic CHF per HF team, developed MERCED. Diuresis now decreased, symptomatically better.     Plan for discharge home w hospice tomorrow. Continue very low dose diuresis.

## 2020-02-28 NOTE — PROGRESS NOTE ADULT - SUBJECTIVE AND OBJECTIVE BOX
Dalia Triana, PGY-1  Internal Medicine  Pager #: [phone] / [facility] [phone]    PROGRESS NOTE:     Patient is a 79y old  Male who presents with a chief complaint of Cellulitis (27 Feb 2020 07:42)      SUBJECTIVE / OVERNIGHT EVENTS:  -No acute overnight events.    ADDITIONAL REVIEW OF SYSTEMS:    MEDICATIONS  (STANDING):  albuterol/ipratropium for Nebulization 3 milliLiter(s) Nebulizer every 6 hours  atorvastatin 20 milliGRAM(s) Oral at bedtime  budesonide 160 MICROgram(s)/formoterol 4.5 MICROgram(s) Inhaler 2 Puff(s) Inhalation two times a day  buMETAnide Injectable 2 milliGRAM(s) IV Push two times a day  folic acid 1 milliGRAM(s) Oral daily  hydrALAZINE 10 milliGRAM(s) Oral every 8 hours  lactulose Syrup 20 Gram(s) Oral two times a day  metoprolol succinate ER 50 milliGRAM(s) Oral two times a day  multivitamin 1 Tablet(s) Oral daily  polyethylene glycol 3350 17 Gram(s) Oral daily  senna 2 Tablet(s) Oral at bedtime    MEDICATIONS  (PRN):  aluminum hydroxide/magnesium hydroxide/simethicone Suspension 30 milliLiter(s) Oral every 4 hours PRN Dyspepsia  bisacodyl 5 milliGRAM(s) Oral every 12 hours PRN Constipation  morphine  - Injectable 4 milliGRAM(s) IV Push every 4 hours PRN moderate  to severe pain      CAPILLARY BLOOD GLUCOSE        I&O's Summary    27 Feb 2020 07:01  -  28 Feb 2020 07:00  --------------------------------------------------------  IN: 815 mL / OUT: 0 mL / NET: 815 mL        PHYSICAL EXAM:  Vital Signs Last 24 Hrs  T(C): 36.8 (28 Feb 2020 05:10), Max: 37.1 (27 Feb 2020 20:47)  T(F): 98.2 (28 Feb 2020 05:10), Max: 98.8 (27 Feb 2020 20:47)  HR: 91 (28 Feb 2020 05:10) (82 - 91)  BP: 104/61 (28 Feb 2020 05:10) (104/61 - 132/75)  BP(mean): --  RR: 18 (28 Feb 2020 05:10) (18 - 18)  SpO2: 94% (28 Feb 2020 05:10) (92% - 95%)    GENERAL: elderly, frail male, NAD sitting up in chair on room air, appears fatigued  NEURO: AO x person/place("[facility]")/year. Stated "January" for month.  HEAD:  Atraumatic, Normocephalic  EYES: conjunctiva and sclera clear  NECK: Supple   CHEST/LUNG: Poor inspiratory effort however clear to auscultation bilaterally; No wheezes, rales or rhonchi  HEART: Regular rate and rhythm; No murmurs, rubs, or gallops  ABDOMEN: Soft, Nontender, Nondistended; Bowel sounds present, no masses.  EXTREMITIES:  2+ Peripheral Pulses, 1+edema in LLE to below knee. Erythema resolved.  SKIN: Warm, dry, in tact, no rashes or lesions  PSYCH: affect appropriate    LABS:                        8.0    3.69  )-----------( 173      ( 28 Feb 2020 06:38 )             26.0     02-28    136  |  93<L>  |  94<H>  ----------------------------<  88  3.7   |  26  |  3.08<H>    Ca    9.2      28 Feb 2020 06:38  Phos  4.3     02-28  Mg     3.1     02-28    TPro  8.5<H>  /  Alb  3.9  /  TBili  0.7  /  DBili  x   /  AST  34  /  ALT  6<L>  /  AlkPhos  92  02-27    PT/INR - ( 27 Feb 2020 08:52 )   PT: 65.1 sec;   INR: 5.42 ratio         PTT - ( 26 Feb 2020 09:17 )  PTT:41.9 sec            RADIOLOGY & ADDITIONAL TESTS:  Results Reviewed:   Imaging Personally Reviewed:  Electrocardiogram Personally Reviewed:    COORDINATION OF CARE:  Care Discussed with Consultants/Other Providers [Y/N]:  Prior or Outpatient Records Reviewed [Y/N]: Dalia Triana, PGY-1  Internal Medicine  Pager #: [phone] / [facility] [phone]    PROGRESS NOTE:     Patient is a 79y old  Male who presents with a chief complaint of Cellulitis (27 Feb 2020 07:42)      SUBJECTIVE / OVERNIGHT EVENTS:  -No acute overnight events.    Patient reports LLE pain resolved. Denies chest pain, SOB, palpitations, swelling in legs. Reports BM yesterday. Denies problems with urination.    ADDITIONAL REVIEW OF SYSTEMS:  Denies fever/chills, chest pain, SOB, abd pain, N/V/D, extremity pain.    MEDICATIONS  (STANDING):  albuterol/ipratropium for Nebulization 3 milliLiter(s) Nebulizer every 6 hours  atorvastatin 20 milliGRAM(s) Oral at bedtime  budesonide 160 MICROgram(s)/formoterol 4.5 MICROgram(s) Inhaler 2 Puff(s) Inhalation two times a day  buMETAnide Injectable 2 milliGRAM(s) IV Push two times a day  folic acid 1 milliGRAM(s) Oral daily  hydrALAZINE 10 milliGRAM(s) Oral every 8 hours  lactulose Syrup 20 Gram(s) Oral two times a day  metoprolol succinate ER 50 milliGRAM(s) Oral two times a day  multivitamin 1 Tablet(s) Oral daily  polyethylene glycol 3350 17 Gram(s) Oral daily  senna 2 Tablet(s) Oral at bedtime    MEDICATIONS  (PRN):  aluminum hydroxide/magnesium hydroxide/simethicone Suspension 30 milliLiter(s) Oral every 4 hours PRN Dyspepsia  bisacodyl 5 milliGRAM(s) Oral every 12 hours PRN Constipation  morphine  - Injectable 4 milliGRAM(s) IV Push every 4 hours PRN moderate  to severe pain      CAPILLARY BLOOD GLUCOSE        I&O's Summary    27 Feb 2020 07:01  -  28 Feb 2020 07:00  --------------------------------------------------------  IN: 815 mL / OUT: 0 mL / NET: 815 mL        PHYSICAL EXAM:  Vital Signs Last 24 Hrs  T(C): 36.8 (28 Feb 2020 05:10), Max: 37.1 (27 Feb 2020 20:47)  T(F): 98.2 (28 Feb 2020 05:10), Max: 98.8 (27 Feb 2020 20:47)  HR: 91 (28 Feb 2020 05:10) (82 - 91)  BP: 104/61 (28 Feb 2020 05:10) (104/61 - 132/75)  BP(mean): --  RR: 18 (28 Feb 2020 05:10) (18 - 18)  SpO2: 94% (28 Feb 2020 05:10) (92% - 95%)    GENERAL: elderly, frail male, NAD sitting up in chair on room air, appears fatigued  NEURO: AO x person/place("[facility]  HEAD:  Atraumatic, Normocephalic  EYES: conjunctiva and sclera clear  NECK: Supple   CHEST/LUNG: Poor inspiratory effort however clear to auscultation bilaterally; No wheezes, rales or rhonchi  HEART: Regular rate and rhythm; No murmurs, rubs, or gallops  ABDOMEN: Soft, Nontender, Nondistended; Bowel sounds present, no masses.  EXTREMITIES:  2+ Peripheral Pulses, no pedal edema. Erythema resolved.  SKIN: Warm, dry, in tact, no rashes or lesions  PSYCH: affect appropriate    LABS:                        8.0    3.69  )-----------( 173      ( 28 Feb 2020 06:38 )             26.0     02-28    136  |  93<L>  |  94<H>  ----------------------------<  88  3.7   |  26  |  3.08<H>    Ca    9.2      28 Feb 2020 06:38  Phos  4.3     02-28  Mg     3.1     02-28    TPro  8.5<H>  /  Alb  3.9  /  TBili  0.7  /  DBili  x   /  AST  34  /  ALT  6<L>  /  AlkPhos  92  02-27    PT/INR - ( 27 Feb 2020 08:52 )   PT: 65.1 sec;   INR: 5.42 ratio         PTT - ( 26 Feb 2020 09:17 )  PTT:41.9 sec            RADIOLOGY & ADDITIONAL TESTS:  Results Reviewed:   Imaging Personally Reviewed:  Electrocardiogram Personally Reviewed:    COORDINATION OF CARE:  Care Discussed with Consultants/Other Providers [Y/N]:  Prior or Outpatient Records Reviewed [Y/N]:

## 2020-02-28 NOTE — PROGRESS NOTE ADULT - PROBLEM SELECTOR PLAN 2
Asymmetric lower extremity edema, left > right, with flat erythematous rash on left side, likely representing cellulitis, significantly improved   -DVT US of LLE negative, repeat negative  -Left foot x-ray showing mod soft tissue swelling, no gas tracking. X-ray left tib-fib with sclerotic lesion.  -ABX: Ancef (2/17-2/22), with limited improvement, broadened abx to Zosyn (2/22-2/27) - complete course this AM  -Pain control: morphine 4mg IV q4h PRN severe pain, standing tylenol  - Blood cx NGTD Asymmetric lower extremity edema, left > right, with flat erythematous rash on left side, likely representing cellulitis. now s/p abx and resolved.  -DVT US of LLE negative, repeat negative  -Left foot x-ray showing mod soft tissue swelling, no gas tracking. X-ray left tib-fib with sclerotic lesion.  -ABX: Ancef (2/17-2/22), with limited improvement, broadened abx to Zosyn (2/22-2/27) - complete course this AM  -Pain control: morphine 4mg IV q4h PRN severe pain, standing tylenol  - Blood cx NGTD

## 2020-02-28 NOTE — PROGRESS NOTE ADULT - PROBLEM SELECTOR PLAN 4
Atrial fibrillation, CHADS2-VASc = 5. On home Coumadin 2.5mg daily for anticoagulation.  -decreased BB to 50 mg BID  -monitor PT/INR for daily Coumadin dosing, INR goal 2-3. HOLD COUMADIN TODAY.

## 2020-02-28 NOTE — PROGRESS NOTE ADULT - ASSESSMENT
79M with PMHx HFrEF (EF 20-25% on 11/27/19) s/p AICD, CAD s/p PCI and CABG, severe aortic stenosis s/p TAVR, known LBBB, COPD, HTN, Afib (on Coumadin), and CKD3 who presents with lower extremity swelling likely 2/2 left lower extremity cellulitis, course c/b ADHF requiring Bumex and Milrinone gtt, c/b MERCED on CKD. Now pending home hospice, off Bumex and Milrinone gtt.

## 2020-02-28 NOTE — PROGRESS NOTE ADULT - PROBLEM SELECTOR PLAN 9
Patient discharged on 1/1/20 with serum Cr of 2.24. Serum Cr as low as 1.6-1.7 in 12/2019.  -Serum Cr elevated from baseline in setting of more aggressive diuresis  -Avoid nephrotoxic medications when possible, renally dose medications Patient discharged on 1/1/20 with serum Cr of 2.24. Serum Cr as low as 1.6-1.7 in 12/2019.  -Serum Cr elevated from baseline in setting of more aggressive diuresis, now downtrending again  -Avoid nephrotoxic medications when possible, renally dose medications

## 2020-02-28 NOTE — PROGRESS NOTE ADULT - PROBLEM SELECTOR PLAN 1
Patient with HFrEF (EF 20-25% with RV dysufnction, on 11/2019 TTE) presenting with acute LLE sweeling. CXR with pleural effusions, pulmonary congestion. proBNP 21K.   -Heart Failure following  -D/c'd Bumex 2/26 in setting of worsening contraction alkalosis and MERCED. However now with worsening urine output and fluid balance, will restart Bumex 2mg IV BID.  -Milrinone gtt stopped after further GOC discussion with patient and son Jose Manuel at bedside  -Resume Hydralazine 10mg TID  -Monitor BMP daily, goal K > 4, Mg > 2  -Metoprolol decreased to 50 mg BID with holding parameters  -strict I/Os, standing weights Patient with HFrEF (EF 20-25% with RV dysufnction, on 11/2019 TTE) presenting with acute LLE sweeling. CXR with pleural effusions, pulmonary congestion. proBNP 21K.   -Heart Failure following  -Transition IV Bumex to PO Bumex 2mg PO BID  -Milrinone gtt stopped after further GOC discussion with patient and son Jose Manuel at bedside  -Resume Hydralazine 10mg TID  -Monitor BMP daily, goal K > 4, Mg > 2  -Metoprolol decreased to 50 mg BID with holding parameters  -strict I/Os, standing weights Acute systolic. Patient with HFrEF (EF 20-25% with RV dysufnction, on 11/2019 TTE) presenting with acute LLE sweeling. CXR with pleural effusions, pulmonary congestion. proBNP 21K.   -Heart Failure following  -Transition IV Bumex to PO Bumex 2mg PO BID  -Milrinone gtt stopped after further GOC discussion with patient and son Jose Manuel at bedside  -Resume Hydralazine 10mg TID  -Monitor BMP daily, goal K > 4, Mg > 2  -Metoprolol decreased to 50 mg BID with holding parameters  -strict I/Os, standing weights

## 2020-02-28 NOTE — PROVIDER CONTACT NOTE (CRITICAL VALUE NOTIFICATION) - ACTION/TREATMENT ORDERED:
Team 2, MD Triana notified. Labs reviewed. Will continue to monitor.
MD notification. Will continue to monitor.
Team made aware, STAT CMP & CBC ordered. Will continue to monitor patient.

## 2020-02-28 NOTE — PROGRESS NOTE ADULT - PROBLEM SELECTOR PLAN 10
code status : DNR / DNI   DVT ppx: Coumadin, for INR goal 2-3  Diet: DASH/TLC  Bowel regimen: senna, miralax, dulcolax    Transitions of Care Status:  1.  Name of PCP: Dr. Quiroz [phone]  2.  PCP Contacted on Admission: [x] Y    [ ] N - emailed  3.  PCP contacted at Discharge: [ ] Y    [ ] N    [ ] N/A  4.  Post-Discharge Appointment Date and Location:  5.  Summary of Handoff given to PCP:

## 2020-02-28 NOTE — PROGRESS NOTE ADULT - PROBLEM SELECTOR PLAN 3
Left tib-fib Xray shows sclerotic lesion in proximal tibia. DDx includes benign bone lesion vs malignancy (h/o prostate cancer)  -Needs to follow outpatient with his urologist, Dr. Nito Sainz, currently looking for new urologist within [facility].

## 2020-02-29 NOTE — PROGRESS NOTE ADULT - PROBLEM SELECTOR PLAN 9
Patient discharged on 1/1/20 with serum Cr of 2.24. Serum Cr as low as 1.6-1.7 in 12/2019.  -Serum Cr elevated from baseline in setting of more aggressive diuresis, now downtrending again  -Avoid nephrotoxic medications when possible, renally dose medications

## 2020-02-29 NOTE — PROGRESS NOTE ADULT - PROBLEM SELECTOR PLAN 10
code status : DNR / DNI   DVT ppx: Coumadin, for INR goal 2-3  Diet: DASH/TLC  Bowel regimen: senna, miralax, dulcolax    Transitions of Care Status:  1.  Name of PCP: Dr. Quiroz [phone]  2.  PCP Contacted on Admission: [x] Y    [ ] N - emailed  3.  PCP contacted at Discharge: [ ] Y    [ ] N    [ ] N/A  4.  Post-Discharge Appointment Date and Location:  5.  Summary of Handoff given to PCP:

## 2020-02-29 NOTE — DISCHARGE NOTE NURSING/CASE MANAGEMENT/SOCIAL WORK - NSDCFUADDAPPT_GEN_ALL_CORE_FT
Please follow-up with your primary care physician/cardiologist Dr. Quiroz within 1 week.    Please also follow-up with your heart failure specialist cardiologist Dr. Ramirez within 1 week.    Please follow-up with your urologist Dr. Sainz.

## 2020-02-29 NOTE — PROGRESS NOTE ADULT - PROBLEM SELECTOR PLAN 3
Left tib-fib Xray shows sclerotic lesion in proximal tibia. DDx includes benign bone lesion vs malignancy (h/o prostate cancer)  -Needs to follow outpatient with his urologist, Dr. Nito Sainz, currently looking for new urologist within [facility].

## 2020-02-29 NOTE — PROGRESS NOTE ADULT - SUBJECTIVE AND OBJECTIVE BOX
Dalia Triana, PGY-1  Internal Medicine  Pager #: [phone] / [facility] [phone]    PROGRESS NOTE:     Patient is a 79y old  Male who presents with a chief complaint of Cellulitis (28 Feb 2020 07:18)      SUBJECTIVE / OVERNIGHT EVENTS:    ADDITIONAL REVIEW OF SYSTEMS:    MEDICATIONS  (STANDING):  albuterol/ipratropium for Nebulization 3 milliLiter(s) Nebulizer every 6 hours  atorvastatin 20 milliGRAM(s) Oral at bedtime  budesonide 160 MICROgram(s)/formoterol 4.5 MICROgram(s) Inhaler 2 Puff(s) Inhalation two times a day  buMETAnide 2 milliGRAM(s) Oral two times a day  folic acid 1 milliGRAM(s) Oral daily  hydrALAZINE 10 milliGRAM(s) Oral every 8 hours  lactulose Syrup 20 Gram(s) Oral two times a day  metoprolol succinate ER 50 milliGRAM(s) Oral two times a day  multivitamin 1 Tablet(s) Oral daily  polyethylene glycol 3350 17 Gram(s) Oral daily  senna 2 Tablet(s) Oral at bedtime    MEDICATIONS  (PRN):  aluminum hydroxide/magnesium hydroxide/simethicone Suspension 30 milliLiter(s) Oral every 4 hours PRN Dyspepsia  bisacodyl 5 milliGRAM(s) Oral every 12 hours PRN Constipation  oxyCODONE    IR 5 milliGRAM(s) Oral every 4 hours PRN Severe Pain (7 - 10)      CAPILLARY BLOOD GLUCOSE        I&O's Summary    28 Feb 2020 07:01  -  29 Feb 2020 07:00  --------------------------------------------------------  IN: 735 mL / OUT: 850 mL / NET: -115 mL        PHYSICAL EXAM:  Vital Signs Last 24 Hrs  T(C): 36.4 (29 Feb 2020 07:27), Max: 36.8 (28 Feb 2020 20:23)  T(F): 97.5 (29 Feb 2020 07:27), Max: 98.2 (28 Feb 2020 20:23)  HR: 88 (29 Feb 2020 07:27) (88 - 97)  BP: 101/60 (29 Feb 2020 07:27) (90/61 - 114/65)  BP(mean): --  RR: 18 (29 Feb 2020 07:27) (17 - 18)  SpO2: 95% (29 Feb 2020 07:27) (95% - 98%)    CONSTITUTIONAL: NAD, well-developed  RESPIRATORY: Normal respiratory effort; lungs are clear to auscultation bilaterally  CARDIOVASCULAR: Regular rate and rhythm, normal S1 and S2, no murmur/rub/gallop; No lower extremity edema; Peripheral pulses are 2+ bilaterally  ABDOMEN: Nontender to palpation, normoactive bowel sounds, no rebound/guarding; No hepatosplenomegaly  MUSCLOSKELETAL: no clubbing or cyanosis of digits; no joint swelling or tenderness to palpation  PSYCH: A+O to person, place, and time; affect appropriate    LABS:                        8.0    3.69  )-----------( 173      ( 28 Feb 2020 06:38 )             26.0     02-28    136  |  93<L>  |  94<H>  ----------------------------<  88  3.7   |  26  |  3.08<H>    Ca    9.2      28 Feb 2020 06:38  Phos  4.3     02-28  Mg     3.1     02-28    TPro  8.5<H>  /  Alb  3.9  /  TBili  0.7  /  DBili  x   /  AST  34  /  ALT  6<L>  /  AlkPhos  92  02-27    PT/INR - ( 28 Feb 2020 07:58 )   PT: 67.0 sec;   INR: 5.62 ratio                     RADIOLOGY & ADDITIONAL TESTS:  Results Reviewed:   Imaging Personally Reviewed:  Electrocardiogram Personally Reviewed:    COORDINATION OF CARE:  Care Discussed with Consultants/Other Providers [Y/N]:  Prior or Outpatient Records Reviewed [Y/N]: Dalia Triana, PGY-1  Internal Medicine  Pager #: [phone] / [facility] [phone]    PROGRESS NOTE:     Patient is a 79y old  Male who presents with a chief complaint of Cellulitis (28 Feb 2020 07:18)      SUBJECTIVE / OVERNIGHT EVENTS:  -No acute overnight events.    Reports doing well, wants to go home. Denies lower extremity pain/swelling. Denies chest pain, SOB.    ADDITIONAL REVIEW OF SYSTEMS:  Denies fever/chills, chest pain, abd pain, N/V/D, extremity pain/swelling.    MEDICATIONS  (STANDING):  albuterol/ipratropium for Nebulization 3 milliLiter(s) Nebulizer every 6 hours  atorvastatin 20 milliGRAM(s) Oral at bedtime  budesonide 160 MICROgram(s)/formoterol 4.5 MICROgram(s) Inhaler 2 Puff(s) Inhalation two times a day  buMETAnide 2 milliGRAM(s) Oral two times a day  folic acid 1 milliGRAM(s) Oral daily  hydrALAZINE 10 milliGRAM(s) Oral every 8 hours  lactulose Syrup 20 Gram(s) Oral two times a day  metoprolol succinate ER 50 milliGRAM(s) Oral two times a day  multivitamin 1 Tablet(s) Oral daily  polyethylene glycol 3350 17 Gram(s) Oral daily  senna 2 Tablet(s) Oral at bedtime    MEDICATIONS  (PRN):  aluminum hydroxide/magnesium hydroxide/simethicone Suspension 30 milliLiter(s) Oral every 4 hours PRN Dyspepsia  bisacodyl 5 milliGRAM(s) Oral every 12 hours PRN Constipation  oxyCODONE    IR 5 milliGRAM(s) Oral every 4 hours PRN Severe Pain (7 - 10)      CAPILLARY BLOOD GLUCOSE        I&O's Summary    28 Feb 2020 07:01  -  29 Feb 2020 07:00  --------------------------------------------------------  IN: 735 mL / OUT: 850 mL / NET: -115 mL        PHYSICAL EXAM:  Vital Signs Last 24 Hrs  T(C): 36.4 (29 Feb 2020 07:27), Max: 36.8 (28 Feb 2020 20:23)  T(F): 97.5 (29 Feb 2020 07:27), Max: 98.2 (28 Feb 2020 20:23)  HR: 88 (29 Feb 2020 07:27) (88 - 97)  BP: 101/60 (29 Feb 2020 07:27) (90/61 - 114/65)  BP(mean): --  RR: 18 (29 Feb 2020 07:27) (17 - 18)  SpO2: 95% (29 Feb 2020 07:27) (95% - 98%)    GENERAL: elderly, frail male, NAD sitting up in chair on room air, appears fatigued  NEURO: AO x person/place("[facility]"). Not oriented to time.  HEAD:  Atraumatic, Normocephalic  EYES: conjunctiva and sclera clear  NECK: Supple   CHEST/LUNG: Poor inspiratory effort however clear to auscultation bilaterally; No wheezes, rales or rhonchi  HEART: Regular rate and rhythm; No murmurs, rubs, or gallops  ABDOMEN: Soft, Nontender, Nondistended; Bowel sounds present, no masses.  EXTREMITIES:  2+ Peripheral Pulses, trace pedal edema in left lower extremity.   SKIN: Warm, dry, in tact, no rashes or lesions  PSYCH: affect appropriate      LABS:                        8.0    3.69  )-----------( 173      ( 28 Feb 2020 06:38 )             26.0     02-28    136  |  93<L>  |  94<H>  ----------------------------<  88  3.7   |  26  |  3.08<H>    Ca    9.2      28 Feb 2020 06:38  Phos  4.3     02-28  Mg     3.1     02-28    TPro  8.5<H>  /  Alb  3.9  /  TBili  0.7  /  DBili  x   /  AST  34  /  ALT  6<L>  /  AlkPhos  92  02-27    PT/INR - ( 28 Feb 2020 07:58 )   PT: 67.0 sec;   INR: 5.62 ratio                     RADIOLOGY & ADDITIONAL TESTS:  Results Reviewed:   Imaging Personally Reviewed:  Electrocardiogram Personally Reviewed:    COORDINATION OF CARE:  Care Discussed with Consultants/Other Providers [Y/N]:  Prior or Outpatient Records Reviewed [Y/N]:

## 2020-02-29 NOTE — DISCHARGE NOTE NURSING/CASE MANAGEMENT/SOCIAL WORK - PATIENT PORTAL LINK FT
You can access the FollowMyHealth Patient Portal offered by [facility] by registering at the following website: [email or web address]. By joining [employer]’s FollowMyHealth portal, you will also be able to view your health information using other applications (apps) compatible with our system.

## 2020-02-29 NOTE — PROGRESS NOTE ADULT - PROBLEM SELECTOR PLAN 2
Asymmetric lower extremity edema, left > right, with flat erythematous rash on left side, likely representing cellulitis. now s/p abx and resolved.  -DVT US of LLE negative, repeat negative  -Left foot x-ray showing mod soft tissue swelling, no gas tracking. X-ray left tib-fib with sclerotic lesion.  -ABX: Ancef (2/17-2/22), with limited improvement, broadened abx to Zosyn (2/22-2/27) - complete course this AM  -Pain control: morphine 4mg IV q4h PRN severe pain, standing tylenol  - Blood cx NGTD

## 2020-02-29 NOTE — PROGRESS NOTE ADULT - PROBLEM SELECTOR PLAN 7
BP controlled. Home med of metoprolol 100mg BID.  -C/w home metoprolol 100mg BID BP controlled. Home med of metoprolol 100mg BID.  -C/w home metoprolol 50mg BID

## 2020-02-29 NOTE — PROGRESS NOTE ADULT - ATTENDING COMMENTS
Pt seen and examined w/family at bedside. No acute events overnight. Clinically stable for discharge home with hospice. Family expressing some concern re: diuretic mgt and volume status. [facility] referral made.

## 2020-02-29 NOTE — PROGRESS NOTE ADULT - PROBLEM SELECTOR PLAN 1
Acute systolic. Patient with HFrEF (EF 20-25% with RV dysufnction, on 11/2019 TTE) presenting with acute LLE sweeling. CXR with pleural effusions, pulmonary congestion. proBNP 21K.   -Heart Failure following  -Transition IV Bumex to PO Bumex 2mg PO BID  -Milrinone gtt stopped after further GOC discussion with patient and son Jose Manuel at bedside  -Resume Hydralazine 10mg TID  -Monitor BMP daily, goal K > 4, Mg > 2  -Metoprolol decreased to 50 mg BID with holding parameters  -strict I/Os, standing weights Acute systolic. Patient with HFrEF (EF 20-25% with RV dysufnction, on 11/2019 TTE) presenting with acute LLE sweeling. CXR with pleural effusions, pulmonary congestion. proBNP 21K.   -Heart Failure following  -Increase to Bumex 4mg PO BID for discharge  -Milrinone gtt stopped after further GOC discussion with patient and son Jose Manuel at bedside  -Resume Hydralazine 10mg TID  -Monitor BMP daily, goal K > 4, Mg > 2  -Metoprolol decreased to 50 mg BID with holding parameters  -strict I/Os, standing weights

## 2020-02-29 NOTE — DISCHARGE NOTE NURSING/CASE MANAGEMENT/SOCIAL WORK - NSTOBACCONEVERSMOKERY/N_GEN_A
Is This A New Presentation, Or A Follow-Up?: Skin Lesions How Severe Is Your Skin Lesion?: mild Has Your Skin Lesion Been Treated?: not been treated Yes

## 2020-03-08 PROBLEM — I50.22 CHRONIC SYSTOLIC HEART FAILURE: Status: ACTIVE | Noted: 2020-01-01

## 2020-03-08 NOTE — PHYSICAL EXAM
[General Appearance - Well Developed] : well developed [Normal Appearance] : normal appearance [Well Groomed] : well groomed [General Appearance - Well Nourished] : well nourished [No Deformities] : no deformities [General Appearance - In No Acute Distress] : no acute distress [Normal Conjunctiva] : the conjunctiva exhibited no abnormalities [Eyelids - No Xanthelasma] : the eyelids demonstrated no xanthelasmas [Normal Oral Mucosa] : normal oral mucosa [No Oral Pallor] : no oral pallor [No Oral Cyanosis] : no oral cyanosis [Normal Jugular Venous A Waves Present] : normal jugular venous A waves present [Normal Jugular Venous V Waves Present] : normal jugular venous V waves present [No Jugular Venous Snow A Waves] : no jugular venous snow A waves [Respiration, Rhythm And Depth] : normal respiratory rhythm and effort [Exaggerated Use Of Accessory Muscles For Inspiration] : no accessory muscle use [Diffuse Wheezing] : diffuse wheezing [Prolonged Exp Time] : with prolonged expiratory time [Decreased Breath Sounds] : decreased breath sounds [Heart Rate And Rhythm] : heart rate and rhythm were normal [Heart Sounds] : normal S1 and S2 [Systolic grade ___/6] : A grade [unfilled]/6 systolic murmur was heard. [Abdomen Soft] : soft [Abdomen Tenderness] : non-tender [Abdomen Mass (___ Cm)] : no abdominal mass palpated [Abnormal Walk] : normal gait [Gait - Sufficient For Exercise Testing] : the gait was sufficient for exercise testing [Nail Clubbing] : no clubbing of the fingernails [Cyanosis, Localized] : no localized cyanosis [Petechial Hemorrhages (___cm)] : no petechial hemorrhages [Skin Color & Pigmentation] : normal skin color and pigmentation [] : no rash [No Venous Stasis] : no venous stasis [Skin Lesions] : no skin lesions [No Skin Ulcers] : no skin ulcer [No Xanthoma] : no  xanthoma was observed [Oriented To Time, Place, And Person] : oriented to person, place, and time [Affect] : the affect was normal [Mood] : the mood was normal [No Anxiety] : not feeling anxious [FreeTextEntry1] : Trace b/l LE edema

## 2020-03-08 NOTE — REASON FOR VISIT
[Follow-Up - Clinic] : a clinic follow-up of [Aortic Stenosis] : aortic stenosis [Atrial Fibrillation] : atrial fibrillation [Coronary Artery Disease] : coronary artery disease [Heart Failure] : congestive heart failure [Hypertension] : hypertension [Medication Management] : Medication management

## 2020-03-08 NOTE — DISCUSSION/SUMMARY
[FreeTextEntry1] : 78 y/o with HTN, Hchol, PAF on coumadin, Prostate Ca, CAD s/p CABG, AS s/p TAVR -\par s/p BIV-AICD\par \par Appears euvolemic\par No change in meds\par \par Encouraged f/u with Hf - maybe receptive to home ionotropes?\par

## 2020-04-08 NOTE — HISTORY OF PRESENT ILLNESS
[Home] : at home, [unfilled] , at the time of the visit. [Other Location: e.g. Home (Enter Location, City,State)___] : at [unfilled] [Patient] : the patient [FreeTextEntry1] : \par \par *INCOMPLETE TELEHEALTH NOTE, UNABLE TO REACH PATIENT*\par \par Referring: Dr. Quiroz\par \par Mr. 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 Quiroz. His LVEF had been normal but was noted to have an LVEF of 20-25% on 11/2019 with subsequent R/LHC showing patent grafts and elevated filling pressures with low cardiac output. He was hospitalized at [facility] from 12/19-1/1 for ADHF with volume overload and MERCED on CKD. He was diuresed 14 lbs and underwent CRT-D for LBBB ( ms). He was readmitted 2/2020 with cellulitis and also found to be volume overloaded with acute kidney injury. Lactate was elevated at 2.8 and he was started on milrinone. GOC conversation held and he declined home inotropes and was enrolled in home hospice services. \par \par \par

## 2020-04-08 NOTE — DISCUSSION/SUMMARY
[FreeTextEntry1] : # ACC/AHA Stage D mixed ischemic/non-ischemic cardiomyopathy\par - Etiology: has baseline CAD but EF recently declined with LHC revealing normal grafts\par - GDMT: current regimen is metoprolol succinate 100 mg daily and hydralizine 10 mg TID. Deferring RAASi given CKD and borderline BP. \par - Diuretic: current regimen is bumex 2 mg BID. Will consider CardioMEMS with Dr. Quiroz to minimize risk of HF hospitalizations (three this year) pending resolution of COVID-19 epidemic. \par - Device: s/p CRT-D.  Last interrogation 1/2020 with 77% CRT pacing and no events\par - Rhythm: AF, due to chronicity and dilated LA will defer rhythm control approach. If he develops suboptimal CRT pacing in the future may have to reconsider though\par - Labs: 2/29/20\par - Advanced therapies: he has declined home milrinone and currently enrolled in home hospice\par \par # CKD III - Cr worsening recently with new baseline !2.7 likely from low output and fluctuating volume status\par - continue to monitor\par \par # CAD s/p CABG\par - continue statin, beta blocker \par \par # Chronic AF\par - rate controlled\par - continue coumadin\par \par # Active tobacco use with COPD\par - discussed importance of tobacco cessation, he is precontemplative \par \par # COVID-19 prevention\par - discussed the importance of social distancing, wearing a facemask outside, frequent hand washing, and avoiding sick contacts given risk of severe decompensation after infection with underlying cardiomyopathy and comorbidities \par \par RTC in 2-3 months pending resolution of COVID-19 epidemic

## 2020-04-08 NOTE — ASSESSMENT
[FreeTextEntry1] : 80 y/o M with a history of ACC/AHA Stage D mixed ischemic/nonischemic cardiomyopathy (LV 5.3 cm, EF 20-25%) with LBBB s/p CRT-D, CAD s/p 3v CABG (LIMA-LAD, SVG- OM1, SVG- RPL1 in 9/2017), severe aortic stenosis s/p TAVR in 2016, chronic AF (on warfarin), CKD Stage 3 (baseline Cr 1.4-1.5), and COPD who presents today for HF followup.\par \par He has had 3 HF hospitalizations in the past year, worsening renal function, and has had episodes of low output with elevated lactate requiring inotrope support. His overall prognosis is poor and he is not a candidate for OHT or LVAD due to age and comorbidities. Palliative inotropes have been offered but he has declined and is now enrolled in home hospice. \par \par

## 2020-04-08 NOTE — PHYSICAL EXAM
[Well Groomed] : well groomed [General Appearance - In No Acute Distress] : no acute distress [Normal Conjunctiva] : the conjunctiva exhibited no abnormalities [No Oral Pallor] : no oral pallor [No Oral Cyanosis] : no oral cyanosis [Respiration, Rhythm And Depth] : normal respiratory rhythm and effort [Heart Sounds] : normal S1 and S2 [2+] : left 2+ [Bowel Sounds] : normal bowel sounds [Abdomen Soft] : soft [Abdomen Tenderness] : non-tender [] : no hepato-splenomegaly [Cyanosis, Localized] : no localized cyanosis [Oriented To Time, Place, And Person] : oriented to person, place, and time [Affect] : the affect was normal [Mood] : the mood was normal [FreeTextEntry1] : warm peripherally

## 2020-05-01 NOTE — DISCHARGE NOTE PROVIDER - CARE PROVIDER_API CALL
"Subjective:       Patient ID: Jose Oh is a 26 y.o. male.    Vitals:  height is 5' 7" (1.702 m) and weight is 99.8 kg (220 lb). His oral temperature is 100.4 °F (38 °C) (abnormal). His pulse is 120 (abnormal). His oxygen saturation is 100%.     Chief Complaint: Headache    Pt presents with rhinitis, HA x 2-3 days and fever that started today. Pt denies cough, sore throat, anosmia or positive Covid contacts.     Headache    This is a new problem. The current episode started in the past 7 days (wednesday). The problem occurs daily. The problem has been gradually worsening. The pain is located in the frontal region. The pain quality is similar to prior headaches. The quality of the pain is described as aching and dull. The pain is at a severity of 4/10. The pain is moderate. Associated symptoms include a fever and muscle aches. Pertinent negatives include no abdominal pain, coughing, dizziness, ear pain, nausea, neck pain, sore throat or vomiting. The symptoms are aggravated by activity. Treatments tried: mucinex, dayquil, night quil. The treatment provided mild relief.       Constitution: Positive for activity change, appetite change, chills, fatigue and fever. Negative for generalized weakness.   HENT: Positive for congestion. Negative for ear pain and sore throat.    Neck: Negative for neck pain and painful lymph nodes.   Cardiovascular: Negative for chest pain.   Respiratory: Negative for cough, shortness of breath and asthma.    Gastrointestinal: Negative for abdominal pain, nausea, vomiting and diarrhea.   Genitourinary: Negative for dysuria and frequency.   Musculoskeletal: Negative for joint pain, joint swelling, muscle cramps and muscle ache.   Skin: Negative for color change, pale and rash.   Allergic/Immunologic: Negative for seasonal allergies, asthma and immunocompromised state.   Neurological: Positive for headaches. Negative for dizziness, light-headedness and passing out. "   Hematologic/Lymphatic: Negative for swollen lymph nodes, easy bruising/bleeding and history of blood clots. Does not bruise/bleed easily.   Psychiatric/Behavioral: Negative for nervous/anxious, sleep disturbance and depression. The patient is not nervous/anxious.        Objective:      Physical Exam    deferred d/t Covid 19 protocol  Assessment:       1. Suspected Covid-19 Virus Infection        Plan:         Suspected Covid-19 Virus Infection  -     COVID-19 Routine Screening          Patient Instructions   You must understand that you've received an Urgent Care treatment only and that you may be released before all your medical problems are known or treated. You, the patient, will arrange for follow up care as instructed.  If your condition worsens we recommend that you receive another evaluation at the emergency room immediately or contact your primary medical clinics after hours call service to discuss your concerns.  Please return here or go to the Emergency Department for any concerns or worsening of condition.    Instructions for Patients with Confirmed or Suspected COVID-19    If you are awaiting your test result, you will either be called or it will be released to the patient portal.  If you have any questions about your test, please visit [email or web address] or call our COVID-19 information line at [phone].       Stay home and stay away from family members and friends. The CDC says, you can leave home after these three things have happened: 1) You have had no fever for at least 72 hours (that is three full days of no fever without the use of medicine that reduces fevers) 2) AND other symptoms have improved (for example, when your cough or shortness of breath have improved) 3) AND at least 7 days have passed since your symptoms first appeared.   Separate yourself from other people and animals in your home.   Call ahead before visiting your doctor.   Wear a facemask.   Cover your coughs  and sneezes.   Wash your hands often with soap and water; hand  can be used, too.   Avoid sharing personal household items.   Wipe down surfaces used daily.   Monitor your symptoms. Seek prompt medical attention if your illness is worsening (e.g., difficulty breathing).    Before seeking care, call your healthcare provider.   If you have a medical emergency and need to call 911, notify the dispatch personnel that you have, or are being evaluated for COVID-19. If possible, put on a facemask before emergency medical services arrive.        Recommended precautions for household members, intimate partners, and caregivers in a home setting of a patient with symptomatic laboratory-confirmed COVID-19 or a patient under investigation.  Household members, intimate partners, and caregivers in the home setting awaiting tests results have close contact with a person with symptomatic, laboratory-confirmed COVID-19 or a person under investigation. Close contacts should monitor their health; they should call their provider right away if they develop symptoms suggestive of COVID-19 (e.g., fever, cough, shortness of breath).    Close contacts should also follow these recommendations:   Make sure that you understand and can help the patient follow their provider's instructions for medication(s) and care. You should help the patient with basic needs in the home and provide support for getting groceries, prescriptions, and other personal needs.   Monitor the patient's symptoms. If the patient is getting sicker, call his or her healthcare provider and tell them that the patient has laboratory-confirmed COVID-19. If the patient has a medical emergency and you need to call 911, notify the dispatch personnel that the patient has, or is being evaluated for COVID-19.   Household members should stay in another room or be  from the patient. Household members should use a separate bedroom and bathroom, if  available.   Prohibit visitors.   Household members should care for any pets in the home.   Make sure that shared spaces in the home have good air flow, such as by an air conditioner or an opened window, weather permitting.   Perform hand hygiene frequently. Wash your hands often with soap and water for at least 20 seconds or use an alcohol-based hand  (that contains > 60% alcohol) covering all surfaces of your hands and rubbing them together until they feel dry. Soap and water should be used preferentially.   Avoid touching your eyes, nose, and mouth.   The patient should wear a facemask. If the patient is not able to wear a facemask (for example, because it causes trouble breathing), caregivers should wear a mask when they are in the same room as the patient.   Wear a disposable facemask and gloves when you touch or have contact with the patient's blood, stool, or body fluids, such as saliva, sputum, nasal mucus, vomit, urine.  o Throw out disposable facemasks and gloves after using them. Do not reuse.  o When removing personal protective equipment, first remove and dispose of gloves. Then, immediately clean your hands with soap and water or alcohol-based hand . Next, remove and dispose of facemask, and immediately clean your hands again with soap and water or alcohol-based hand .   You should not share dishes, drinking glasses, cups, eating utensils, towels, bedding, or other items with the patient. After the patient uses these items, you should wash them thoroughly (see below Wash laundry thoroughly).   Clean all high-touch surfaces, such as counters, tabletops, doorknobs, bathroom fixtures, toilets, phones, keyboards, tablets, and bedside tables, every day. Also, clean any surfaces that may have blood, stool, or body fluids on them.   Use a household cleaning spray or wipe, according to the label instructions. Labels contain instructions for safe and effective use of the  cleaning product including precautions you should take when applying the product, such as wearing gloves and making sure you have good ventilation during use of the product.   Wash laundry thoroughly.  o Immediately remove and wash clothes or bedding that have blood, stool, or body fluids on them.  o Wear disposable gloves while handling soiled items and keep soiled items away from your body. Clean your hands (with soap and water or an alcohol-based hand ) immediately after removing your gloves.  o Read and follow directions on labels of laundry or clothing items and detergent. In general, using a normal laundry detergent according to washing machine instructions and dry thoroughly using the warmest temperatures recommended on the clothing label.   Place all used disposable gloves, facemasks, and other contaminated items in a lined container before disposing of them with other household waste. Clean your hands (with soap and water or an alcohol-based hand ) immediately after handling these items. Soap and water should be used preferentially if hands are visibly dirty.   Discuss any additional questions with your state or local health department or healthcare provider. Check available hours when contacting your local health department.    For more information see CDC link below.      https://www.cdc.gov/coronavirus/2019-ncov/hcp/guidance-prevent-spread.html#precautions        Sources:  [facility], [facility] and [facility]         [facility]  Phone: [phone]  Fax: (   )    -  Follow Up Time: 2 weeks    Ramón Quiroz)  Cardiovascular Disease; Interventional Cardiology  [address] [place] [address]  Phone: [phone]  Fax: [phone]  Follow Up Time: 2 weeks

## 2020-06-05 PROBLEM — E87.6 HYPOKALEMIA: Status: ACTIVE | Noted: 2020-01-01

## 2020-06-18 ENCOUNTER — APPOINTMENT (OUTPATIENT)
Dept: CARDIOLOGY | Facility: CLINIC | Age: 80
End: 2020-06-18

## 2020-08-23 NOTE — PROVIDER CONTACT NOTE (OTHER) - REASON
AC connected Dr. Rafaela Thapa with [facility] ED. Pt accepted to [facility] ED and [facility] setting up transport.      [facility]  08/23/20 7179 Pt had 8bts wct on tele

## 2020-12-02 NOTE — PROGRESS NOTE ADULT - PROBLEM SELECTOR PLAN 8
- Patient with anemia and thrombocytopenia with elevated MCV.  - Patient is not folic acid or B12 deficient with normal TSH  - Patient had bone marrow biopsy which he states was unremarkable
-Patient with anemia and thrombocytopenia with elevated MCV. Patient is not folic acid or B12 deficient with normal TSH  -Patient had bone marrow biopsy which he states was unremarkable
-Patient with anemia and thrombocytopenia with elevated MCV.  - Patient is not folic acid or B12 deficient with normal TSH  -Patient had bone marrow biopsy which he states was unremarkable
-Patient with anemia and thrombocytopenia with elevated MCV.  - Patient is not folic acid or B12 deficient with normal TSH  -Patient had bone marrow biopsy which he states was unremarkable
yes

## 2021-08-23 NOTE — DIETITIAN INITIAL EVALUATION ADULT. - PROBLEM SELECTOR PROBLEM 8
PATIENT WENT TO [facility] ER WITH HIS WIFE AND BOTH TESTED POSITIVE FOR COVID.   PATIENT CALLED COMPLAINING OF FEVER, CHILLS, SORE CHEST, BODY ACHES, DRAINAGE.   WAS GIVE A PAPER SHOWING TESTED POSITIVE AND WANTED TO BRING IT TO OFFICE.  ADVISED PATIENT WILL BE CONTACTING THE HOSPITAL FOR CARE SUMMARY.  PATIENT WAS ASKED IF NEEDED ANY MEDICATION, LIKE HYDROXYCHLOROQUINE, HE REFUSED. HE RATHER CONSULT WITH PCP TO CHECK IF ANY MEDICATION WILL BE NEEDED AT THIS TIME    PLEASE ADVISE    [phone]   Need for prophylactic measure

## 2021-08-24 NOTE — PROGRESS NOTE ADULT - ATTENDING COMMENTS
Denies dyspnea. Euvolemic.    L leg swelling- likely from venous pooling, compression stocking ordered. F/u repeat doppler. Denies dyspnea. Euvolemic.    L leg swelling- likely venous pooling, compression stocking ordered. F/u repeat doppler. none

## 2022-04-12 NOTE — PROGRESS NOTE ADULT - ASSESSMENT
79M with PMHx of CAD (post-CABG and PCI), severe aortic stenosis (post-TAVR), known LBBB, COPD, current smoker, HTN, atrial fibrillation, and CKD3 referred by Dr. Quiroz's office for elevated cr. Now with acute on chronic systolic chf exacerbation with roberto on ckd- improving. (1) Other Medications/None

## 2022-06-20 NOTE — PATIENT PROFILE ADULT - NSPROMEDSDISPOSITION_GEN_A_NUR
-- DO NOT REPLY / DO NOT REPLY ALL --  -- Message is from the [facility]--    Provider paged via [employer] Documentation - The below message was copied and pasted from a [employer] page:      Initiated Date/Time  6/20/2022 6:10 pm  Message Sent Date/Time  6/20/2022 6:13 pm  Source  [facility]  Department  [facility]  Phone Number  [phone]  Method  Secure Text  Contacted  Wayne Miller MD  Details  Patient  Message  [phone] [facility] CALLER NAME: JUAN PABLO MILANA PHYSICIAN: WAYNE MILLER RE: JUAN PABLO MARIN PATIENT 1976 PATIENT PCP: MENA RENE PATIENT CALLED STATED SHE NEED ANOTHER PRESCRIPTION FOR CONTINUOUS BLOOD GLUC  (DEXCOM G4 PLAT PED RCV/SHARE) DEVICE. NEED SENSORS TO GO WITH THE DEVICE .   sent home w/ family/friend

## 2022-10-27 NOTE — ED PROVIDER NOTE - CLINICAL SUMMARY MEDICAL DECISION MAKING FREE TEXT BOX
Outreach Attempt was made to schedule Overdue Care Gap.    The Outcome of the Outreach was Contact was not made, left message. Care Gaps discussed included Breast.     79M w/ h/o HTN, HLD, CAD s/p CABG x3 vessels (2008), severe aortic stenosis s/p TAVR 2016, afib on coumadin presenting w reported abnormal K and Cr from baseline. No acute sx otherwise. Will check labs, blader scan for retention. Will require admit for further care and evaluation, as well as trending labs. Currently stable, no acute distress. Will continue to follow up and re-assess. Case discussed with Attending.  Giuseppe Livingston MD, PGY3 Emergency Medicine 79M w/ h/o HTN, HLD, CAD s/p CABG x3 vessels (2008), severe aortic stenosis s/p TAVR 2016, afib on coumadin presenting w reported abnormal K and Cr from baseline. No acute sx otherwise. Will check labs, blader scan for retention. Will require admit for further care and evaluation, as well as trending labs. Currently stable, no acute distress. Will continue to follow up and re-assess. Case discussed with Attending.  Giuseppe Livingston MD, PGY3 Emergency Medicine     Attn - agree with above - pt with elevated K+ and worsening renal function and elevated INR.  Pt evaluated by cardiology for Dr. Quiroz - requesting renal consult for possible dialysis vs [place].  Pt likely to go to CCU.

## 2022-11-02 NOTE — PROGRESS NOTE ADULT - PROVIDER SPECIALTY LIST ADULT
Heart Failure Xelguichoz Pregnancy And Lactation Text: This medication is Pregnancy Category D and is not considered safe during pregnancy.  The risk during breast feeding is also uncertain.

## 2023-04-05 NOTE — DIETITIAN INITIAL EVALUATION ADULT. - PROBLEM SELECTOR PLAN 5
Soft BPs on admission. Home med of metoprolol succinate 100mg BID.  -Continue with metoprolol succinate at reduced dose 50mg BID given relative hypotension, with hold parameters [id number]

## 2023-05-16 NOTE — CONSULT NOTE ADULT - SUBJECTIVE AND OBJECTIVE BOX
[facility] Division of Kidney Diseases & Hypertension  INITIAL CONSULT NOTE  --------------------------------------------------------------------------------  HPI:        PAST HISTORY  --------------------------------------------------------------------------------  PAST MEDICAL & SURGICAL HISTORY:  Heart failure  Moderate tricuspid regurgitation  Osteoarthritis  Bilateral carotid artery disease  Hypertension  COPD (chronic obstructive pulmonary disease): cigar smoker for 29 years, quit 1/2019  Prostate cancer: Prostatectomy 2008, radiation 2011  Atrial fibrillation, unspecified type: on coumadin  Erectile dysfunction, unspecified erectile dysfunction type  Coronary artery disease of bypass graft of native heart with stable angina pectoris  [place]  S/P TAVR (transcatheter aortic valve replacement): 2016 in [facility]  S/P tonsillectomy: as a child  S/P prostatectomy: 11/18/2008  S/P hernia repair: inguinal, 1998  S/P CABG x 3: 9/17/2007 at [facility]    FAMILY HISTORY:  Family history of heart disease (Child)    PAST SOCIAL HISTORY:    ALLERGIES & MEDICATIONS  --------------------------------------------------------------------------------  Allergies    No Known Allergies    Intolerances      Standing Inpatient Medications  albuterol/ipratropium for Nebulization. 3 milliLiter(s) Nebulizer every 6 hours  atorvastatin 20 milliGRAM(s) Oral at bedtime  budesonide 160 MICROgram(s)/formoterol 4.5 MICROgram(s) Inhaler 2 Puff(s) Inhalation two times a day  folic acid 1 milliGRAM(s) Oral daily    PRN Inpatient Medications      REVIEW OF SYSTEMS  --------------------------------------------------------------------------------  Gen: No  fevers/chills, weakness  Skin: No rashes  Head/Eyes/Ears/Mouth: No headache; Normal hearing; Normal vision w/o blurriness;   Respiratory: No dyspnea, cough, wheezing, hemoptysis  CV: No chest pain,   GI: No abdominal pain, diarrhea, constipation, nausea, vomiting  : No increased frequency, dysuria, hematuria, nocturia  MSK: No joint pain/swelling;   Neuro: No dizziness/lightheadedness, weakness, seizures    All other systems were reviewed and are negative, except as noted.    VITALS/PHYSICAL EXAM  --------------------------------------------------------------------------------  T(C): 36.6 (12-20-19 @ 07:57), Max: 36.6 (12-20-19 @ 01:04)  HR: 78 (12-20-19 @ 07:57) (65 - 99)  BP: 133/78 (12-20-19 @ 07:57) (99/65 - 139/70)  RR: 18 (12-20-19 @ 07:57) (17 - 20)  SpO2: 97% (12-20-19 @ 07:57) (94% - 99%)  Wt(kg): --  Height (cm): 162.56 (12-19-19 @ 19:21)  Weight (kg): 70.3 (12-19-19 @ 19:21)  BMI (kg/m2): 26.6 (12-19-19 @ 19:21)  BSA (m2): 1.76 (12-19-19 @ 19:21)      Physical Exam:  	Gen: NAD, well-appearing  	HEENT: PERRL, supple neck  	Pulm: CTA B/L  	CV:  S1S2  	Abd: +BS, soft   	Ext: No B/L Lower ext edema  	Neuro: No focal deficits  	Skin: Warm, without rashes  	Vascular access:     LABS/STUDIES  --------------------------------------------------------------------------------              11.6   5.45  >-----------<  130      [12-19-19 @ 19:54]              36.5     141  |  101  |  108  ----------------------------<  132      [12-19-19 @ 21:27]  5.4   |  21  |  3.40        Ca     9.1     [12-19-19 @ 21:27]      Mg     2.7     [12-19-19 @ 19:54]      Phos  5.6     [12-19-19 @ 19:54]    TPro  7.9  /  Alb  3.6  /  TBili  0.4  /  DBili  x   /  AST  40  /  ALT  28  /  AlkPhos  101  [12-19-19 @ 19:54]    PT/INR: PT 75.0 , INR 6.22       [12-19-19 @ 19:54]  PTT: 60.8       [12-19-19 @ 19:54]      Creatinine Trend:  SCr 3.40 [12-19 @ 21:27]  SCr 3.42 [12-19 @ 19:54]  SCr 1.68 [12-05 @ 05:19]  SCr 1.63 [12-04 @ 12:24]  SCr 1.73 [12-03 @ 13:34] [facility] Division of Kidney Diseases & Hypertension  INITIAL CONSULT NOTE  --------------------------------------------------------------------------------  HPI:  79M with PMHx of CAD (post-CABG and PCI), severe aortic stenosis (post-TAVR), known LBBB, COPD, current smoker, HTN, atrial fibrillation, and CKD3 referred by Dr. Quiroz's office for elevated creatine and potassium. Patient recently admitted on 12/3-12/5/19 for new onset CHF exacerbation. Since discharge patient reports he has been having increasing LE swelling and has since increased his dose of Lasix from 40mgBID to 60mg BID. Patient also has been taking Tylenol 1000mg and Ibuprofen 600mg 3x/week. On presentation patient found to have SCr: 3.4 and hyperkalemia 6.7 (mod hemolyzed) with repeat serum K: 5.4. Nephrology team was consulted.  Patient with  baseline SCr: 1.1- 1.5. Last SCr: 1.6 on 12/5/19. On review of systems patient reports increasing LE swelling and  GAN. He denies cough, fever, nausea/vomiting, diarrhea, chest pain or palpitations      PAST HISTORY  --------------------------------------------------------------------------------  PAST MEDICAL & SURGICAL HISTORY:  Heart failure  Moderate tricuspid regurgitation  Osteoarthritis  Bilateral carotid artery disease  Hypertension  COPD (chronic obstructive pulmonary disease): cigar smoker for 29 years, quit 1/2019  Prostate cancer: Prostatectomy 2008, radiation 2011  Atrial fibrillation, unspecified type: on coumadin  Erectile dysfunction, unspecified erectile dysfunction type  Coronary artery disease of bypass graft of native heart with stable angina pectoris  [place]  S/P TAVR (transcatheter aortic valve replacement): 2016 in [facility]  S/P tonsillectomy: as a child  S/P prostatectomy: 11/18/2008  S/P hernia repair: inguinal, 1998  S/P CABG x 3: 9/17/2007 at [facility]    FAMILY HISTORY:  Family history of heart disease (Child)    PAST SOCIAL HISTORY:  Patient is a current tobacco and cigar smoker. He denies significant alcohol use. There is no IVDU.    ALLERGIES & MEDICATIONS  --------------------------------------------------------------------------------  Allergies    No Known Allergies    Intolerances      Standing Inpatient Medications  albuterol/ipratropium for Nebulization. 3 milliLiter(s) Nebulizer every 6 hours  atorvastatin 20 milliGRAM(s) Oral at bedtime  budesonide 160 MICROgram(s)/formoterol 4.5 MICROgram(s) Inhaler 2 Puff(s) Inhalation two times a day  folic acid 1 milliGRAM(s) Oral daily    PRN Inpatient Medications      REVIEW OF SYSTEMS  --------------------------------------------------------------------------------  Gen: No  fevers/chills, weakness  Skin: No rashes  Head/Eyes/Ears/Mouth: No headache  Respiratory: + GAN  CV: No chest pain,   GI: No abdominal pain, diarrhea, constipation, nausea, vomiting  : No increased frequency, dysuria, hematuria, nocturia  MSK:  + LE swelling  Neuro: No dizziness/lightheadedness, weakness, seizures    All other systems were reviewed and are negative, except as noted.    VITALS/PHYSICAL EXAM  --------------------------------------------------------------------------------  T(C): 36.6 (12-20-19 @ 07:57), Max: 36.6 (12-20-19 @ 01:04)  HR: 78 (12-20-19 @ 07:57) (65 - 99)  BP: 133/78 (12-20-19 @ 07:57) (99/65 - 139/70)  RR: 18 (12-20-19 @ 07:57) (17 - 20)  SpO2: 97% (12-20-19 @ 07:57) (94% - 99%)  Wt(kg): --  Height (cm): 162.56 (12-19-19 @ 19:21)  Weight (kg): 70.3 (12-19-19 @ 19:21)  BMI (kg/m2): 26.6 (12-19-19 @ 19:21)  BSA (m2): 1.76 (12-19-19 @ 19:21)      Physical Exam:  	Gen: NAD, well-appearing  	HEENT: supple neck, mild JVD elevation  	Pulm: rales on the left lung   	CV:  S1S2  	Abd: +BS, soft   	Ext: + bilateral pitting edema  	Neuro: No focal deficits  	Skin: Warm, without rashes  	Vascular access: none    LABS/STUDIES  --------------------------------------------------------------------------------              11.6   5.45  >-----------<  130      [12-19-19 @ 19:54]              36.5     141  |  101  |  108  ----------------------------<  132      [12-19-19 @ 21:27]  5.4   |  21  |  3.40        Ca     9.1     [12-19-19 @ 21:27]      Mg     2.7     [12-19-19 @ 19:54]      Phos  5.6     [12-19-19 @ 19:54]    TPro  7.9  /  Alb  3.6  /  TBili  0.4  /  DBili  x   /  AST  40  /  ALT  28  /  AlkPhos  101  [12-19-19 @ 19:54]    PT/INR: PT 75.0 , INR 6.22       [12-19-19 @ 19:54]  PTT: 60.8       [12-19-19 @ 19:54]      Creatinine Trend:  SCr 3.40 [12-19 @ 21:27]  SCr 3.42 [12-19 @ 19:54]  SCr 1.68 [12-05 @ 05:19]  SCr 1.63 [12-04 @ 12:24]  SCr 1.73 [12-03 @ 13:34] Rifampin Pregnancy And Lactation Text: This medication is Pregnancy Category C and it isn't know if it is safe during pregnancy. It is also excreted in breast milk and should not be used if you are breast feeding.

## 2023-11-28 NOTE — PHYSICAL THERAPY INITIAL EVALUATION ADULT - BALANCE DISTURBANCE, IDENTIFIED IMPAIRMENT CONTRIBUTE, REHAB EVAL
How Severe Are Your Warts?: moderate
Is This A New Presentation, Or A Follow-Up?: Follow Up Jatin
Treatment Number (Optional): 2
pain/impaired postural control/decreased strength

## 2024-07-10 NOTE — PROGRESS NOTE ADULT - PROBLEM SELECTOR PLAN 4
No F/C/N/V/D, abdominal/flank   UA C&S  
 Reinforced changing positions slowly   Reinforced using walker/rollator for mobility  
 Stable   BP within goal   Lasix propranolol   monitor  
 Stable   Continue to avoid nephrotoxins    Monitor BMP  
 Stable   no SOB, Rales, edema   Lasix    monitor  
+ nighttime voids and hesitancy   Finasteride  
Stable  no SOB, palpitations, dizziness   rate controlled  Xarelto   bleeding precaution  monitor  
Improving with diuresis  Will continue to monitor daily

## 2024-08-15 NOTE — PROVIDER CONTACT NOTE (OTHER) - DATE AND TIME:
31-Dec-2019 01:30 Detail Level: Simple Render Risk Assessment In Note?: no Additional Notes: Pt will discuss with cardiologist about possibly being clear to use oral minoxidil in the future & pt is considering doing red light therapy for scalp

## 2025-03-04 NOTE — PATIENT PROFILE ADULT - LIVES WITH
69 y/o M with PMH of HTN, HLD, VAZQUEZ on CPAP and home O2 (2LNC daytime), CKD, epilepsy, schizophrenia developmental delay, metastatic testicular CA. Recent admission at [facility] for acute respiratory failure in the setting of seizures, influenza infection, pulmonary edema, PNA requiring intubation in MICU. Was eventually discharged to rehab. Presents now with SOB, found to be COVID + at facility on 2/13. Febrile on admission to ED to 101.9F. Placed on Bipap for increased WOB. Pulmonary called to consult for acute respiratory failure.  alone
